# Patient Record
Sex: FEMALE | Race: WHITE | NOT HISPANIC OR LATINO | Employment: OTHER | URBAN - METROPOLITAN AREA
[De-identification: names, ages, dates, MRNs, and addresses within clinical notes are randomized per-mention and may not be internally consistent; named-entity substitution may affect disease eponyms.]

---

## 2017-01-03 ENCOUNTER — HOSPITAL ENCOUNTER (OUTPATIENT)
Dept: PHYSICAL THERAPY | Facility: HOSPITAL | Age: 77
Setting detail: THERAPIES SERIES
Discharge: HOME | End: 2017-01-03

## 2017-01-03 DIAGNOSIS — I89.0 LYMPHEDEMA: Primary | ICD-10-CM

## 2017-01-03 PROCEDURE — 97140 MANUAL THERAPY 1/> REGIONS: CPT

## 2017-01-03 NOTE — PROGRESS NOTES
Outpatient Physical Therapy Lymphedema Treatment Note  [facility]     Patient Name: Shoshana Bae  : 1940  MRN: [id number]  Today's Date: 1/3/2017        Visit Date: 2017    Visit Dx:    ICD-10-CM ICD-9-CM   1. Lymphedema I89.0 457.1       Patient Active Problem List   Diagnosis   • History of total knee arthroplasty   • Pain in left knee   • Failed total knee arthroplasty   • H/O mammogram   • H/O colonoscopy   • Lymph edema   • Sarcoidosis   • Atrial fibrillation   • Hyperlipidemia   • Hypertriglyceridemia   • Type 2 diabetes mellitus with hyperglycemia   • Stasis dermatitis of both legs   • Essential hypertension   • Cellulitis of left lower extremity   • Cellulitis of extremity, lower bilateral   • Chronic anticoagulation, for a fib   • Morbid obesity   • OA (osteoarthritis) of knee   • Infected prosthetic knee joint              Lymphedema       17 1000          Subjective Comments    Subjective Comments States the nurse where she lives did bandage her over the long holiday weekend, but did not apply as many bandages on the left leg as we do here.  -KD      Subjective Pain    Able to rate subjective pain? yes  -KD      Pre-Treatment Pain Level 3  -KD      Post-Treatment Pain Level 3  -KD      Manual Lymphatic Drainage    Manual Lymphatic Drainage --   B inguinal, BLE's  -KD      Compression/Skin Care    Skin Care --   Eucerin lotion  -KD      Wrapping Location --   R LE foot to knee, L LE foot to mid-thigh  -KD      Wrapping Comments Left: Silver stockinette, Rosidal soft, Rosidal K 3-10cm, 2-12cm. Right: Silver stockinette, Rosidal soft, Rosidal K 3-10cm.  -KD        User Key  (r) = Recorded By, (t) = Taken By, (c) = Cosigned By    Initials Name Provider Type    CLAUDIA Cruz, PT Physical Therapist                              PT Assessment/Plan       17 1057 16 1141 16 1116    PT Assessment    Assessment Comments No rash noted today. Will measure pt on  "1/5/16.  -KD Noted light rash left knee, but pt requested using Eucerin lotion instead of HCC.  Will moniter.  -PC Measurements are about the same today. Due to the holidays pt is being bandaged frequently at home.  -KD    PT Plan    PT Plan Comments Cont. per POC.  -KD Cont.  -PC Cont.  -KD      User Key  (r) = Recorded By, (t) = Taken By, (c) = Cosigned By    Initials Name Provider Type    PC Joya Escudero, PT Physical Therapist    CLAUDIA Cruz, PT Physical Therapist                     Exercises       01/03/17 1000          Subjective Comments    Subjective Comments States the nurse where she lives did bandage her over the long holiday weekend, but did not apply as many bandages on the left leg as we do here.  -KD      Subjective Pain    Able to rate subjective pain? yes  -KD      Pre-Treatment Pain Level 3  -KD      Post-Treatment Pain Level 3  -KD        User Key  (r) = Recorded By, (t) = Taken By, (c) = Cosigned By    Initials Name Provider Type    KD Brit Cruz, PT Physical Therapist                              PT OP Goals       12/27/16 1100          PT Short Term Goals    STG Date to Achieve 01/03/17  -KD      STG 1 Patient to demonstrate proper awareness of \"What is Lymphedema\" , \"Do's and Don'ts\", and /or Risk Reduction Practices\" for improved prevention, management, care of symptoms and ease to self-care of condition.  -KD      STG 1 Progress Ongoing  -KD      STG 2 Patient independent and compliant with self-wrapping techniques of compression bandages with assistance of caregiver as needed for improved self-management of condition.  -KD      STG 2 Progress Met  -KD      STG 2 Progress Comments Nurse where pt lives is able to bandage pt's legs.  -KD      STG 3 Patient will demonstrate decreased net edema of B LE's  >/= 5-10cm  for decrease in edema, symptoms, decreased risk of infection and improved skin care/transition to self-care of condition.  -KD      STG 3 Progress Ongoing  -KD      STG " 3 Progress Comments Measurements are about the same.  -KD        User Key  (r) = Recorded By, (t) = Taken By, (c) = Cosigned By    Initials Name Provider Type    CLAUDIA Cruz PT Physical Therapist                Therapy Education       01/03/17 1057    Therapy Education    Given Edema management  -KD    Program Reinforced  -KD    How Provided Verbal  -KD    Provided to Patient  -KD    Level of Understanding Verbalized  -KD      12/28/16 1140    Therapy Education    Given Symptoms/condition management   Skin care  -PC    Program Reinforced  -PC    How Provided Verbal  -PC    Provided to Patient  -PC    Level of Understanding Verbalized  -PC      12/27/16 1114    Therapy Education    Given Edema management  -KD    Program Reinforced  -KD    How Provided Verbal  -KD    Provided to Patient  -KD    Level of Understanding Verbalized  -KD      User Key  (r) = Recorded By, (t) = Taken By, (c) = Cosigned By    Initials Name Provider Type    PC Joya Escudero, PT Physical Therapist    CLAUDIA Cruz PT Physical Therapist                Time Calculation:   Start Time: 0835  Stop Time: 0936  Time Calculation (min): 61 min     Therapy Charges for Today     Code Description Service Date Service Provider Modifiers Qty    02916807999 HC PT MANUAL THERAPY EA 15 MIN 1/3/2017 Brit Cruz, PT GP 4                    Brit Cruz PT  1/3/2017

## 2017-01-04 ENCOUNTER — HOSPITAL ENCOUNTER (OUTPATIENT)
Dept: PHYSICAL THERAPY | Facility: HOSPITAL | Age: 77
Setting detail: THERAPIES SERIES
Discharge: HOME | End: 2017-01-04

## 2017-01-04 DIAGNOSIS — I89.0 LYMPHEDEMA: Primary | ICD-10-CM

## 2017-01-04 PROCEDURE — 97140 MANUAL THERAPY 1/> REGIONS: CPT

## 2017-01-04 NOTE — PROGRESS NOTES
Outpatient Physical Therapy Lymphedema Treatment Note  [facility]     Patient Name: Shoshana Bae  : 1940  MRN: [id number]  Today's Date: 2017        Visit Date: 2017    Visit Dx:    ICD-10-CM ICD-9-CM   1. Lymphedema I89.0 457.1       Patient Active Problem List   Diagnosis   • History of total knee arthroplasty   • Pain in left knee   • Failed total knee arthroplasty   • H/O mammogram   • H/O colonoscopy   • Lymph edema   • Sarcoidosis   • Atrial fibrillation   • Hyperlipidemia   • Hypertriglyceridemia   • Type 2 diabetes mellitus with hyperglycemia   • Stasis dermatitis of both legs   • Essential hypertension   • Cellulitis of left lower extremity   • Cellulitis of extremity, lower bilateral   • Chronic anticoagulation, for a fib   • Morbid obesity   • OA (osteoarthritis) of knee   • Infected prosthetic knee joint              Lymphedema       17 0900 17 1000       Subjective Comments    Subjective Comments No complaints of itching.  -PC States the nurse where she lives did bandage her over the long holiday weekend, but did not apply as many bandages on the left leg as we do here.  -KD     Subjective Pain    Able to rate subjective pain? yes  -PC yes  -KD     Pre-Treatment Pain Level 0  -PC 3  -KD     Post-Treatment Pain Level 0  -PC 3  -KD     Lymphedema Edema Assessment    Edema Assessment Comment Left foot and ankle edematous with pitting from pt's shoe.  -PC      Skin Changes/Observations    Skin Observations Comment No rash noted todayl  -PC      Manual Lymphatic Drainage    Manual Lymphatic Drainage --   B inguinal, BLE's  -PC --   B inguinal, BLE's  -KD     Compression/Skin Care    Skin Care --   Eucerin lotion  -PC --   Eucerin lotion  -KD     Wrapping Location --   R LE foot to knee, L LE foot to mid-thigh  -PC --   R LE foot to knee, L LE foot to mid-thigh  -KD     Wrapping Comments Left: Silver stockinette, Rosidal soft, Rosidal K 3-10cm, 2-12cm. Right: Silver  "stockinette, Rosidal soft, Rosidal K 3-10cm.  -PC Left: Silver stockinette, Rosidal soft, Rosidal K 3-10cm, 2-12cm. Right: Silver stockinette, Rosidal soft, Rosidal K 3-10cm.  -KD       User Key  (r) = Recorded By, (t) = Taken By, (c) = Cosigned By    Initials Name Provider Type    PC Joya Escudero, PT Physical Therapist    CLAUDIA Cruz, PT Physical Therapist                              PT Assessment/Plan       01/04/17 0945 01/03/17 1057 12/28/16 1141    PT Assessment    Assessment Comments Legs appear smaller in size.  -PC No rash noted today. Will measure pt on 1/5/16.  -KD Noted light rash left knee, but pt requested using Eucerin lotion instead of HCC.  Will moniter.  -PC    PT Plan    PT Plan Comments Cont.  -PC Cont. per POC.  -KD Cont.  -PC      User Key  (r) = Recorded By, (t) = Taken By, (c) = Cosigned By    Initials Name Provider Type    PC Joya Escudero, PT Physical Therapist    KD Brit Cruz, PT Physical Therapist                     Exercises       01/04/17 0900 01/03/17 1000       Subjective Comments    Subjective Comments No complaints of itching.  -PC States the nurse where she lives did bandage her over the long holiday weekend, but did not apply as many bandages on the left leg as we do here.  -KD     Subjective Pain    Able to rate subjective pain? yes  -PC yes  -KD     Pre-Treatment Pain Level 0  -PC 3  -KD     Post-Treatment Pain Level 0  -PC 3  -KD       User Key  (r) = Recorded By, (t) = Taken By, (c) = Cosigned By    Initials Name Provider Type    PC Joya Escudero, PT Physical Therapist    CLAUDIA Cruz, PT Physical Therapist                              PT OP Goals       12/27/16 1100          PT Short Term Goals    STG Date to Achieve 01/03/17  -KD      STG 1 Patient to demonstrate proper awareness of \"What is Lymphedema\" , \"Do's and Don'ts\", and /or Risk Reduction Practices\" for improved prevention, management, care of symptoms and ease to self-care of condition.  -KD   "    STG 1 Progress Ongoing  -KD      STG 2 Patient independent and compliant with self-wrapping techniques of compression bandages with assistance of caregiver as needed for improved self-management of condition.  -KD      STG 2 Progress Met  -KD      STG 2 Progress Comments Nurse where pt lives is able to bandage pt's legs.  -KD      STG 3 Patient will demonstrate decreased net edema of B LE's  >/= 5-10cm  for decrease in edema, symptoms, decreased risk of infection and improved skin care/transition to self-care of condition.  -KD      STG 3 Progress Ongoing  -KD      STG 3 Progress Comments Measurements are about the same.  -KD        User Key  (r) = Recorded By, (t) = Taken By, (c) = Cosigned By    Initials Name Provider Type    CLAUDIA Cruz PT Physical Therapist                Therapy Education       01/04/17 0945    Therapy Education    Given Symptoms/condition management  -PC    Program Reinforced  -PC    How Provided Verbal  -PC    Provided to Patient  -PC    Level of Understanding Verbalized  -PC      01/03/17 1057    Therapy Education    Given Edema management  -KD    Program Reinforced  -KD    How Provided Verbal  -KD    Provided to Patient  -KD    Level of Understanding Verbalized  -KD      12/28/16 1140    Therapy Education    Given Symptoms/condition management   Skin care  -PC    Program Reinforced  -PC    How Provided Verbal  -PC    Provided to Patient  -PC    Level of Understanding Verbalized  -PC      User Key  (r) = Recorded By, (t) = Taken By, (c) = Cosigned By    Initials Name Provider Type    PC Joya Escudero, PT Physical Therapist    KD Brit Cruz PT Physical Therapist                Time Calculation:   Start Time: 0832  Stop Time: 0934  Time Calculation (min): 62 min     Therapy Charges for Today     Code Description Service Date Service Provider Modifiers Qty    38203804757  PT MANUAL THERAPY EA 15 MIN 1/4/2017 Joya Escudero, PT GP 4                    Joya Escudero,  PT  1/4/2017

## 2017-01-05 ENCOUNTER — APPOINTMENT (OUTPATIENT)
Dept: LAB | Facility: HOSPITAL | Age: 77
End: 2017-01-05

## 2017-01-05 ENCOUNTER — HOSPITAL ENCOUNTER (OUTPATIENT)
Dept: PHYSICAL THERAPY | Facility: HOSPITAL | Age: 77
Setting detail: THERAPIES SERIES
Discharge: HOME | End: 2017-01-05

## 2017-01-05 PROCEDURE — 36415 COLL VENOUS BLD VENIPUNCTURE: CPT | Performed by: ORTHOPAEDIC SURGERY

## 2017-01-05 PROCEDURE — 97140 MANUAL THERAPY 1/> REGIONS: CPT

## 2017-01-05 PROCEDURE — 86140 C-REACTIVE PROTEIN: CPT | Performed by: ORTHOPAEDIC SURGERY

## 2017-01-05 PROCEDURE — 85652 RBC SED RATE AUTOMATED: CPT | Performed by: ORTHOPAEDIC SURGERY

## 2017-01-05 NOTE — PROGRESS NOTES
Outpatient Physical Therapy Lymphedema Treatment Note  [facility]     Patient Name: Shoshana Bae  : 1940  MRN: [id number]  Today's Date: 2017        Visit Date: 2017    Visit Dx:  No diagnosis found.    Patient Active Problem List   Diagnosis   • History of total knee arthroplasty   • Pain in left knee   • Failed total knee arthroplasty   • H/O mammogram   • H/O colonoscopy   • Lymph edema   • Sarcoidosis   • Atrial fibrillation   • Hyperlipidemia   • Hypertriglyceridemia   • Type 2 diabetes mellitus with hyperglycemia   • Stasis dermatitis of both legs   • Essential hypertension   • Cellulitis of left lower extremity   • Cellulitis of extremity, lower bilateral   • Chronic anticoagulation, for a fib   • Morbid obesity   • OA (osteoarthritis) of knee   • Infected prosthetic knee joint              Lymphedema       17 0900          Subjective Comments    Subjective Comments States she thinks her left knee is down some.  -KD      Subjective Pain    Able to rate subjective pain? yes  -KD      Pre-Treatment Pain Level 0  -KD      Post-Treatment Pain Level 0  -KD      Lymphedema Edema Assessment    Edema Assessment Comment Left knee seems less edematous.  -KD      LLE Circumferential (cm)    Measurement Location 1 10cm above knee  -KD      Left 1 59.5 cm  -KD      Measurement Location 2 Knee (popliteal crease)  -KD      Left 2 59 cm  -KD      Measurement Location 3 10cm below knee  -KD      Left 3 46 cm  -KD      Measurement Location 4 20cm below knee  -KD      Left 4 40.5 cm  -KD      Measurement Location 5 30cm below knee  -KD      Left 5 27 cm  -KD      Measurement Location 6 Ankle  -KD      Left 6 26 cm  -KD      Measurement Location 7 Midfoot  -KD      Left 7 25 cm  -KD      Measurement Location 8 TOTAL  -KD      Left 8 283 cm  -KD      Measurement Location 9 Difference since 16  -KD      Left 9 -6.2 cm  -KD      RLE Circumferential (cm)    Measurement Location 1 10cm above  knee  -KD      Right 1 55.5 cm  -KD      Measurement Location 2 Knee (popliteal crease)  -KD      Right 2 46.5 cm  -KD      Measurement Location 3 10cm below  knee  -KD      Right 3 41.5 cm  -KD      Measurement Location 4 20cm below knee  -KD      Right 4 32.5 cm  -KD      Measurement Location 5 30cm below knee  -KD      Right 5 23 cm  -KD      Measurement Location 6 Ankle  -KD      Right 6 25.7 cm  -KD      Measurement Location 7 Midfoot  -KD      Right 7 24.5 cm  -KD      Measurement Location 8 TOTAL  -KD      Right 8 249.2 cm  -KD      Measurement Location 9 Difference since 12/20/16  -KD      Right 9 -1.3 cm  -KD      Manual Lymphatic Drainage    Manual Lymphatic Drainage --   B inguinal, BLE's  -KD      Compression/Skin Care    Skin Care --   Eucerin lotion  -KD      Wrapping Location --   R LE foot to knee, L LE foot to mid-thigh  -KD      Wrapping Comments Left: Silver stockinette, Rosidal soft, Rosidal K 3-10cm, 2-12cm. Right: Silver stockinette, Rosidal soft, Rosidal K 3-10cm  -KD        User Key  (r) = Recorded By, (t) = Taken By, (c) = Cosigned By    Initials Name Provider Type    KD Brit Cruz, PT Physical Therapist                              PT Assessment/Plan       01/05/17 0947 01/04/17 0945 01/03/17 1057    PT Assessment    Assessment Comments Msmts down some today, rashi for left knee.  -KD Legs appear smaller in size.  -PC No rash noted today. Will measure pt on 1/5/16.  -KD    PT Plan    PT Plan Comments Cont.  -KD Cont.  -PC Cont. per POC.  -KD      User Key  (r) = Recorded By, (t) = Taken By, (c) = Cosigned By    Initials Name Provider Type    PC Joya Escudero, PT Physical Therapist    KD Brit Cruz, PT Physical Therapist                     Exercises       01/05/17 0900          Subjective Comments    Subjective Comments States she thinks her left knee is down some.  -KD      Subjective Pain    Able to rate subjective pain? yes  -KD      Pre-Treatment Pain Level 0  -KD       "Post-Treatment Pain Level 0  -KD        User Key  (r) = Recorded By, (t) = Taken By, (c) = Cosigned By    Initials Name Provider Type    CLAUDIA Cruz, PT Physical Therapist                              PT OP Goals       01/05/17 0900 12/27/16 1100       PT Short Term Goals    STG Date to Achieve 01/03/17  -KD 01/03/17  -KD     STG 1 Patient to demonstrate proper awareness of \"What is Lymphedema\" , \"Do's and Don'ts\", and /or Risk Reduction Practices\" for improved prevention, management, care of symptoms and ease to self-care of condition.  -KD Patient to demonstrate proper awareness of \"What is Lymphedema\" , \"Do's and Don'ts\", and /or Risk Reduction Practices\" for improved prevention, management, care of symptoms and ease to self-care of condition.  -KD     STG 1 Progress Ongoing  -KD Ongoing  -KD     STG 2 Patient independent and compliant with self-wrapping techniques of compression bandages with assistance of caregiver as needed for improved self-management of condition.  -KD Patient independent and compliant with self-wrapping techniques of compression bandages with assistance of caregiver as needed for improved self-management of condition.  -KD     STG 2 Progress Met  -KD Met  -KD     STG 2 Progress Comments  Nurse where pt lives is able to bandage pt's legs.  -KD     STG 3 Patient will demonstrate decreased net edema of B LE's  >/= 5-10cm  for decrease in edema, symptoms, decreased risk of infection and improved skin care/transition to self-care of condition.  -KD Patient will demonstrate decreased net edema of B LE's  >/= 5-10cm  for decrease in edema, symptoms, decreased risk of infection and improved skin care/transition to self-care of condition.  -KD     STG 3 Progress Partially Met  -KD Ongoing  -KD     STG 3 Progress Comments Met for L LE (decrease of 6.2cm). R LE down 1.3cm  -KD Measurements are about the same.  -KD       User Key  (r) = Recorded By, (t) = Taken By, (c) = Cosigned By    Initials " Name Provider Type    CLAUDIA Cruz PT Physical Therapist                Therapy Education       01/05/17 0944    Therapy Education    Given Bandaging/dressing change   reiterated importance of using the same number of bandages on the weekend that we apply here  -KD    Program Reinforced  -KD    How Provided Verbal  -KD    Provided to Patient  -KD    Level of Understanding Verbalized  -KD      01/04/17 0945    Therapy Education    Given Symptoms/condition management  -PC    Program Reinforced  -PC    How Provided Verbal  -PC    Provided to Patient  -PC    Level of Understanding Verbalized  -PC      01/03/17 1057    Therapy Education    Given Edema management  -KD    Program Reinforced  -KD    How Provided Verbal  -KD    Provided to Patient  -KD    Level of Understanding Verbalized  -KD      User Key  (r) = Recorded By, (t) = Taken By, (c) = Cosigned By    Initials Name Provider Type    LIZZIE Escudero, OLE Physical Therapist    CLAUDIA Cruz PT Physical Therapist                Time Calculation:   Start Time: 0833  Stop Time: 0932  Time Calculation (min): 59 min     Therapy Charges for Today     Code Description Service Date Service Provider Modifiers Qty    25910535855 HC PT MANUAL THERAPY EA 15 MIN 1/5/2017 Brit Cruz, PT GP 4                    Brit Cruz PT  1/5/2017

## 2017-01-06 ENCOUNTER — TELEPHONE (OUTPATIENT)
Dept: ORTHOPEDIC SURGERY | Facility: CLINIC | Age: 77
End: 2017-01-06

## 2017-01-06 ENCOUNTER — HOSPITAL ENCOUNTER (OUTPATIENT)
Dept: PHYSICAL THERAPY | Facility: HOSPITAL | Age: 77
Setting detail: THERAPIES SERIES
Discharge: HOME | End: 2017-01-06

## 2017-01-06 DIAGNOSIS — I89.0 LYMPHEDEMA: Primary | ICD-10-CM

## 2017-01-06 PROCEDURE — 97140 MANUAL THERAPY 1/> REGIONS: CPT

## 2017-01-06 NOTE — PROGRESS NOTES
Outpatient Physical Therapy Lymphedema Treatment Note  [facility]     Patient Name: Shoshana Bae  : 1940  MRN: [id number]  Today's Date: 2017        Visit Date: 2017    Visit Dx:    ICD-10-CM ICD-9-CM   1. Lymphedema I89.0 457.1       Patient Active Problem List   Diagnosis   • History of total knee arthroplasty   • Pain in left knee   • Failed total knee arthroplasty   • H/O mammogram   • H/O colonoscopy   • Lymph edema   • Sarcoidosis   • Atrial fibrillation   • Hyperlipidemia   • Hypertriglyceridemia   • Type 2 diabetes mellitus with hyperglycemia   • Stasis dermatitis of both legs   • Essential hypertension   • Cellulitis of left lower extremity   • Cellulitis of extremity, lower bilateral   • Chronic anticoagulation, for a fib   • Morbid obesity   • OA (osteoarthritis) of knee   • Infected prosthetic knee joint              Lymphedema       17 1000          Subjective Comments    Subjective Comments States the bandages have been staying on fine.  -PC      Subjective Pain    Able to rate subjective pain? yes  -PC      Pre-Treatment Pain Level 0  -PC      Post-Treatment Pain Level 0  -PC      Manual Lymphatic Drainage    Manual Lymphatic Drainage --   B inguinal, BLE's  -PC      Compression/Skin Care    Skin Care --   Eucerin lotion  -PC      Wrapping Location --   R LE foot to knee, L LE foot to mid-thigh  -PC      Wrapping Comments Left: Silver stockinette, Rosidal soft, Rosidal K 3-10cm, 2-12cm. Right: Silver stockinette, Rosidal soft, Rosidal K 3-10cm  -PC        User Key  (r) = Recorded By, (t) = Taken By, (c) = Cosigned By    Initials Name Provider Type    PC Joya Escudero, PT Physical Therapist                              PT Assessment/Plan       17 1018 17 0947 17 0945    PT Assessment    Assessment Comments Pt is doing well so far.  Still needs further decrease in edema to prepare for her TKR surgery.  -PC Msmts down some today, rashi for left knee.   "-KD Legs appear smaller in size.  -PC    PT Plan    PT Plan Comments Cont.  -PC Cont.  -KD Cont.  -PC      01/03/17 1057          PT Assessment    Assessment Comments No rash noted today. Will measure pt on 1/5/16.  -KD      PT Plan    PT Plan Comments Cont. per POC.  -KD        User Key  (r) = Recorded By, (t) = Taken By, (c) = Cosigned By    Initials Name Provider Type    PC Joya Escudero, PT Physical Therapist    KD Brit Cruz, PT Physical Therapist                     Exercises       01/06/17 1000          Subjective Comments    Subjective Comments States the bandages have been staying on fine.  -PC      Subjective Pain    Able to rate subjective pain? yes  -PC      Pre-Treatment Pain Level 0  -PC      Post-Treatment Pain Level 0  -PC        User Key  (r) = Recorded By, (t) = Taken By, (c) = Cosigned By    Initials Name Provider Type    PC Joya Escudero, PT Physical Therapist                              PT OP Goals       01/05/17 0900 12/27/16 1100       PT Short Term Goals    STG Date to Achieve 01/03/17  -KD 01/03/17  -KD     STG 1 Patient to demonstrate proper awareness of \"What is Lymphedema\" , \"Do's and Don'ts\", and /or Risk Reduction Practices\" for improved prevention, management, care of symptoms and ease to self-care of condition.  -KD Patient to demonstrate proper awareness of \"What is Lymphedema\" , \"Do's and Don'ts\", and /or Risk Reduction Practices\" for improved prevention, management, care of symptoms and ease to self-care of condition.  -KD     STG 1 Progress Ongoing  -KD Ongoing  -KD     STG 2 Patient independent and compliant with self-wrapping techniques of compression bandages with assistance of caregiver as needed for improved self-management of condition.  -KD Patient independent and compliant with self-wrapping techniques of compression bandages with assistance of caregiver as needed for improved self-management of condition.  -KD     STG 2 Progress Met  -KD Met  -KD     STG 2 Progress " Comments  Nurse where pt lives is able to bandage pt's legs.  -KD     STG 3 Patient will demonstrate decreased net edema of B LE's  >/= 5-10cm  for decrease in edema, symptoms, decreased risk of infection and improved skin care/transition to self-care of condition.  -KD Patient will demonstrate decreased net edema of B LE's  >/= 5-10cm  for decrease in edema, symptoms, decreased risk of infection and improved skin care/transition to self-care of condition.  -KD     STG 3 Progress Partially Met  -KD Ongoing  -KD     STG 3 Progress Comments Met for L LE (decrease of 6.2cm). R LE down 1.3cm  -KD Measurements are about the same.  -KD       User Key  (r) = Recorded By, (t) = Taken By, (c) = Cosigned By    Initials Name Provider Type    CLAUDIA Cruz, PT Physical Therapist                Therapy Education       01/06/17 1017    Therapy Education    Given HEP  -PC    Program Reinforced  -PC    How Provided Verbal  -PC    Provided to Patient  -PC    Level of Understanding Verbalized  -PC      01/05/17 0944    Therapy Education    Given Bandaging/dressing change   reiterated importance of using the same number of bandages on the weekend that we apply here  -KD    Program Reinforced  -KD    How Provided Verbal  -KD    Provided to Patient  -KD    Level of Understanding Verbalized  -KD      01/04/17 0945    Therapy Education    Given Symptoms/condition management  -PC    Program Reinforced  -PC    How Provided Verbal  -PC    Provided to Patient  -PC    Level of Understanding Verbalized  -PC      01/03/17 1057    Therapy Education    Given Edema management  -KD    Program Reinforced  -KD    How Provided Verbal  -KD    Provided to Patient  -KD    Level of Understanding Verbalized  -KD      User Key  (r) = Recorded By, (t) = Taken By, (c) = Cosigned By    Initials Name Provider Type    PC Joya Escudero, PT Physical Therapist    CLAUDIA Cruz, PT Physical Therapist                Time Calculation:   Start Time: 0850 (Pt  was late due to having blood drawn this am.)  Stop Time: 0952  Time Calculation (min): 62 min     Therapy Charges for Today     Code Description Service Date Service Provider Modifiers Qty    57344810464 HC PT MANUAL THERAPY EA 15 MIN 1/6/2017 Joya Escudero, PT GP 4                    Joya Escudero, PT  1/6/2017

## 2017-01-06 NOTE — TELEPHONE ENCOUNTER
Called patient with the results of the labs and reminded her to keep her appt with RBB next week per MLL

## 2017-01-06 NOTE — TELEPHONE ENCOUNTER
----- Message from CHAIM Andujar sent at 1/5/2017  5:24 PM EST -----  Please let patient know that her labs are more elevated than last time, please make sure to keep appointment with Dr. Bosch next week as scheduled.

## 2017-01-09 ENCOUNTER — HOSPITAL ENCOUNTER (OUTPATIENT)
Dept: PHYSICAL THERAPY | Facility: HOSPITAL | Age: 77
Setting detail: THERAPIES SERIES
Discharge: HOME | End: 2017-01-09

## 2017-01-09 DIAGNOSIS — I89.0 LYMPHEDEMA: Primary | ICD-10-CM

## 2017-01-09 PROCEDURE — G8979 MOBILITY GOAL STATUS: HCPCS

## 2017-01-09 PROCEDURE — 97140 MANUAL THERAPY 1/> REGIONS: CPT

## 2017-01-09 PROCEDURE — G8978 MOBILITY CURRENT STATUS: HCPCS

## 2017-01-09 NOTE — PROGRESS NOTES
Outpatient Physical Therapy Lymphedema Treatment Note  [facility]     Patient Name: Shoshana Bae  : 1940  MRN: [id number]  Today's Date: 2017        Visit Date: 2017    Visit Dx:    ICD-10-CM ICD-9-CM   1. Lymphedema I89.0 457.1       Patient Active Problem List   Diagnosis   • History of total knee arthroplasty   • Pain in left knee   • Failed total knee arthroplasty   • H/O mammogram   • H/O colonoscopy   • Lymph edema   • Sarcoidosis   • Atrial fibrillation   • Hyperlipidemia   • Hypertriglyceridemia   • Type 2 diabetes mellitus with hyperglycemia   • Stasis dermatitis of both legs   • Essential hypertension   • Cellulitis of left lower extremity   • Cellulitis of extremity, lower bilateral   • Chronic anticoagulation, for a fib   • Morbid obesity   • OA (osteoarthritis) of knee   • Infected prosthetic knee joint              Lymphedema       17 1000          Subjective Comments    Subjective Comments States her blood is too thick so she might not be able to have her knee surgery on  as planned.  -PC      Subjective Pain    Able to rate subjective pain? yes  -PC      Pre-Treatment Pain Level 0  -PC      Post-Treatment Pain Level 0  -PC      Skin Changes/Observations    Skin Observations Comment Still with redness B ankles to knees.  -PC      Manual Lymphatic Drainage    Manual Lymphatic Drainage --   B inguinal, BLE's  -PC      Compression/Skin Care    Skin Care --   Eucerin lotion  -PC      Wrapping Location --   R LE foot to knee, L LE foot to mid-thigh  -PC      Wrapping Comments Left: Silver stockinette, Rosidal soft, Rosidal K 3-10cm, 2-12cm. Right: Silver stockinette, Rosidal soft, Rosidal K 3-10cm  -PC        User Key  (r) = Recorded By, (t) = Taken By, (c) = Cosigned By    Initials Name Provider Type    PC Joya Escudero, PT Physical Therapist                              PT Assessment/Plan       17 1012 17 1018 17 0947    PT Assessment    Assessment  "Comments Pt is progressing well.  -PC Pt is doing well so far.  Still needs further decrease in edema to prepare for her TKR surgery.  -PC Msmts down some today, rashi for left knee.  -KD    PT Plan    PT Plan Comments Cont per POC.  -PC Cont.  -PC Cont.  -KD      01/04/17 0945 01/03/17 1057       PT Assessment    Assessment Comments Legs appear smaller in size.  -PC No rash noted today. Will measure pt on 1/5/16.  -KD     PT Plan    PT Plan Comments Cont.  -PC Cont. per POC.  -KD       User Key  (r) = Recorded By, (t) = Taken By, (c) = Cosigned By    Initials Name Provider Type    PC Joya Escudero, PT Physical Therapist    KD Brit Cruz, PT Physical Therapist                     Exercises       01/09/17 1000          Subjective Comments    Subjective Comments States her blood is too thick so she might not be able to have her knee surgery on 2/1 as planned.  -PC      Subjective Pain    Able to rate subjective pain? yes  -PC      Pre-Treatment Pain Level 0  -PC      Post-Treatment Pain Level 0  -PC        User Key  (r) = Recorded By, (t) = Taken By, (c) = Cosigned By    Initials Name Provider Type    PC Joya Escudero, PT Physical Therapist                              PT OP Goals       01/05/17 0900 12/27/16 1100       PT Short Term Goals    STG Date to Achieve 01/03/17  -KD 01/03/17  -KD     STG 1 Patient to demonstrate proper awareness of \"What is Lymphedema\" , \"Do's and Don'ts\", and /or Risk Reduction Practices\" for improved prevention, management, care of symptoms and ease to self-care of condition.  -KD Patient to demonstrate proper awareness of \"What is Lymphedema\" , \"Do's and Don'ts\", and /or Risk Reduction Practices\" for improved prevention, management, care of symptoms and ease to self-care of condition.  -KD     STG 1 Progress Ongoing  -KD Ongoing  -KD     STG 2 Patient independent and compliant with self-wrapping techniques of compression bandages with assistance of caregiver as needed for improved " self-management of condition.  -KD Patient independent and compliant with self-wrapping techniques of compression bandages with assistance of caregiver as needed for improved self-management of condition.  -KD     STG 2 Progress Met  -KD Met  -KD     STG 2 Progress Comments  Nurse where pt lives is able to bandage pt's legs.  -KD     STG 3 Patient will demonstrate decreased net edema of B LE's  >/= 5-10cm  for decrease in edema, symptoms, decreased risk of infection and improved skin care/transition to self-care of condition.  -KD Patient will demonstrate decreased net edema of B LE's  >/= 5-10cm  for decrease in edema, symptoms, decreased risk of infection and improved skin care/transition to self-care of condition.  -KD     STG 3 Progress Partially Met  -KD Ongoing  -KD     STG 3 Progress Comments Met for L LE (decrease of 6.2cm). R LE down 1.3cm  -KD Measurements are about the same.  -KD       User Key  (r) = Recorded By, (t) = Taken By, (c) = Cosigned By    Initials Name Provider Type    CLAUDIA Cruz PT Physical Therapist                Therapy Education       01/09/17 1011    Therapy Education    Given Symptoms/condition management  -PC    Program Reinforced  -PC    How Provided Verbal  -PC    Provided to Patient  -PC    Level of Understanding Verbalized  -PC      01/06/17 1017    Therapy Education    Given HEP  -PC    Program Reinforced  -PC    How Provided Verbal  -PC    Provided to Patient  -PC    Level of Understanding Verbalized  -PC      01/05/17 0944    Therapy Education    Given Bandaging/dressing change   reiterated importance of using the same number of bandages on the weekend that we apply here  -KD    Program Reinforced  -KD    How Provided Verbal  -KD    Provided to Patient  -KD    Level of Understanding Verbalized  -KD      01/04/17 0945    Therapy Education    Given Symptoms/condition management  -PC    Program Reinforced  -PC    How Provided Verbal  -PC    Provided to Patient  -PC    Level  of Understanding Verbalized  -PC      01/03/17 1057    Therapy Education    Given Edema management  -KD    Program Reinforced  -KD    How Provided Verbal  -KD    Provided to Patient  -KD    Level of Understanding Verbalized  -KD      User Key  (r) = Recorded By, (t) = Taken By, (c) = Cosigned By    Initials Name Provider Type    PC Joya Escudero, PT Physical Therapist    KD Brit Cruz, PT Physical Therapist                Time Calculation:   Start Time: 0835  Stop Time: 0935  Time Calculation (min): 60 min     Therapy Charges for Today     Code Description Service Date Service Provider Modifiers Qty    90976117937 HC PT MOBILITY CURRENT 1/9/2017 Joya Escudero, PT GP, CL 1    15772452049 HC PT MOBILITY PROJECTED 1/9/2017 Joya Escudero, PT GP, CK 1    [id number] HC PT MANUAL THERAPY EA 15 MIN 1/9/2017 Joya Escudero, PT GP 4          PT G-Codes  Functional Limitation: Mobility: Walking and moving around  Mobility: Walking and Moving Around Current Status (): At least 60 percent but less than 80 percent impaired, limited or restricted  Mobility: Walking and Moving Around Goal Status (): At least 40 percent but less than 60 percent impaired, limited or restricted         Joya Escudero, PT  1/9/2017

## 2017-01-10 ENCOUNTER — HOSPITAL ENCOUNTER (OUTPATIENT)
Dept: PHYSICAL THERAPY | Facility: HOSPITAL | Age: 77
Setting detail: THERAPIES SERIES
Discharge: HOME | End: 2017-01-10

## 2017-01-10 DIAGNOSIS — I89.0 LYMPHEDEMA: Primary | ICD-10-CM

## 2017-01-10 PROCEDURE — 97140 MANUAL THERAPY 1/> REGIONS: CPT

## 2017-01-10 NOTE — PROGRESS NOTES
Outpatient Physical Therapy Lymphedema Treatment Note  [facility]     Patient Name: Shoshana Bae  : 1940  MRN: [id number]  Today's Date: 1/10/2017        Visit Date: 01/10/2017    Visit Dx:    ICD-10-CM ICD-9-CM   1. Lymphedema I89.0 457.1       Patient Active Problem List   Diagnosis   • History of total knee arthroplasty   • Pain in left knee   • Failed total knee arthroplasty   • H/O mammogram   • H/O colonoscopy   • Lymph edema   • Sarcoidosis   • Atrial fibrillation   • Hyperlipidemia   • Hypertriglyceridemia   • Type 2 diabetes mellitus with hyperglycemia   • Stasis dermatitis of both legs   • Essential hypertension   • Cellulitis of left lower extremity   • Cellulitis of extremity, lower bilateral   • Chronic anticoagulation, for a fib   • Morbid obesity   • OA (osteoarthritis) of knee   • Infected prosthetic knee joint              Lymphedema       01/10/17 1000          Subjective Comments    Subjective Comments Nothing new today.  -KD      Subjective Pain    Able to rate subjective pain? yes  -KD      Pre-Treatment Pain Level 0  -KD      Post-Treatment Pain Level 0  -KD      Manual Lymphatic Drainage    Manual Lymphatic Drainage --   B inguinal, BLE's  -KD      Compression/Skin Care    Compression/Skin Care --   Silver stockinette, Ros Soft,Ros K 1-8cm;2-10cm R, 4-10cm L  -KD      Skin Care --   Eucerin lotion  -KD      Wrapping Location --   R LE foot to knee, L LE foot to mid-thigh  -KD        User Key  (r) = Recorded By, (t) = Taken By, (c) = Cosigned By    Initials Name Provider Type    CLAUDIA Cruz, PT Physical Therapist                              PT Assessment/Plan       01/10/17 1058 17 1012 17 1018    PT Assessment    Assessment Comments Pt doing well with therapy program.  -KD Pt is progressing well.  -PC Pt is doing well so far.  Still needs further decrease in edema to prepare for her TKR surgery.  -PC    PT Plan    PT Plan Comments Cont.  -KD Cont per POC.   "-PC Cont.  -PC      01/05/17 0947 01/04/17 0945       PT Assessment    Assessment Comments Msmts down some today, rashi for left knee.  -KD Legs appear smaller in size.  -PC     PT Plan    PT Plan Comments Cont.  -KD Cont.  -PC       User Key  (r) = Recorded By, (t) = Taken By, (c) = Cosigned By    Initials Name Provider Type     Joya Escudero, PT Physical Therapist    CLAUDIA Cruz, PT Physical Therapist                     Exercises       01/10/17 1000          Subjective Comments    Subjective Comments Nothing new today.  -KD      Subjective Pain    Able to rate subjective pain? yes  -KD      Pre-Treatment Pain Level 0  -KD      Post-Treatment Pain Level 0  -KD        User Key  (r) = Recorded By, (t) = Taken By, (c) = Cosigned By    Initials Name Provider Type    CLAUDIA Cruz, PT Physical Therapist                              PT OP Goals       01/05/17 0900          PT Short Term Goals    STG Date to Achieve 01/03/17  -KD      STG 1 Patient to demonstrate proper awareness of \"What is Lymphedema\" , \"Do's and Don'ts\", and /or Risk Reduction Practices\" for improved prevention, management, care of symptoms and ease to self-care of condition.  -KD      STG 1 Progress Ongoing  -KD      STG 2 Patient independent and compliant with self-wrapping techniques of compression bandages with assistance of caregiver as needed for improved self-management of condition.  -KD      STG 2 Progress Met  -KD      STG 3 Patient will demonstrate decreased net edema of B LE's  >/= 5-10cm  for decrease in edema, symptoms, decreased risk of infection and improved skin care/transition to self-care of condition.  -KD      STG 3 Progress Partially Met  -KD      STG 3 Progress Comments Met for L LE (decrease of 6.2cm). R LE down 1.3cm  -KD        User Key  (r) = Recorded By, (t) = Taken By, (c) = Cosigned By    Initials Name Provider Type    CLAUDIA Cruz PT Physical Therapist                Therapy Education       01/10/17 1057 "    Therapy Education    Given Edema management  -KD    Program Reinforced  -KD    How Provided Verbal  -KD    Provided to Patient  -KD    Level of Understanding Verbalized  -KD      01/09/17 1011    Therapy Education    Given Symptoms/condition management  -PC    Program Reinforced  -PC    How Provided Verbal  -PC    Provided to Patient  -PC    Level of Understanding Verbalized  -PC      01/06/17 1017    Therapy Education    Given HEP  -PC    Program Reinforced  -PC    How Provided Verbal  -PC    Provided to Patient  -PC    Level of Understanding Verbalized  -PC      01/05/17 0944    Therapy Education    Given Bandaging/dressing change   reiterated importance of using the same number of bandages on the weekend that we apply here  -KD    Program Reinforced  -KD    How Provided Verbal  -KD    Provided to Patient  -KD    Level of Understanding Verbalized  -KD      01/04/17 0945    Therapy Education    Given Symptoms/condition management  -PC    Program Reinforced  -PC    How Provided Verbal  -PC    Provided to Patient  -PC    Level of Understanding Verbalized  -PC      User Key  (r) = Recorded By, (t) = Taken By, (c) = Cosigned By    Initials Name Provider Type    PC Joya Escudero PT Physical Therapist    KD Brit Cruz PT Physical Therapist                Time Calculation:   Start Time: 0828  Stop Time: 0922  Time Calculation (min): 54 min     Therapy Charges for Today     Code Description Service Date Service Provider Modifiers Qty    [id number] HC PT MANUAL THERAPY EA 15 MIN 1/10/2017 Brit Cruz, PT GP 4                    Brit Cruz PT  1/10/2017

## 2017-01-11 ENCOUNTER — HOSPITAL ENCOUNTER (OUTPATIENT)
Dept: PHYSICAL THERAPY | Facility: HOSPITAL | Age: 77
Setting detail: THERAPIES SERIES
Discharge: HOME | End: 2017-01-11

## 2017-01-11 DIAGNOSIS — I89.0 LYMPHEDEMA: Primary | ICD-10-CM

## 2017-01-11 PROCEDURE — 97140 MANUAL THERAPY 1/> REGIONS: CPT

## 2017-01-11 NOTE — PROGRESS NOTES
Outpatient Physical Therapy Lymphedema Treatment Note  [facility]     Patient Name: Shoshana Bae  : 1940  MRN: [id number]  Today's Date: 2017        Visit Date: 2017    Visit Dx:    ICD-10-CM ICD-9-CM   1. Lymphedema I89.0 457.1       Patient Active Problem List   Diagnosis   • History of total knee arthroplasty   • Pain in left knee   • Failed total knee arthroplasty   • H/O mammogram   • H/O colonoscopy   • Lymph edema   • Sarcoidosis   • Atrial fibrillation   • Hyperlipidemia   • Hypertriglyceridemia   • Type 2 diabetes mellitus with hyperglycemia   • Stasis dermatitis of both legs   • Essential hypertension   • Cellulitis of left lower extremity   • Cellulitis of extremity, lower bilateral   • Chronic anticoagulation, for a fib   • Morbid obesity   • OA (osteoarthritis) of knee   • Infected prosthetic knee joint              Lymphedema       17 1200          Subjective Comments    Subjective Comments Doing well.  -PC      Subjective Pain    Able to rate subjective pain? yes  -PC      Pre-Treatment Pain Level 0  -PC      Post-Treatment Pain Level 0  -PC      Skin Changes/Observations    Skin Observations Comment Redness appears decreased on right lower leg.  -PC      Manual Lymphatic Drainage    Manual Lymphatic Drainage --   B inguinal, BLE's  -PC      Compression/Skin Care    Compression/Skin Care --   Silver stockinette, Ros Soft,Ros K 1-8cm;2-10cm R, 4-10cm L  -PC      Skin Care --   Eucerin lotion  -PC      Wrapping Location --   R LE foot to knee, L LE foot to mid-thigh  -PC        User Key  (r) = Recorded By, (t) = Taken By, (c) = Cosigned By    Initials Name Provider Type    PC Joya Escudero PT Physical Therapist                              PT Assessment/Plan       17 1238 01/10/17 1058 17 1012    PT Assessment    Assessment Comments No new problems. Pt sees ortho dr on .  -PC Pt doing well with therapy program.  -KD Pt is progressing well.  -PC    PT  "Plan    PT Plan Comments Cont.  -PC Cont.  -KD Cont per POC.  -PC      01/06/17 1018 01/05/17 0947       PT Assessment    Assessment Comments Pt is doing well so far.  Still needs further decrease in edema to prepare for her TKR surgery.  -PC Msmts down some today, rashi for left knee.  -KD     PT Plan    PT Plan Comments Cont.  -PC Cont.  -KD       User Key  (r) = Recorded By, (t) = Taken By, (c) = Cosigned By    Initials Name Provider Type    PC Joya Escudero, PT Physical Therapist    KD Brit Cruz, PT Physical Therapist                     Exercises       01/11/17 1200          Subjective Comments    Subjective Comments Doing well.  -PC      Subjective Pain    Able to rate subjective pain? yes  -PC      Pre-Treatment Pain Level 0  -PC      Post-Treatment Pain Level 0  -PC        User Key  (r) = Recorded By, (t) = Taken By, (c) = Cosigned By    Initials Name Provider Type    PC Joya Escudero, PT Physical Therapist                              PT OP Goals       01/05/17 0900          PT Short Term Goals    STG Date to Achieve 01/03/17  -KD      STG 1 Patient to demonstrate proper awareness of \"What is Lymphedema\" , \"Do's and Don'ts\", and /or Risk Reduction Practices\" for improved prevention, management, care of symptoms and ease to self-care of condition.  -KD      STG 1 Progress Ongoing  -KD      STG 2 Patient independent and compliant with self-wrapping techniques of compression bandages with assistance of caregiver as needed for improved self-management of condition.  -KD      STG 2 Progress Met  -KD      STG 3 Patient will demonstrate decreased net edema of B LE's  >/= 5-10cm  for decrease in edema, symptoms, decreased risk of infection and improved skin care/transition to self-care of condition.  -KD      STG 3 Progress Partially Met  -KD      STG 3 Progress Comments Met for L LE (decrease of 6.2cm). R LE down 1.3cm  -KD        User Key  (r) = Recorded By, (t) = Taken By, (c) = Cosigned By    Initials Name " Provider Type    KD Brit Cruz, PT Physical Therapist                Therapy Education       01/11/17 1237    Therapy Education    Given Bandaging/dressing change  -PC    Program Reinforced  -PC    How Provided Verbal;Demonstration  -PC    Provided to Patient  -PC    Level of Understanding Verbalized  -PC      01/10/17 1057    Therapy Education    Given Edema management  -KD    Program Reinforced  -KD    How Provided Verbal  -KD    Provided to Patient  -KD    Level of Understanding Verbalized  -KD      01/09/17 1011    Therapy Education    Given Symptoms/condition management  -PC    Program Reinforced  -PC    How Provided Verbal  -PC    Provided to Patient  -PC    Level of Understanding Verbalized  -PC      01/06/17 1017    Therapy Education    Given HEP  -PC    Program Reinforced  -PC    How Provided Verbal  -PC    Provided to Patient  -PC    Level of Understanding Verbalized  -PC      01/05/17 0944    Therapy Education    Given Bandaging/dressing change   reiterated importance of using the same number of bandages on the weekend that we apply here  -KD    Program Reinforced  -KD    How Provided Verbal  -KD    Provided to Patient  -KD    Level of Understanding Verbalized  -KD      User Key  (r) = Recorded By, (t) = Taken By, (c) = Cosigned By    Initials Name Provider Type    PC Joya Escudero, PT Physical Therapist    KD Brit Cruz PT Physical Therapist                Time Calculation:   Start Time: 1100  Stop Time: 1201  Time Calculation (min): 61 min     Therapy Charges for Today     Code Description Service Date Service Provider Modifiers Qty    86406830804 HC PT MANUAL THERAPY EA 15 MIN 1/11/2017 Joya Escudero, PT GP 4                    Joya Escudero, PT  1/11/2017

## 2017-01-12 ENCOUNTER — HOSPITAL ENCOUNTER (OUTPATIENT)
Dept: PHYSICAL THERAPY | Facility: HOSPITAL | Age: 77
Setting detail: THERAPIES SERIES
Discharge: HOME | End: 2017-01-12

## 2017-01-12 DIAGNOSIS — I89.0 LYMPHEDEMA: Primary | ICD-10-CM

## 2017-01-12 PROCEDURE — 97140 MANUAL THERAPY 1/> REGIONS: CPT

## 2017-01-12 NOTE — PROGRESS NOTES
Outpatient Physical Therapy Lymphedema Treatment Note  [facility]     Patient Name: Shoshana Bae  : 1940  MRN: [id number]  Today's Date: 2017        Visit Date: 2017    Visit Dx:    ICD-10-CM ICD-9-CM   1. Lymphedema I89.0 457.1       Patient Active Problem List   Diagnosis   • History of total knee arthroplasty   • Pain in left knee   • Failed total knee arthroplasty   • H/O mammogram   • H/O colonoscopy   • Lymph edema   • Sarcoidosis   • Atrial fibrillation   • Hyperlipidemia   • Hypertriglyceridemia   • Type 2 diabetes mellitus with hyperglycemia   • Stasis dermatitis of both legs   • Essential hypertension   • Cellulitis of left lower extremity   • Cellulitis of extremity, lower bilateral   • Chronic anticoagulation, for a fib   • Morbid obesity   • OA (osteoarthritis) of knee   • Infected prosthetic knee joint              Lymphedema       17 1000 17 1200       Subjective Comments    Subjective Comments States she sees Dr. Bosch tomorrow to discuss knee surgery.  -KD Doing well.  -PC     Subjective Pain    Able to rate subjective pain? yes  -KD yes  -PC     Pre-Treatment Pain Level 0  -KD 0  -PC     Post-Treatment Pain Level 0  -KD 0  -PC     Skin Changes/Observations    Skin Observations Comment Still with some redness of lower legs, temperature normal.  -KD Redness appears decreased on right lower leg.  -PC     LLE Circumferential (cm)    Measurement Location 1 10cm above knee  -KD      Left 1 59 cm  -KD      Measurement Location 2 Knee (popliteal crease)  -KD      Left 2 58.5 cm  -KD      Measurement Location 3 10cm below knee  -KD      Left 3 46 cm  -KD      Measurement Location 4 20cm below knee  -KD      Left 4 40.5 cm  -KD      Measurement Location 5 30cm below knee  -KD      Left 5 27 cm  -KD      Measurement Location 6 Ankle  -KD      Left 6 26.5 cm  -KD      Measurement Location 7 Midfoot  -KD      Left 7 24.5 cm  -KD      Measurement Location 8 TOTAL  -KD       Left 8 282 cm  -KD      Measurement Location 9 Difference since 12/20/16  -KD      Left 9 -7.2 cm  -KD      RLE Circumferential (cm)    Measurement Location 1 10cm above knee  -KD      Right 1 55.5 cm  -KD      Measurement Location 2 Knee (popliteal crease)  -KD      Right 2 47 cm  -KD      Measurement Location 3 10cm below  knee  -KD      Right 3 41 cm  -KD      Measurement Location 4 20cm below knee  -KD      Right 4 31.5 cm  -KD      Measurement Location 5 30cm below knee  -KD      Right 5 22.5 cm  -KD      Measurement Location 6 Ankle  -KD      Right 6 26 cm  -KD      Measurement Location 7 Midfoot  -KD      Right 7 24.5 cm  -KD      Measurement Location 8 TOTAL  -KD      Right 8 248 cm  -KD      Measurement Location 9 Difference since 12/20/16  -KD      Right 9 -2.5 cm  -KD      Manual Lymphatic Drainage    Manual Lymphatic Drainage --   B inguinal, BLE's  -KD --   B inguinal, BLE's  -PC     Compression/Skin Care    Compression/Skin Care --   Silver stockinette, Ros Soft,Ros K 1-8cm;2-10cm R, 4-10cm L  -KD --   Silver stockinette, Ros Soft,Ros K 1-8cm;2-10cm R, 4-10cm L  -PC     Skin Care --   Eucerin lotion  -KD --   Eucerin lotion  -PC     Wrapping Location --   R LE foot to knee, L LE foot to mid-thigh  -KD --   R LE foot to knee, L LE foot to mid-thigh  -PC       User Key  (r) = Recorded By, (t) = Taken By, (c) = Cosigned By    Initials Name Provider Type    PC Joya Escudero, PT Physical Therapist    KD Brit Cruz, PT Physical Therapist                              PT Assessment/Plan       01/12/17 1112 01/11/17 1238 01/10/17 1058    PT Assessment    Assessment Comments Pt making slow but steady progress in therapy. Left knee has decreased by 5.5cm total.  -KD No new problems. Pt sees ortho  on 1/13.  -PC Pt doing well with therapy program.  -KD    PT Plan    PT Plan Comments Cont.  -KD Cont.  -PC Cont.  -KD      01/09/17 1012 01/06/17 1018       PT Assessment    Assessment Comments Pt is  "progressing well.  -PC Pt is doing well so far.  Still needs further decrease in edema to prepare for her TKR surgery.  -PC     PT Plan    PT Plan Comments Cont per POC.  -PC Cont.  -PC       User Key  (r) = Recorded By, (t) = Taken By, (c) = Cosigned By    Initials Name Provider Type    PC Joya Escudero, PT Physical Therapist    CLAUDIA Cruz, PT Physical Therapist                     Exercises       01/12/17 1000 01/11/17 1200       Subjective Comments    Subjective Comments States she sees Dr. Bosch tomorrow to discuss knee surgery.  -KD Doing well.  -PC     Subjective Pain    Able to rate subjective pain? yes  -KD yes  -PC     Pre-Treatment Pain Level 0  -KD 0  -PC     Post-Treatment Pain Level 0  -KD 0  -PC       User Key  (r) = Recorded By, (t) = Taken By, (c) = Cosigned By    Initials Name Provider Type    PC Joya Escudero, PT Physical Therapist    KD Brit Cruz, PT Physical Therapist                              PT OP Goals       01/12/17 1100 01/05/17 0900       PT Short Term Goals    STG Date to Achieve 01/03/17  -KD 01/03/17  -KD     STG 1 Patient to demonstrate proper awareness of \"What is Lymphedema\" , \"Do's and Don'ts\", and /or Risk Reduction Practices\" for improved prevention, management, care of symptoms and ease to self-care of condition.  -KD Patient to demonstrate proper awareness of \"What is Lymphedema\" , \"Do's and Don'ts\", and /or Risk Reduction Practices\" for improved prevention, management, care of symptoms and ease to self-care of condition.  -KD     STG 1 Progress Met  -KD Ongoing  -KD     STG 1 Progress Comments Pt seems to have good understanding of condition & precautions  -KD      STG 2 Patient independent and compliant with self-wrapping techniques of compression bandages with assistance of caregiver as needed for improved self-management of condition.  -KD Patient independent and compliant with self-wrapping techniques of compression bandages with assistance of caregiver as " needed for improved self-management of condition.  -KD     STG 2 Progress Met  -KD Met  -KD     STG 2 Progress Comments Pt has someone where she resides that can bandage her legs.  -KD      STG 3 Patient will demonstrate decreased net edema of B LE's  >/= 5-10cm  for decrease in edema, symptoms, decreased risk of infection and improved skin care/transition to self-care of condition.  -KD Patient will demonstrate decreased net edema of B LE's  >/= 5-10cm  for decrease in edema, symptoms, decreased risk of infection and improved skin care/transition to self-care of condition.  -KD     STG 3 Progress Partially Met  -KD Partially Met  -KD     STG 3 Progress Comments Met for L LE (decrease of 7.2cm), ongoing for R LE (decrease of 2.5cm)  -KD Met for L LE (decrease of 6.2cm). R LE down 1.3cm  -KD     Long Term Goals    LTG Date to Achieve 01/20/17  -KD      LTG 1 Patient will demonstrate decreased net edema of L LE >/= 10-20cm for decrease in edema, symptoms, decreased risk of infection and improved skin care/transition to self-care of condition.  -KD      LTG 1 Progress Ongoing  -KD      LTG 2 Patient independent and compliant with compression garments as indicated for self-management of condition.  -KD      LTG 2 Progress New  -KD      LTG 3 Patient/family/caregivers independent and compliant with self-care techniques for self-management of condition.  -KD      LTG 3 Progress Ongoing  -KD        User Key  (r) = Recorded By, (t) = Taken By, (c) = Cosigned By    Initials Name Provider Type    CLAUDIA Cruz PT Physical Therapist                Therapy Education       01/12/17 1108    Therapy Education    Given Edema management  -KD    Program Reinforced  -KD    How Provided Verbal  -KD    Provided to Patient  -KD    Level of Understanding Verbalized  -KD      01/11/17 1237    Therapy Education    Given Bandaging/dressing change  -PC    Program Reinforced  -PC    How Provided Verbal;Demonstration  -PC    Provided to  Patient  -PC    Level of Understanding Verbalized  -PC      01/10/17 1057    Therapy Education    Given Edema management  -KD    Program Reinforced  -KD    How Provided Verbal  -KD    Provided to Patient  -KD    Level of Understanding Verbalized  -KD      01/09/17 1011    Therapy Education    Given Symptoms/condition management  -PC    Program Reinforced  -PC    How Provided Verbal  -PC    Provided to Patient  -PC    Level of Understanding Verbalized  -PC      01/06/17 1017    Therapy Education    Given HEP  -PC    Program Reinforced  -PC    How Provided Verbal  -PC    Provided to Patient  -PC    Level of Understanding Verbalized  -PC      User Key  (r) = Recorded By, (t) = Taken By, (c) = Cosigned By    Initials Name Provider Type    PC Joya Escudero, PT Physical Therapist    KD Brit Cruz PT Physical Therapist                Time Calculation:   Start Time: 0831  Stop Time: 0932  Time Calculation (min): 61 min     Therapy Charges for Today     Code Description Service Date Service Provider Modifiers Qty    95284402201 HC PT MANUAL THERAPY EA 15 MIN 1/12/2017 Brit Cruz, PT GP 4                    Brit Cruz PT  1/12/2017

## 2017-01-13 ENCOUNTER — OFFICE VISIT (OUTPATIENT)
Dept: ORTHOPEDIC SURGERY | Facility: CLINIC | Age: 77
End: 2017-01-13

## 2017-01-13 ENCOUNTER — APPOINTMENT (OUTPATIENT)
Dept: PHYSICAL THERAPY | Facility: HOSPITAL | Age: 77
End: 2017-01-13

## 2017-01-13 VITALS — TEMPERATURE: 98.4 F | WEIGHT: 260 LBS | BODY MASS INDEX: 47.84 KG/M2 | HEIGHT: 62 IN

## 2017-01-13 DIAGNOSIS — T84.019D FAILED TOTAL JOINT REPLACEMENT, SUBSEQUENT ENCOUNTER: Primary | ICD-10-CM

## 2017-01-13 PROCEDURE — 99213 OFFICE O/P EST LOW 20 MIN: CPT | Performed by: ORTHOPAEDIC SURGERY

## 2017-01-13 PROCEDURE — 73562 X-RAY EXAM OF KNEE 3: CPT | Performed by: ORTHOPAEDIC SURGERY

## 2017-01-13 PROCEDURE — 20610 DRAIN/INJ JOINT/BURSA W/O US: CPT | Performed by: ORTHOPAEDIC SURGERY

## 2017-01-13 RX ORDER — METOPROLOL TARTRATE 50 MG/1
50 TABLET, FILM COATED ORAL 2 TIMES DAILY
COMMUNITY
Start: 2016-12-12 | End: 2017-04-13 | Stop reason: SDUPTHER

## 2017-01-13 NOTE — PROGRESS NOTES
Patient: Shoshana Bae  YOB: 1940 76 y.o. female  Medical Record Number: [id number]    Chief Complaints:   Chief Complaint   Patient presents with   • Left Knee - Follow-up, Pain       History of Present Illness:Shoshana Bae is a 76 y.o. female who presents for follow-up of  left knee pain.  She had removal of an infected knee replacement on 10/10/16 she has been off antibiotics for 6 weeks now over the last day or so she's had increased pain in the left knee.  She has been working with the lymphedema clinic and her edema and her lites is coming down nicely.    Allergies:   Allergies   Allergen Reactions   • Codeine Nausea And Vomiting   • Morphine Nausea And Vomiting   • Morphine And Related        Medications:   Current Outpatient Prescriptions   Medication Sig Dispense Refill   • acetaminophen (TYLENOL) 500 MG tablet Take 500 mg by mouth Every 6 (Six) Hours As Needed for mild pain (1-3).     • atorvastatin (LIPITOR) 10 MG tablet Take 1 tablet by mouth daily. 90 tablet 3   • furosemide (LASIX) 40 MG tablet Take 1 tablet by mouth 2 (two) times a day. (Patient taking differently: Take 40 mg by mouth Daily. Pt taking 100mg qd) 180 tablet 3   • glucagon, human recombinant, (GLUCAGEN DIAGNOSTIC) 1 MG injection Inject 1 mg under the skin 1 (One) Time As Needed (hypoglycemia) for up to 1 dose. 1 each 2   • HYDROcodone-acetaminophen (NORCO) 7.5-325 MG per tablet 1-2 po q4hr prn pain 100 tablet 0   • hydrocortisone (ALA-CYNTHIA) 1 % cream Apply  topically 2 (two) times a day. (Patient taking differently: Apply 1 application topically 2 (Two) Times a Day.) 1.5 g 3   • icosapent ethyl (VASCEPA) 1 G capsule capsule Take 2 g by mouth 2 (two) times a day with meals. 360 capsule 3   • KLOR-CON 20 MEQ CR tablet Take 1 tablet by mouth 2 (two) times a day. 180 tablet 3   • lisinopril (PRINIVIL,ZESTRIL) 10 MG tablet Take 10 mg by mouth Daily.     • metFORMIN (GLUCOPHAGE) 500 MG tablet      • metoprolol tartrate  "(LOPRESSOR) 50 MG tablet      • warfarin (COUMADIN) 5 MG tablet Take 1 tablet by mouth Daily. 5MG daily 30 tablet 0     No current facility-administered medications for this visit.          The following portions of the patient's history were reviewed and updated as appropriate: allergies, current medications, past family history, past medical history, past social history, past surgical history and problem list.    Review of Systems:   A 14 point review of systems was performed. All systems negative except pertinent positives/negative listed in HPI above    Physical Exam:   Vitals:    01/13/17 1443   Temp: 98.4 °F (36.9 °C)   Weight: 260 lb (118 kg)   Height: 62\" (157.5 cm)       General: A and O x 3, ASA, NAD    SCLERA:    Normal    DENTITION:   Normal  Range of motion is 5-90° moderate lymphedema from mid tibia distal    Radiology:  Xrays 3views (ap,lateral, sunrise) were ordered and reviewed for evaluation of knee pain demonstratinga well positioned knee spacer without evidence of wear, loosening or osteolysis  todays xrays were compared to previous xrays and demonstrate no change    Assessment/Plan:  Infected TKA s/p spacer placement. Knee aspirated, will order ESR, CRP.  Large Joint Arthrocentesis  Date/Time: 1/13/2017 3:24 PM  Procedure Details  Aspirate amount: 25 mL  Aspirate: bloody          "

## 2017-01-13 NOTE — MR AVS SNAPSHOT
Shoshana Bae   1/13/2017 2:30 PM   Office Visit    Dept Phone:  [phone]   Encounter #:  [id number]    Provider:  Dominick Bosch MD   Department:  [facility]                Your Full Care Plan              Today's Medication Changes          These changes are accurate as of: 1/13/17  3:35 PM.  If you have any questions, ask your nurse or doctor.               Medication(s)that have changed:     metFORMIN 500 MG tablet   Commonly known as:  GLUCOPHAGE   What changed:  Another medication with the same name was removed. Continue taking this medication, and follow the directions you see here.   Changed by:  Dominick Bosch MD       metoprolol tartrate 50 MG tablet   Commonly known as:  LOPRESSOR   What changed:  Another medication with the same name was removed. Continue taking this medication, and follow the directions you see here.   Changed by:  Dominick Bosch MD                  Your Updated Medication List          This list is accurate as of: 1/13/17  3:35 PM.  Always use your most recent med list.                acetaminophen 500 MG tablet   Commonly known as:  TYLENOL       atorvastatin 10 MG tablet   Commonly known as:  LIPITOR   Take 1 tablet by mouth daily.       furosemide 40 MG tablet   Commonly known as:  LASIX   Take 1 tablet by mouth 2 (two) times a day.       glucagon (human recombinant) 1 MG injection   Commonly known as:  GLUCAGEN DIAGNOSTIC   Inject 1 mg under the skin 1 (One) Time As Needed (hypoglycemia) for up to 1 dose.       HYDROcodone-acetaminophen 7.5-325 MG per tablet   Commonly known as:  NORCO   1-2 po q4hr prn pain       hydrocortisone 1 % cream   Commonly known as:  ALA-CYNTHIA   Apply  topically 2 (two) times a day.       icosapent ethyl 1 G capsule capsule   Commonly known as:  VASCEPA   Take 2 g by mouth 2 (two) times a day with meals.       KLOR-CON 20 MEQ CR tablet   Generic drug:  potassium chloride      Take 1 tablet by mouth 2 (two) times a day.       lisinopril 10 MG tablet   Commonly known as:  PRINIVIL,ZESTRIL       metFORMIN 500 MG tablet   Commonly known as:  GLUCOPHAGE       metoprolol tartrate 50 MG tablet   Commonly known as:  LOPRESSOR       warfarin 5 MG tablet   Commonly known as:  COUMADIN   Take 1 tablet by mouth Daily. 5MG daily               We Performed the Following     Body Fluid Cell Count With Differential     Body Fluid Culture     Large Joint Arthrocentesis     XR Knee 1 or 2 View Left       You Were Diagnosed With        Codes Comments    Failed total joint replacement, subsequent encounter    -  Primary ICD-10-CM: T84.019D  ICD-9-CM: V58.89, 996.47, V43.60       Instructions     None    Patient Instructions History      Upcoming Appointments     Visit Type Date Time Department    FOLLOW UP 1/13/2017  2:30 PM [facility]    TREATMENT - LYMPHEDEMA 1/16/2017  8:30 AM  [facility]    TREATMENT - LYMPHEDEMA 1/17/2017  8:30 AM  [facility]    TREATMENT - LYMPHEDEMA 1/18/2017  8:30 AM  [facility]    TREATMENT - LYMPHEDEMA 1/19/2017  8:30 AM  [facility]    TREATMENT - LYMPHEDEMA 1/20/2017  8:30 AM  [facility]    TREATMENT - LYMPHEDEMA 1/23/2017  8:30 AM  [facility]    TREATMENT - LYMPHEDEMA 1/24/2017  8:30 AM  [facility]    FOLLOW UP 1/25/2017  1:00 PM [facility]    TREATMENT - LYMPHEDEMA 1/25/2017  8:30 AM  [facility]    TREATMENT - LYMPHEDEMA 1/26/2017  8:30 AM  [facility]    TREATMENT - LYMPHEDEMA 1/27/2017  8:30 AM  [facility]    TREATMENT - LYMPHEDEMA 1/30/2017  8:30 AM [facility]    TREATMENT - LYMPHEDEMA 1/31/2017  8:30 AM [facility]    TREATMENT - LYMPHEDEMA 2/1/2017  8:30 AM [facility]    TREATMENT - LYMPHEDEMA 2/2/2017  8:30 AM [facility]    TREATMENT - LYMPHEDEMA 2/7/2017  8:30 AM [facility]      MyChart Signup     Our  "records indicate that your [facility] account has been deactivated. If you would like to reactivate your account, please email [email or web address] or call [phone] to talk to our [facility] staff.             Other Info from Your Visit           Your Appointments     Jan 16, 2017  8:30 AM EST   LYMPHEDEMA with Joya Escudero, PT   [facility] ([facility]    [address] [id number] [phone]            Jan 17, 2017  8:30 AM EST   LYMPHEDEMA with Brit Cruz, PT   [facility] ([facility]    [address] [id number] [phone]            Jan 18, 2017  8:30 AM EST   LYMPHEDEMA with Joya Escudero, PT   [facility] ([place])    [address] [id number] [phone]            Jan 19, 2017  8:30 AM EST   LYMPHEDEMA with Brit Cruz, PT   [facility] ([place])    [address] [id number] [phone]            Jan 20, 2017  8:30 AM EST   LYMPHEDEMA with Joya Escudero, PT   [facility] ([place])    [address] [id number] [phone]              Allergies     Codeine  Nausea And Vomiting    Morphine  Nausea And Vomiting    Morphine And Related        Reason for Visit     Left Knee - Follow-up, Pain           Vital Signs     Temperature Height Weight Body Mass Index Smoking Status       98.4 °F (36.9 °C) 62\" (157.5 cm) 260 lb (118 kg) 47.55 kg/m2 Never Smoker       Problems and Diagnoses Noted     Failed total joint replacement, subsequent encounter    -  Primary        "

## 2017-01-16 ENCOUNTER — HOSPITAL ENCOUNTER (OUTPATIENT)
Dept: PHYSICAL THERAPY | Facility: HOSPITAL | Age: 77
Setting detail: THERAPIES SERIES
Discharge: HOME | End: 2017-01-16

## 2017-01-16 DIAGNOSIS — I89.0 LYMPHEDEMA: Primary | ICD-10-CM

## 2017-01-16 LAB
APPEARANCE FLD: ABNORMAL
COLOR FLD: ABNORMAL
LYMPHOCYTES NFR FLD MANUAL: 44 %
METHOD: ABNORMAL
MONOCYTES NFR FLD: 49 %
MONOS+MACROS NFR FLD: 2 %
NEUTROPHILS NFR FLD MANUAL: 5 %
NUC CELL # FLD: 290 /MM3
RBC # FLD AUTO: ABNORMAL /MM3

## 2017-01-16 PROCEDURE — 89051 BODY FLUID CELL COUNT: CPT | Performed by: ORTHOPAEDIC SURGERY

## 2017-01-16 PROCEDURE — 87070 CULTURE OTHR SPECIMN AEROBIC: CPT | Performed by: ORTHOPAEDIC SURGERY

## 2017-01-16 PROCEDURE — 87015 SPECIMEN INFECT AGNT CONCNTJ: CPT | Performed by: ORTHOPAEDIC SURGERY

## 2017-01-16 PROCEDURE — 97140 MANUAL THERAPY 1/> REGIONS: CPT

## 2017-01-16 PROCEDURE — 87205 SMEAR GRAM STAIN: CPT | Performed by: ORTHOPAEDIC SURGERY

## 2017-01-16 NOTE — PROGRESS NOTES
Outpatient Physical Therapy Lymphedema Treatment Note  [facility]     Patient Name: Shoshana Bae  : 1940  MRN: [id number]  Today's Date: 2017        Visit Date: 2017    Visit Dx:    ICD-10-CM ICD-9-CM   1. Lymphedema I89.0 457.1       Patient Active Problem List   Diagnosis   • History of total knee arthroplasty   • Pain in left knee   • Failed total knee arthroplasty   • H/O mammogram   • H/O colonoscopy   • Lymph edema   • Sarcoidosis   • Atrial fibrillation   • Hyperlipidemia   • Hypertriglyceridemia   • Type 2 diabetes mellitus with hyperglycemia   • Stasis dermatitis of both legs   • Essential hypertension   • Cellulitis of left lower extremity   • Cellulitis of extremity, lower bilateral   • Chronic anticoagulation, for a fib   • Morbid obesity   • OA (osteoarthritis) of knee   • Infected prosthetic knee joint              Lymphedema       17 1000          Subjective Comments    Subjective Comments States she saw Dr Bosch yesterday.  Her white count is high so she cannot have the TKR surgery on  as planned.  He drained some fluid out of her knee and it was bloody.  She will have more bloodwork on . He was very pleased with how much better her legs looked with decreased swelling.  -PC      Subjective Pain    Able to rate subjective pain? yes  -PC      Pre-Treatment Pain Level 0  -PC      Post-Treatment Pain Level 0  -PC      Manual Lymphatic Drainage    Manual Lymphatic Drainage --   B inguinal, BLE's  -PC      Compression/Skin Care    Compression/Skin Care --   Silver stockinette, Ros Soft,Ros K 1-8cm;2-10cm R, 4-10cm L  -PC      Skin Care --   Eucerin lotion  -PC      Wrapping Location --   R LE foot to knee, L LE foot to mid-thigh  -PC        User Key  (r) = Recorded By, (t) = Taken By, (c) = Cosigned By    Initials Name Provider Type    PC Joya Escudero PT Physical Therapist                              PT Assessment/Plan       17 1007 17 1112  "01/11/17 1238    PT Assessment    Assessment Comments Pt not having TKR Feb 1 as planned due to elevated white count.  MD was pleased with her Lymphedema progress.  -PC Pt making slow but steady progress in therapy. Left knee has decreased by 5.5cm total.  -KD No new problems. Pt sees ortho  on 1/13.  -PC    PT Plan    Predicted Duration of Therapy Intervention (days/wks) Cont.  -PC      PT Plan Comments  Cont.  -KD Cont.  -PC      01/10/17 1058 01/09/17 1012       PT Assessment    Assessment Comments Pt doing well with therapy program.  -KD Pt is progressing well.  -PC     PT Plan    PT Plan Comments Cont.  -KD Cont per POC.  -PC       User Key  (r) = Recorded By, (t) = Taken By, (c) = Cosigned By    Initials Name Provider Type    LIZZIE Escudero, PT Physical Therapist    KD Brit Cruz, PT Physical Therapist                     Exercises       01/16/17 1000          Subjective Comments    Subjective Comments States she saw Dr Bosch yesterday.  Her white count is high so she cannot have the TKR surgery on Feb 1 as planned.  He drained some fluid out of her knee and it was bloody.  She will have more bloodwork on 1/20. He was very pleased with how much better her legs looked with decreased swelling.  -PC      Subjective Pain    Able to rate subjective pain? yes  -PC      Pre-Treatment Pain Level 0  -PC      Post-Treatment Pain Level 0  -PC        User Key  (r) = Recorded By, (t) = Taken By, (c) = Cosigned By    Initials Name Provider Type    PC Joya Escudero, PT Physical Therapist                              PT OP Goals       01/12/17 1100 01/05/17 0900       PT Short Term Goals    STG Date to Achieve 01/03/17  -KD 01/03/17  -KD     STG 1 Patient to demonstrate proper awareness of \"What is Lymphedema\" , \"Do's and Don'ts\", and /or Risk Reduction Practices\" for improved prevention, management, care of symptoms and ease to self-care of condition.  -KD Patient to demonstrate proper awareness of \"What is " "Lymphedema\" , \"Do's and Don'ts\", and /or Risk Reduction Practices\" for improved prevention, management, care of symptoms and ease to self-care of condition.  -KD     STG 1 Progress Met  -KD Ongoing  -KD     STG 1 Progress Comments Pt seems to have good understanding of condition & precautions  -KD      STG 2 Patient independent and compliant with self-wrapping techniques of compression bandages with assistance of caregiver as needed for improved self-management of condition.  -KD Patient independent and compliant with self-wrapping techniques of compression bandages with assistance of caregiver as needed for improved self-management of condition.  -KD     STG 2 Progress Met  -KD Met  -KD     STG 2 Progress Comments Pt has someone where she resides that can bandage her legs.  -KD      STG 3 Patient will demonstrate decreased net edema of B LE's  >/= 5-10cm  for decrease in edema, symptoms, decreased risk of infection and improved skin care/transition to self-care of condition.  -KD Patient will demonstrate decreased net edema of B LE's  >/= 5-10cm  for decrease in edema, symptoms, decreased risk of infection and improved skin care/transition to self-care of condition.  -KD     STG 3 Progress Partially Met  -KD Partially Met  -KD     STG 3 Progress Comments Met for L LE (decrease of 7.2cm), ongoing for R LE (decrease of 2.5cm)  -KD Met for L LE (decrease of 6.2cm). R LE down 1.3cm  -KD     Long Term Goals    LTG Date to Achieve 01/20/17  -KD      LTG 1 Patient will demonstrate decreased net edema of L LE >/= 10-20cm for decrease in edema, symptoms, decreased risk of infection and improved skin care/transition to self-care of condition.  -KD      LTG 1 Progress Ongoing  -KD      LTG 2 Patient independent and compliant with compression garments as indicated for self-management of condition.  -KD      LTG 2 Progress New  -KD      LTG 3 Patient/family/caregivers independent and compliant with self-care techniques for " self-management of condition.  -KD      LTG 3 Progress Ongoing  -KD        User Key  (r) = Recorded By, (t) = Taken By, (c) = Cosigned By    Initials Name Provider Type    CLAUDIA Cruz PT Physical Therapist                Therapy Education       01/16/17 1006    Therapy Education    Given Symptoms/condition management  -PC    Program Reinforced  -PC    How Provided Verbal  -PC    Provided to Patient  -PC    Level of Understanding Verbalized  -PC      01/12/17 1108    Therapy Education    Given Edema management  -KD    Program Reinforced  -KD    How Provided Verbal  -KD    Provided to Patient  -KD    Level of Understanding Verbalized  -KD      01/11/17 1237    Therapy Education    Given Bandaging/dressing change  -PC    Program Reinforced  -PC    How Provided Verbal;Demonstration  -PC    Provided to Patient  -PC    Level of Understanding Verbalized  -PC      01/10/17 1057    Therapy Education    Given Edema management  -KD    Program Reinforced  -KD    How Provided Verbal  -KD    Provided to Patient  -KD    Level of Understanding Verbalized  -KD      01/09/17 1011    Therapy Education    Given Symptoms/condition management  -PC    Program Reinforced  -PC    How Provided Verbal  -PC    Provided to Patient  -PC    Level of Understanding Verbalized  -PC      User Key  (r) = Recorded By, (t) = Taken By, (c) = Cosigned By    Initials Name Provider Type    PC Joya Escudero, PT Physical Therapist    KD Brit Cruz PT Physical Therapist                Time Calculation:   Start Time: 0828  Stop Time: 0933  Time Calculation (min): 65 min     Therapy Charges for Today     Code Description Service Date Service Provider Modifiers Qty    85446071877 HC PT MANUAL THERAPY EA 15 MIN 1/16/2017 Joya Escudero, PT GP 4                    Joya Escudero, PT  1/16/2017

## 2017-01-17 ENCOUNTER — HOSPITAL ENCOUNTER (OUTPATIENT)
Dept: PHYSICAL THERAPY | Facility: HOSPITAL | Age: 77
Setting detail: THERAPIES SERIES
Discharge: HOME | End: 2017-01-17

## 2017-01-17 DIAGNOSIS — I89.0 LYMPHEDEMA: Primary | ICD-10-CM

## 2017-01-17 PROCEDURE — 97140 MANUAL THERAPY 1/> REGIONS: CPT

## 2017-01-17 NOTE — PROGRESS NOTES
Outpatient Physical Therapy Lymphedema Treatment Note  [facility]     Patient Name: Shoshana Bae  : 1940  MRN: [id number]  Today's Date: 2017        Visit Date: 2017    Visit Dx:    ICD-10-CM ICD-9-CM   1. Lymphedema I89.0 457.1       Patient Active Problem List   Diagnosis   • History of total knee arthroplasty   • Pain in left knee   • Failed total knee arthroplasty   • H/O mammogram   • H/O colonoscopy   • Lymph edema   • Sarcoidosis   • Atrial fibrillation   • Hyperlipidemia   • Hypertriglyceridemia   • Type 2 diabetes mellitus with hyperglycemia   • Stasis dermatitis of both legs   • Essential hypertension   • Cellulitis of left lower extremity   • Cellulitis of extremity, lower bilateral   • Chronic anticoagulation, for a fib   • Morbid obesity   • OA (osteoarthritis) of knee   • Infected prosthetic knee joint              Lymphedema       17 1100          Subjective Comments    Subjective Comments Nothing new today.  -KD      Subjective Pain    Able to rate subjective pain? yes  -KD      Pre-Treatment Pain Level 0  -KD      Post-Treatment Pain Level 0  -KD      Manual Lymphatic Drainage    Manual Lymphatic Drainage --   B inguinal, BLE's  -KD      Compression/Skin Care    Compression/Skin Care --   Silver stockinette, Ros Soft,Ros K 1-8cm;2-10cm R, 4-10cm L  -KD      Skin Care --   Eucerin lotion  -KD      Wrapping Location --   R LE foot to knee, L LE foot to mid-thigh  -KD        User Key  (r) = Recorded By, (t) = Taken By, (c) = Cosigned By    Initials Name Provider Type    CLAUDIA Cruz, PT Physical Therapist                              PT Assessment/Plan       17 1125 17 1007 17 1112    PT Assessment    Assessment Comments Pt seems agreeable to wearing a knee high stocking on the right, thigh high on the left to keep that knee down in preparation for knee surgery(whenever she undergoes it).  -KD Pt not having TKR  as planned due to elevated  "white count.  MD was pleased with her Lymphedema progress.  -PC Pt making slow but steady progress in therapy. Left knee has decreased by 5.5cm total.  -KD    PT Plan    Predicted Duration of Therapy Intervention (days/wks)  Cont.  -PC     PT Plan Comments Work toward discharge.  -KD  Cont.  -KD      01/11/17 1238          PT Assessment    Assessment Comments No new problems. Pt sees ortho  on 1/13.  -PC      PT Plan    PT Plan Comments Cont.  -PC        User Key  (r) = Recorded By, (t) = Taken By, (c) = Cosigned By    Initials Name Provider Type    PC Joya Escudero, PT Physical Therapist    KD Brit Cruz, PT Physical Therapist                     Exercises       01/17/17 1100          Subjective Comments    Subjective Comments Nothing new today.  -KD      Subjective Pain    Able to rate subjective pain? yes  -KD      Pre-Treatment Pain Level 0  -KD      Post-Treatment Pain Level 0  -KD        User Key  (r) = Recorded By, (t) = Taken By, (c) = Cosigned By    Initials Name Provider Type    CLAUDIA Cruz, PT Physical Therapist                              PT OP Goals       01/12/17 1100 01/05/17 0900       PT Short Term Goals    STG Date to Achieve 01/03/17  -KD 01/03/17  -KD     STG 1 Patient to demonstrate proper awareness of \"What is Lymphedema\" , \"Do's and Don'ts\", and /or Risk Reduction Practices\" for improved prevention, management, care of symptoms and ease to self-care of condition.  -KD Patient to demonstrate proper awareness of \"What is Lymphedema\" , \"Do's and Don'ts\", and /or Risk Reduction Practices\" for improved prevention, management, care of symptoms and ease to self-care of condition.  -KD     STG 1 Progress Met  -KD Ongoing  -KD     STG 1 Progress Comments Pt seems to have good understanding of condition & precautions  -KD      STG 2 Patient independent and compliant with self-wrapping techniques of compression bandages with assistance of caregiver as needed for improved self-management of " condition.  -KD Patient independent and compliant with self-wrapping techniques of compression bandages with assistance of caregiver as needed for improved self-management of condition.  -KD     STG 2 Progress Met  -KD Met  -KD     STG 2 Progress Comments Pt has someone where she resides that can bandage her legs.  -KD      STG 3 Patient will demonstrate decreased net edema of B LE's  >/= 5-10cm  for decrease in edema, symptoms, decreased risk of infection and improved skin care/transition to self-care of condition.  -KD Patient will demonstrate decreased net edema of B LE's  >/= 5-10cm  for decrease in edema, symptoms, decreased risk of infection and improved skin care/transition to self-care of condition.  -KD     STG 3 Progress Partially Met  -KD Partially Met  -KD     STG 3 Progress Comments Met for L LE (decrease of 7.2cm), ongoing for R LE (decrease of 2.5cm)  -KD Met for L LE (decrease of 6.2cm). R LE down 1.3cm  -KD     Long Term Goals    LTG Date to Achieve 01/20/17  -KD      LTG 1 Patient will demonstrate decreased net edema of L LE >/= 10-20cm for decrease in edema, symptoms, decreased risk of infection and improved skin care/transition to self-care of condition.  -KD      LTG 1 Progress Ongoing  -KD      LTG 2 Patient independent and compliant with compression garments as indicated for self-management of condition.  -KD      LTG 2 Progress New  -KD      LTG 3 Patient/family/caregivers independent and compliant with self-care techniques for self-management of condition.  -KD      LTG 3 Progress Ongoing  -KD        User Key  (r) = Recorded By, (t) = Taken By, (c) = Cosigned By    Initials Name Provider Type    CLAUDIA Cruz PT Physical Therapist                Therapy Education       01/17/17 1122    Therapy Education    Given Edema management   Discussed compression stockings.  -KD    Program New  -KD    How Provided Verbal  -KD    Provided to Patient  -KD    Level of Understanding Verbalized  -KD       01/16/17 1006    Therapy Education    Given Symptoms/condition management  -PC    Program Reinforced  -PC    How Provided Verbal  -PC    Provided to Patient  -PC    Level of Understanding Verbalized  -PC      01/12/17 1108    Therapy Education    Given Edema management  -KD    Program Reinforced  -KD    How Provided Verbal  -KD    Provided to Patient  -KD    Level of Understanding Verbalized  -KD      01/11/17 1237    Therapy Education    Given Bandaging/dressing change  -PC    Program Reinforced  -PC    How Provided Verbal;Demonstration  -PC    Provided to Patient  -PC    Level of Understanding Verbalized  -PC      User Key  (r) = Recorded By, (t) = Taken By, (c) = Cosigned By    Initials Name Provider Type    PC Joya Escudero, PT Physical Therapist    KD Brit Cruz, PT Physical Therapist                Time Calculation:   Start Time: 0831  Stop Time: 0932  Time Calculation (min): 61 min     Therapy Charges for Today     Code Description Service Date Service Provider Modifiers Qty    51394570903 HC PT MANUAL THERAPY EA 15 MIN 1/17/2017 Brit Cruz, PT GP 4                    Brit Cruz PT  1/17/2017

## 2017-01-18 ENCOUNTER — HOSPITAL ENCOUNTER (OUTPATIENT)
Dept: PHYSICAL THERAPY | Facility: HOSPITAL | Age: 77
Setting detail: THERAPIES SERIES
Discharge: HOME | End: 2017-01-18

## 2017-01-18 DIAGNOSIS — I89.0 LYMPHEDEMA: Primary | ICD-10-CM

## 2017-01-18 PROCEDURE — 97140 MANUAL THERAPY 1/> REGIONS: CPT

## 2017-01-18 NOTE — PROGRESS NOTES
Outpatient Physical Therapy Lymphedema Treatment Note  [facility]     Patient Name: Shoshana Bae  : 1940  MRN: [id number]  Today's Date: 2017        Visit Date: 2017    Visit Dx:    ICD-10-CM ICD-9-CM   1. Lymphedema I89.0 457.1       Patient Active Problem List   Diagnosis   • History of total knee arthroplasty   • Pain in left knee   • Failed total knee arthroplasty   • H/O mammogram   • H/O colonoscopy   • Lymph edema   • Sarcoidosis   • Atrial fibrillation   • Hyperlipidemia   • Hypertriglyceridemia   • Type 2 diabetes mellitus with hyperglycemia   • Stasis dermatitis of both legs   • Essential hypertension   • Cellulitis of left lower extremity   • Cellulitis of extremity, lower bilateral   • Chronic anticoagulation, for a fib   • Morbid obesity   • OA (osteoarthritis) of knee   • Infected prosthetic knee joint              Lymphedema       17 1100          Subjective Comments    Subjective Comments No complaints.  -PC      Subjective Pain    Able to rate subjective pain? yes  -PC      Pre-Treatment Pain Level 0  -PC      Post-Treatment Pain Level 0  -PC      Manual Lymphatic Drainage    Manual Lymphatic Drainage --   B inguinal, BLE's  -PC      Compression/Skin Care    Compression/Skin Care --   Silver stockinette, Ros Soft,Ros K 1-8cm;2-10cm R, 4-10cm L  -PC      Skin Care --   Eucerin lotion  -PC      Wrapping Location --   R LE foot to knee, L LE foot to mid-thigh  -PC        User Key  (r) = Recorded By, (t) = Taken By, (c) = Cosigned By    Initials Name Provider Type    PC Joya Escudero, PT Physical Therapist                              PT Assessment/Plan       17 1134 17 1125 17 1007    PT Assessment    Assessment Comments Pt is doing well, ready to order stockings.  Deciding between a class 1 and 2.  -PC Pt seems agreeable to wearing a knee high stocking on the right, thigh high on the left to keep that knee down in preparation for knee  "surgery(whenever she undergoes it).  -KD Pt not having TKR Feb 1 as planned due to elevated white count.  MD was pleased with her Lymphedema progress.  -PC    PT Plan    Predicted Duration of Therapy Intervention (days/wks)   Cont.  -PC    PT Plan Comments Cont- send pt to certified fitter for stockings.  -PC Work toward discharge.  -KD       01/12/17 1112 01/11/17 1238       PT Assessment    Assessment Comments Pt making slow but steady progress in therapy. Left knee has decreased by 5.5cm total.  -KD No new problems. Pt sees ortho  on 1/13.  -PC     PT Plan    PT Plan Comments Cont.  -KD Cont.  -PC       User Key  (r) = Recorded By, (t) = Taken By, (c) = Cosigned By    Initials Name Provider Type    PC Joya Escudero, PT Physical Therapist    KD Brit Cruz, PT Physical Therapist                     Exercises       01/18/17 1100          Subjective Comments    Subjective Comments No complaints.  -PC      Subjective Pain    Able to rate subjective pain? yes  -PC      Pre-Treatment Pain Level 0  -PC      Post-Treatment Pain Level 0  -PC        User Key  (r) = Recorded By, (t) = Taken By, (c) = Cosigned By    Initials Name Provider Type    PC Joya Escudero, PT Physical Therapist                              PT OP Goals       01/12/17 1100 01/05/17 0900       PT Short Term Goals    STG Date to Achieve 01/03/17  -KD 01/03/17  -KD     STG 1 Patient to demonstrate proper awareness of \"What is Lymphedema\" , \"Do's and Don'ts\", and /or Risk Reduction Practices\" for improved prevention, management, care of symptoms and ease to self-care of condition.  -KD Patient to demonstrate proper awareness of \"What is Lymphedema\" , \"Do's and Don'ts\", and /or Risk Reduction Practices\" for improved prevention, management, care of symptoms and ease to self-care of condition.  -KD     STG 1 Progress Met  -KD Ongoing  -KD     STG 1 Progress Comments Pt seems to have good understanding of condition & precautions  -KD      STG 2 Patient " independent and compliant with self-wrapping techniques of compression bandages with assistance of caregiver as needed for improved self-management of condition.  -KD Patient independent and compliant with self-wrapping techniques of compression bandages with assistance of caregiver as needed for improved self-management of condition.  -KD     STG 2 Progress Met  -KD Met  -KD     STG 2 Progress Comments Pt has someone where she resides that can bandage her legs.  -KD      STG 3 Patient will demonstrate decreased net edema of B LE's  >/= 5-10cm  for decrease in edema, symptoms, decreased risk of infection and improved skin care/transition to self-care of condition.  -KD Patient will demonstrate decreased net edema of B LE's  >/= 5-10cm  for decrease in edema, symptoms, decreased risk of infection and improved skin care/transition to self-care of condition.  -KD     STG 3 Progress Partially Met  -KD Partially Met  -KD     STG 3 Progress Comments Met for L LE (decrease of 7.2cm), ongoing for R LE (decrease of 2.5cm)  -KD Met for L LE (decrease of 6.2cm). R LE down 1.3cm  -KD     Long Term Goals    LTG Date to Achieve 01/20/17  -KD      LTG 1 Patient will demonstrate decreased net edema of L LE >/= 10-20cm for decrease in edema, symptoms, decreased risk of infection and improved skin care/transition to self-care of condition.  -KD      LTG 1 Progress Ongoing  -KD      LTG 2 Patient independent and compliant with compression garments as indicated for self-management of condition.  -KD      LTG 2 Progress New  -KD      LTG 3 Patient/family/caregivers independent and compliant with self-care techniques for self-management of condition.  -KD      LTG 3 Progress Ongoing  -KD        User Key  (r) = Recorded By, (t) = Taken By, (c) = Cosigned By    Initials Name Provider Type    CLAUDIA Cruz PT Physical Therapist                Therapy Education       01/18/17 1133    Therapy Education    Given Symptoms/condition  management   Discussed stocking options  -PC    Program New  -PC    How Provided Verbal  -PC    Provided to Patient  -PC    Level of Understanding Verbalized  -PC      01/17/17 1122    Therapy Education    Given Edema management   Discussed compression stockings.  -KD    Program New  -KD    How Provided Verbal  -KD    Provided to Patient  -KD    Level of Understanding Verbalized  -KD      01/16/17 1006    Therapy Education    Given Symptoms/condition management  -PC    Program Reinforced  -PC    How Provided Verbal  -PC    Provided to Patient  -PC    Level of Understanding Verbalized  -PC      01/12/17 1108    Therapy Education    Given Edema management  -KD    Program Reinforced  -KD    How Provided Verbal  -KD    Provided to Patient  -KD    Level of Understanding Verbalized  -KD      01/11/17 1237    Therapy Education    Given Bandaging/dressing change  -PC    Program Reinforced  -PC    How Provided Verbal;Demonstration  -PC    Provided to Patient  -PC    Level of Understanding Verbalized  -PC      User Key  (r) = Recorded By, (t) = Taken By, (c) = Cosigned By    Initials Name Provider Type    PC Joya Escudero, PT Physical Therapist    KD Brit Cruz, PT Physical Therapist                Time Calculation:   Start Time: 0840  Stop Time: 0935  Time Calculation (min): 55 min     Therapy Charges for Today     Code Description Service Date Service Provider Modifiers Qty    26900891796 HC PT MANUAL THERAPY EA 15 MIN 1/18/2017 Joya Escudero, PT GP 4                    Joya Escudero, PT  1/18/2017

## 2017-01-19 ENCOUNTER — HOSPITAL ENCOUNTER (OUTPATIENT)
Dept: PHYSICAL THERAPY | Facility: HOSPITAL | Age: 77
Setting detail: THERAPIES SERIES
Discharge: HOME | End: 2017-01-19

## 2017-01-19 DIAGNOSIS — I89.0 LYMPHEDEMA: Primary | ICD-10-CM

## 2017-01-19 PROCEDURE — 97140 MANUAL THERAPY 1/> REGIONS: CPT

## 2017-01-19 NOTE — PROGRESS NOTES
Outpatient Physical Therapy Lymphedema 30 Day Re-certification/Re-evaluation Progress Note  [facility]     Patient Name: Shoshana Bae  : 1940  MRN: [id number]  Today's Date: 2017        Visit Date: 2017    Visit Dx:    ICD-10-CM ICD-9-CM   1. Lymphedema I89.0 457.1       Patient Active Problem List   Diagnosis   • History of total knee arthroplasty   • Pain in left knee   • Failed total knee arthroplasty   • H/O mammogram   • H/O colonoscopy   • Lymph edema   • Sarcoidosis   • Atrial fibrillation   • Hyperlipidemia   • Hypertriglyceridemia   • Type 2 diabetes mellitus with hyperglycemia   • Stasis dermatitis of both legs   • Essential hypertension   • Cellulitis of left lower extremity   • Cellulitis of extremity, lower bilateral   • Chronic anticoagulation, for a fib   • Morbid obesity   • OA (osteoarthritis) of knee   • Infected prosthetic knee joint              Lymphedema       17 1400          Subjective Comments    Subjective Comments States she had some itching along top part of scar on left knee.  -KD      Subjective Pain    Able to rate subjective pain? yes  -KD      Pre-Treatment Pain Level 1  -KD      Post-Treatment Pain Level 1  -KD      Skin Changes/Observations    Skin Observations Comment Left knee appears down.  -KD      LLE Circumferential (cm)    Measurement Location 1 10cm above knee  -KD      Left 1 59 cm  -KD      Measurement Location 2 Knee (popliteal crease)  -KD      Left 2 55 cm  -KD      Measurement Location 3 10cm below knee  -KD      Left 3 47 cm  -KD      Measurement Location 4 20cm below knee  -KD      Left 4 40.5 cm  -KD      Measurement Location 5 30cm below knee  -KD      Left 5 28 cm  -KD      Measurement Location 6 Ankle  -KD      Left 6 26 cm  -KD      Measurement Location 7 Midfoot  -KD      Left 7 25 cm  -KD      Measurement Location 8 TOTAL  -KD      Left 8 280.5 cm  -KD      Measurement Location 9 Difference since 16  -KD      Left 9  -8.7 cm  -KD      RLE Circumferential (cm)    Measurement Location 1 10cm above knee  -KD      Right 1 54 cm  -KD      Measurement Location 2 Knee (popliteal crease)  -KD      Right 2 47.5 cm  -KD      Measurement Location 3 10cm below  knee  -KD      Right 3 42 cm  -KD      Measurement Location 4 20cm below knee  -KD      Right 4 33 cm  -KD      Measurement Location 5 30cm below knee  -KD      Right 5 23.5 cm  -KD      Measurement Location 6 Ankle  -KD      Right 6 24.5 cm  -KD      Measurement Location 7 Midfoot  -KD      Right 7 25 cm  -KD      Measurement Location 8 TOTAL  -KD      Right 8 249.5 cm  -KD      Measurement Location 9 Difference since 12/20/16  -KD      Right 9 -1 cm  -KD      Manual Lymphatic Drainage    Manual Lymphatic Drainage --   B inguinal, BLE's  -KD      Compression/Skin Care    Compression/Skin Care --   Silver stockinette, Ros Soft,Ros K 1-8cm;2-10cm R, 4-10cm L  -KD      Skin Care --   Eucerin lotion, HCC to scar area on left knee  -KD      Wrapping Location --   R LE foot to knee, L LE foot to mid-thigh  -KD        User Key  (r) = Recorded By, (t) = Taken By, (c) = Cosigned By    Initials Name Provider Type    KD Brit Cruz, PT Physical Therapist                              PT Assessment/Plan       01/19/17 1457 01/18/17 1134 01/17/17 1125    PT Assessment    Functional Limitations Impaired locomotion  -KD      Impairments Impaired lymphatic circulation;Edema;Impaired venous circulation;Locomotion  -KD      Assessment Comments Request has been sent to [place] to schedule pt for stocking measurement and fitting as she is ready for long term compression.  -KD Pt is doing well, ready to order stockings.  Deciding betweeen a class 1 and 2.  -PC Pt seems agreeable to wearing a knee high stocking on the right, thigh high on the left to keep that knee down in preparation for knee surgery(whenever she undergoes it).  -KD    Please refer to paper survey for additional  "self-reported information Yes  -KD      Rehab Potential Good  -KD      Patient/caregiver participated in establishment of treatment plan and goals Yes  -KD      Patient would benefit from skilled therapy intervention Yes  -KD      PT Plan    PT Frequency 5x/week  -KD      Predicted Duration of Therapy Intervention (days/wks) 4 weeks  -KD      Planned CPT's? PT RE-EVAL: 24364;PT MANUAL THERAPY EA 15 MIN: 76782;PT SELF CARE/HOME MGMT/TRAIN EA 15: 55017  -KD      Physical Therapy Interventions (Optional Details) bandaging;home exercise program;patient/family education;manual lymphatic drainage   skin care  -KD      PT Plan Comments Continue per PT Plan, work toward discharge.  -KD Cont- send pt to certified fitter for stockings.  -PC Work toward discharge.  -KD      01/16/17 1007          PT Assessment    Assessment Comments Pt not having TKR Feb 1 as planned due to elevated white count.  MD was pleased with her Lymphedema progress.  -PC      PT Plan    Predicted Duration of Therapy Intervention (days/wks) Cont.  -PC        User Key  (r) = Recorded By, (t) = Taken By, (c) = Cosigned By    Initials Name Provider Type    PC Joya Escudero, PT Physical Therapist    KD Brit Cruz, PT Physical Therapist                     Exercises       01/19/17 1400          Subjective Comments    Subjective Comments States she had some itching along top part of scar on left knee.  -KD      Subjective Pain    Able to rate subjective pain? yes  -KD      Pre-Treatment Pain Level 1  -KD      Post-Treatment Pain Level 1  -KD        User Key  (r) = Recorded By, (t) = Taken By, (c) = Cosigned By    Initials Name Provider Type    CLAUDIA Cruz, PT Physical Therapist                              PT OP Goals       01/19/17 1400 01/12/17 1100       PT Short Term Goals    STG Date to Achieve 01/03/17  -KD 01/03/17  -KD     STG 1 Patient to demonstrate proper awareness of \"What is Lymphedema\" , \"Do's and Don'ts\", and /or Risk Reduction " "Practices\" for improved prevention, management, care of symptoms and ease to self-care of condition.  -KD Patient to demonstrate proper awareness of \"What is Lymphedema\" , \"Do's and Don'ts\", and /or Risk Reduction Practices\" for improved prevention, management, care of symptoms and ease to self-care of condition.  -KD     STG 1 Progress Met  -KD Met  -KD     STG 1 Progress Comments  Pt seems to have good understanding of condition & precautions  -KD     STG 2 Patient independent and compliant with self-wrapping techniques of compression bandages with assistance of caregiver as needed for improved self-management of condition.  -KD Patient independent and compliant with self-wrapping techniques of compression bandages with assistance of caregiver as needed for improved self-management of condition.  -KD     STG 2 Progress Met  -KD Met  -KD     STG 2 Progress Comments  Pt has someone where she resides that can bandage her legs.  -KD     STG 3 Patient will demonstrate decreased net edema of B LE's  >/= 5-10cm  for decrease in edema, symptoms, decreased risk of infection and improved skin care/transition to self-care of condition.  -KD Patient will demonstrate decreased net edema of B LE's  >/= 5-10cm  for decrease in edema, symptoms, decreased risk of infection and improved skin care/transition to self-care of condition.  -KD     STG 3 Progress Partially Met  -KD Partially Met  -KD     STG 3 Progress Comments Met for L LE (decrease of 8.7cm), ongoing for R LE (decrease of 1.0cm)  -KD Met for L LE (decrease of 7.2cm), ongoing for R LE (decrease of 2.5cm)  -KD     Long Term Goals    LTG Date to Achieve 02/18/17  -KD 01/20/17  -KD     LTG 1 Patient will demonstrate decreased net edema of L LE >/= 10-20cm for decrease in edema, symptoms, decreased risk of infection and improved skin care/transition to self-care of condition.  -KD Patient will demonstrate decreased net edema of L LE >/= 10-20cm for decrease in edema, " symptoms, decreased risk of infection and improved skin care/transition to self-care of condition.  -KD     LTG 1 Progress Progressing  -KD Ongoing  -KD     LTG 1 Progress Comments L LE has decreased by a total of 8.7cm.  -KD      LTG 2 Patient independent and compliant with compression garments as indicated for self-management of condition.  -KD Patient independent and compliant with compression garments as indicated for self-management of condition.  -KD     LTG 2 Progress New  -KD New  -KD     LTG 2 Progress Comments Pt to be fit for stockings soon.  -KD      LTG 3 Patient/family/caregivers independent and compliant with self-care techniques for self-management of condition.  -KD Patient/family/caregivers independent and compliant with self-care techniques for self-management of condition.  -KD     LTG 3 Progress Ongoing  -KD Ongoing  -KD     Time Calculation    PT Goal Re-Cert Due Date 02/18/17  -KD        User Key  (r) = Recorded By, (t) = Taken By, (c) = Cosigned By    Initials Name Provider Type    KD Brit Cruz, PT Physical Therapist                Therapy Education       01/19/17 1452    Therapy Education    Given Edema management   Discussed long term compression--[facility] to schedule her for fitting.  -KD    Program Reinforced  -KD    How Provided Verbal  -KD    Provided to Patient  -KD    Level of Understanding Verbalized  -KD      01/18/17 1132    Therapy Education    Given Symptoms/condition management   Discussed stocking options  -PC    Program New  -PC    How Provided Verbal  -PC    Provided to Patient  -PC    Level of Understanding Verbalized  -PC      01/17/17 1122    Therapy Education    Given Edema management   Discussed compression stockings.  -KD    Program New  -KD    How Provided Verbal  -KD    Provided to Patient  -KD    Level of Understanding Verbalized  -KD      01/16/17 1006    Therapy Education    Given Symptoms/condition management  -PC    Program Reinforced  -PC     How Provided Verbal  -PC    Provided to Patient  -PC    Level of Understanding Verbalized  -PC      User Key  (r) = Recorded By, (t) = Taken By, (c) = Cosigned By    Initials Name Provider Type    PC Joya Escudero, PT Physical Therapist    CLAUDIA Cruz PT Physical Therapist                Time Calculation:   Start Time: 0832  Stop Time: 0933  Time Calculation (min): 61 min     Therapy Charges for Today     Code Description Service Date Service Provider Modifiers Qty    75911565294 HC PT MANUAL THERAPY EA 15 MIN 1/19/2017 Brit Cruz, PT GP 4                    Brit Cruz PT  1/19/2017

## 2017-01-20 ENCOUNTER — TELEPHONE (OUTPATIENT)
Dept: ORTHOPEDIC SURGERY | Facility: CLINIC | Age: 77
End: 2017-01-20

## 2017-01-20 ENCOUNTER — HOSPITAL ENCOUNTER (OUTPATIENT)
Dept: PHYSICAL THERAPY | Facility: HOSPITAL | Age: 77
Setting detail: THERAPIES SERIES
Discharge: HOME | End: 2017-01-20

## 2017-01-20 ENCOUNTER — LAB (OUTPATIENT)
Dept: LAB | Facility: HOSPITAL | Age: 77
End: 2017-01-20
Attending: ORTHOPAEDIC SURGERY

## 2017-01-20 ENCOUNTER — TRANSCRIBE ORDERS (OUTPATIENT)
Dept: ADMINISTRATIVE | Facility: HOSPITAL | Age: 77
End: 2017-01-20

## 2017-01-20 DIAGNOSIS — I89.0 LYMPHEDEMA: Primary | ICD-10-CM

## 2017-01-20 DIAGNOSIS — T84.019A FAILED TOTAL JOINT REPLACEMENT, INITIAL ENCOUNTER (HCC): Primary | ICD-10-CM

## 2017-01-20 DIAGNOSIS — T84.019A FAILED TOTAL JOINT REPLACEMENT, INITIAL ENCOUNTER (HCC): ICD-10-CM

## 2017-01-20 LAB
CRP SERPL-MCNC: 0.84 MG/DL (ref 0–0.5)
ERYTHROCYTE [SEDIMENTATION RATE] IN BLOOD: 42 MM/HR (ref 0–30)

## 2017-01-20 PROCEDURE — 85652 RBC SED RATE AUTOMATED: CPT

## 2017-01-20 PROCEDURE — 36415 COLL VENOUS BLD VENIPUNCTURE: CPT

## 2017-01-20 PROCEDURE — 97140 MANUAL THERAPY 1/> REGIONS: CPT

## 2017-01-20 PROCEDURE — 86140 C-REACTIVE PROTEIN: CPT

## 2017-01-20 NOTE — PROGRESS NOTES
Outpatient Physical Therapy Lymphedema Treatment Note  [facility]     Patient Name: Shoshana Bae  : 1940  MRN: [id number]  Today's Date: 2017        Visit Date: 2017    Visit Dx:    ICD-10-CM ICD-9-CM   1. Lymphedema I89.0 457.1       Patient Active Problem List   Diagnosis   • History of total knee arthroplasty   • Pain in left knee   • Failed total knee arthroplasty   • H/O mammogram   • H/O colonoscopy   • Lymph edema   • Sarcoidosis   • Atrial fibrillation   • Hyperlipidemia   • Hypertriglyceridemia   • Type 2 diabetes mellitus with hyperglycemia   • Stasis dermatitis of both legs   • Essential hypertension   • Cellulitis of left lower extremity   • Cellulitis of extremity, lower bilateral   • Chronic anticoagulation, for a fib   • Morbid obesity   • OA (osteoarthritis) of knee   • Infected prosthetic knee joint              Lymphedema       17 1000 17 1400       Subjective Comments    Subjective Comments No complaints of itching.  -PC States she had some itching along top part of scar on left knee.  -KD     Subjective Pain    Able to rate subjective pain? yes  -PC yes  -KD     Pre-Treatment Pain Level 0  -PC 1  -KD     Post-Treatment Pain Level 0  -PC 1  -KD     Skin Changes/Observations    Skin Observations Comment  Left knee appears down.  -KD     LLE Circumferential (cm)    Measurement Location 1  10cm above knee  -KD     Left 1  59 cm  -KD     Measurement Location 2  Knee (popliteal crease)  -KD     Left 2  55 cm  -KD     Measurement Location 3  10cm below knee  -KD     Left 3  47 cm  -KD     Measurement Location 4  20cm below knee  -KD     Left 4  40.5 cm  -KD     Measurement Location 5  30cm below knee  -KD     Left 5  28 cm  -KD     Measurement Location 6  Ankle  -KD     Left 6  26 cm  -KD     Measurement Location 7  Midfoot  -KD     Left 7  25 cm  -KD     Measurement Location 8  TOTAL  -KD     Left 8  280.5 cm  -KD     Measurement Location 9  Difference since  12/20/16  -KD     Left 9  -8.7 cm  -KD     RLE Circumferential (cm)    Measurement Location 1  10cm above knee  -KD     Right 1  54 cm  -KD     Measurement Location 2  Knee (popliteal crease)  -KD     Right 2  47.5 cm  -KD     Measurement Location 3  10cm below  knee  -KD     Right 3  42 cm  -KD     Measurement Location 4  20cm below knee  -KD     Right 4  33 cm  -KD     Measurement Location 5  30cm below knee  -KD     Right 5  23.5 cm  -KD     Measurement Location 6  Ankle  -KD     Right 6  24.5 cm  -KD     Measurement Location 7  Midfoot  -KD     Right 7  25 cm  -KD     Measurement Location 8  TOTAL  -KD     Right 8  249.5 cm  -KD     Measurement Location 9  Difference since 12/20/16  -KD     Right 9  -1 cm  -KD     Manual Lymphatic Drainage    Manual Lymphatic Drainage --   B inguinal, BLE's  -PC --   B inguinal, BLE's  -KD     Compression/Skin Care    Compression/Skin Care --   Silver stockinette, Ros Soft,Ros K 1-8cm;2-10cm R, 4-10cm L  -PC --   Silver stockinette, Ros Soft,Ros K 1-8cm;2-10cm R, 4-10cm L  -KD     Skin Care --   Eucerin lotion  -PC --   Eucerin lotion, HCC to scar area on left knee  -KD     Wrapping Location --   R LE foot to knee, L LE foot to mid-thigh  -PC --   R LE foot to knee, L LE foot to mid-thigh  -KD       User Key  (r) = Recorded By, (t) = Taken By, (c) = Cosigned By    Initials Name Provider Type    PC Joya Escudero, PT Physical Therapist    KD Brit Cruz, PT Physical Therapist                              PT Assessment/Plan       01/20/17 1108 01/19/17 1457 01/18/17 1134    PT Assessment    Functional Limitations  Impaired locomotion  -KD     Impairments  Impaired lymphatic circulation;Edema;Impaired venous circulation;Locomotion  -KD     Assessment Comments No new problems.  -PC Request has been sent to [facility] to schedule pt for stocking measurement and fitting as she is ready for long term compression.  -KD Pt is doing well, ready to order stockings.   Deciding betweeen a class 1 and 2.  -PC    Please refer to paper survey for additional self-reported information  Yes  -KD     Rehab Potential  Good  -KD     Patient/caregiver participated in establishment of treatment plan and goals  Yes  -KD     Patient would benefit from skilled therapy intervention  Yes  -KD     PT Plan    PT Frequency  5x/week  -KD     Predicted Duration of Therapy Intervention (days/wks)  4 weeks  -KD     Planned CPT's?  PT RE-EVAL: 89620;PT MANUAL THERAPY EA 15 MIN: 18190;PT SELF CARE/HOME MGMT/TRAIN EA 15: 29666  -KD     Physical Therapy Interventions (Optional Details)  bandaging;home exercise program;patient/family education;manual lymphatic drainage   skin care  -KD     PT Plan Comments Cont.  -PC Continue per PT Plan, work toward discharge.  -KD Cont- send pt to certified fitter for stockings.  -PC      01/17/17 1125 01/16/17 1007       PT Assessment    Assessment Comments Pt seems agreeable to wearing a knee high stocking on the right, thigh high on the left to keep that knee down in preparation for knee surgery(whenever she undergoes it).  -KD Pt not having TKR Feb 1 as planned due to elevated white count.  MD was pleased with her Lymphedema progress.  -PC     PT Plan    Predicted Duration of Therapy Intervention (days/wks)  Cont.  -PC     PT Plan Comments Work toward discharge.  -KD        User Key  (r) = Recorded By, (t) = Taken By, (c) = Cosigned By    Initials Name Provider Type    PC Joya Escudero, PT Physical Therapist    KD Brit Cruz, PT Physical Therapist                     Exercises       01/20/17 1000 01/19/17 1400       Subjective Comments    Subjective Comments No complaints of itching.  -PC States she had some itching along top part of scar on left knee.  -KD     Subjective Pain    Able to rate subjective pain? yes  -PC yes  -KD     Pre-Treatment Pain Level 0  -PC 1  -KD     Post-Treatment Pain Level 0  -PC 1  -KD       User Key  (r) = Recorded By, (t) = Taken By,  "(c) = Cosigned By    Initials Name Provider Type    PC Joya Escudero, PT Physical Therapist    KD Brit Cruz, PT Physical Therapist                              PT OP Goals       01/19/17 1400 01/12/17 1100       PT Short Term Goals    STG Date to Achieve 01/03/17  -KD 01/03/17  -KD     STG 1 Patient to demonstrate proper awareness of \"What is Lymphedema\" , \"Do's and Don'ts\", and /or Risk Reduction Practices\" for improved prevention, management, care of symptoms and ease to self-care of condition.  -KD Patient to demonstrate proper awareness of \"What is Lymphedema\" , \"Do's and Don'ts\", and /or Risk Reduction Practices\" for improved prevention, management, care of symptoms and ease to self-care of condition.  -KD     STG 1 Progress Met  -KD Met  -KD     STG 1 Progress Comments  Pt seems to have good understanding of condition & precautions  -KD     STG 2 Patient independent and compliant with self-wrapping techniques of compression bandages with assistance of caregiver as needed for improved self-management of condition.  -KD Patient independent and compliant with self-wrapping techniques of compression bandages with assistance of caregiver as needed for improved self-management of condition.  -KD     STG 2 Progress Met  -KD Met  -KD     STG 2 Progress Comments  Pt has someone where she resides that can bandage her legs.  -KD     STG 3 Patient will demonstrate decreased net edema of B LE's  >/= 5-10cm  for decrease in edema, symptoms, decreased risk of infection and improved skin care/transition to self-care of condition.  -KD Patient will demonstrate decreased net edema of B LE's  >/= 5-10cm  for decrease in edema, symptoms, decreased risk of infection and improved skin care/transition to self-care of condition.  -KD     STG 3 Progress Partially Met  -KD Partially Met  -KD     STG 3 Progress Comments Met for L LE (decrease of 8.7cm), ongoing for R LE (decrease of 1.0cm)  -KD Met for L LE (decrease of 7.2cm), " ongoing for R LE (decrease of 2.5cm)  -KD     Long Term Goals    LTG Date to Achieve 02/18/17  -KD 01/20/17  -KD     LTG 1 Patient will demonstrate decreased net edema of L LE >/= 10-20cm for decrease in edema, symptoms, decreased risk of infection and improved skin care/transition to self-care of condition.  -KD Patient will demonstrate decreased net edema of L LE >/= 10-20cm for decrease in edema, symptoms, decreased risk of infection and improved skin care/transition to self-care of condition.  -KD     LTG 1 Progress Progressing  -KD Ongoing  -KD     LTG 1 Progress Comments L LE has decreased by a total of 8.7cm.  -KD      LTG 2 Patient independent and compliant with compression garments as indicated for self-management of condition.  -KD Patient independent and compliant with compression garments as indicated for self-management of condition.  -KD     LTG 2 Progress New  -KD New  -KD     LTG 2 Progress Comments Pt to be fit for stockings soon.  -KD      LTG 3 Patient/family/caregivers independent and compliant with self-care techniques for self-management of condition.  -KD Patient/family/caregivers independent and compliant with self-care techniques for self-management of condition.  -KD     LTG 3 Progress Ongoing  -KD Ongoing  -KD     Time Calculation    PT Goal Re-Cert Due Date 02/18/17  -KD        User Key  (r) = Recorded By, (t) = Taken By, (c) = Cosigned By    Initials Name Provider Type    CLAUDIA Cruz, PT Physical Therapist                Therapy Education       01/20/17 1105    Therapy Education    Given Symptoms/condition management  -PC    Program Reinforced  -PC    How Provided Verbal  -PC    Provided to Patient  -PC    Level of Understanding Verbalized  -PC      01/19/17 1452    Therapy Education    Given Edema management   Discussed long term compression--[facility] to schedule her for fitting.  -KD    Program Reinforced  -KD    How Provided Verbal  -KD    Provided to Patient   -KD    Level of Understanding Verbalized  -KD      01/18/17 1132    Therapy Education    Given Symptoms/condition management   Discussed stocking options  -PC    Program New  -PC    How Provided Verbal  -PC    Provided to Patient  -PC    Level of Understanding Verbalized  -PC      01/17/17 1122    Therapy Education    Given Edema management   Discussed compression stockings.  -KD    Program New  -KD    How Provided Verbal  -KD    Provided to Patient  -KD    Level of Understanding Verbalized  -KD      01/16/17 1006    Therapy Education    Given Symptoms/condition management  -PC    Program Reinforced  -PC    How Provided Verbal  -PC    Provided to Patient  -PC    Level of Understanding Verbalized  -PC      User Key  (r) = Recorded By, (t) = Taken By, (c) = Cosigned By    Initials Name Provider Type    PC Joya Escudero, PT Physical Therapist    KD Brit Cruz, PT Physical Therapist                Time Calculation:   Start Time: 0835  Stop Time: 0930  Time Calculation (min): 55 min     Therapy Charges for Today     Code Description Service Date Service Provider Modifiers Qty    54875687903 HC PT MANUAL THERAPY EA 15 MIN 1/20/2017 Joya Escudero, PT GP 4                    Joya Escudero, PT  1/20/2017

## 2017-01-21 LAB
BACTERIA FLD CULT: NORMAL
GRAM STN SPEC: NORMAL
GRAM STN SPEC: NORMAL

## 2017-01-23 ENCOUNTER — HOSPITAL ENCOUNTER (OUTPATIENT)
Dept: PHYSICAL THERAPY | Facility: HOSPITAL | Age: 77
Setting detail: THERAPIES SERIES
Discharge: HOME | End: 2017-01-23

## 2017-01-23 DIAGNOSIS — I89.0 LYMPHEDEMA: Primary | ICD-10-CM

## 2017-01-23 PROCEDURE — 97140 MANUAL THERAPY 1/> REGIONS: CPT

## 2017-01-23 NOTE — PROGRESS NOTES
Outpatient Physical Therapy Lymphedema Treatment Note  [facility]     Patient Name: Shoshana Bae  : 1940  MRN: [id number]  Today's Date: 2017        Visit Date: 2017    Visit Dx:    ICD-10-CM ICD-9-CM   1. Lymphedema I89.0 457.1       Patient Active Problem List   Diagnosis   • History of total knee arthroplasty   • Pain in left knee   • Failed total knee arthroplasty   • H/O mammogram   • H/O colonoscopy   • Lymph edema   • Sarcoidosis   • Atrial fibrillation   • Hyperlipidemia   • Hypertriglyceridemia   • Type 2 diabetes mellitus with hyperglycemia   • Stasis dermatitis of both legs   • Essential hypertension   • Cellulitis of left lower extremity   • Cellulitis of extremity, lower bilateral   • Chronic anticoagulation, for a fib   • Morbid obesity   • OA (osteoarthritis) of knee   • Infected prosthetic knee joint              Lymphedema       17 1100          Subjective Comments    Subjective Comments States she spoke with [facility] and she will be measured/fitted on Thurs.  -PC      Subjective Pain    Able to rate subjective pain? yes  -PC      Pre-Treatment Pain Level 0  -PC      Post-Treatment Pain Level 0  -PC      Skin Changes/Observations    Skin Observations Comment Pt had a small area of rash ant lower legs.  -PC      Manual Lymphatic Drainage    Manual Lymphatic Drainage --   B inguinal, BLE's  -PC      Compression/Skin Care    Compression/Skin Care --   Silver stockinette, Ros Soft,Ros K 1-8cm;2-10cm R, 4-10cm L  -PC      Skin Care --   HCC to rash areas, Eucerin lotion to rest of legs.  -PC      Wrapping Location --   R LE foot to knee, L LE foot to mid-thigh  -PC        User Key  (r) = Recorded By, (t) = Taken By, (c) = Cosigned By    Initials Name Provider Type    PC Joya Escudero, PT Physical Therapist                              PT Assessment/Plan       17 1126 17 1108 17 1178    PT Assessment    Functional Limitations   Impaired locomotion  -KD     Impairments   Impaired lymphatic circulation;Edema;Impaired venous circulation;Locomotion  -KD    Assessment Comments Pt had new small areas of rash today, applied HCC. Pt to be fitted with stockings on 1/26.  -PC No new problems.  -PC Request has been sent to [facility] to schedule pt for stocking measurement and fitting as she is ready for long term compression.  -KD    Please refer to paper survey for additional self-reported information   Yes  -KD    Rehab Potential   Good  -KD    Patient/caregiver participated in establishment of treatment plan and goals   Yes  -KD    Patient would benefit from skilled therapy intervention   Yes  -KD    PT Plan    PT Frequency   5x/week  -KD    Predicted Duration of Therapy Intervention (days/wks)   4 weeks  -KD    Planned CPT's?   PT RE-EVAL: 31503;PT MANUAL THERAPY EA 15 MIN: 94455;PT SELF CARE/HOME MGMT/TRAIN EA 15: 29010  -KD    Physical Therapy Interventions (Optional Details)   bandaging;home exercise program;patient/family education;manual lymphatic drainage   skin care  -KD    PT Plan Comments Cont, assess rash.  -PC Cont.  -PC Continue per PT Plan, work toward discharge.  -KD      01/18/17 1134 01/17/17 1125       PT Assessment    Assessment Comments Pt is doing well, ready to order stockings.  Deciding betweeen a class 1 and 2.  -PC Pt seems agreeable to wearing a knee high stocking on the right, thigh high on the left to keep that knee down in preparation for knee surgery(whenever she undergoes it).  -KD     PT Plan    PT Plan Comments Cont- send pt to certified fitter for stockings.  -PC Work toward discharge.  -KD       User Key  (r) = Recorded By, (t) = Taken By, (c) = Cosigned By    Initials Name Provider Type    PC Joya Escudero, PT Physical Therapist    KD Brit Cruz, PT Physical Therapist                     Exercises       01/23/17 1100          Subjective Comments    Subjective Comments States she spoke with [facility] and she will be  "measured/fitted on Thurs.  -PC      Subjective Pain    Able to rate subjective pain? yes  -PC      Pre-Treatment Pain Level 0  -PC      Post-Treatment Pain Level 0  -PC        User Key  (r) = Recorded By, (t) = Taken By, (c) = Cosigned By    Initials Name Provider Type    PC Joya Escudero, PT Physical Therapist                              PT OP Goals       01/19/17 1400 01/12/17 1100       PT Short Term Goals    STG Date to Achieve 01/03/17  -KD 01/03/17  -KD     STG 1 Patient to demonstrate proper awareness of \"What is Lymphedema\" , \"Do's and Don'ts\", and /or Risk Reduction Practices\" for improved prevention, management, care of symptoms and ease to self-care of condition.  -KD Patient to demonstrate proper awareness of \"What is Lymphedema\" , \"Do's and Don'ts\", and /or Risk Reduction Practices\" for improved prevention, management, care of symptoms and ease to self-care of condition.  -KD     STG 1 Progress Met  -KD Met  -KD     STG 1 Progress Comments  Pt seems to have good understanding of condition & precautions  -KD     STG 2 Patient independent and compliant with self-wrapping techniques of compression bandages with assistance of caregiver as needed for improved self-management of condition.  -KD Patient independent and compliant with self-wrapping techniques of compression bandages with assistance of caregiver as needed for improved self-management of condition.  -KD     STG 2 Progress Met  -KD Met  -KD     STG 2 Progress Comments  Pt has someone where she resides that can bandage her legs.  -KD     STG 3 Patient will demonstrate decreased net edema of B LE's  >/= 5-10cm  for decrease in edema, symptoms, decreased risk of infection and improved skin care/transition to self-care of condition.  -KD Patient will demonstrate decreased net edema of B LE's  >/= 5-10cm  for decrease in edema, symptoms, decreased risk of infection and improved skin care/transition to self-care of condition.  -KD     STG 3 Progress " Partially Met  -KD Partially Met  -KD     STG 3 Progress Comments Met for L LE (decrease of 8.7cm), ongoing for R LE (decrease of 1.0cm)  -KD Met for L LE (decrease of 7.2cm), ongoing for R LE (decrease of 2.5cm)  -KD     Long Term Goals    LTG Date to Achieve 02/18/17  -KD 01/20/17  -KD     LTG 1 Patient will demonstrate decreased net edema of L LE >/= 10-20cm for decrease in edema, symptoms, decreased risk of infection and improved skin care/transition to self-care of condition.  -KD Patient will demonstrate decreased net edema of L LE >/= 10-20cm for decrease in edema, symptoms, decreased risk of infection and improved skin care/transition to self-care of condition.  -KD     LTG 1 Progress Progressing  -KD Ongoing  -KD     LTG 1 Progress Comments L LE has decreased by a total of 8.7cm.  -KD      LTG 2 Patient independent and compliant with compression garments as indicated for self-management of condition.  -KD Patient independent and compliant with compression garments as indicated for self-management of condition.  -KD     LTG 2 Progress New  -KD New  -KD     LTG 2 Progress Comments Pt to be fit for stockings soon.  -KD      LTG 3 Patient/family/caregivers independent and compliant with self-care techniques for self-management of condition.  -KD Patient/family/caregivers independent and compliant with self-care techniques for self-management of condition.  -KD     LTG 3 Progress Ongoing  -KD Ongoing  -KD     Time Calculation    PT Goal Re-Cert Due Date 02/18/17  -KD        User Key  (r) = Recorded By, (t) = Taken By, (c) = Cosigned By    Initials Name Provider Type    CLAUDIA Cruz, OLE Physical Therapist                Therapy Education       01/23/17 1126    Therapy Education    Given Symptoms/condition management   Discussed stocking options  -PC    Program Reinforced  -PC    How Provided Verbal  -PC    Provided to Patient  -PC    Level of Understanding Verbalized  -PC      01/20/17 1105    Therapy  Education    Given Symptoms/condition management  -PC    Program Reinforced  -PC    How Provided Verbal  -PC    Provided to Patient  -PC    Level of Understanding Verbalized  -PC      01/19/17 1452    Therapy Education    Given Edema management   Discussed long term compression--[facility] to schedule her for fitting.  -KD    Program Reinforced  -KD    How Provided Verbal  -KD    Provided to Patient  -KD    Level of Understanding Verbalized  -KD      01/18/17 1132    Therapy Education    Given Symptoms/condition management   Discussed stocking options  -PC    Program New  -PC    How Provided Verbal  -PC    Provided to Patient  -PC    Level of Understanding Verbalized  -PC      01/17/17 1122    Therapy Education    Given Edema management   Discussed compression stockings.  -KD    Program New  -KD    How Provided Verbal  -KD    Provided to Patient  -KD    Level of Understanding Verbalized  -KD      User Key  (r) = Recorded By, (t) = Taken By, (c) = Cosigned By    Initials Name Provider Type    PC Joya Escudero, PT Physical Therapist    KD Brit Cruz, PT Physical Therapist                Time Calculation:   Start Time: 0832  Stop Time: 0935  Time Calculation (min): 63 min     Therapy Charges for Today     Code Description Service Date Service Provider Modifiers Qty    38885970511 HC PT MANUAL THERAPY EA 15 MIN 1/23/2017 Joya Escudero, PT GP 4                    Joya Escudero, PT  1/23/2017

## 2017-01-24 ENCOUNTER — HOSPITAL ENCOUNTER (OUTPATIENT)
Dept: PHYSICAL THERAPY | Facility: HOSPITAL | Age: 77
Setting detail: THERAPIES SERIES
Discharge: HOME | End: 2017-01-24

## 2017-01-24 ENCOUNTER — TELEPHONE (OUTPATIENT)
Dept: ORTHOPEDIC SURGERY | Facility: CLINIC | Age: 77
End: 2017-01-24

## 2017-01-24 DIAGNOSIS — T84.018D FAILED TOTAL KNEE REPLACEMENT, SUBSEQUENT ENCOUNTER: Primary | ICD-10-CM

## 2017-01-24 DIAGNOSIS — I89.0 LYMPHEDEMA: Primary | ICD-10-CM

## 2017-01-24 DIAGNOSIS — Z96.659 FAILED TOTAL KNEE REPLACEMENT, SUBSEQUENT ENCOUNTER: Primary | ICD-10-CM

## 2017-01-24 PROCEDURE — 97140 MANUAL THERAPY 1/> REGIONS: CPT

## 2017-01-24 PROCEDURE — G8978 MOBILITY CURRENT STATUS: HCPCS

## 2017-01-24 PROCEDURE — G8979 MOBILITY GOAL STATUS: HCPCS

## 2017-01-24 RX ORDER — MELOXICAM 7.5 MG/1
15 TABLET ORAL ONCE
Status: CANCELLED | OUTPATIENT
Start: 2017-01-24 | End: 2017-01-24

## 2017-01-24 RX ORDER — CEFAZOLIN SODIUM 2 G/100ML
2 INJECTION, SOLUTION INTRAVENOUS ONCE
Status: CANCELLED | OUTPATIENT
Start: 2017-01-24 | End: 2017-01-24

## 2017-01-24 RX ORDER — PREGABALIN 75 MG/1
150 CAPSULE ORAL ONCE
Status: CANCELLED | OUTPATIENT
Start: 2017-01-24 | End: 2017-01-24

## 2017-01-24 NOTE — PROGRESS NOTES
Outpatient Physical Therapy Lymphedema 10th Visit  Progress Note  [facility]     Patient Name: Shoshana Bae  : 1940  MRN: [id number]  Today's Date: 2017        Visit Date: 2017    Visit Dx:    ICD-10-CM ICD-9-CM   1. Lymphedema I89.0 457.1       Patient Active Problem List   Diagnosis   • History of total knee arthroplasty   • Pain in left knee   • Failed total knee arthroplasty   • H/O mammogram   • H/O colonoscopy   • Lymph edema   • Sarcoidosis   • Atrial fibrillation   • Hyperlipidemia   • Hypertriglyceridemia   • Type 2 diabetes mellitus with hyperglycemia   • Stasis dermatitis of both legs   • Essential hypertension   • Cellulitis of left lower extremity   • Cellulitis of extremity, lower bilateral   • Chronic anticoagulation, for a fib   • Morbid obesity   • OA (osteoarthritis) of knee   • Infected prosthetic knee joint              Lymphedema       17 1500          Subjective Comments    Subjective Comments States she sees MD tomorrow and is planning to go to [facility] for fitting on  morning.  -KD      Subjective Pain    Able to rate subjective pain? yes  -KD      Pre-Treatment Pain Level 0  -KD      Post-Treatment Pain Level 0  -KD      Skin Changes/Observations    Skin Observations Comment Pt had a small area of rash ant lower legs  -KD      Manual Lymphatic Drainage    Manual Lymphatic Drainage --   B inguinal, BLE's  -KD      Compression/Skin Care    Compression/Skin Care --   Silver stockinette, Ros Soft,Ros K 1-8cm;2-10cm R, 4-10cm L  -KD      Skin Care --   HCC to rash areas, Eucerin lotion to rest of legs.  -KD      Wrapping Location --   R LE foot to knee, L LE foot to mid-thigh  -KD        User Key  (r) = Recorded By, (t) = Taken By, (c) = Cosigned By    Initials Name Provider Type    CLAUDIA Cruz, PT Physical Therapist                              PT Assessment/Plan       17 1524 17 1126 17 1108    PT Assessment    Assessment Comments  Pt to see MD tomorrow, to be fitted with compression stockings 1/26.  -KD Pt had new small areas of rash today, applied HCC. Pt to be fitted with stockings on 1/26.  -PC No new problems.  -PC    PT Plan    PT Plan Comments Cont. on 1/27, hopefully mainly for a stocking check.  -KD Cont, assess rash.  -PC Cont.  -PC      01/19/17 1457 01/18/17 1134       PT Assessment    Functional Limitations Impaired locomotion  -KD      Impairments Impaired lymphatic circulation;Edema;Impaired venous circulation;Locomotion  -KD      Assessment Comments Request has been sent to [facility] to schedule pt for stocking measurement and fitting as she is ready for long term compression.  -KD Pt is doing well, ready to order stockings.  Deciding betweeen a class 1 and 2.  -PC     Please refer to paper survey for additional self-reported information Yes  -KD      Rehab Potential Good  -KD      Patient/caregiver participated in establishment of treatment plan and goals Yes  -KD      Patient would benefit from skilled therapy intervention Yes  -KD      PT Plan    PT Frequency 5x/week  -KD      Predicted Duration of Therapy Intervention (days/wks) 4 weeks  -KD      Planned CPT's? PT RE-EVAL: 39008;PT MANUAL THERAPY EA 15 MIN: 22605;PT SELF CARE/HOME MGMT/TRAIN EA 15: 76048  -KD      Physical Therapy Interventions (Optional Details) bandaging;home exercise program;patient/family education;manual lymphatic drainage   skin care  -KD      PT Plan Comments Continue per PT Plan, work toward discharge.  -KD Cont- send pt to certified fitter for stockings.  -PC       User Key  (r) = Recorded By, (t) = Taken By, (c) = Cosigned By    Initials Name Provider Type    PC Joya Escudero, PT Physical Therapist    KD Brit Cruz, PT Physical Therapist                     Exercises       01/24/17 1500          Subjective Comments    Subjective Comments States she sees MD tomorrow and is planning to go to [facility] for fitting on 1/26 morning.   "-KD      Subjective Pain    Able to rate subjective pain? yes  -KD      Pre-Treatment Pain Level 0  -KD      Post-Treatment Pain Level 0  -KD        User Key  (r) = Recorded By, (t) = Taken By, (c) = Cosigned By    Initials Name Provider Type    CLAUDIA Cruz, PT Physical Therapist                              PT OP Goals       01/19/17 1400 01/12/17 1100       PT Short Term Goals    STG Date to Achieve 01/03/17  -KD 01/03/17  -KD     STG 1 Patient to demonstrate proper awareness of \"What is Lymphedema\" , \"Do's and Don'ts\", and /or Risk Reduction Practices\" for improved prevention, management, care of symptoms and ease to self-care of condition.  -KD Patient to demonstrate proper awareness of \"What is Lymphedema\" , \"Do's and Don'ts\", and /or Risk Reduction Practices\" for improved prevention, management, care of symptoms and ease to self-care of condition.  -KD     STG 1 Progress Met  -KD Met  -KD     STG 1 Progress Comments  Pt seems to have good understanding of condition & precautions  -KD     STG 2 Patient independent and compliant with self-wrapping techniques of compression bandages with assistance of caregiver as needed for improved self-management of condition.  -KD Patient independent and compliant with self-wrapping techniques of compression bandages with assistance of caregiver as needed for improved self-management of condition.  -KD     STG 2 Progress Met  -KD Met  -KD     STG 2 Progress Comments  Pt has someone where she resides that can bandage her legs.  -KD     STG 3 Patient will demonstrate decreased net edema of B LE's  >/= 5-10cm  for decrease in edema, symptoms, decreased risk of infection and improved skin care/transition to self-care of condition.  -KD Patient will demonstrate decreased net edema of B LE's  >/= 5-10cm  for decrease in edema, symptoms, decreased risk of infection and improved skin care/transition to self-care of condition.  -KD     STG 3 Progress Partially Met  -KD Partially " Met  -KD     STG 3 Progress Comments Met for L LE (decrease of 8.7cm), ongoing for R LE (decrease of 1.0cm)  -KD Met for L LE (decrease of 7.2cm), ongoing for R LE (decrease of 2.5cm)  -KD     Long Term Goals    LTG Date to Achieve 02/18/17  -KD 01/20/17  -KD     LTG 1 Patient will demonstrate decreased net edema of L LE >/= 10-20cm for decrease in edema, symptoms, decreased risk of infection and improved skin care/transition to self-care of condition.  -KD Patient will demonstrate decreased net edema of L LE >/= 10-20cm for decrease in edema, symptoms, decreased risk of infection and improved skin care/transition to self-care of condition.  -KD     LTG 1 Progress Progressing  -KD Ongoing  -KD     LTG 1 Progress Comments L LE has decreased by a total of 8.7cm.  -KD      LTG 2 Patient independent and compliant with compression garments as indicated for self-management of condition.  -KD Patient independent and compliant with compression garments as indicated for self-management of condition.  -KD     LTG 2 Progress New  -KD New  -KD     LTG 2 Progress Comments Pt to be fit for stockings soon.  -KD      LTG 3 Patient/family/caregivers independent and compliant with self-care techniques for self-management of condition.  -KD Patient/family/caregivers independent and compliant with self-care techniques for self-management of condition.  -KD     LTG 3 Progress Ongoing  -KD Ongoing  -KD     Time Calculation    PT Goal Re-Cert Due Date 02/18/17  -KD        User Key  (r) = Recorded By, (t) = Taken By, (c) = Cosigned By    Initials Name Provider Type    CLAUDIA Cruz, PT Physical Therapist                Therapy Education       01/24/17 1524    Therapy Education    Given Edema management  -KD    Program Reinforced  -KD    How Provided Verbal  -KD    Provided to Patient  -KD    Level of Understanding Verbalized  -KD      01/23/17 1126    Therapy Education    Given Symptoms/condition management   Discussed stocking options   -PC    Program Reinforced  -PC    How Provided Verbal  -PC    Provided to Patient  -PC    Level of Understanding Verbalized  -PC      01/20/17 1105    Therapy Education    Given Symptoms/condition management  -PC    Program Reinforced  -PC    How Provided Verbal  -PC    Provided to Patient  -PC    Level of Understanding Verbalized  -PC      01/19/17 1452    Therapy Education    Given Edema management   Discussed long term compression--[facility] to schedule her for fitting.  -KD    Program Reinforced  -KD    How Provided Verbal  -KD    Provided to Patient  -KD    Level of Understanding Verbalized  -KD      01/18/17 1132    Therapy Education    Given Symptoms/condition management   Discussed stocking options  -PC    Program New  -PC    How Provided Verbal  -PC    Provided to Patient  -PC    Level of Understanding Verbalized  -PC      User Key  (r) = Recorded By, (t) = Taken By, (c) = Cosigned By    Initials Name Provider Type    PC Joya Escudero, PT Physical Therapist    KD Brit Cruz PT Physical Therapist                Time Calculation:   Start Time: 0831  Stop Time: 0932  Time Calculation (min): 61 min     Therapy Charges for Today     Code Description Service Date Service Provider Modifiers Qty    14308931035 HC PT MOBILITY CURRENT 1/24/2017 Brit Cruz, PT GP, CL 1    42879573865 HC PT MOBILITY PROJECTED 1/24/2017 Brit Cruz, PT GP, CK 1    31671083394 HC PT MANUAL THERAPY EA 15 MIN 1/24/2017 Brit Cruz PT KX, GP 4          PT G-Codes  Mobility: Walking and Moving Around Current Status (): At least 60 percent but less than 80 percent impaired, limited or restricted  Mobility: Walking and Moving Around Goal Status (): At least 40 percent but less than 60 percent impaired, limited or restricted         Brit Cruz PT  1/24/2017

## 2017-01-25 ENCOUNTER — APPOINTMENT (OUTPATIENT)
Dept: PHYSICAL THERAPY | Facility: HOSPITAL | Age: 77
End: 2017-01-25

## 2017-01-25 ENCOUNTER — OFFICE VISIT (OUTPATIENT)
Dept: INFECTIOUS DISEASES | Facility: CLINIC | Age: 77
End: 2017-01-25

## 2017-01-25 VITALS
DIASTOLIC BLOOD PRESSURE: 67 MMHG | SYSTOLIC BLOOD PRESSURE: 113 MMHG | HEIGHT: 62 IN | TEMPERATURE: 97.5 F | HEART RATE: 75 BPM

## 2017-01-25 DIAGNOSIS — I89.0 LYMPH EDEMA: ICD-10-CM

## 2017-01-25 DIAGNOSIS — Z96.659 INFECTED PROSTHETIC KNEE JOINT, SUBSEQUENT ENCOUNTER: Primary | ICD-10-CM

## 2017-01-25 DIAGNOSIS — T84.59XD INFECTED PROSTHETIC KNEE JOINT, SUBSEQUENT ENCOUNTER: Primary | ICD-10-CM

## 2017-01-25 PROCEDURE — 99213 OFFICE O/P EST LOW 20 MIN: CPT | Performed by: INTERNAL MEDICINE

## 2017-01-25 NOTE — PROGRESS NOTES
cc: 76 y.o. female here for  Prosthetic Joint Infection     Per prior notes: Yee, she underwent bilateral knee replacement in 2006. She did well up until about 2015 when she developed pain in the left knee which was progressive. She followed up with Dr. Mayer on 06/17/2016 and was referred to the lymphedema clinic, as well as his partner, Dr. Dominick Bosch after plain films revealed loosening. Given increasing ESR and CRP and abnormal imaging, it was felt most likely that this was representative of chronic prosthetic joint infection causing loosening versus aseptic loosening. She was admitted electively on 10/10/2016 and taken to the operating room by Dr. Dominick Bosch. He performed incision and drainage of her left TKA, as well as complete explantation of the prosthetic material and implantation of an antibiotic spacer. Per the operative note it appears that there was “a moderate amount of somewhat bloody cloudy appearing fluid with joint, there was evidence of chronic inflammatory/infectious synovitis.” Operative cultures grew coagulase-negative staphylococci susceptible to clindamycin, rifampin, trimethoprim/sulfamethoxazole and vancomycin with an MALIK of 1. We'll plan on 6 weeks of antibiotic therapy with vancomycin through 11/21/2016.    I last evaluated her in clinic on 11/21/16.  At that time we discontinued her antibiotics and her PICC line.  She's been off antibiotics since that time.  I did go ahead and refer to lymphedema clinic and they've done remarkable work with her.  Her legs are much better and she is much less swollen there.    She's been feeling pretty well since being off antibiotics.  She did see Dr. Bosch on January 13 and she was having some increasing knee pain at that time.  He went ahead and performed aspiration with results as below.  Cultures were negative.  She's not had any real residual knee pain.  No constitutional signs or symptoms of infection.  Compared to the pain she was having prior  "to having her knee out, she is tremendously improved.      Review of Systems: Negative for fever, chills, night sweats, vision changes, GI symptoms,  symptoms    Blood pressure 113/67, pulse 75, temperature 97.5 °F (36.4 °C), height 62\" (157.5 cm).  GENERAL: Awake and alert, in no acute distress.   Left knee minimally swollen without cellulitic features.  Not impressively tender to palpation  SKIN: Chronic venous stasis changes to bilateral lower extremities.  Warm and dry without cutaneous eruptions   PSYCHIATRIC: Appropriate mood, affect, insight, and judgment.       DIAGNOSTICS:     8/15/2016 14:19 9/22/2016 16:57 9/26/2016 15:55 10/12/2016 03:53 12/14/2016 10:23 1/5/2017 09:53 1/20/2017 10:49   C-Reactive Protein 1.57 (H) 4.48 (H) 7.47 (H) 12.38 (H) 0.94 (H) 1.25 (H) 0.84 (H)          8/15/2016 14:19 9/22/2016 16:57 9/26/2016 15:55 10/12/2016 03:53 12/14/2016 10:23 1/5/2017 09:53 1/20/2017 10:49   Sed Rate 45 (H) 42 (H) 58 (H) 40 (H) 39 (H) 55 (H) 42 (H)     Knee aspiration culture from 1/16/17 negative arthrocentesis cell count 158,000 red blood cells, 290 nucleated cells, 5% were neutrophils, 44% lymphocytes, 49% monocytes      Assessment and Plan  1. Infected prosthetic knee joint, subsequent encounter, secondary to coagulase-negative staphylococci status post explantation with placement of antibody spacer on 10/10/16 and status post 6 weeks of IV vancomycin completing on 11/21/16   2. Lymphedema, much improved.  Continue lymphedema clinic.    Doing well.  Her sedimentation rate is stable and her CRP is now the low as it is ever been.  Recent arthrocentesis culture is negative.  She has been very stable off antibiotics.  Lymphedema is well controlled.  I think she is about as optimized as she can be for redo TKA and apparently she is going to be scheduled in March for TKA. that sounds reasonable to me.  I will see her back on an as-needed basis, but she knows she can return to see me anytime she needs.    "

## 2017-01-25 NOTE — MR AVS SNAPSHOT
Shoshana Bae   1/25/2017 1:00 PM   Office Visit    Dept Phone:  [phone]   Encounter #:  [id number]    Provider:  Harvinder Arita MD   Department:  [facility]                Your Full Care Plan              Your Updated Medication List          This list is accurate as of: 1/25/17  1:13 PM.  Always use your most recent med list.                acetaminophen 500 MG tablet   Commonly known as:  TYLENOL       atorvastatin 10 MG tablet   Commonly known as:  LIPITOR   Take 1 tablet by mouth daily.       furosemide 40 MG tablet   Commonly known as:  LASIX   Take 1 tablet by mouth 2 (two) times a day.       glucagon (human recombinant) 1 MG injection   Commonly known as:  GLUCAGEN DIAGNOSTIC   Inject 1 mg under the skin 1 (One) Time As Needed (hypoglycemia) for up to 1 dose.       HYDROcodone-acetaminophen 7.5-325 MG per tablet   Commonly known as:  NORCO   1-2 po q4hr prn pain       hydrocortisone 1 % cream   Commonly known as:  ALA-CYNTHIA   Apply  topically 2 (two) times a day.       icosapent ethyl 1 G capsule capsule   Commonly known as:  VASCEPA   Take 2 g by mouth 2 (two) times a day with meals.       KLOR-CON 20 MEQ CR tablet   Generic drug:  potassium chloride   Take 1 tablet by mouth 2 (two) times a day.       lisinopril 10 MG tablet   Commonly known as:  PRINIVIL,ZESTRIL       metFORMIN 500 MG tablet   Commonly known as:  GLUCOPHAGE       metoprolol tartrate 50 MG tablet   Commonly known as:  LOPRESSOR       warfarin 5 MG tablet   Commonly known as:  COUMADIN   Take 1 tablet by mouth Daily. 5MG daily               Instructions     None    Patient Instructions History      Upcoming Appointments     Visit Type Date Time Department    FOLLOW UP 1/25/2017  1:00 PM [facility]    TREATMENT - LYMPHEDEMA 1/26/2017  8:30 AM  [facility]    TREATMENT - LYMPHEDEMA 1/27/2017  8:30 AM  [facility]    OFFICE VISIT 2/8/2017 10:00 AM [facility]  "[facility] FIONA      [facility] Signup     [facility] "[employer]" allows you to send messages to your doctor, view your test results, renew your prescriptions, schedule appointments, and more. To sign up, go to [employer] and click on the Sign Up Now link in the New User? box. Enter your "[employer]" Activation Code exactly as it appears below along with the last four digits of your Social Security Number and your Date of Birth () to complete the sign-up process. If you do not sign up before the expiration date, you must request a new code.    "[employer]" Activation Code: [email or web address]  Expires: 2017  1:13 PM    If you have questions, you can email [email or web address] or call [phone] to talk to our "[employer]" staff. Remember, "[employer]" is NOT to be used for urgent needs. For medical emergencies, dial 911.               Other Info from Your Visit           Your Appointments     2017  8:30 AM EST   LYMPHEDEMA with Brit Cruz, PT   [facility] OP PT [facility]    [address] [id number]   [phone]            2017  8:30 AM EST   LYMPHEDEMA with Joya Escudero, PT   [facility] OP PT MEDICAL [facility]    [address] [facility] [address]   [phone]            2017 10:00 AM EST   Office Visit with Yuval Real MD   [facility] (--)    [address] [facility] [id number] [phone]           Arrive 15 minutes prior to appointment.              Allergies     Codeine  Nausea And Vomiting    Morphine  Nausea And Vomiting    Morphine And Related        Reason for Visit     Follow-up           Vital Signs     Blood Pressure Pulse Temperature Height Smoking Status       113/67 75 97.5 °F (36.4 °C) 62\" (157.5 cm) Never Smoker         "

## 2017-01-26 ENCOUNTER — APPOINTMENT (OUTPATIENT)
Dept: PHYSICAL THERAPY | Facility: HOSPITAL | Age: 77
End: 2017-01-26

## 2017-01-27 ENCOUNTER — HOSPITAL ENCOUNTER (OUTPATIENT)
Dept: PHYSICAL THERAPY | Facility: HOSPITAL | Age: 77
Setting detail: THERAPIES SERIES
Discharge: HOME | End: 2017-01-27

## 2017-01-27 DIAGNOSIS — I89.0 LYMPHEDEMA: Primary | ICD-10-CM

## 2017-01-27 PROCEDURE — 97140 MANUAL THERAPY 1/> REGIONS: CPT

## 2017-01-27 PROCEDURE — G8979 MOBILITY GOAL STATUS: HCPCS

## 2017-01-27 PROCEDURE — G8980 MOBILITY D/C STATUS: HCPCS

## 2017-01-27 NOTE — THERAPY DISCHARGE NOTE
Outpatient Physical Therapy Lymphedema Treatment Note/Discharge Summary  [facility]     Patient Name: Shoshana Bae  : 1940  MRN: [id number]  Today's Date: 2017      Visit Date: 2017    Visit Dx:    ICD-10-CM ICD-9-CM   1. Lymphedema I89.0 457.1       Patient Active Problem List   Diagnosis   • History of total knee arthroplasty   • Pain in left knee   • Failed total knee arthroplasty   • H/O mammogram   • H/O colonoscopy   • Lymph edema   • Sarcoidosis   • Atrial fibrillation   • Hyperlipidemia   • Hypertriglyceridemia   • Type 2 diabetes mellitus with hyperglycemia   • Stasis dermatitis of both legs   • Essential hypertension   • Cellulitis of left lower extremity   • Cellulitis of extremity, lower bilateral   • Chronic anticoagulation, for a fib   • Morbid obesity   • OA (osteoarthritis) of knee   • Infected prosthetic knee joint              Lymphedema       17 1400          Subjective Comments    Subjective Comments States she got her stockings yesterday.  She was able to get them on and off by herself.  -PC      Subjective Pain    Able to rate subjective pain? yes  -PC      Pre-Treatment Pain Level 0  -PC      Post-Treatment Pain Level 0  -PC      Skin Changes/Observations    Skin Observations Comment No redness or skin problems from the stockings.  -PC      Manual Lymphatic Drainage    Manual Lymphatic Drainage --   B inguinal, BLE's  -PC      Compression/Skin Care    Compression/Skin Care --   No bandaging today- pt wore her stockings.  -PC      Compression Garment Comments Pt came in wearing class 1 knee high on right leg and class 1 thigh high on left leg.   -PC        User Key  (r) = Recorded By, (t) = Taken By, (c) = Cosigned By    Initials Name Provider Type    PC Joya Escudero, PT Physical Therapist                              PT Assessment/Plan       17 1454 17 1524 17 1126    PT Assessment    Assessment Comments Pt was able to don/doff stockings  "independently.  She has help at her home if needed. Her knee surgery has been rescheduled for March.  -PC Pt to see MD tomorrow, to be fitted with compression stockings 1/26.  -KD Pt had new small areas of rash today, applied HCC. Pt to be fitted with stockings on 1/26.  -PC    PT Plan    PT Plan Comments Pt to continue wearing stockings and bandaging as needed. D/C therapy.  -PC Cont. on 1/27, hopefully mainly for a stocking check.  -KD Cont, assess rash.  -PC      User Key  (r) = Recorded By, (t) = Taken By, (c) = Cosigned By    Initials Name Provider Type    PC Joya Escudero, PT Physical Therapist    KD Brit Cruz, PT Physical Therapist                     Exercises       01/27/17 1400          Subjective Comments    Subjective Comments States she got her stockings yesterday.  She was able to get them on and off by herself.  -PC      Subjective Pain    Able to rate subjective pain? yes  -PC      Pre-Treatment Pain Level 0  -PC      Post-Treatment Pain Level 0  -PC        User Key  (r) = Recorded By, (t) = Taken By, (c) = Cosigned By    Initials Name Provider Type    PC Joya Escudero, PT Physical Therapist                              PT OP Goals       01/27/17 1400 01/19/17 1400       PT Short Term Goals    STG Date to Achieve  01/03/17  -KD     STG 1 Patient to demonstrate proper awareness of \"What is Lymphedema\" , \"Do's and Don'ts\", and /or Risk Reduction Practices\" for improved prevention, management, care of symptoms and ease to self-care of condition.  -PC Patient to demonstrate proper awareness of \"What is Lymphedema\" , \"Do's and Don'ts\", and /or Risk Reduction Practices\" for improved prevention, management, care of symptoms and ease to self-care of condition.  -KD     STG 1 Progress Met  -PC Met  -KD     STG 2 Patient independent and compliant with self-wrapping techniques of compression bandages with assistance of caregiver as needed for improved self-management of condition.  -PC Patient " independent and compliant with self-wrapping techniques of compression bandages with assistance of caregiver as needed for improved self-management of condition.  -KD     STG 2 Progress Met  -PC Met  -KD     STG 3 Patient will demonstrate decreased net edema of B LE's  >/= 5-10cm  for decrease in edema, symptoms, decreased risk of infection and improved skin care/transition to self-care of condition.  -PC Patient will demonstrate decreased net edema of B LE's  >/= 5-10cm  for decrease in edema, symptoms, decreased risk of infection and improved skin care/transition to self-care of condition.  -KD     STG 3 Progress Partially Met  -PC Partially Met  -KD     STG 3 Progress Comments Met on left leg, not on right leg.  -PC Met for L LE (decrease of 8.7cm), ongoing for R LE (decrease of 1.0cm)  -KD     Long Term Goals    LTG Date to Achieve  02/18/17  -KD     LTG 1 Patient will demonstrate decreased net edema of L LE >/= 10-20cm for decrease in edema, symptoms, decreased risk of infection and improved skin care/transition to self-care of condition.  -PC Patient will demonstrate decreased net edema of L LE >/= 10-20cm for decrease in edema, symptoms, decreased risk of infection and improved skin care/transition to self-care of condition.  -KD     LTG 1 Progress Not Met  -PC Progressing  -KD     LTG 1 Progress Comments  L LE has decreased by a total of 8.7cm.  -KD     LTG 2 Patient independent and compliant with compression garments as indicated for self-management of condition.  -PC Patient independent and compliant with compression garments as indicated for self-management of condition.  -KD     LTG 2 Progress Met  -PC New  -KD     LTG 2 Progress Comments  Pt to be fit for stockings soon.  -KD     LTG 3 Patient/family/caregivers independent and compliant with self-care techniques for self-management of condition.  -PC Patient/family/caregivers independent and compliant with self-care techniques for self-management of  condition.  -KD     LTG 3 Progress Met  -PC Ongoing  -KD     Time Calculation    PT Goal Re-Cert Due Date  02/18/17  -KD       User Key  (r) = Recorded By, (t) = Taken By, (c) = Cosigned By    Initials Name Provider Type    PC Joya Escudero, PT Physical Therapist    CLAUDIA Cruz, PT Physical Therapist                Therapy Education       01/27/17 1445    Therapy Education    Given Symptoms/condition management   Reviewed stocking don/doff, care of stocking, when to replace.  Reminded pt to use lotion every night after removing stockings.  -PC    Program Reinforced  -PC    How Provided Verbal  -PC    Provided to Patient  -PC    Level of Understanding Verbalized  -PC      01/24/17 1524    Therapy Education    Given Edema management  -KD    Program Reinforced  -KD    How Provided Verbal  -KD    Provided to Patient  -KD    Level of Understanding Verbalized  -KD      01/23/17 1126    Therapy Education    Given Symptoms/condition management   Discussed stocking options  -PC    Program Reinforced  -PC    How Provided Verbal  -PC    Provided to Patient  -PC    Level of Understanding Verbalized  -PC      User Key  (r) = Recorded By, (t) = Taken By, (c) = Cosigned By    Initials Name Provider Type    PC Joya Escudero, PT Physical Therapist    CLAUDIA Cruz, PT Physical Therapist                Time Calculation:   Start Time: 0835  Stop Time: 0930  Time Calculation (min): 55 min     Therapy Charges for Today     Code Description Service Date Service Provider Modifiers Qty    47974139709 HC PT MOBILITY PROJECTED 1/27/2017 Joya Escudero, PT  1    16956130830 HC PT MOBILITY DISCHARGE 1/27/2017 Joya Escudero PT  1    [id number] HC PT MANUAL THERAPY EA 15 MIN 1/27/2017 Joya Escudero, PT  4          PT G-Codes  Functional Limitation: Mobility: Walking and moving around  Mobility: Walking and Moving Around Goal Status (): At least 40 percent but less than 60 percent impaired, limited or restricted  Mobility:  Walking and Moving Around Discharge Status (): At least 60 percent but less than 80 percent impaired, limited or restricted      OP PT Discharge Summary  Date of Discharge: 01/27/17  Reason for Discharge: Maximum functional potential achieved  Outcomes Achieved: Patient able to partially acheive established goals  Discharge Destination: Home with home program  Discharge Instructions: Wear stockings during day, remove at night.  Bandage as needed.  Cont with gentle ex and skin care.      Joya Escudero, PT  1/27/2017

## 2017-01-30 ENCOUNTER — APPOINTMENT (OUTPATIENT)
Dept: PHYSICAL THERAPY | Facility: HOSPITAL | Age: 77
End: 2017-01-30

## 2017-01-31 ENCOUNTER — APPOINTMENT (OUTPATIENT)
Dept: PHYSICAL THERAPY | Facility: HOSPITAL | Age: 77
End: 2017-01-31

## 2017-01-31 RX ORDER — DIAPER,BRIEF,INFANT-TODD,DISP
EACH MISCELLANEOUS
Qty: 85.05 G | Refills: 2 | Status: SHIPPED | OUTPATIENT
Start: 2017-01-31 | End: 2017-04-13 | Stop reason: SDUPTHER

## 2017-02-01 ENCOUNTER — APPOINTMENT (OUTPATIENT)
Dept: PHYSICAL THERAPY | Facility: HOSPITAL | Age: 77
End: 2017-02-01

## 2017-02-02 ENCOUNTER — APPOINTMENT (OUTPATIENT)
Dept: PHYSICAL THERAPY | Facility: HOSPITAL | Age: 77
End: 2017-02-02

## 2017-02-07 ENCOUNTER — APPOINTMENT (OUTPATIENT)
Dept: PHYSICAL THERAPY | Facility: HOSPITAL | Age: 77
End: 2017-02-07

## 2017-02-08 ENCOUNTER — OFFICE VISIT (OUTPATIENT)
Dept: FAMILY MEDICINE CLINIC | Facility: CLINIC | Age: 77
End: 2017-02-08

## 2017-02-08 VITALS
SYSTOLIC BLOOD PRESSURE: 118 MMHG | RESPIRATION RATE: 16 BRPM | DIASTOLIC BLOOD PRESSURE: 84 MMHG | BODY MASS INDEX: 50.97 KG/M2 | HEIGHT: 62 IN | HEART RATE: 77 BPM | TEMPERATURE: 98.1 F | WEIGHT: 277 LBS

## 2017-02-08 DIAGNOSIS — R91.1 PULMONARY NODULE: Primary | ICD-10-CM

## 2017-02-08 PROBLEM — G47.33 OBSTRUCTIVE SLEEP APNEA SYNDROME: Status: ACTIVE | Noted: 2017-02-08

## 2017-02-08 PROBLEM — I87.2 CHRONIC VENOUS INSUFFICIENCY: Status: ACTIVE | Noted: 2017-02-08

## 2017-02-08 PROCEDURE — 99213 OFFICE O/P EST LOW 20 MIN: CPT | Performed by: INTERNAL MEDICINE

## 2017-02-08 NOTE — PROGRESS NOTES
Subjective   Shoshana Bae is a 76 y.o. female.     History of Present Illness     {Common H&P Review Areas:68780}    Review of Systems    Objective   Physical Exam    Assessment/Plan   {Assess/PlanSmartLinks:73955}

## 2017-02-08 NOTE — PROGRESS NOTES
Subjective  complaint is follow-up for pulmonary nodule  Shoshana Bae is a 76 y.o. female.     History of Present Illness   Sr. is here today for follow-up.  In August 2016 on a cardiac PET scan there was a pulmonary nodule noticed.  A high-resolution CT scan performed at [facility] in September showed a 12 mm noncalcified nodule.  A 3 month follow-up was recommended.  The patient has never been a cigarette smoker.  Her father was a cigarette smoker but never really smoked much.  Not exposed much secondhand smoke since joining the [employer].  She does have an upcoming surgery on March 7.  Hopefully they will be able to do her knee replacement.  Her most recent cultures of her left knee showed white cells but no bacteria.  She does have atrial fibrillation for which she is on warfarin.  She did have an INR at her cardiologist it was normal on 30 January.  She has chronic venous stasis and stasis skin changes.  The following portions of the patient's history were reviewed and updated as appropriate: allergies, current medications, past medical history and problem list.    Review of Systems   Respiratory: Negative for chest tightness and shortness of breath.        Objective   Physical Exam   Cardiovascular:   Rhythm is irregular.  The rate is normal   Pulmonary/Chest: Effort normal and breath sounds normal. No respiratory distress. She has no wheezes. She has no rales.   Musculoskeletal: She exhibits edema.   Skin:   There are changes consistent with venous stasis dermatitis but no obvious cellulitis.   Nursing note and vitals reviewed.      Assessment/Plan   Shoshana was seen today for follow-up.    Diagnoses and all orders for this visit:    Pulmonary nodule  -     CT Chest Hi Resolution; Future    Other orders  -     glucose blood (FREESTYLE TEST STRIPS) test strip; 1 each by Other route 2 (Two) Times a Week.      Chief complaint is follow-up for pulmonary nodule.  We are going to get another CAT scan of the  chest to follow-up.  With her never having been a smoker and not being in a smoking environment I would think that this is unchanged we can probably not do any further scans.

## 2017-03-08 ENCOUNTER — APPOINTMENT (OUTPATIENT)
Dept: PREADMISSION TESTING | Facility: HOSPITAL | Age: 77
End: 2017-03-08

## 2017-03-08 VITALS
BODY MASS INDEX: 51.16 KG/M2 | RESPIRATION RATE: 20 BRPM | DIASTOLIC BLOOD PRESSURE: 86 MMHG | HEART RATE: 66 BPM | TEMPERATURE: 98.5 F | HEIGHT: 62 IN | SYSTOLIC BLOOD PRESSURE: 131 MMHG | WEIGHT: 278 LBS | OXYGEN SATURATION: 97 %

## 2017-03-08 LAB
ABO GROUP BLD: NORMAL
ANION GAP SERPL CALCULATED.3IONS-SCNC: 14.5 MMOL/L
BACTERIA UR QL AUTO: ABNORMAL /HPF
BASOPHILS # BLD AUTO: 0.05 10*3/MM3 (ref 0–0.2)
BASOPHILS NFR BLD AUTO: 0.7 % (ref 0–1.5)
BILIRUB UR QL STRIP: NEGATIVE
BLD GP AB SCN SERPL QL: NEGATIVE
BUN BLD-MCNC: 31 MG/DL (ref 8–23)
BUN/CREAT SERPL: 26.7 (ref 7–25)
CALCIUM SPEC-SCNC: 9.1 MG/DL (ref 8.6–10.5)
CHLORIDE SERPL-SCNC: 102 MMOL/L (ref 98–107)
CLARITY UR: ABNORMAL
CO2 SERPL-SCNC: 23.5 MMOL/L (ref 22–29)
COLOR UR: YELLOW
CREAT BLD-MCNC: 1.16 MG/DL (ref 0.57–1)
CRP SERPL-MCNC: 0.56 MG/DL (ref 0–0.5)
DEPRECATED RDW RBC AUTO: 49.8 FL (ref 37–54)
EOSINOPHIL # BLD AUTO: 0.28 10*3/MM3 (ref 0–0.7)
EOSINOPHIL NFR BLD AUTO: 3.8 % (ref 0.3–6.2)
ERYTHROCYTE [DISTWIDTH] IN BLOOD BY AUTOMATED COUNT: 14 % (ref 11.7–13)
ERYTHROCYTE [SEDIMENTATION RATE] IN BLOOD: 38 MM/HR (ref 0–30)
GFR SERPL CREATININE-BSD FRML MDRD: 45 ML/MIN/1.73
GLUCOSE BLD-MCNC: 126 MG/DL (ref 65–99)
GLUCOSE UR STRIP-MCNC: NEGATIVE MG/DL
HCT VFR BLD AUTO: 39.5 % (ref 35.6–45.5)
HGB BLD-MCNC: 12.7 G/DL (ref 11.9–15.5)
HGB UR QL STRIP.AUTO: NEGATIVE
HYALINE CASTS UR QL AUTO: ABNORMAL /LPF
IMM GRANULOCYTES # BLD: 0.02 10*3/MM3 (ref 0–0.03)
IMM GRANULOCYTES NFR BLD: 0.3 % (ref 0–0.5)
KETONES UR QL STRIP: NEGATIVE
LEUKOCYTE ESTERASE UR QL STRIP.AUTO: ABNORMAL
LYMPHOCYTES # BLD AUTO: 2.11 10*3/MM3 (ref 0.9–4.8)
LYMPHOCYTES NFR BLD AUTO: 28.4 % (ref 19.6–45.3)
MCH RBC QN AUTO: 31.7 PG (ref 26.9–32)
MCHC RBC AUTO-ENTMCNC: 32.2 G/DL (ref 32.4–36.3)
MCV RBC AUTO: 98.5 FL (ref 80.5–98.2)
MONOCYTES # BLD AUTO: 0.48 10*3/MM3 (ref 0.2–1.2)
MONOCYTES NFR BLD AUTO: 6.5 % (ref 5–12)
NEUTROPHILS # BLD AUTO: 4.48 10*3/MM3 (ref 1.9–8.1)
NEUTROPHILS NFR BLD AUTO: 60.3 % (ref 42.7–76)
NITRITE UR QL STRIP: NEGATIVE
PH UR STRIP.AUTO: 5.5 [PH] (ref 5–8)
PLATELET # BLD AUTO: 191 10*3/MM3 (ref 140–500)
PMV BLD AUTO: 11.2 FL (ref 6–12)
POTASSIUM BLD-SCNC: 4.7 MMOL/L (ref 3.5–5.2)
PROT UR QL STRIP: NEGATIVE
RBC # BLD AUTO: 4.01 10*6/MM3 (ref 3.9–5.2)
RBC # UR: ABNORMAL /HPF
REF LAB TEST METHOD: ABNORMAL
RH BLD: NEGATIVE
SODIUM BLD-SCNC: 140 MMOL/L (ref 136–145)
SP GR UR STRIP: 1.01 (ref 1–1.03)
SQUAMOUS #/AREA URNS HPF: ABNORMAL /HPF
UROBILINOGEN UR QL STRIP: ABNORMAL
WBC NRBC COR # BLD: 7.42 10*3/MM3 (ref 4.5–10.7)
WBC UR QL AUTO: ABNORMAL /HPF

## 2017-03-08 PROCEDURE — 86901 BLOOD TYPING SEROLOGIC RH(D): CPT | Performed by: ORTHOPAEDIC SURGERY

## 2017-03-08 PROCEDURE — 85652 RBC SED RATE AUTOMATED: CPT | Performed by: ORTHOPAEDIC SURGERY

## 2017-03-08 PROCEDURE — 81001 URINALYSIS AUTO W/SCOPE: CPT | Performed by: ORTHOPAEDIC SURGERY

## 2017-03-08 PROCEDURE — 36415 COLL VENOUS BLD VENIPUNCTURE: CPT

## 2017-03-08 PROCEDURE — 80048 BASIC METABOLIC PNL TOTAL CA: CPT | Performed by: ORTHOPAEDIC SURGERY

## 2017-03-08 PROCEDURE — 86850 RBC ANTIBODY SCREEN: CPT | Performed by: ORTHOPAEDIC SURGERY

## 2017-03-08 PROCEDURE — 86140 C-REACTIVE PROTEIN: CPT | Performed by: ORTHOPAEDIC SURGERY

## 2017-03-08 PROCEDURE — 86900 BLOOD TYPING SEROLOGIC ABO: CPT | Performed by: ORTHOPAEDIC SURGERY

## 2017-03-08 PROCEDURE — 87086 URINE CULTURE/COLONY COUNT: CPT | Performed by: ORTHOPAEDIC SURGERY

## 2017-03-08 PROCEDURE — 85025 COMPLETE CBC W/AUTO DIFF WBC: CPT | Performed by: ORTHOPAEDIC SURGERY

## 2017-03-08 PROCEDURE — 93005 ELECTROCARDIOGRAM TRACING: CPT

## 2017-03-08 RX ORDER — WARFARIN SODIUM 7.5 MG/1
7.5 TABLET ORAL
COMMUNITY
End: 2017-04-13

## 2017-03-08 NOTE — DISCHARGE INSTRUCTIONS
Take the following medications the morning of surgery with a small sip of water.METOPROLOL        General Instructions:  • Do not eat or drink after midnight: includes water, mints, or gum. You may brush your teeth.  • Do not smoke, chew tobacco, or drink alcohol.  • The Pre-Admission Testing nurse will instruct you to bring medications if unable to obtain an accurate list in Pre-Admission Testing.    • If applicable bring your C-PAP/ BI-PAP machine.  • Bring any papers given to you in the doctor’s office.  • Wear clean comfortable clothes and socks.  • Do not wear contact lenses or make-up.  Bring a case for your glasses if applicable.   • Bring crutches or walker if applicable.  • Leave all other valuables and jewelry at home.    If you were given a blood bank ID arm band remember to bring it with you the day of surgery.    Preventing a Surgical Site Infection:  Shower on the morning of surgery using a fresh bar of anti-bacterial soap (such as Dial) and clean washcloth.  Dry with a clean towel and dress in clean clothing.  For 2 to 3 days before surgery, avoid shaving with a razor near where you will have surgery because the razor can irritate skin and make it easier to develop an infection  Ask your surgeon if you will be receiving antibiotics prior to surgery  Make sure you, your family, and all healthcare providers clean their hands with soap and water or an alcohol based hand  before caring for you or your wound  If at all possible, quit smoking as many days before surgery as you can.    Day of surgery:  Upon arrival, a Pre-op nurse and Anesthesiologist will review your health history, obtain vital signs, and answer questions you may have.  The only belongings needed at this time will be your home medications and if applicable your C-PAP/BI-PAP machine.  If you are staying overnight your family can leave the rest of your belongings in the car and bring them to your room later.  A Pre-op nurse will  start an IV and you may receive medication in preparation for surgery, including something to help you relax.  Your family will be able to see you in the Pre-op area.  While you are in surgery your family should notify the waiting room  if they leave the waiting room area and provide a contact phone number.    Please be aware that surgery does come with discomfort.  We want to make every effort to control your discomfort so please discuss any uncontrolled symptoms with your nurse.   Your doctor will most likely have prescribed pain medications.      If you are going home after surgery you will receive individualized written care instructions before being discharged.  A responsible adult must drive you to and from the hospital on the day of your surgery and stay with you for 24 hours.    If you are staying overnight following surgery, you will be transported to your hospital room following the recovery period.  [facility] has all private rooms.    If you have any questions please call Pre-Admission Testing at [phone].  Deductibles and co-payments are collected on the day of service. Please be prepared to pay the required co-pay, deductible or deposit on the day of service as defined by your plan.    2% CHLORAHEXIDINE GLUCONATE* CLOTH  Preparing or “prepping” skin before surgery can reduce the risk of infection at the surgical site. To make the process easier, [facility] has chosen disposable cloths moistened with a rinse-free, 2% Chlorhexidine Gluconate (CHG) antiseptic solution. The steps below outline the prepping process and should be carefully followed.        Use the prep cloth on the area that is circled in the diagram             Directions Night before Surgery  1) Shower using a fresh bar of anti-bacterial soap (such as Dial) and clean washcloth.  Use a clean towel to completely dry your skin.  2) Do not use any lotions, oils or creams on your skin.  3) Open the  package and remove 1 cloth, wipe your skin for 30 seconds in a circular motion.  Allow to dry for 3 minutes.  4) Repeat #3 with second cloth.  5) Do not touch your eyes, ears, or mouth with the prep cloth.  6) Allow the wet prep solution to air dry.  7) Discard the prep cloth and wash your hands with soap and water.   8) Dress in clean bed clothes and sleep on fresh clean bed sheets.   9) You may experience some temporary itching after the prep.    Directions Day of Surgery  1) Repeat steps 1,2,3,4,5,6,7, and 9.   2) Dress in clean clothes before coming to the hospital.    BACTROBAN NASAL OINTMENT  There are many germs normally in your nose. Bactroban is an ointment that will help reduce these germs. Please follow these instructions for Bactroban use:    ____Two days before surgery in the evening Date________    ____The day before surgery in the morning  Date________    ____The day before surgery in the evening              Date________    ____The day of surgery in the morning    Date________    **Squirt ½ package of Bactroban Ointment onto a cotton applicator and apply to inside of 1st nostril.  Squirt the remaining Bactroban and apply to the inside of the other nostril.    PERIDEX- ORAL:  Use only if your surgeon has ordered  Use the night before and morning of surgery - Swish, gargle, and spit - do not swallow.

## 2017-03-09 LAB — BACTERIA SPEC AEROBE CULT: NORMAL

## 2017-03-10 DIAGNOSIS — R93.2 ABNORMAL CT OF LIVER: Primary | ICD-10-CM

## 2017-03-15 DIAGNOSIS — R93.5 ABNORMAL CT OF THE ABDOMEN: Primary | ICD-10-CM

## 2017-03-16 ENCOUNTER — OFFICE VISIT (OUTPATIENT)
Dept: ORTHOPEDIC SURGERY | Facility: CLINIC | Age: 77
End: 2017-03-16

## 2017-03-16 VITALS
WEIGHT: 277 LBS | BODY MASS INDEX: 50.97 KG/M2 | DIASTOLIC BLOOD PRESSURE: 80 MMHG | HEIGHT: 62 IN | SYSTOLIC BLOOD PRESSURE: 115 MMHG | TEMPERATURE: 98.4 F

## 2017-03-16 DIAGNOSIS — G89.29 CHRONIC PAIN OF LEFT KNEE: ICD-10-CM

## 2017-03-16 DIAGNOSIS — T84.018D FAILED TOTAL KNEE REPLACEMENT, SUBSEQUENT ENCOUNTER: Primary | ICD-10-CM

## 2017-03-16 DIAGNOSIS — Z96.659 FAILED TOTAL KNEE REPLACEMENT, SUBSEQUENT ENCOUNTER: Primary | ICD-10-CM

## 2017-03-16 DIAGNOSIS — M25.562 CHRONIC PAIN OF LEFT KNEE: ICD-10-CM

## 2017-03-16 PROCEDURE — 77077 JOINT SURVEY SINGLE VIEW: CPT | Performed by: NURSE PRACTITIONER

## 2017-03-16 PROCEDURE — S0260 H&P FOR SURGERY: HCPCS | Performed by: NURSE PRACTITIONER

## 2017-03-16 PROCEDURE — 73560 X-RAY EXAM OF KNEE 1 OR 2: CPT | Performed by: NURSE PRACTITIONER

## 2017-03-16 NOTE — H&P
Patient: Shoshana Bae    Date of Admission: 3/22/17    YOB: 1940    Medical Record Number: [id number]    Admitting Physician: Dr. Dominick Bosch    Reason for Admission: Left knee revision     History of Present Illness: 76 y.o. female presents with a history of left knee replacement with subsequent infection.  She is scheduled for left knee revision Mar 22, 2017.      Allergies:   Allergies   Allergen Reactions   • Codeine Nausea And Vomiting   • Morphine Nausea And Vomiting   • Morphine And Related          Current Medications:  Scheduled Meds:  PRN Meds:.    PMH:     Past Medical History   Diagnosis Date   • Arm fracture    • Arthritis    • Atrial fibrillation    • Back pain      WITH STANDING   • Cancer    • Cellulitis      LOWER LEGS   • Colon polyp    • DDD (degenerative disc disease), lumbar    • Diabetes mellitus      TYPE 2   • Edema    • H/O colonoscopy      2014   • H/O mammogram      [facility] 2015   • Hyperlipidemia    • Hypertension    • On anticoagulant therapy    • Oral-mouth cancer    • Sarcoidosis of lung with sarcoidosis of lymph nodes    • Sleep apnea      MOUTHPIECE USED/CPAP   • Uterine cancer        PF/Surg/Soc Hx:     Past Surgical History   Procedure Laterality Date   • Hysterectomy     • Tonsillectomy     • Knee surgery Bilateral    • Dilatation and curettage     • Ovarian cyst surgery     • Thyroid surgery  12/18/2007   • Hernia repair     • Colonoscopy     • Eye surgery     • Umbilical hernia repair     • Lumbar discectomy fusion instrumentation     • Mouth lesion excisional biopsy     • Cardiac catheterization  2010   • Pr revise knee joint replace,1 part Left 10/10/2016     Procedure: TOTAL KNEE ARTHROPLASTY REVISION - SPACER PLACEMENT;  Surgeon: Dominick Bosch MD;  Location: [facility] OR;  Service: Orthopedics   • Shoulder surgery Right      FRACTURE REPAIR         Social History     Occupational History   • nun      Social History Main Topics   • Smoking status: Never Smoker  "  • Smokeless tobacco: Never Used   • Alcohol use No   • Drug use: No   • Sexual activity: Not on file      Social History     Social History Narrative        Family History   Problem Relation Age of Onset   • Heart disease Mother    • Hypertension Mother    • Cancer Mother    • Heart disease Father    • Hypertension Father    • Cancer Father    • Heart disease Sister    • Hypertension Sister    • Diabetes Sister    • Cancer Sister    • Cancer Brother    • No Known Problems Daughter    • No Known Problems Son    • No Known Problems Maternal Grandmother    • No Known Problems Paternal Grandmother    • No Known Problems Maternal Aunt    • No Known Problems Paternal Aunt    • Hypertension Other    • Heart disease Other      AFIB   • Cancer Other    • BRCA 1/2 Neg Hx    • Breast cancer Neg Hx    • Colon cancer Neg Hx    • Endometrial cancer Neg Hx    • Ovarian cancer Neg Hx          Review of Systems:   A 14 point review of systems was performed, pertinent positives discussed above, all other systems are negative    Physical Exam: 76 y.o. female  Vital Signs :   Vitals:    03/16/17 1407   BP: 115/80   BP Location: Left arm   Patient Position: Sitting   Temp: 98.4 °F (36.9 °C)   TempSrc: Temporal Artery    Weight: 277 lb (126 kg)   Height: 62\" (157.5 cm)     General: Alert and Oriented x 3, No acute distress.  Psych: mood and affect appropriate; recent and remote memory intact  Eyes: conjunctiva clear; pupils equally round and reactive, sclera nonicteric  CV: no peripheral edema  Resp: normal respiratory effort  Skin: no rashes or wounds; normal turgor  Musculosketetal; pain and crepitance with knee range of motion  Vascular: palpable distal pulses    Xrays:  -A full length AP xray and single view of the left knee was ordered today for purposes of operative alignment demonstrating left knee antibiotic spacer     Assessment:  Infected left total knee replacement Conservative measures have failed.      Plan:  The plan is " to proceed with left knee revision The patient voiced understanding of the risks, benefits, and alternative forms of treatment that were discussed with Dr Bosch at the time of scheduling.  Patient is planning on going to rehabilitation after 3 day stay.  Dr. De La Cruz her cardiologist has instructed her to stop her Coumadin 5 days prior to surgery I did make the patient aware we will need to recheck PT and INR the morning of surgery to make sure she is within safe range for surgery, we will resume her Coumadin postoperatively.  Transient make acid to be given topically.  In addition no anti-inflammatories because of history of elevated BUN/creatinine    Charlee Celestin, APRN  3/16/2017

## 2017-03-22 ENCOUNTER — ANESTHESIA (OUTPATIENT)
Dept: PERIOP | Facility: HOSPITAL | Age: 77
End: 2017-03-22

## 2017-03-22 ENCOUNTER — ANESTHESIA EVENT (OUTPATIENT)
Dept: PERIOP | Facility: HOSPITAL | Age: 77
End: 2017-03-22

## 2017-03-22 ENCOUNTER — APPOINTMENT (OUTPATIENT)
Dept: GENERAL RADIOLOGY | Facility: HOSPITAL | Age: 77
End: 2017-03-22

## 2017-03-22 ENCOUNTER — HOSPITAL ENCOUNTER (INPATIENT)
Facility: HOSPITAL | Age: 77
LOS: 3 days | Discharge: NURSING, REHAB OR LONG-TERM CARE | End: 2017-03-25
Attending: ORTHOPAEDIC SURGERY | Admitting: ORTHOPAEDIC SURGERY

## 2017-03-22 DIAGNOSIS — R26.2 DIFFICULTY WALKING: Primary | ICD-10-CM

## 2017-03-22 DIAGNOSIS — T84.018D FAILED TOTAL KNEE REPLACEMENT, SUBSEQUENT ENCOUNTER: ICD-10-CM

## 2017-03-22 DIAGNOSIS — Z96.659 FAILED TOTAL KNEE REPLACEMENT, SUBSEQUENT ENCOUNTER: ICD-10-CM

## 2017-03-22 PROBLEM — T84.018A FAILED TOTAL KNEE REPLACEMENT (HCC): Status: ACTIVE | Noted: 2017-03-22

## 2017-03-22 LAB
APPEARANCE FLD: ABNORMAL
COLOR FLD: ABNORMAL
GLUCOSE BLDC GLUCOMTR-MCNC: 141 MG/DL (ref 70–130)
GLUCOSE BLDC GLUCOMTR-MCNC: 155 MG/DL (ref 70–130)
GLUCOSE BLDC GLUCOMTR-MCNC: 169 MG/DL (ref 70–130)
INR PPP: 1.29 (ref 0.9–1.1)
INR PPP: 1.31 (ref 0.9–1.1)
LYMPHOCYTES NFR FLD MANUAL: 81 %
METHOD: ABNORMAL
MONOCYTES NFR FLD: 2 %
MONOS+MACROS NFR FLD: 2 %
NEUTROPHILS NFR FLD MANUAL: 15 %
NUC CELL # FLD: 174 /MM3
PROTHROMBIN TIME: 15.6 SECONDS (ref 11.7–14.2)
PROTHROMBIN TIME: 15.8 SECONDS (ref 11.7–14.2)
RBC # FLD AUTO: 2115 /MM3

## 2017-03-22 PROCEDURE — 25010000002 FENTANYL CITRATE (PF) 100 MCG/2ML SOLUTION: Performed by: ANESTHESIOLOGY

## 2017-03-22 PROCEDURE — 25010000002 MIDAZOLAM PER 1 MG: Performed by: ANESTHESIOLOGY

## 2017-03-22 PROCEDURE — 25010000002 FENTANYL CITRATE (PF) 100 MCG/2ML SOLUTION: Performed by: NURSE ANESTHETIST, CERTIFIED REGISTERED

## 2017-03-22 PROCEDURE — 73560 X-RAY EXAM OF KNEE 1 OR 2: CPT

## 2017-03-22 PROCEDURE — 25010000002 ONDANSETRON PER 1 MG: Performed by: ORTHOPAEDIC SURGERY

## 2017-03-22 PROCEDURE — 85610 PROTHROMBIN TIME: CPT | Performed by: ORTHOPAEDIC SURGERY

## 2017-03-22 PROCEDURE — 25010000002 NEOSTIGMINE 10 MG/10ML SOLUTION: Performed by: NURSE ANESTHETIST, CERTIFIED REGISTERED

## 2017-03-22 PROCEDURE — 0SPD08Z REMOVAL OF SPACER FROM LEFT KNEE JOINT, OPEN APPROACH: ICD-10-PCS | Performed by: ORTHOPAEDIC SURGERY

## 2017-03-22 PROCEDURE — C1713 ANCHOR/SCREW BN/BN,TIS/BN: HCPCS | Performed by: ORTHOPAEDIC SURGERY

## 2017-03-22 PROCEDURE — 25010000002 PHENYLEPHRINE PER 1 ML: Performed by: NURSE ANESTHETIST, CERTIFIED REGISTERED

## 2017-03-22 PROCEDURE — 0SHD08Z INSERTION OF SPACER INTO LEFT KNEE JOINT, OPEN APPROACH: ICD-10-PCS | Performed by: ORTHOPAEDIC SURGERY

## 2017-03-22 PROCEDURE — C1776 JOINT DEVICE (IMPLANTABLE): HCPCS | Performed by: ORTHOPAEDIC SURGERY

## 2017-03-22 PROCEDURE — 27310 EXPLORATION OF KNEE JOINT: CPT | Performed by: ORTHOPAEDIC SURGERY

## 2017-03-22 PROCEDURE — 25010000002 PROPOFOL 10 MG/ML EMULSION: Performed by: NURSE ANESTHETIST, CERTIFIED REGISTERED

## 2017-03-22 PROCEDURE — 11983 REMOVE/INSERT DRUG IMPLANT: CPT | Performed by: ORTHOPAEDIC SURGERY

## 2017-03-22 PROCEDURE — 25010000002 ONDANSETRON PER 1 MG: Performed by: NURSE ANESTHETIST, CERTIFIED REGISTERED

## 2017-03-22 PROCEDURE — 88331 PATH CONSLTJ SURG 1 BLK 1SPC: CPT | Performed by: ORTHOPAEDIC SURGERY

## 2017-03-22 PROCEDURE — 88305 TISSUE EXAM BY PATHOLOGIST: CPT | Performed by: ORTHOPAEDIC SURGERY

## 2017-03-22 PROCEDURE — 25010000002 VANCOMYCIN: Performed by: ORTHOPAEDIC SURGERY

## 2017-03-22 PROCEDURE — 25010000003 CEFAZOLIN IN DEXTROSE 2-4 GM/100ML-% SOLUTION: Performed by: ORTHOPAEDIC SURGERY

## 2017-03-22 PROCEDURE — 0SBD0ZZ EXCISION OF LEFT KNEE JOINT, OPEN APPROACH: ICD-10-PCS | Performed by: ORTHOPAEDIC SURGERY

## 2017-03-22 PROCEDURE — 89051 BODY FLUID CELL COUNT: CPT | Performed by: ORTHOPAEDIC SURGERY

## 2017-03-22 PROCEDURE — 94799 UNLISTED PULMONARY SVC/PX: CPT

## 2017-03-22 PROCEDURE — 82962 GLUCOSE BLOOD TEST: CPT

## 2017-03-22 PROCEDURE — 25010000002 VANCOMYCIN PER 500 MG: Performed by: ORTHOPAEDIC SURGERY

## 2017-03-22 DEVICE — CMT BONE PALACOS 120001: Type: IMPLANTABLE DEVICE | Site: KNEE | Status: FUNCTIONAL

## 2017-03-22 DEVICE — TEMPORARY KNEE SPACER WITH GENTAMICIN. INTERSPACE KNEE IS COMPOSED OF FULLY FORMED GENTAMICIN/POLYMETHYLMETHACRYLATE (PMMA) RADIOPAQUE BONE CEMENT.THE DEVICE IS STERILE AND SINGLE-USE.INTERSPACE KNEE IS INDICATED FOR TEMPORARY USE (MAXIMUM OF 180 DAYS) AS A TOTAL KNEE REPLACEMENT (TKR) IN SKELETALLY MATURE PATIENTS UNDERGOING A TWO-STAGE PROCEDURE DUE TO A SEPTIC PROCESS. INTERSPACE KNEE IS APPLIED ON THE FEMORAL CONDYLES AND ON THE TIBIAL PLATE FOLLOWING REMOVAL OF THE EXISTING IMPLANTAND RADICAL DEBRIDEMENT. THE DEVICE IS INTENDED FOR USE IN CONJUNCTION WITH SYSTEMIC ANTIMICROBIAL ANTIBIOTIC THERAPY (STANDARD TREATMENT APPROACH TO AN INFECTION). INTERSPACE KNEE IS NOT INTENDED FOR USE FOR MORE THAN 180 DAYS, AT WHICH TIME IT MUST BE EXPLANTED AND A PERMANENT DEVICE IMPLANTED OR ANOTHER APPROPRIATE TREATMENT PERFORMED (E.G., RESECTION ARTHROPLASTY, FUSION, ETC.).
Type: IMPLANTABLE DEVICE | Site: KNEE | Status: FUNCTIONAL
Brand: INTERSPACE KNEE

## 2017-03-22 RX ORDER — DOCUSATE SODIUM 100 MG/1
100 CAPSULE, LIQUID FILLED ORAL 2 TIMES DAILY
Status: DISCONTINUED | OUTPATIENT
Start: 2017-03-22 | End: 2017-03-25 | Stop reason: HOSPADM

## 2017-03-22 RX ORDER — FENTANYL CITRATE 50 UG/ML
50 INJECTION, SOLUTION INTRAMUSCULAR; INTRAVENOUS
Status: DISCONTINUED | OUTPATIENT
Start: 2017-03-22 | End: 2017-03-22 | Stop reason: HOSPADM

## 2017-03-22 RX ORDER — OXYCODONE AND ACETAMINOPHEN 7.5; 325 MG/1; MG/1
1 TABLET ORAL ONCE AS NEEDED
Status: DISCONTINUED | OUTPATIENT
Start: 2017-03-22 | End: 2017-03-22 | Stop reason: HOSPADM

## 2017-03-22 RX ORDER — POLYETHYLENE GLYCOL 3350 17 G/17G
17 POWDER, FOR SOLUTION ORAL 2 TIMES DAILY
Status: DISCONTINUED | OUTPATIENT
Start: 2017-03-22 | End: 2017-03-25 | Stop reason: HOSPADM

## 2017-03-22 RX ORDER — LIDOCAINE HYDROCHLORIDE 20 MG/ML
INJECTION, SOLUTION INFILTRATION; PERINEURAL AS NEEDED
Status: DISCONTINUED | OUTPATIENT
Start: 2017-03-22 | End: 2017-03-22 | Stop reason: SURG

## 2017-03-22 RX ORDER — SODIUM CHLORIDE, SODIUM LACTATE, POTASSIUM CHLORIDE, CALCIUM CHLORIDE 600; 310; 30; 20 MG/100ML; MG/100ML; MG/100ML; MG/100ML
9 INJECTION, SOLUTION INTRAVENOUS CONTINUOUS
Status: DISCONTINUED | OUTPATIENT
Start: 2017-03-22 | End: 2017-03-22 | Stop reason: HOSPADM

## 2017-03-22 RX ORDER — LISINOPRIL 10 MG/1
10 TABLET ORAL EVERY MORNING
Status: DISCONTINUED | OUTPATIENT
Start: 2017-03-23 | End: 2017-03-23

## 2017-03-22 RX ORDER — PROMETHAZINE HYDROCHLORIDE 25 MG/1
12.5 TABLET ORAL ONCE AS NEEDED
Status: DISCONTINUED | OUTPATIENT
Start: 2017-03-22 | End: 2017-03-22 | Stop reason: HOSPADM

## 2017-03-22 RX ORDER — CEFAZOLIN SODIUM 2 G/100ML
2 INJECTION, SOLUTION INTRAVENOUS ONCE
Status: COMPLETED | OUTPATIENT
Start: 2017-03-22 | End: 2017-03-22

## 2017-03-22 RX ORDER — UREA 10 %
1 LOTION (ML) TOPICAL NIGHTLY PRN
Status: DISCONTINUED | OUTPATIENT
Start: 2017-03-22 | End: 2017-03-25 | Stop reason: HOSPADM

## 2017-03-22 RX ORDER — WARFARIN SODIUM 5 MG/1
5 TABLET ORAL
Status: DISCONTINUED | OUTPATIENT
Start: 2017-03-22 | End: 2017-03-25 | Stop reason: HOSPADM

## 2017-03-22 RX ORDER — ACETAMINOPHEN 325 MG/1
325 TABLET ORAL EVERY 4 HOURS PRN
Status: DISCONTINUED | OUTPATIENT
Start: 2017-03-22 | End: 2017-03-25 | Stop reason: HOSPADM

## 2017-03-22 RX ORDER — PROMETHAZINE HYDROCHLORIDE 25 MG/1
25 TABLET ORAL ONCE AS NEEDED
Status: DISCONTINUED | OUTPATIENT
Start: 2017-03-22 | End: 2017-03-22 | Stop reason: HOSPADM

## 2017-03-22 RX ORDER — PROMETHAZINE HYDROCHLORIDE 25 MG/1
25 SUPPOSITORY RECTAL ONCE AS NEEDED
Status: DISCONTINUED | OUTPATIENT
Start: 2017-03-22 | End: 2017-03-22 | Stop reason: HOSPADM

## 2017-03-22 RX ORDER — GLYCOPYRROLATE 0.2 MG/ML
INJECTION INTRAMUSCULAR; INTRAVENOUS AS NEEDED
Status: DISCONTINUED | OUTPATIENT
Start: 2017-03-22 | End: 2017-03-22 | Stop reason: SURG

## 2017-03-22 RX ORDER — FAMOTIDINE 10 MG/ML
20 INJECTION, SOLUTION INTRAVENOUS ONCE
Status: COMPLETED | OUTPATIENT
Start: 2017-03-22 | End: 2017-03-22

## 2017-03-22 RX ORDER — ROCURONIUM BROMIDE 10 MG/ML
INJECTION, SOLUTION INTRAVENOUS AS NEEDED
Status: DISCONTINUED | OUTPATIENT
Start: 2017-03-22 | End: 2017-03-22 | Stop reason: SURG

## 2017-03-22 RX ORDER — ONDANSETRON 2 MG/ML
INJECTION INTRAMUSCULAR; INTRAVENOUS AS NEEDED
Status: DISCONTINUED | OUTPATIENT
Start: 2017-03-22 | End: 2017-03-22 | Stop reason: SURG

## 2017-03-22 RX ORDER — NALOXONE HCL 0.4 MG/ML
0.2 VIAL (ML) INJECTION AS NEEDED
Status: DISCONTINUED | OUTPATIENT
Start: 2017-03-22 | End: 2017-03-22 | Stop reason: HOSPADM

## 2017-03-22 RX ORDER — ONDANSETRON 2 MG/ML
4 INJECTION INTRAMUSCULAR; INTRAVENOUS EVERY 6 HOURS PRN
Status: DISCONTINUED | OUTPATIENT
Start: 2017-03-22 | End: 2017-03-25 | Stop reason: HOSPADM

## 2017-03-22 RX ORDER — ONDANSETRON 4 MG/1
4 TABLET, FILM COATED ORAL EVERY 6 HOURS PRN
Status: DISCONTINUED | OUTPATIENT
Start: 2017-03-22 | End: 2017-03-25 | Stop reason: HOSPADM

## 2017-03-22 RX ORDER — METOPROLOL TARTRATE 50 MG/1
50 TABLET, FILM COATED ORAL 2 TIMES DAILY
Status: DISCONTINUED | OUTPATIENT
Start: 2017-03-22 | End: 2017-03-23

## 2017-03-22 RX ORDER — MELOXICAM 15 MG/1
15 TABLET ORAL DAILY
Status: DISCONTINUED | OUTPATIENT
Start: 2017-03-22 | End: 2017-03-23

## 2017-03-22 RX ORDER — MAGNESIUM HYDROXIDE 1200 MG/15ML
LIQUID ORAL AS NEEDED
Status: DISCONTINUED | OUTPATIENT
Start: 2017-03-22 | End: 2017-03-22 | Stop reason: HOSPADM

## 2017-03-22 RX ORDER — FLUMAZENIL 0.1 MG/ML
0.2 INJECTION INTRAVENOUS AS NEEDED
Status: DISCONTINUED | OUTPATIENT
Start: 2017-03-22 | End: 2017-03-22 | Stop reason: HOSPADM

## 2017-03-22 RX ORDER — ACETAMINOPHEN 325 MG/1
650 TABLET ORAL EVERY 4 HOURS PRN
Status: DISCONTINUED | OUTPATIENT
Start: 2017-03-22 | End: 2017-03-25 | Stop reason: HOSPADM

## 2017-03-22 RX ORDER — FENTANYL CITRATE 50 UG/ML
INJECTION, SOLUTION INTRAMUSCULAR; INTRAVENOUS AS NEEDED
Status: DISCONTINUED | OUTPATIENT
Start: 2017-03-22 | End: 2017-03-22 | Stop reason: SURG

## 2017-03-22 RX ORDER — PROMETHAZINE HYDROCHLORIDE 25 MG/ML
12.5 INJECTION, SOLUTION INTRAMUSCULAR; INTRAVENOUS ONCE AS NEEDED
Status: DISCONTINUED | OUTPATIENT
Start: 2017-03-22 | End: 2017-03-22 | Stop reason: HOSPADM

## 2017-03-22 RX ORDER — PROMETHAZINE HYDROCHLORIDE 25 MG/ML
12.5 INJECTION, SOLUTION INTRAMUSCULAR; INTRAVENOUS EVERY 6 HOURS PRN
Status: DISCONTINUED | OUTPATIENT
Start: 2017-03-22 | End: 2017-03-25 | Stop reason: HOSPADM

## 2017-03-22 RX ORDER — ONDANSETRON 4 MG/1
4 TABLET, ORALLY DISINTEGRATING ORAL EVERY 6 HOURS PRN
Status: DISCONTINUED | OUTPATIENT
Start: 2017-03-22 | End: 2017-03-25 | Stop reason: HOSPADM

## 2017-03-22 RX ORDER — PREGABALIN 75 MG/1
150 CAPSULE ORAL ONCE
Status: COMPLETED | OUTPATIENT
Start: 2017-03-22 | End: 2017-03-22

## 2017-03-22 RX ORDER — SODIUM CHLORIDE 0.9 % (FLUSH) 0.9 %
1-10 SYRINGE (ML) INJECTION AS NEEDED
Status: DISCONTINUED | OUTPATIENT
Start: 2017-03-22 | End: 2017-03-22 | Stop reason: HOSPADM

## 2017-03-22 RX ORDER — HYDROCODONE BITARTRATE AND ACETAMINOPHEN 7.5; 325 MG/1; MG/1
1 TABLET ORAL ONCE AS NEEDED
Status: DISCONTINUED | OUTPATIENT
Start: 2017-03-22 | End: 2017-03-22 | Stop reason: HOSPADM

## 2017-03-22 RX ORDER — MIDAZOLAM HYDROCHLORIDE 1 MG/ML
1 INJECTION INTRAMUSCULAR; INTRAVENOUS
Status: DISCONTINUED | OUTPATIENT
Start: 2017-03-22 | End: 2017-03-22 | Stop reason: HOSPADM

## 2017-03-22 RX ORDER — MELOXICAM 7.5 MG/1
15 TABLET ORAL ONCE
Status: DISCONTINUED | OUTPATIENT
Start: 2017-03-22 | End: 2017-03-22 | Stop reason: HOSPADM

## 2017-03-22 RX ORDER — ACETAMINOPHEN 10 MG/ML
INJECTION, SOLUTION INTRAVENOUS AS NEEDED
Status: DISCONTINUED | OUTPATIENT
Start: 2017-03-22 | End: 2017-03-22 | Stop reason: SURG

## 2017-03-22 RX ORDER — NEOSTIGMINE METHYLSULFATE 1 MG/ML
INJECTION, SOLUTION INTRAVENOUS AS NEEDED
Status: DISCONTINUED | OUTPATIENT
Start: 2017-03-22 | End: 2017-03-22 | Stop reason: SURG

## 2017-03-22 RX ORDER — HYDROCODONE BITARTRATE AND ACETAMINOPHEN 7.5; 325 MG/1; MG/1
1 TABLET ORAL EVERY 4 HOURS PRN
Status: DISCONTINUED | OUTPATIENT
Start: 2017-03-22 | End: 2017-03-25 | Stop reason: HOSPADM

## 2017-03-22 RX ORDER — HYDRALAZINE HYDROCHLORIDE 20 MG/ML
5 INJECTION INTRAMUSCULAR; INTRAVENOUS
Status: DISCONTINUED | OUTPATIENT
Start: 2017-03-22 | End: 2017-03-22 | Stop reason: HOSPADM

## 2017-03-22 RX ORDER — HYDROMORPHONE HYDROCHLORIDE 1 MG/ML
0.5 INJECTION, SOLUTION INTRAMUSCULAR; INTRAVENOUS; SUBCUTANEOUS
Status: DISCONTINUED | OUTPATIENT
Start: 2017-03-22 | End: 2017-03-22 | Stop reason: HOSPADM

## 2017-03-22 RX ORDER — LABETALOL HYDROCHLORIDE 5 MG/ML
5 INJECTION, SOLUTION INTRAVENOUS
Status: DISCONTINUED | OUTPATIENT
Start: 2017-03-22 | End: 2017-03-22 | Stop reason: HOSPADM

## 2017-03-22 RX ORDER — DIPHENHYDRAMINE HYDROCHLORIDE 50 MG/ML
12.5 INJECTION INTRAMUSCULAR; INTRAVENOUS
Status: DISCONTINUED | OUTPATIENT
Start: 2017-03-22 | End: 2017-03-22 | Stop reason: HOSPADM

## 2017-03-22 RX ORDER — ONDANSETRON 2 MG/ML
4 INJECTION INTRAMUSCULAR; INTRAVENOUS ONCE AS NEEDED
Status: DISCONTINUED | OUTPATIENT
Start: 2017-03-22 | End: 2017-03-22 | Stop reason: HOSPADM

## 2017-03-22 RX ORDER — FUROSEMIDE 40 MG/1
40 TABLET ORAL 2 TIMES DAILY
Status: DISCONTINUED | OUTPATIENT
Start: 2017-03-22 | End: 2017-03-23

## 2017-03-22 RX ORDER — HYDROCODONE BITARTRATE AND ACETAMINOPHEN 7.5; 325 MG/1; MG/1
2 TABLET ORAL EVERY 4 HOURS PRN
Status: DISCONTINUED | OUTPATIENT
Start: 2017-03-22 | End: 2017-03-25 | Stop reason: HOSPADM

## 2017-03-22 RX ORDER — KETOROLAC TROMETHAMINE 15 MG/ML
15 INJECTION, SOLUTION INTRAMUSCULAR; INTRAVENOUS EVERY 8 HOURS
Status: DISCONTINUED | OUTPATIENT
Start: 2017-03-22 | End: 2017-03-23

## 2017-03-22 RX ORDER — PROPOFOL 10 MG/ML
VIAL (ML) INTRAVENOUS AS NEEDED
Status: DISCONTINUED | OUTPATIENT
Start: 2017-03-22 | End: 2017-03-22 | Stop reason: SURG

## 2017-03-22 RX ORDER — MIDAZOLAM HYDROCHLORIDE 1 MG/ML
2 INJECTION INTRAMUSCULAR; INTRAVENOUS
Status: DISCONTINUED | OUTPATIENT
Start: 2017-03-22 | End: 2017-03-22 | Stop reason: HOSPADM

## 2017-03-22 RX ADMIN — FENTANYL CITRATE 50 MCG: 50 INJECTION, SOLUTION INTRAMUSCULAR; INTRAVENOUS at 14:22

## 2017-03-22 RX ADMIN — SODIUM CHLORIDE, POTASSIUM CHLORIDE, SODIUM LACTATE AND CALCIUM CHLORIDE 9 ML/HR: 600; 310; 30; 20 INJECTION, SOLUTION INTRAVENOUS at 10:04

## 2017-03-22 RX ADMIN — LIDOCAINE HYDROCHLORIDE 100 MG: 20 INJECTION, SOLUTION INFILTRATION; PERINEURAL at 11:50

## 2017-03-22 RX ADMIN — PREGABALIN 150 MG: 75 CAPSULE ORAL at 09:02

## 2017-03-22 RX ADMIN — EPHEDRINE SULFATE 10 MG: 50 INJECTION INTRAMUSCULAR; INTRAVENOUS; SUBCUTANEOUS at 12:01

## 2017-03-22 RX ADMIN — NEOSTIGMINE METHYLSULFATE 3 MG: 1 INJECTION INTRAVENOUS at 13:56

## 2017-03-22 RX ADMIN — HYDROCODONE BITARTRATE AND ACETAMINOPHEN 1 TABLET: 7.5; 325 TABLET ORAL at 17:00

## 2017-03-22 RX ADMIN — WARFARIN SODIUM 5 MG: 5 TABLET ORAL at 20:28

## 2017-03-22 RX ADMIN — MIDAZOLAM 1 MG: 1 INJECTION INTRAMUSCULAR; INTRAVENOUS at 09:59

## 2017-03-22 RX ADMIN — VANCOMYCIN HYDROCHLORIDE 1750 MG: 1 INJECTION, POWDER, LYOPHILIZED, FOR SOLUTION INTRAVENOUS at 09:03

## 2017-03-22 RX ADMIN — MIDAZOLAM 1 MG: 1 INJECTION INTRAMUSCULAR; INTRAVENOUS at 09:26

## 2017-03-22 RX ADMIN — ROCURONIUM BROMIDE 10 MG: 10 INJECTION INTRAVENOUS at 12:31

## 2017-03-22 RX ADMIN — FENTANYL CITRATE 25 MCG: 50 INJECTION INTRAMUSCULAR; INTRAVENOUS at 10:00

## 2017-03-22 RX ADMIN — FENTANYL CITRATE 50 MCG: 50 INJECTION, SOLUTION INTRAMUSCULAR; INTRAVENOUS at 12:40

## 2017-03-22 RX ADMIN — FENTANYL CITRATE 50 MCG: 50 INJECTION, SOLUTION INTRAMUSCULAR; INTRAVENOUS at 11:50

## 2017-03-22 RX ADMIN — HYDROCODONE BITARTRATE AND ACETAMINOPHEN 1 TABLET: 7.5; 325 TABLET ORAL at 22:03

## 2017-03-22 RX ADMIN — CEFAZOLIN SODIUM 2 G: 2 INJECTION, SOLUTION INTRAVENOUS at 11:48

## 2017-03-22 RX ADMIN — GLYCOPYRROLATE 0.5 MG: 0.2 INJECTION INTRAMUSCULAR; INTRAVENOUS at 13:56

## 2017-03-22 RX ADMIN — VANCOMYCIN HYDROCHLORIDE 2000 MG: 1 INJECTION, POWDER, LYOPHILIZED, FOR SOLUTION INTRAVENOUS at 20:30

## 2017-03-22 RX ADMIN — METOPROLOL TARTRATE 50 MG: 50 TABLET ORAL at 20:28

## 2017-03-22 RX ADMIN — ROCURONIUM BROMIDE 40 MG: 10 INJECTION INTRAVENOUS at 11:50

## 2017-03-22 RX ADMIN — HYDROCODONE BITARTRATE AND ACETAMINOPHEN 1 TABLET: 7.5; 325 TABLET ORAL at 18:01

## 2017-03-22 RX ADMIN — EPHEDRINE SULFATE 10 MG: 50 INJECTION INTRAMUSCULAR; INTRAVENOUS; SUBCUTANEOUS at 11:56

## 2017-03-22 RX ADMIN — ACETAMINOPHEN 1000 MG: 10 INJECTION, SOLUTION INTRAVENOUS at 12:12

## 2017-03-22 RX ADMIN — FENTANYL CITRATE 50 MCG: 50 INJECTION, SOLUTION INTRAMUSCULAR; INTRAVENOUS at 12:20

## 2017-03-22 RX ADMIN — PROPOFOL 200 MG: 10 INJECTION, EMULSION INTRAVENOUS at 11:50

## 2017-03-22 RX ADMIN — EPHEDRINE SULFATE 5 MG: 50 INJECTION INTRAMUSCULAR; INTRAVENOUS; SUBCUTANEOUS at 12:46

## 2017-03-22 RX ADMIN — FAMOTIDINE 20 MG: 10 INJECTION, SOLUTION INTRAVENOUS at 09:26

## 2017-03-22 RX ADMIN — SODIUM CHLORIDE, POTASSIUM CHLORIDE, SODIUM LACTATE AND CALCIUM CHLORIDE: 600; 310; 30; 20 INJECTION, SOLUTION INTRAVENOUS at 13:32

## 2017-03-22 RX ADMIN — PHENYLEPHRINE HYDROCHLORIDE 100 MCG: 10 INJECTION INTRAVENOUS at 12:04

## 2017-03-22 RX ADMIN — SODIUM CHLORIDE, POTASSIUM CHLORIDE, SODIUM LACTATE AND CALCIUM CHLORIDE 500 ML: 600; 310; 30; 20 INJECTION, SOLUTION INTRAVENOUS at 09:02

## 2017-03-22 RX ADMIN — FENTANYL CITRATE 50 MCG: 50 INJECTION, SOLUTION INTRAMUSCULAR; INTRAVENOUS at 14:30

## 2017-03-22 RX ADMIN — ONDANSETRON 4 MG: 2 INJECTION INTRAMUSCULAR; INTRAVENOUS at 17:01

## 2017-03-22 RX ADMIN — ONDANSETRON 4 MG: 2 INJECTION INTRAMUSCULAR; INTRAVENOUS at 13:51

## 2017-03-22 RX ADMIN — EPHEDRINE SULFATE 10 MG: 50 INJECTION INTRAMUSCULAR; INTRAVENOUS; SUBCUTANEOUS at 12:04

## 2017-03-22 RX ADMIN — METFORMIN HYDROCHLORIDE 500 MG: 500 TABLET ORAL at 20:27

## 2017-03-22 NOTE — OP NOTE
Name: Shoshana Bae#    YOB: 1940  DATE OF SURGERY: 3/22/2017    PREOPERATIVE DIAGNOSIS: Left knee infection    POSTOPERATIVE DIAGNOSIS: Left  knee infection    PROCEDURE PERFORMED: Left knee antibiotic spacer exchange, I&D    SURGEON: Dominick Bosch M.D.    ASSISTANT:  Susannah Durand    IMPLANTS:   Implant Name Type Inv. Item Serial No.  Lot No. LRB No. Used   CMT BONE PALACOS [id number] - [id number] Implant CMT BONE PALACOS [id number]  [facility] [id number] Left 1   SPACR KN INTERSPACE GENT 1.8GM 74 LG - [id number] Implant SPACR KN INTERSPACE GENT 1.8GM 74 LG   TECRES SPA [id number] Left 1       Estimated Blood Loss: 200cc    Specimens : Cx, frozen section    Complications: none    DESCRIPTION OF PROCEDURE: The patient was taken to the operating room and placed in the supine position. A sequential compression device was carefully placed on the non-operative leg. Preoperative antibiotics were administered. Surgical time out was performed. After adequate induction of anesthesia, the leg was prepped and draped in the usual sterile fashion, and the tourniquet inflated to 250 mmHg. A midline incision was performed followed by a medial parapatellar arthrotomy. The patella was subluxed laterally. There was small amount of clear bloody fluid  fluid. There was a large amount of synovial tissue tissue within the knee. All nonviable/ infected appearing was excised back to healthy tissue. A synovectomy was performed. The spacer was then removed. In general the bone and surrounding tissue were in very poor condition  in appearance and the bone ends were also very deteriorated. I used an oscillating saw and removed roughly 4-5 mm of the bone ends. After extensive debridement the spacer was constructed as above. The knee was then copiously irrigated with betadine and saline pulsed lavage. The spacer components were then cemented onto the bone ends with high dose abx cement and the knee was placed in  full extension until the cement had completely hardened. At this point the tourniquet was released and there was excellent hemostasis. We placed a one-eighth inch Hemovac drain. We closed the knee in multiple layers in standard fashion. Sterile dressing were applied. At the end of the case, the sponge and needle counts were reported as being correct. There were no known complications. The patient was then transported to the recovery room.    Post op the pt will remain in a knee immobilizer and will be toe touch weight bearing.    Dominick Bosch M.D.  3/22/2017

## 2017-03-22 NOTE — ANESTHESIA PROCEDURE NOTES
Airway  Date/Time: 3/22/2017 11:53 AM  Airway not difficult    General Information and Staff    Patient location during procedure: OR  Anesthesiologist: CRISTINE STALLWORTH  CRNA: TED RYAN    Indications and Patient Condition  Indications for airway management: airway protection    Preoxygenated: yes  MILS maintained throughout  Mask difficulty assessment: 2 - vent by mask + OA or adjuvant +/- NMBA    Final Airway Details  Final airway type: endotracheal airway      Successful airway: ETT  Cuffed: yes   Successful intubation technique: direct laryngoscopy  Facilitating devices/methods: intubating stylet  Endotracheal tube insertion site: oral  Blade: Vinay  Blade size: #3  ETT size: 7.0 mm  Cormack-Lehane Classification: grade IIa - partial view of glottis  Placement verified by: chest auscultation and capnometry   Cuff volume (mL): 7  Measured from: lips  ETT to lips (cm): 21  Number of attempts at approach: 1    Additional Comments  Patient in OR. Monitors on. BLVS. Pre 02 100%. SIVI. DL x1. DVVC. Atraumatic placement of ETT. Placement verified with ETC02 and BBS. ETT secured. Teeth/lips in pre-op condition.

## 2017-03-22 NOTE — PLAN OF CARE
Problem: Patient Care Overview (Adult)  Goal: Plan of Care Review  Outcome: Ongoing (interventions implemented as appropriate)    03/22/17 0852   Coping/Psychosocial Response Interventions   Plan Of Care Reviewed With patient;other (see comments)  (friend)   Patient Care Overview   Progress no change       Goal: Adult Individualization and Mutuality  Outcome: Ongoing (interventions implemented as appropriate)    03/22/17 0852   Individualization   Patient Specific Preferences call Shsohana   Patient Specific Goals return back to assist living with more mobility and less pain after rehab   Patient Specific Interventions teach S&S of infection   Mutuality/Individual Preferences   What Anxieties, Fears or Concerns Do You Have About Your Health or Care? none   What Questions Do You Have About Your Health or Care? none   What Information Would Help Us Give You More Personalized Care? none       Goal: Discharge Needs Assessment  Outcome: Ongoing (interventions implemented as appropriate)    03/22/17 0852   Discharge Needs Assessment   Concerns To Be Addressed denies needs/concerns at this time   Readmission Within The Last 30 Days no previous admission in last 30 days   Current Health   Outpatient/Agency/Support Group Needs assisted living facility ([facility])  (at [facility] now)   Self-Care   Equipment Currently Used at Home wheelchair;walker, rolling;cane, straight   Living Environment   Transportation Available car         Problem: Perioperative Period (Adult)  Goal: Signs and Symptoms of Listed Potential Problems Will be Absent or Manageable (Perioperative Period)  Outcome: Ongoing (interventions implemented as appropriate)    03/22/17 0852   Perioperative Period   Problems Assessed (Perioperative Period) pain;hypoxia/hypoxemia;situational response   Problems Present (Perioperative Period) none

## 2017-03-22 NOTE — PERIOPERATIVE NURSING NOTE
"Notified Dr Bosch of 2+ leuk estrace and increased WBC. Culture contaminated.  OK to proceed.  Also shown redness in legs and blisters bilateral.  States, \"This as good as she can get her legs\", will proceed with surgery.  "

## 2017-03-22 NOTE — PLAN OF CARE
Problem: Patient Care Overview (Adult)  Goal: Plan of Care Review  Outcome: Ongoing (interventions implemented as appropriate)    03/22/17 1820   Coping/Psychosocial Response Interventions   Plan Of Care Reviewed With patient   Patient Care Overview   Progress no change   Outcome Evaluation   Outcome Summary/Follow up Plan Arrived from PACU vss. Medicated for n/v with some relief. po pain meds given. up to bsc voided. up in chair.          Problem: Perioperative Period (Adult)  Goal: Signs and Symptoms of Listed Potential Problems Will be Absent or Manageable (Perioperative Period)  Outcome: Ongoing (interventions implemented as appropriate)    Problem: Fall Risk (Adult)  Goal: Identify Related Risk Factors and Signs and Symptoms  Outcome: Outcome(s) achieved Date Met:  03/22/17  Goal: Absence of Falls  Outcome: Ongoing (interventions implemented as appropriate)    Problem: Knee Replacement, Total (Adult)  Goal: Signs and Symptoms of Listed Potential Problems Will be Absent or Manageable (Knee Replacement, Total)  Outcome: Ongoing (interventions implemented as appropriate)

## 2017-03-22 NOTE — PERIOPERATIVE NURSING NOTE
Dr. Bosch was called in the or, to inform him that pt. Had left excoriated groin, and small open area in left groin. Dr. Bosch said he was aware of pt.'s skin, and that she was okay for or.

## 2017-03-22 NOTE — ANESTHESIA POSTPROCEDURE EVALUATION
Patient: Shoshana Bae    Procedure Summary     Date Anesthesia Start Anesthesia Stop Room / Location    03/22/17 1144 1435  [facility] /  [facility]       Procedure Diagnosis Surgeon Provider    TOTAL KNEE ARTHROPLASTY REVISION, ANTIBIOTIC SPACER REPLACEMENT (Left Knee) Failed total knee replacement, subsequent encounter  (Failed total knee replacement, subsequent encounter [T84.018D, Z96.649]) MD Telly Bhatia MD          Anesthesia Type: general  Last vitals  /81 (03/22/17 1515)    Temp 36.6 °C (97.8 °F) (03/22/17 1429)    Pulse 86 (03/22/17 1515)   Resp 24 (03/22/17 1515)    SpO2 99 % (03/22/17 1515)      Post Anesthesia Care and Evaluation    Patient location during evaluation: bedside  Patient participation: complete - patient participated  Level of consciousness: awake and alert  Pain management: adequate  Airway patency: patent  Anesthetic complications: No anesthetic complications    Cardiovascular status: acceptable  Respiratory status: acceptable  Hydration status: acceptable

## 2017-03-22 NOTE — ANESTHESIA PREPROCEDURE EVALUATION
Anesthesia Evaluation        Airway   Dental      Pulmonary    (+) sleep apnea,   Cardiovascular     (+) hypertension, dysrhythmias Atrial Fib, PVD,       Neuro/Psych  GI/Hepatic/Renal/Endo    (+) morbid obesity, diabetes mellitus type 2,     Musculoskeletal     (+) back pain,   Abdominal    Substance History      OB/GYN          Other                                    Anesthesia Plan    ASA 3     general     Anesthetic plan and risks discussed with patient.

## 2017-03-22 NOTE — H&P (VIEW-ONLY)
Patient: Shoshana Bae    Date of Admission: 3/22/17    YOB: 1940    Medical Record Number: [id number]    Admitting Physician: Dr. Dominick Bosch    Reason for Admission: Left knee revision     History of Present Illness: 76 y.o. female presents with a history of left knee replacement with subsequent infection.  She is scheduled for left knee revision Mar 22, 2017.      Allergies:   Allergies   Allergen Reactions   • Codeine Nausea And Vomiting   • Morphine Nausea And Vomiting   • Morphine And Related          Current Medications:  Scheduled Meds:  PRN Meds:.    PMH:     Past Medical History   Diagnosis Date   • Arm fracture    • Arthritis    • Atrial fibrillation    • Back pain      WITH STANDING   • Cancer    • Cellulitis      LOWER LEGS   • Colon polyp    • DDD (degenerative disc disease), lumbar    • Diabetes mellitus      TYPE 2   • Edema    • H/O colonoscopy      2014   • H/O mammogram      [facility] 2015   • Hyperlipidemia    • Hypertension    • On anticoagulant therapy    • Oral-mouth cancer    • Sarcoidosis of lung with sarcoidosis of lymph nodes    • Sleep apnea      MOUTHPIECE USED/CPAP   • Uterine cancer        PF/Surg/Soc Hx:     Past Surgical History   Procedure Laterality Date   • Hysterectomy     • Tonsillectomy     • Knee surgery Bilateral    • Dilatation and curettage     • Ovarian cyst surgery     • Thyroid surgery  12/18/2007   • Hernia repair     • Colonoscopy     • Eye surgery     • Umbilical hernia repair     • Lumbar discectomy fusion instrumentation     • Mouth lesion excisional biopsy     • Cardiac catheterization  2010   • Pr revise knee joint replace,1 part Left 10/10/2016     Procedure: TOTAL KNEE ARTHROPLASTY REVISION - SPACER PLACEMENT;  Surgeon: Dominick Bosch MD;  Location: [facility] OR;  Service: Orthopedics   • Shoulder surgery Right      FRACTURE REPAIR         Social History     Occupational History   • nun      Social History Main Topics   • Smoking status: Never Smoker  "  • Smokeless tobacco: Never Used   • Alcohol use No   • Drug use: No   • Sexual activity: Not on file      Social History     Social History Narrative        Family History   Problem Relation Age of Onset   • Heart disease Mother    • Hypertension Mother    • Cancer Mother    • Heart disease Father    • Hypertension Father    • Cancer Father    • Heart disease Sister    • Hypertension Sister    • Diabetes Sister    • Cancer Sister    • Cancer Brother    • No Known Problems Daughter    • No Known Problems Son    • No Known Problems Maternal Grandmother    • No Known Problems Paternal Grandmother    • No Known Problems Maternal Aunt    • No Known Problems Paternal Aunt    • Hypertension Other    • Heart disease Other      AFIB   • Cancer Other    • BRCA 1/2 Neg Hx    • Breast cancer Neg Hx    • Colon cancer Neg Hx    • Endometrial cancer Neg Hx    • Ovarian cancer Neg Hx          Review of Systems:   A 14 point review of systems was performed, pertinent positives discussed above, all other systems are negative    Physical Exam: 76 y.o. female  Vital Signs :   Vitals:    03/16/17 1407   BP: 115/80   BP Location: Left arm   Patient Position: Sitting   Temp: 98.4 °F (36.9 °C)   TempSrc: Temporal Artery    Weight: 277 lb (126 kg)   Height: 62\" (157.5 cm)     General: Alert and Oriented x 3, No acute distress.  Psych: mood and affect appropriate; recent and remote memory intact  Eyes: conjunctiva clear; pupils equally round and reactive, sclera nonicteric  CV: no peripheral edema  Resp: normal respiratory effort  Skin: no rashes or wounds; normal turgor  Musculosketetal; pain and crepitance with knee range of motion  Vascular: palpable distal pulses    Xrays:  -A full length AP xray and single view of the left knee was ordered today for purposes of operative alignment demonstrating left knee antibiotic spacer     Assessment:  Infected left total knee replacement Conservative measures have failed.      Plan:  The plan is " to proceed with left knee revision The patient voiced understanding of the risks, benefits, and alternative forms of treatment that were discussed with Dr Bosch at the time of scheduling.  Patient is planning on going to rehabilitation after 3 day stay.  Dr. De La Cruz her cardiologist has instructed her to stop her Coumadin 5 days prior to surgery I did make the patient aware we will need to recheck PT and INR the morning of surgery to make sure she is within safe range for surgery, we will resume her Coumadin postoperatively.  Transient make acid to be given topically.  In addition no anti-inflammatories because of history of elevated BUN/creatinine    Charlee Celestin, APRN  3/16/2017

## 2017-03-23 LAB
CYTO UR: NORMAL
GLUCOSE BLDC GLUCOMTR-MCNC: 156 MG/DL (ref 70–130)
GLUCOSE BLDC GLUCOMTR-MCNC: 180 MG/DL (ref 70–130)
GLUCOSE BLDC GLUCOMTR-MCNC: 185 MG/DL (ref 70–130)
GLUCOSE BLDC GLUCOMTR-MCNC: 191 MG/DL (ref 70–130)
HCT VFR BLD AUTO: 32.2 % (ref 35.6–45.5)
HGB BLD-MCNC: 10.1 G/DL (ref 11.9–15.5)
INR PPP: 1.41 (ref 0.9–1.1)
LAB AP CASE REPORT: NORMAL
Lab: NORMAL
Lab: NORMAL
PATH REPORT.FINAL DX SPEC: NORMAL
PATH REPORT.GROSS SPEC: NORMAL
PROTHROMBIN TIME: 16.8 SECONDS (ref 11.7–14.2)

## 2017-03-23 PROCEDURE — 94799 UNLISTED PULMONARY SVC/PX: CPT

## 2017-03-23 PROCEDURE — 25010000002 VANCOMYCIN: Performed by: INTERNAL MEDICINE

## 2017-03-23 PROCEDURE — 85018 HEMOGLOBIN: CPT | Performed by: ORTHOPAEDIC SURGERY

## 2017-03-23 PROCEDURE — 25010000002 KETOROLAC TROMETHAMINE PER 15 MG: Performed by: ORTHOPAEDIC SURGERY

## 2017-03-23 PROCEDURE — 85610 PROTHROMBIN TIME: CPT | Performed by: ORTHOPAEDIC SURGERY

## 2017-03-23 PROCEDURE — 97110 THERAPEUTIC EXERCISES: CPT | Performed by: PHYSICAL THERAPIST

## 2017-03-23 PROCEDURE — 99222 1ST HOSP IP/OBS MODERATE 55: CPT | Performed by: INTERNAL MEDICINE

## 2017-03-23 PROCEDURE — 82962 GLUCOSE BLOOD TEST: CPT

## 2017-03-23 PROCEDURE — 97162 PT EVAL MOD COMPLEX 30 MIN: CPT | Performed by: PHYSICAL THERAPIST

## 2017-03-23 PROCEDURE — 63710000001 INSULIN ASPART PER 5 UNITS: Performed by: HOSPITALIST

## 2017-03-23 PROCEDURE — 85014 HEMATOCRIT: CPT | Performed by: ORTHOPAEDIC SURGERY

## 2017-03-23 PROCEDURE — 99024 POSTOP FOLLOW-UP VISIT: CPT | Performed by: NURSE PRACTITIONER

## 2017-03-23 RX ORDER — NICOTINE POLACRILEX 4 MG
15 LOZENGE BUCCAL
Status: DISCONTINUED | OUTPATIENT
Start: 2017-03-23 | End: 2017-03-25 | Stop reason: HOSPADM

## 2017-03-23 RX ORDER — DEXTROSE MONOHYDRATE 25 G/50ML
25 INJECTION, SOLUTION INTRAVENOUS
Status: DISCONTINUED | OUTPATIENT
Start: 2017-03-23 | End: 2017-03-25 | Stop reason: HOSPADM

## 2017-03-23 RX ADMIN — DOCUSATE SODIUM 100 MG: 100 CAPSULE, LIQUID FILLED ORAL at 08:21

## 2017-03-23 RX ADMIN — DOCUSATE SODIUM 100 MG: 100 CAPSULE, LIQUID FILLED ORAL at 17:54

## 2017-03-23 RX ADMIN — WARFARIN SODIUM 5 MG: 5 TABLET ORAL at 17:54

## 2017-03-23 RX ADMIN — HYDROCODONE BITARTRATE AND ACETAMINOPHEN 1 TABLET: 7.5; 325 TABLET ORAL at 12:35

## 2017-03-23 RX ADMIN — INSULIN ASPART 2 UNITS: 100 INJECTION, SOLUTION INTRAVENOUS; SUBCUTANEOUS at 12:35

## 2017-03-23 RX ADMIN — POLYETHYLENE GLYCOL 3350 17 G: 17 POWDER, FOR SOLUTION ORAL at 17:54

## 2017-03-23 RX ADMIN — HYDROCODONE BITARTRATE AND ACETAMINOPHEN 1 TABLET: 7.5; 325 TABLET ORAL at 22:18

## 2017-03-23 RX ADMIN — HYDROCODONE BITARTRATE AND ACETAMINOPHEN 1 TABLET: 7.5; 325 TABLET ORAL at 02:02

## 2017-03-23 RX ADMIN — METFORMIN HYDROCHLORIDE 500 MG: 500 TABLET ORAL at 08:21

## 2017-03-23 RX ADMIN — HYDROCODONE BITARTRATE AND ACETAMINOPHEN 1 TABLET: 7.5; 325 TABLET ORAL at 17:54

## 2017-03-23 RX ADMIN — HYDROCODONE BITARTRATE AND ACETAMINOPHEN 1 TABLET: 7.5; 325 TABLET ORAL at 06:31

## 2017-03-23 RX ADMIN — MELOXICAM 15 MG: 15 TABLET ORAL at 08:21

## 2017-03-23 RX ADMIN — POLYETHYLENE GLYCOL 3350 17 G: 17 POWDER, FOR SOLUTION ORAL at 08:22

## 2017-03-23 RX ADMIN — KETOROLAC TROMETHAMINE 15 MG: 15 INJECTION, SOLUTION INTRAMUSCULAR; INTRAVENOUS at 01:27

## 2017-03-23 RX ADMIN — INSULIN ASPART 2 UNITS: 100 INJECTION, SOLUTION INTRAVENOUS; SUBCUTANEOUS at 21:28

## 2017-03-23 RX ADMIN — VANCOMYCIN HYDROCHLORIDE 2250 MG: 1 INJECTION, POWDER, LYOPHILIZED, FOR SOLUTION INTRAVENOUS at 17:54

## 2017-03-23 RX ADMIN — SODIUM CHLORIDE 500 ML: 9 INJECTION, SOLUTION INTRAVENOUS at 11:10

## 2017-03-23 RX ADMIN — INSULIN ASPART 2 UNITS: 100 INJECTION, SOLUTION INTRAVENOUS; SUBCUTANEOUS at 17:54

## 2017-03-23 NOTE — PROGRESS NOTES
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   Goal trough: 15-20 mg/L   Other antimicrobials:none    Relevant clinical data and objective history reviewed:  76 y.o. female 62.5\" (158.8 cm) 279 lb 3.2 oz (127 kg)    Past Medical History:   Diagnosis Date   • Arm fracture    • Arthritis    • Atrial fibrillation    • Back pain     WITH STANDING   • Cancer    • Cellulitis     LOWER LEGS   • Colon polyp    • DDD (degenerative disc disease), lumbar    • Diabetes mellitus     TYPE 2   • Edema    • H/O colonoscopy     2014   • H/O mammogram     [facility] 2015   • Hyperlipidemia    • Hypertension    • On anticoagulant therapy    • Oral-mouth cancer    • Sarcoidosis of lung with sarcoidosis of lymph nodes    • Sleep apnea     MOUTHPIECE USED/CPAP   • Uterine cancer      Creatinine   Date Value Ref Range Status   03/08/2017 1.16 (H) 0.57 - 1.00 mg/dL Final   10/13/2016 0.78 0.57 - 1.00 mg/dL Final   10/12/2016 0.94 0.57 - 1.00 mg/dL Final     BUN   Date Value Ref Range Status   03/08/2017 31 (H) 8 - 23 mg/dL Final     Estimated Creatinine Clearance: 51.5 mL/min (by C-G formula based on Cr of 1.16).    Lab Results   Component Value Date    WBC 7.42 03/08/2017     Temp Readings from Last 3 Encounters:   03/23/17 98.1 °F (36.7 °C) (Oral)   03/16/17 98.4 °F (36.9 °C) (Temporal Artery )   03/08/17 98.5 °F (36.9 °C) (Oral)      Baseline culture/source/susceptibility: No results to date    Assessment/Plan  Patient received vancomycin 2gm (~15mg/kg) last night at ~2100. Will start a slightly higher dose with a scheduled regimen of ~18mg/kg (2250mg) iv q24h (1st dose at 1800 today) Trough level prior to the 1800 3/25/17 dose. Pharmacy will continue to follow daily while on the vancomycin and adjust dose if level is not therapeutic.     Dillon" DONTRELL Mc, [facility]

## 2017-03-23 NOTE — PROGRESS NOTES
Acute Care - Physical Therapy Initial Evaluation  [facility]     Patient Name: Shoshana Bae  : 1940  MRN: [id number]  Today's Date: 3/23/2017                Admit Date: 3/22/2017     Visit Dx:    ICD-10-CM ICD-9-CM   1. Difficulty walking R26.2 719.7   2. Failed total knee replacement, subsequent encounter T84.018D V58.89    Z96.649 996.47     V43.65     Patient Active Problem List   Diagnosis   • History of total knee arthroplasty   • Pain in left knee   • Failed total knee arthroplasty   • H/O mammogram   • H/O colonoscopy   • Lymph edema   • Sarcoidosis   • Atrial fibrillation   • Hyperlipidemia   • Hypertriglyceridemia   • Type 2 diabetes mellitus with hyperglycemia   • Stasis dermatitis of both legs   • Essential hypertension   • Cellulitis of left lower extremity   • Cellulitis of extremity, lower bilateral   • Chronic anticoagulation, for a fib   • Morbid obesity   • OA (osteoarthritis) of knee   • Infected prosthetic knee joint   • Obstructive sleep apnea syndrome   • Chronic venous insufficiency   • Pulmonary nodule   • Failed total knee replacement     Past Medical History:   Diagnosis Date   • Arm fracture    • Arthritis    • Atrial fibrillation    • Back pain     WITH STANDING   • Cancer    • Cellulitis     LOWER LEGS   • Colon polyp    • DDD (degenerative disc disease), lumbar    • Diabetes mellitus     TYPE 2   • Edema    • H/O colonoscopy        • H/O mammogram     [facility]    • Hyperlipidemia    • Hypertension    • On anticoagulant therapy    • Oral-mouth cancer    • Sarcoidosis of lung with sarcoidosis of lymph nodes    • Sleep apnea     MOUTHPIECE USED/CPAP   • Uterine cancer      Past Surgical History:   Procedure Laterality Date   • CARDIAC CATHETERIZATION     • COLONOSCOPY     • DILATATION AND CURETTAGE     • EYE SURGERY     • HERNIA REPAIR     • HYSTERECTOMY     • KNEE SURGERY Bilateral    • LUMBAR DISCECTOMY FUSION INSTRUMENTATION     • MOUTH LESION EXCISIONAL BIOPSY      • OVARIAN CYST SURGERY     • MA REVISE KNEE JOINT REPLACE,1 PART Left 10/10/2016    Procedure: TOTAL KNEE ARTHROPLASTY REVISION - SPACER PLACEMENT;  Surgeon: Dominick Bosch MD;  Location: [facility] OR;  Service: Orthopedics   • MA REVISE KNEE JOINT REPLACE,1 PART Left 3/22/2017    Procedure: TOTAL KNEE ARTHROPLASTY REVISION, ANTIBIOTIC SPACER REPLACEMENT;  Surgeon: Dominick Bosch MD;  Location: [facility];  Service: Orthopedics   • SHOULDER SURGERY Right     FRACTURE REPAIR    • THYROID SURGERY  12/18/2007   • TONSILLECTOMY     • UMBILICAL HERNIA REPAIR            PT ASSESSMENT (last 72 hours)      PT Evaluation       03/23/17 0900 03/23/17 0850    Rehab Evaluation    Document Type  evaluation  -    Subjective Information  agree to therapy;complains of;pain  -    Patient Effort, Rehab Treatment  good  -    Symptoms Noted During/After Treatment  increased pain  -    General Information    General Observations  in chair, KI on LLE  -    Pertinent History Of Current Problem  s/p L knee spacer exchange  -    Precautions/Limitations  non-weight bearing status  -    Prior Level of Function  independent:  -    Equipment Currently Used at Home cane, straight;commode;walker, rolling;shower chair  -[facility]     Plans/Goals Discussed With  patient  -    Living Environment    Lives With facility resident  -[facility]     Living Arrangements assisted living  -[facility]     Transportation Available car;family or friend will provide  -[facility]     Clinical Impression    Patient/Family Goals Statement  rehab at [place] prior to returning home  -    Criteria for Skilled Therapeutic Interventions Met  yes;treatment indicated  -    Impairments Found (describe specific impairments)  gait, locomotion, and balance  -    Rehab Potential  good, to achieve stated therapy goals  -    Pain Assessment    Pain Assessment  0-10  -    Pain Score  5  -    Pain Location  Knee  -    Pain Orientation  Left  -    Pain Intervention(s)   Repositioned  -KH    Response to Interventions  tolerated  -KH    Cognitive Assessment/Intervention    Current Cognitive/Communication Assessment  functional  -KH    Orientation Status  oriented x 4  -KH    Follows Commands/Answers Questions  100% of the time  -KH    Personal Safety  WNL/WFL  -KH    Personal Safety Interventions  fall prevention program maintained;gait belt  -    ROM (Range of Motion)    General ROM Detail  [facility] except LLE  -KH    MMT (Manual Muscle Testing)    General MMT Assessment Detail  WFL  -KH    Mobility Assessment/Training    Extremity Weight-Bearing Status  left lower extremity  -KH    Left Lower Extremity Weight-Bearing  touch down weight-bearing   clarified by RN this AM  -    Bed Mobility, Assessment/Treatment    Bed Mobility, Comment  up in chair  -KH    Transfer Assessment/Treatment    Transfers, Bed-Chair [place]  contact guard assist;2 person assist required  -KH    Transfers, Chair-Bed [place]  contact guard assist;2 person assist required  -KH    Transfers, Bed-Chair-Bed, Assist Device  rolling walker  -KH    Transfers, Sit-Stand [place]  contact guard assist;2 person assist required  -KH    Transfers, Stand-Sit [place]  contact guard assist;2 person assist required  -KH    Transfers, Sit-Stand-Sit, Assist Device  rolling walker  -    Gait Assessment/Treatment    Gait, [place] Level  not appropriate to assess  -    Therapy Exercises    Right Lower Extremity  10 reps;ankle pumps/circles;hip abduction/adduction;heel slides;LAQ;quad sets  -KH    Left Lower Extremity  10 reps;ankle pumps/circles;hip abduction/adduction;SLR;quad sets   with KI donned  -    Positioning and Restraints    Pre-Treatment Position  sitting in chair/recliner  -    Post Treatment Position  chair  -KH    In Chair  reclined;call light within reach;encouraged to call for assist;notified drea  -JENNIFER      03/23/17 0253 03/22/17 7319    General Information    Equipment Currently  Used at Home  cane, straight;commode;power chair, (recliner lift);shower chair;walker, rolling  -RAÚL    Living Environment    Transportation Available car;family or friend will provide  -KB       03/22/17 0852 03/22/17 0818    General Information    Equipment Currently Used at Home wheelchair;walker, rolling;cane, straight  -DK power chair, (recliner lift)  -DK    Living Environment    Lives With  other relative(s) (specify)  -DK    Living Arrangements  assisted living  -DK    Home Accessibility  no concerns  -DK    Stair Railings at Home  none  -DK    Type of Financial/Environmental Concern  none  -DK    Transportation Available car  -DK car;family or friend will provide  -DK      User Key  (r) = Recorded By, (t) = Taken By, (c) = Cosigned By    Initials Name Provider Type     iTna Madera, PT Physical Therapist    RANDAL Yusuf, RN Registered Nurse    TYLER Lara, RN Case Manager     Brit Boggs, RN Registered Nurse    RAÚL Loya, RN Registered Nurse          Physical Therapy Education     Title: PT OT SLP Therapies (Active)     Topic: Physical Therapy (Active)     Point: Mobility training (Done)    Learning Progress Summary    Learner Readiness Method Response Comment Documented by Status   Patient Acceptance E [facility] 03/23/17 0907 Done               Point: Home exercise program (Done)    Learning Progress Summary    Learner Readiness Method Response Comment Documented by Status   Patient Acceptance E [facility] 03/23/17 0907 Done               Point: Precautions (Done)    Learning Progress Summary    Learner Readiness Method Response Comment Documented by Status   Patient Acceptance E [facility] 03/23/17 0907 Done                      User Key     Initials Effective Dates Name Provider Type [facility] 05/18/15 -  Tina Madera, PT Physical Therapist PT                PT Recommendation and Plan  Anticipated Discharge Disposition: skilled nursing facility  Planned Therapy  Interventions: bed mobility training, home exercise program, patient/family education, strengthening, transfer training  PT Frequency: daily  Plan of Care Review  Outcome Summary/Follow up Plan: Pt is s/p L knee spacer exchange. Order was clarified for TTWB on LLE with KI on at all times. Pt tolerated BLE exercises and was able to maintain TTWB while working on stand pivot transfers. Pt would benefit from PT to address strengthening and functiona mobility training.          IP PT Goals       03/23/17 0908          Bed Mobility PT LTG    Bed Mobility PT LTG, Date Established 03/23/17  -      Bed Mobility PT LTG, Time to Achieve 4 days  -KH      Bed Mobility PT LTG, Activity Type all bed mobility  -KH      Bed Mobility PT LTG, [place] Level supervision required  -      Transfer Training PT LTG    Transfer Training PT LTG, Date Established 03/23/17  -      Transfer Training PT LTG, Time to Achieve 4 days  -KH      Transfer Training PT LTG, Activity Type all transfers  -      Transfer Training PT LTG, [place] Level supervision required  -      Transfer Training PT LTG, Assist Device walker, rolling  -      Patient Education PT LTG    Patient Education PT LTG, Date Established 03/23/17  -      Patient Education PT LTG, Time to Achieve 4 days  -KH      Patient Education PT LTG, Education Type HEP;precaution per surgeon  -      Patient Education PT LTG, Education Understanding demonstrate adequately  -        User Key  (r) = Recorded By, (t) = Taken By, (c) = Cosigned By    Initials Name Provider Type    JENNIFER Madera, PT Physical Therapist                Outcome Measures       03/23/17 0900          How much help from another person do you currently need...    Turning from your back to your side while in flat bed without using bedrails? 3  -KH      Moving from lying on back to sitting on the side of a flat bed without bedrails? 3  -KH      Moving to and from a bed to a chair  (including a wheelchair)? 3  -KH      Standing up from a chair using your arms (e.g., wheelchair, bedside chair)? 3  -KH      Climbing 3-5 steps with a railing? 3  -KH      To walk in hospital room? 3  -KH      AM-PAC 6 Clicks Score 18  -KH      Functional Assessment    Outcome Measure Options AM-PAC 6 Clicks Basic Mobility (PT)  -        User Key  (r) = Recorded By, (t) = Taken By, (c) = Cosigned By    Initials Name Provider Type    JENNIFER Madear, PT Physical Therapist           Time Calculation:         PT Charges       03/23/17 0927          Time Calculation    Start Time 0850  -      Stop Time 0905  -      Time Calculation (min) 15 min  -      PT Received On 03/23/17  -      PT - Next Appointment 03/24/17  -      PT Goal Re-Cert Due Date 03/27/17  -        User Key  (r) = Recorded By, (t) = Taken By, (c) = Cosigned By    Initials Name Provider Type    JENNIFER Madera, PT Physical Therapist          Therapy Charges for Today     Code Description Service Date Service Provider Modifiers Qty    36374519471 HC PT EVAL MOD COMPLEXITY 2 3/23/2017 Tina Maedra, PT GP 1    23537347251 HC PT THER PROC EA 15 MIN 3/23/2017 Tina Madera, PT GP 1    76022134718 HC PT THER SUPP EA 15 MIN 3/23/2017 Tina Madera, PT GP 1          PT G-Codes  Outcome Measure Options: AM-PAC 6 Clicks Basic Mobility (PT)      Tina Madera, PT  3/23/2017

## 2017-03-23 NOTE — PROGRESS NOTES
Orthopedic Progress Note      Patient: Shoshana Bae  Date of Admission: 3/22/2017  YOB: 1940  Medical Record Number: [id number]    POD # :  1 Day Post-Op Procedure(s) (LRB):  TOTAL KNEE ARTHROPLASTY REVISION, ANTIBIOTIC SPACER REPLACEMENT (Left)    Systemic or Specific Complaints: No Complaints    Pain Relief: complete resolution    Physical Exam:  76 y.o.  female  Vitals:  Temp:  [97 °F (36.1 °C)-97.9 °F (36.6 °C)] 97.9 °F (36.6 °C)  Heart Rate:  [] 68  Resp:  [16-24] 16  BP: ()/(50-95) 90/70  alert and oriented  Chest: Clear to auscultation  CV: Regular Rate and Rhythm  Abd: Soft, NT, with BS +  Ext: NV intact. ROM appropriate. Calf is soft and nontender. Negative Homans Sn  Skin: Incision clean dry and intact w/out signs or  symptoms of infection.    Activity: Mobilizing Per P.T.   Weight Bearing: As Tolerated    Data Review     Admission on 03/22/2017   Component Date Value Ref Range Status   • Protime 03/22/2017 15.6* 11.7 - 14.2 Seconds Final   • INR 03/22/2017 1.29* 0.90 - 1.10 Final   • Glucose 03/22/2017 155* 70 - 130 mg/dL Final   • Color, Fluid 03/22/2017 Other   Final    orange   • Appearance, Fluid 03/22/2017 Cloudy* Clear Final   • RBC, Fluid 03/22/2017 2115  /mm3 Final   • Nucleated Cells, Fluid 03/22/2017 174  /mm3 Final   • Method: 03/22/2017 Hemacytometer Method   Final   • Neutrophils, Fluid 03/22/2017 15  % Final   • Lymphocytes, Fluid 03/22/2017 81  % Final   • Monocytes, Fluid 03/22/2017 2  % Final   • Mononuclear, Fluid 03/22/2017 2  % Final   • Glucose 03/22/2017 141* 70 - 130 mg/dL Final   • Protime 03/22/2017 15.8* 11.7 - 14.2 Seconds Final   • INR 03/22/2017 1.31* 0.90 - 1.10 Final   • Glucose 03/22/2017 169* 70 - 130 mg/dL Final   • Protime 03/23/2017 16.8* 11.7 - 14.2 Seconds Final   • INR 03/23/2017 1.41* 0.90 - 1.10 Final   • Hemoglobin 03/23/2017 10.1* 11.9 - 15.5 g/dL Final   • Hematocrit 03/23/2017 32.2* 35.6 - 45.5 % Final   • Glucose 03/23/2017  180* 70 - 130 mg/dL Final       Xr Knee 1 Or 2 View Left    Result Date: 3/23/2017  Narrative: AP AND LATERAL PROJECTIONS OF THE LEFT KNEE  CLINICAL HISTORY:  Post op left knee.  FINDINGS:  AP and lateral projections of the left knee demonstrate an antibiotic spacer in place. There are pins traversing the tibial and femoral components extending into the adjacent diametaphyses. The findings are similar in appearance when compared to the prior study of 10/10/2016 although the tibial component of the spacer may have been further revised since the prior exam of 10/10/2016. Please correlate with surgical history. Typical postoperative changes are identified within the adjacent soft tissues.  This report was finalized on 3/23/2017 3:31 AM by Dr. Zack Farrell MD.      Xr Knee 1 Or 2 View Left    Impression: Ordering physician's impression is located in the Encounter Note dated 03/16/17.     Xr Joint Survey Ap 2+ Joints    Impression: Ordering physician's impression is located in the Encounter Note dated 03/16/17.       Medications:    docusate sodium 100 mg Oral BID   furosemide 40 mg Oral BID   ketorolac 15 mg Intravenous Q8H   lisinopril 10 mg Oral QAM   meloxicam 15 mg Oral Daily   metFORMIN 500 mg Oral BID With Meals   metoprolol tartrate 50 mg Oral BID   polyethylene glycol 17 g Oral BID   warfarin 5 mg Oral Daily     •  acetaminophen  •  acetaminophen  •  glucagon (human recombinant)  •  HYDROcodone-acetaminophen  •  HYDROcodone-acetaminophen  •  melatonin  •  ondansetron **OR** ondansetron ODT **OR** ondansetron  •  promethazine **OR** promethazine    Assessment:  Doing well POD  # 1 Day Post-Op Procedure(s) (LRB):  TOTAL KNEE ARTHROPLASTY REVISION, ANTIBIOTIC SPACER REPLACEMENT (Left)  Problem List Items Addressed This Visit        Musculoskeletal and Integument    Failed total knee replacement    Relevant Medications    lactated ringers bolus 500 mL (Completed)    ceFAZolin in dextrose (ANCEF) IVPB solution 2 g  (Completed)    pregabalin (LYRICA) capsule 150 mg (Completed)    vancomycin 1750 mg/500 mL 0.9% NS IVPB ([facility]) (Completed)    Other Relevant Orders    Body Fluid Cell Count With Differential (Completed)    Body fluid cell count (Completed)    Tissue Exam    Body fluid differential (Completed)          Plan:  Continue efforts to mobilize  Continue Pain Control Measures  Continue incisional Care  DVT prophylaxis  We will consult patient's primary care physician for diabetes and hypertension    Charlee Celestin, CHAIM    Date: 3/23/2017  Time: 8:51 AM

## 2017-03-23 NOTE — PLAN OF CARE
Problem: Patient Care Overview (Adult)  Goal: Plan of Care Review    03/23/17 0908   Outcome Evaluation   Outcome Summary/Follow up Plan Pt is s/p L knee spacer exchange. Order was clarified for TTWB on LLE with KI on at all times. Pt tolerated BLE exercises and was able to maintain TTWB while working on stand pivot transfers. Pt would benefit from PT to address strengthening and functional mobility training.         Problem: Inpatient Physical Therapy  Goal: Bed Mobility Goal LTG- PT    03/23/17 0908   Bed Mobility PT LTG   Bed Mobility PT LTG, Date Established 03/23/17   Bed Mobility PT LTG, Time to Achieve 4 days   Bed Mobility PT LTG, Activity Type all bed mobility   Bed Mobility PT LTG, [place] Level supervision required       Goal: Transfer Training Goal 1 LTG- PT    03/23/17 0908   Transfer Training PT LTG   Transfer Training PT LTG, Date Established 03/23/17   Transfer Training PT LTG, Time to Achieve 4 days   Transfer Training PT LTG, Activity Type all transfers   Transfer Training PT LTG, [place] Level supervision required   Transfer Training PT LTG, Assist Device walker, rolling       Goal: Patient Education Goal LTG- PT    03/23/17 0908   Patient Education PT LTG   Patient Education PT LTG, Date Established 03/23/17   Patient Education PT LTG, Time to Achieve 4 days   Patient Education PT LTG, Education Type HEP;precaution per surgeon   Patient Education PT LTG, Education Understanding demonstrate adequately

## 2017-03-23 NOTE — CONSULTS
Name: Shoshana Bae ADMIT: 3/22/2017   : 1940  PCP: Yuval Real MD    MRN: [id number] LOS: 1 days   AGE/SEX: 76 y.o. female  ROOM: [facility]/         Inpatient Consult to Internal Medicine  Consult performed by: PAULINE MORENO  Consult ordered by: SANTHOSH WAY  Reason for consult: Hypertension and diabetes      Date of Admit: 3/22/2017  Date of Consult: 17    Subjective   History of Present Illness  Patient is a 76 y.o. female that presents to [facility] following elective CT REVISE KNEE JOINT REPLACE,1 PART.  She has been admitted to a orthopedic floor following surgery and we were asked to see and assist with her medical problems, specifically as it pertains to her blood pressure and blood sugar.  At the time of my visit she denies any chest pain, SOA, nausea, vomiting or diarrhea.  She does complain of expected postoperative discomfort.  She has tolerated a diet though not much.  Feels dry.        Past Medical History:   Diagnosis Date   • Arm fracture    • Arthritis    • Atrial fibrillation    • Back pain     WITH STANDING   • Cancer    • Cellulitis     LOWER LEGS   • Colon polyp    • DDD (degenerative disc disease), lumbar    • Diabetes mellitus     TYPE 2   • Edema    • H/O colonoscopy        • H/O mammogram     E    • Hyperlipidemia    • Hypertension    • On anticoagulant therapy    • Oral-mouth cancer    • Sarcoidosis of lung with sarcoidosis of lymph nodes    • Sleep apnea     MOUTHPIECE USED/CPAP   • Uterine cancer      Past Surgical History:   Procedure Laterality Date   • CARDIAC CATHETERIZATION     • COLONOSCOPY     • DILATATION AND CURETTAGE     • EYE SURGERY     • HERNIA REPAIR     • HYSTERECTOMY     • KNEE SURGERY Bilateral    • LUMBAR DISCECTOMY FUSION INSTRUMENTATION     • MOUTH LESION EXCISIONAL BIOPSY     • OVARIAN CYST SURGERY     • CT REVISE KNEE JOINT REPLACE,1 PART Left 10/10/2016    Procedure: TOTAL KNEE ARTHROPLASTY REVISION -  SPACER PLACEMENT;  Surgeon: Dominick Bosch MD;  Location: [facility];  Service: Orthopedics   • MA REVISE KNEE JOINT REPLACE,1 PART Left 3/22/2017    Procedure: TOTAL KNEE ARTHROPLASTY REVISION, ANTIBIOTIC SPACER REPLACEMENT;  Surgeon: Dominick Bosch MD;  Location: [facility] OR;  Service: Orthopedics   • SHOULDER SURGERY Right     FRACTURE REPAIR    • THYROID SURGERY  12/18/2007   • TONSILLECTOMY     • UMBILICAL HERNIA REPAIR       Family History   Problem Relation Age of Onset   • Heart disease Mother    • Hypertension Mother    • Cancer Mother    • Heart disease Father    • Hypertension Father    • Cancer Father    • Heart disease Sister    • Hypertension Sister    • Diabetes Sister    • Cancer Sister    • Cancer Brother    • No Known Problems Daughter    • No Known Problems Son    • No Known Problems Maternal Grandmother    • No Known Problems Paternal Grandmother    • No Known Problems Maternal Aunt    • No Known Problems Paternal Aunt    • Hypertension Other    • Heart disease Other      AFIB   • Cancer Other    • BRCA 1/2 Neg Hx    • Breast cancer Neg Hx    • Colon cancer Neg Hx    • Endometrial cancer Neg Hx    • Ovarian cancer Neg Hx      Social History   Substance Use Topics   • Smoking status: Never Smoker   • Smokeless tobacco: Never Used   • Alcohol use No     Prescriptions Prior to Admission   Medication Sig Dispense Refill Last Dose   • atorvastatin (LIPITOR) 10 MG tablet Take 1 tablet by mouth daily. (Patient taking differently: Take 10 mg by mouth Every Morning.) 90 tablet 3 3/21/2017 at 0800   • Chlorhexidine Gluconate 2 % pads Apply 1 application topically 2 (Two) Times a Day. PRE OP   3/22/2017 at 0600   • furosemide (LASIX) 40 MG tablet Take 1 tablet by mouth 2 (two) times a day. (Patient taking differently: Take 40 mg by mouth 2 (Two) Times a Day. Pt taking 100mg qd) 180 tablet 3 3/21/2017 at 0800   • glucagon, human recombinant, (GLUCAGEN DIAGNOSTIC) 1 MG injection Inject 1 mg under the  skin 1 (One) Time As Needed (hypoglycemia) for up to 1 dose. 1 each 2 3/21/2017 at 2100   • glucose blood (FREESTYLE TEST STRIPS) test strip 1 each by Other route 2 (Two) Times a Week. 50 each 12 3/22/2017 at 0700   • hydrocortisone 1 % cream APPLY TO AFFECTED AREA(S) TWO TIMES A DAY 85.05 g 2 3/21/2017 at 2100   • icosapent ethyl (VASCEPA) 1 G capsule capsule Take 2 g by mouth 2 (two) times a day with meals. 360 capsule 3 3/21/2017 at 1800   • KLOR-CON 20 MEQ CR tablet Take 1 tablet by mouth 2 (two) times a day. 180 tablet 3 3/21/2017 at 0800   • lisinopril (PRINIVIL,ZESTRIL) 10 MG tablet Take 10 mg by mouth Every Morning.   3/22/2017 at 0600   • metFORMIN (GLUCOPHAGE) 500 MG tablet 500 mg 2 (Two) Times a Day With Meals.   3/21/2017 at 1800   • metoprolol tartrate (LOPRESSOR) 50 MG tablet 50 mg 2 (Two) Times a Day.   3/22/2017 at 0600   • mupirocin (BACTROBAN) 2 % ointment Apply 1 application topically 3 (Three) Times a Day. PRE OP   3/22/2017 at 0600   • HYDROcodone-acetaminophen (NORCO) 7.5-325 MG per tablet 1-2 po q4hr prn pain 100 tablet 0 More than a month at Unknown time   • warfarin (COUMADIN) 5 MG tablet Take 1 tablet by mouth Daily. 5MG daily (Patient taking differently: Take 5 mg by mouth Daily. STOPPED 3/17/17  5MG daily Tuesday Thursday Saturday SUNDAY) 30 tablet 0 3/16/2017 at 0800   • warfarin (COUMADIN) 7.5 MG tablet Take 7.5 mg by mouth Daily. Monday Wednesday Friday   STOPPED 3/17/17   3/15/2017 at 0800     Allergies:  Codeine; Morphine; and Morphine and related    Review of Systems   Constitutional: Negative.    HENT: Negative.    Eyes: Negative.    Respiratory: Negative.    Cardiovascular: Negative.    Gastrointestinal: Negative.    Endocrine: Negative.    Genitourinary: Negative.    Musculoskeletal: Negative.    Skin: Negative.    Neurological: Negative.    Hematological: Negative.    Psychiatric/Behavioral: Negative.        Objective      Vital Signs  Temp:  [97 °F (36.1 °C)-97.9 °F (36.6  °C)] 97.9 °F (36.6 °C)  Heart Rate:  [] 68  Resp:  [16-24] 16  BP: ()/(50-95) 90/70  Body mass index is 50.25 kg/(m^2).    Physical Exam   Constitutional: She is oriented to person, place, and time. She appears well-developed and well-nourished. No distress.   HENT:   Head: Normocephalic and atraumatic.   Eyes: Conjunctivae and EOM are normal. No scleral icterus.   Neck: Normal range of motion. Neck supple. No tracheal deviation present.   Cardiovascular: Normal rate.  An irregularly irregular rhythm present.   No murmur heard.  Pulmonary/Chest: Effort normal and breath sounds normal. No respiratory distress. She has no wheezes. She has no rales.   Abdominal: Soft. Bowel sounds are normal. She exhibits no distension and no mass. There is no tenderness.   Morbidly obese   Musculoskeletal: She exhibits deformity (Left knee heavily bandaged and cannot be further examined). She exhibits no edema.   Neurological: She is alert and oriented to person, place, and time. No cranial nerve deficit.   Skin: Skin is warm and dry. No rash noted. No erythema.   CVS changes RLE.  Small bruise noted right anterior shin.  No evidence of cellulitis.   Psychiatric: She has a normal mood and affect. Her behavior is normal. Judgment and thought content normal.   Nursing note and vitals reviewed.      Results Review:    I reviewed the patient's new clinical results.      Assessment/Plan     Principal Problem:    Failed total knee replacement  Active Problems:    Sarcoidosis    Chronic atrial fibrillation    Type 2 diabetes mellitus with hyperglycemia    Essential hypertension    Chronic anticoagulation, for a fib    Morbid obesity with BMI of 50.0-59.9, adult    Obstructive sleep apnea syndrome      Assessment & Plan  - Hold METFORMIN for now.  - NOVOLOG - moderate dose correctional factor as needed.  - Monitor glucose QAC and QHS. For any BG less than 70 mg/dL, treat per hospital protocol  - HgbA1C and fasting BMP ordered for  in the morning.  Will review.    - NCS diet  - Holding parameters have been written for LOPRESSOR.   - Will hold LASIX.  Anticipate fluctuations in BP due to blood loss, hypovolemia, anesthesia, narcotics etc..   - Continue IVFs as ordered.  Creatinine is slightly elevated compared to baseline.  Will monitor.  - Monitor renal status closely and resume ACE-I for secondary renal and CAD protection as BP allows.  - MOBIC given this morning despite order to hold if Cr > 1.1.  Will discontinue this and TORADOL for now.  Received dose Toradol late last night.  - WARFARIN has been ordered for DVT prophylaxis per ortho.  Was taking prior to admission for atrial fibrillation.  - Patient encouraged to use incentive spirometer as instructed.      Thank you very much for asking [facility] to be involved in this patient's care.  We will follow along with you.      Saravanan Yo MD  [facility]  03/23/17  10:33 AM

## 2017-03-23 NOTE — PLAN OF CARE
Problem: Patient Care Overview (Adult)  Goal: Plan of Care Review  Outcome: Ongoing (interventions implemented as appropriate)    03/23/17 0253   Coping/Psychosocial Response Interventions   Plan Of Care Reviewed With patient   Patient Care Overview   Progress improving   Outcome Evaluation   Outcome Summary/Follow up Plan vss, n/v status wnl, dsg c/d/i, voiding, reports adequate pain control, tolerated up in chair and is to start PT today, on iv vancomycin       Goal: Adult Individualization and Mutuality  Outcome: Ongoing (interventions implemented as appropriate)  Goal: Discharge Needs Assessment  Outcome: Ongoing (interventions implemented as appropriate)    Problem: Perioperative Period (Adult)  Goal: Signs and Symptoms of Listed Potential Problems Will be Absent or Manageable (Perioperative Period)  Outcome: Ongoing (interventions implemented as appropriate)    Problem: Fall Risk (Adult)  Goal: Absence of Falls  Outcome: Ongoing (interventions implemented as appropriate)    Problem: Knee Replacement, Total (Adult)  Goal: Signs and Symptoms of Listed Potential Problems Will be Absent or Manageable (Knee Replacement, Total)  Outcome: Ongoing (interventions implemented as appropriate)

## 2017-03-23 NOTE — PROGRESS NOTES
Discharge Planning Assessment  [facility]     Patient Name: Shoshana Bae  MRN: [id number]  Today's Date: 3/23/2017    Admit Date: 3/22/2017          Discharge Needs Assessment       03/23/17 0900    Living Environment    Lives With facility resident    Living Arrangements assisted living    Quality Of Family Relationships supportive;helpful;involved    Discharge Needs Assessment    Concerns To Be Addressed no discharge needs identified   SNF     Readmission Within The Last 30 Days no previous admission in last 30 days    Equipment Currently Used at Home cane, straight;commode;walker, rolling;shower chair    Discharge Facility/Level Of Care Needs nursing facility, skilled    Transportation Available car;family or friend will provide            Discharge Plan       03/23/17 0857    Case Management/Social Work Plan    Plan [facility] bed available Saturday 3/25.    Patient/Family In Agreement With Plan yes    Additional Comments IMM 3/22, s/w pt at the bedside, verified facesheet and explained CCP role. Pt plans to d/c to [facility] for rehab. Rosy Mccartney notified. Pt's bed will be available on Saturday 3/25. Transfer packet in [facility].         Discharge Placement     Facility/Agency Request Status Selected? Address Phone Number Fax Number    [facility] Accepted    Yes [address] [address] [phone]        Haleigh Lara RN 3/23/2017 08:56    Bed available for d/c on Saturday 3/25. Rosy/shad following.                            Demographic Summary     None            Functional Status       03/23/17 0859    Functional Status Current    Ambulation 3-->assistive equipment and person    Transferring 3-->assistive equipment and person    Toileting 3-->assistive equipment and person    Bathing 2-->assistive person    Dressing 2-->assistive person    Eating 2-->assistive person    Communication 0-->understands/communicates without difficulty     Swallowing (if score 2 or more for any item, consult Rehab Services) 0-->swallows foods/liquids without difficulty    Change in Functional Status Since Onset of Current Illness/Injury yes    Functional Status Prior    Ambulation 0-->independent    Transferring 0-->independent    Toileting 0-->independent    Bathing 0-->independent    Dressing 0-->independent    Eating 0-->independent    Communication 0-->understands/communicates without difficulty    Swallowing 0-->swallows foods/liquids without difficulty    IADL    Medications independent    Meal Preparation independent    Housekeeping independent    Laundry independent    Shopping independent    Oral Care independent    Activity Tolerance    Usual Activity Tolerance moderate    Current Activity Tolerance fair    Cognitive/Perceptual/Developmental    Current Mental Status/Cognitive Functioning no deficits noted    Recent Changes in Mental Status/Cognitive Functioning no changes            Psychosocial     None            Abuse/Neglect     None            Legal     None            Substance Abuse     None            Patient Forms       03/23/17 0901    Patient Forms    Provider Choice List Delivered    Delivered to Patient    Method of delivery In person          Haleigh Lara RN

## 2017-03-23 NOTE — CONSULTS
Referring Provider: Dominick Bosch MD  Reason for Consultation: Infected L TKA      Subjective   History of present illness:       This is a very nice 76-year-old lady who was admitted on 03/22/2017 for replacement of an infected left total knee arthroplasty. The infectious disease service was asked to provide evaluation and opinion regarding need for antibiotics.    The patient is known to me from prior admission. Briefly she underwent bilateral knee replacements in 2006 and did well up until about 2015 when she developed pain in the left knee which was progressive. She followed up with Dr. Mayer in 06/2016 and was referred to the lymphedema clinic as well as his partner, Dr. Dominick Bosch, after plain films revealed loosening. Given increasing CRP as well as abnormal imaging it was felt most likely this was representative of a chronic prosthetic joint infection causing loosening versus aseptic loosening. She was electively admitted on 10/10/2016 and taken to the operating room by Dr. Dominick Bosch. He performed incision and debridement of her left TKA as well as complete explantation of the prosthetic material and implantation of an antibiotic spacer. Operative cultures grew coagulase-negative staphylococci, and she completed 6 weeks of antibiotic therapy on 11/21/2016. I last saw her in clinic on 01/25/2017 and she had had improvement in her ESR and CRP. She had been stable off antibiotics, and her lymphedema had been controlled. We felt at that time she was likely about as well compensated as she could be for repeat TKA. She was eventually scheduled and underwent re-implantation of her TKA on 03/22/2017.  Apparently per operative note there was some suspicion of some infection and a new knee was not placed. However, her old antibiotic spacer was removed and a new one replaced. She was then admitted.     Per the patient, she says that her left knee really has not been bothering her whatsoever. Her right leg she said has  been painful and a little bit red and hot. This started last week after she bumped a wheelchair. She did take some antibiotic that she had at home and that helped clear it up. She denies any associated fevers, chills, or night sweats. Otherwise she feels well.      Past medical history: Hypertension, atrial fibrillation, diabetes mellitus type 2, hyperlipidemia, uterine cancer, mouth cancer, osteoarthritis, RODRIGO, sarcoidosis off therapy,cellulitis of LE, venous stasis, appendectomy, tonsillectomy, bilateral total knee arthroplasties, DJD s/p lumbar fusion, thyroid surgery, bilateral cataracts, hernia repair, ovarian cyst removal, D&C ×2,       Social history: She is a [employer]. She is single and retired. She formerly worked as a grade . No history of tobacco, ethanol or drug use.     FH notable for cancer in several relatives      Medication:  Antibiotics:  IV Anti-Infectives     Ordered     Dose/Rate Route Frequency Start Stop    03/22/17 1607  vancomycin 2000 mg/500 mL 0.9% NS IVPB ([facility])     Ordering Provider:  Dominick Bosch MD    15 mg/kg × 127 kg  over 60 Minutes Intravenous Every 24 Hours 03/22/17 2100 03/22/17 2130    03/22/17 0823  ceFAZolin in dextrose (ANCEF) IVPB solution 2 g     Ordering Provider:  Dominick Bosch MD    2 g Intravenous Once 03/22/17 0900 03/22/17 1148    03/22/17 0823  vancomycin 1750 mg/500 mL 0.9% NS IVPB ([facility])     Ordering Provider:  Dominick Bosch MD    15 mg/kg × 118 kg Intravenous Once 03/22/17 0900 03/22/17 0903          Review of Systems  Pertinent items are noted in HPI, all other systems reviewed and negative    Objective     Physical Exam:   Vital Signs   Temp:  [97 °F (36.1 °C)-97.9 °F (36.6 °C)] 97.1 °F (36.2 °C)  Heart Rate:  [] 102  Resp:  [16-24] 16  BP: ()/(49-95) 82/49    GENERAL: Awake and alert, in no acute distress.   HEENT: Oropharynx is clear. Hearing is grossly normal.   EYES: PERRL. No conjunctival injection. No lid lag.   LYMPHATICS: No  lymphadenopathy of the neck or inguinal regions.   HEART: Regular rate and irr rhythm. No peripheral edema.   LUNGS: Clear to auscultation anteriorly with normal respiratory effort.   GI: Soft, nontender, nondistended. No appreciable organomegaly.   SKIN: Warm and dry without cutaneous eruptions; venous stasis changes to RLE  Lknee with drain and brace  PSYCHIATRIC: Appropriate mood, affect, insight, and judgment.     Results Review:   I reviewed the patient's new clinical results.    Lab Results   Component Value Date    WBC 7.42 03/08/2017    HGB 10.1 (L) 03/23/2017    HCT 32.2 (L) 03/23/2017    MCV 98.5 (H) 03/08/2017     03/08/2017     Lab Results   Component Value Date    GLUCOSE 126 (H) 03/08/2017    BUN 31 (H) 03/08/2017    CREATININE 1.16 (H) 03/08/2017    EGFRIFNONA 45 (L) 03/08/2017    EGFRIFAFRI  09/25/2016      Comment:      <15 Indicative of kidney failure.    BCR 26.7 (H) 03/08/2017    CO2 23.5 03/08/2017    CALCIUM 9.1 03/08/2017    ALBUMIN 3.70 09/22/2016    LABIL2 1.1 09/22/2016    AST 12 09/22/2016    ALT 15 09/22/2016     3/22/17 cell count 174 white blood cells, 81% lymphocytes.  Red blood cells 2115    Estimated Creatinine Clearance: 51.5 mL/min (by C-G formula based on Cr of 1.16).      Microbiology:  None    Radiology:  3/22/17 left knee plain films: Antibiotic spacer in place, stable findings.    Assessment/Plan   1.  Chronic L total knee PJI, suspicion for recurrent/persistent infection    I'm going to start her on vancomycin for the time being to cover her past coagulase negative staphylococci.  Ultimately, her antibiotic plan is going to depend on future orthopedic plans.  I will discuss with Dr. Bosch.    Thank you for this consult.  We will continue to follow along and tailor antibiotics as the patient's clinical course evolves.    Harvinder Arita MD  03/23/17  2:15 PM

## 2017-03-24 LAB
ANION GAP SERPL CALCULATED.3IONS-SCNC: 12.3 MMOL/L
BUN BLD-MCNC: 28 MG/DL (ref 8–23)
BUN/CREAT SERPL: 29.2 (ref 7–25)
CALCIUM SPEC-SCNC: 8.3 MG/DL (ref 8.6–10.5)
CHLORIDE SERPL-SCNC: 103 MMOL/L (ref 98–107)
CO2 SERPL-SCNC: 23.7 MMOL/L (ref 22–29)
CREAT BLD-MCNC: 0.96 MG/DL (ref 0.57–1)
GFR SERPL CREATININE-BSD FRML MDRD: 56 ML/MIN/1.73
GLUCOSE BLD-MCNC: 207 MG/DL (ref 65–99)
GLUCOSE BLDC GLUCOMTR-MCNC: 191 MG/DL (ref 70–130)
GLUCOSE BLDC GLUCOMTR-MCNC: 202 MG/DL (ref 70–130)
GLUCOSE BLDC GLUCOMTR-MCNC: 205 MG/DL (ref 70–130)
GLUCOSE BLDC GLUCOMTR-MCNC: 222 MG/DL (ref 70–130)
HBA1C MFR BLD: 6.63 % (ref 4.8–5.6)
HCT VFR BLD AUTO: 31.6 % (ref 35.6–45.5)
HGB BLD-MCNC: 9.8 G/DL (ref 11.9–15.5)
INR PPP: 1.47 (ref 0.9–1.1)
POTASSIUM BLD-SCNC: 4.2 MMOL/L (ref 3.5–5.2)
PROTHROMBIN TIME: 17.3 SECONDS (ref 11.7–14.2)
SODIUM BLD-SCNC: 139 MMOL/L (ref 136–145)

## 2017-03-24 PROCEDURE — 85018 HEMOGLOBIN: CPT | Performed by: ORTHOPAEDIC SURGERY

## 2017-03-24 PROCEDURE — 99232 SBSQ HOSP IP/OBS MODERATE 35: CPT | Performed by: INTERNAL MEDICINE

## 2017-03-24 PROCEDURE — 63710000001 INSULIN ASPART PER 5 UNITS: Performed by: HOSPITALIST

## 2017-03-24 PROCEDURE — 94799 UNLISTED PULMONARY SVC/PX: CPT

## 2017-03-24 PROCEDURE — 97110 THERAPEUTIC EXERCISES: CPT | Performed by: PHYSICAL THERAPIST

## 2017-03-24 PROCEDURE — 85014 HEMATOCRIT: CPT | Performed by: ORTHOPAEDIC SURGERY

## 2017-03-24 PROCEDURE — 82962 GLUCOSE BLOOD TEST: CPT

## 2017-03-24 PROCEDURE — 80048 BASIC METABOLIC PNL TOTAL CA: CPT | Performed by: HOSPITALIST

## 2017-03-24 PROCEDURE — 85610 PROTHROMBIN TIME: CPT | Performed by: ORTHOPAEDIC SURGERY

## 2017-03-24 PROCEDURE — 83036 HEMOGLOBIN GLYCOSYLATED A1C: CPT | Performed by: HOSPITALIST

## 2017-03-24 RX ADMIN — DOCUSATE SODIUM 100 MG: 100 CAPSULE, LIQUID FILLED ORAL at 21:02

## 2017-03-24 RX ADMIN — POLYETHYLENE GLYCOL 3350 17 G: 17 POWDER, FOR SOLUTION ORAL at 21:02

## 2017-03-24 RX ADMIN — DOCUSATE SODIUM 100 MG: 100 CAPSULE, LIQUID FILLED ORAL at 08:27

## 2017-03-24 RX ADMIN — HYDROCODONE BITARTRATE AND ACETAMINOPHEN 1 TABLET: 7.5; 325 TABLET ORAL at 14:26

## 2017-03-24 RX ADMIN — HYDROCODONE BITARTRATE AND ACETAMINOPHEN 2 TABLET: 7.5; 325 TABLET ORAL at 18:03

## 2017-03-24 RX ADMIN — METOPROLOL TARTRATE 25 MG: 25 TABLET ORAL at 21:03

## 2017-03-24 RX ADMIN — POLYETHYLENE GLYCOL 3350 17 G: 17 POWDER, FOR SOLUTION ORAL at 08:27

## 2017-03-24 RX ADMIN — HYDROCODONE BITARTRATE AND ACETAMINOPHEN 1 TABLET: 7.5; 325 TABLET ORAL at 22:21

## 2017-03-24 RX ADMIN — INSULIN ASPART 4 UNITS: 100 INJECTION, SOLUTION INTRAVENOUS; SUBCUTANEOUS at 12:54

## 2017-03-24 RX ADMIN — WARFARIN SODIUM 5 MG: 5 TABLET ORAL at 21:03

## 2017-03-24 RX ADMIN — INSULIN ASPART 4 UNITS: 100 INJECTION, SOLUTION INTRAVENOUS; SUBCUTANEOUS at 21:10

## 2017-03-24 RX ADMIN — INSULIN ASPART 2 UNITS: 100 INJECTION, SOLUTION INTRAVENOUS; SUBCUTANEOUS at 18:04

## 2017-03-24 RX ADMIN — INSULIN ASPART 4 UNITS: 100 INJECTION, SOLUTION INTRAVENOUS; SUBCUTANEOUS at 08:27

## 2017-03-24 RX ADMIN — METOPROLOL TARTRATE 25 MG: 25 TABLET ORAL at 08:27

## 2017-03-24 NOTE — PLAN OF CARE
Problem: Patient Care Overview (Adult)  Goal: Plan of Care Review  Outcome: Ongoing (interventions implemented as appropriate)    03/24/17 0240   Coping/Psychosocial Response Interventions   Plan Of Care Reviewed With patient   Patient Care Overview   Progress improving   Outcome Evaluation   Outcome Summary/Follow up Plan pain under control with pain meds. BP gradually comming up. vitals stable. voiding without difficulty. ambulates with assist x2. tOE TOUCH WEIGHT BEARING.       Goal: Adult Individualization and Mutuality  Outcome: Ongoing (interventions implemented as appropriate)  Goal: Discharge Needs Assessment  Outcome: Ongoing (interventions implemented as appropriate)    Problem: Perioperative Period (Adult)  Goal: Signs and Symptoms of Listed Potential Problems Will be Absent or Manageable (Perioperative Period)  Outcome: Outcome(s) achieved Date Met:  03/24/17    Problem: Fall Risk (Adult)  Goal: Absence of Falls  Outcome: Ongoing (interventions implemented as appropriate)    Problem: Knee Replacement, Total (Adult)  Goal: Signs and Symptoms of Listed Potential Problems Will be Absent or Manageable (Knee Replacement, Total)  Outcome: Ongoing (interventions implemented as appropriate)

## 2017-03-24 NOTE — PROGRESS NOTES
" LOS: 2 days   Primary Care Physician: Yuval Real MD     Subjective  Reported some nausea but no vomiting with standing. No pain with swallowing, abd pain, or SOA. No CP. Tolerating PO.    Vital Signs  Body mass index is 50.25 kg/(m^2).  Temp:  [97 °F (36.1 °C)-98.9 °F (37.2 °C)] 98.9 °F (37.2 °C)  Heart Rate:  [] 89  Resp:  [16] 16  BP: (102-113)/(54-72) 107/68      Objective:  General Appearance:  Comfortable and in no acute distress.    Vital signs: (most recent): Blood pressure 107/68, pulse 89, temperature 98.9 °F (37.2 °C), temperature source Oral, resp. rate 16, height 62.5\" (158.8 cm), weight 279 lb 3.2 oz (127 kg), SpO2 92 %.  Vital signs are normal.    HEENT: Normal HEENT exam.    Lungs:  Normal respiratory rate and normal effort.  Breath sounds clear to auscultation.    Heart: Normal rate.  Irregular rhythm.    Abdomen: Abdomen is soft.  There is no abdominal tenderness.     Extremities: Decreased range of motion.  There is venous stasis.  (L knee dressed)  Pulses: Distal pulses are intact.    Neurological: Patient is alert.    Pupils:  Pupils are equal, round, and reactive to light.    Skin:  Warm and dry.                Results Review:    I reviewed the patient's new clinical results.      Results from last 7 days  Lab Units 03/24/17 0442 03/23/17  0458   HEMOGLOBIN g/dL 9.8* 10.1*       Results from last 7 days  Lab Units 03/24/17  0442   SODIUM mmol/L 139   POTASSIUM mmol/L 4.2   CHLORIDE mmol/L 103   TOTAL CO2 mmol/L 23.7   BUN mg/dL 28*   CREATININE mg/dL 0.96   CALCIUM mg/dL 8.3*   GLUCOSE mg/dL 207*       Results from last 7 days  Lab Units 03/24/17  0442   INR  1.47*     Hemoglobin A1C:  Lab Results   Component Value Date    HGBA1C 6.63 (H) 03/24/2017       Glucose Range:  Glucose   Date/Time Value Ref Range Status   03/24/2017 1113 202 (H) 70 - 130 mg/dL Final   03/24/2017 0633 205 (H) 70 - 130 mg/dL Final   03/23/2017 2055 156 (H) 70 - 130 mg/dL Final   03/23/2017 1617 185 " (H) 70 - 130 mg/dL Final   03/23/2017 1121 191 (H) 70 - 130 mg/dL Final   03/23/2017 0724 180 (H) 70 - 130 mg/dL Final       Medication Review: Yes    Physical Therapy:    Assessment/Plan     Active Hospital Problems (** Indicates Principal Problem)    Diagnosis Date Noted   • **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       Assessment & Plan  -Cr improved. UA with 20-30 WBC but no dysuria. Will follow up Cx results.  -A1c 6.6. Will repeat BMP tomorrow to monitor renal function. Continue SSI. Anticipate ability to resume metformin upon discharge.  -BP is borderline low but stable. Will likely resume low dose ACE tomorrow depending on labs and BP in morning.  -Coumadin for AC for AFib and DVT ppx per Ortho. INR 1.4.  -ID following. Plan dc of antibiotics per their recommendations and resumption if symptoms worsen.  Disposition:    Albino Harmon MD  03/24/17  4:17 PM

## 2017-03-24 NOTE — PROGRESS NOTES
Acute Care - Physical Therapy Treatment Note  [facility]     Patient Name: Shoshana Bae  : 1940  MRN: [id number]  Today's Date: 3/24/2017             Admit Date: 3/22/2017    Visit Dx:    ICD-10-CM ICD-9-CM   1. Difficulty walking R26.2 719.7   2. Failed total knee replacement, subsequent encounter T84.018D V58.89    Z96.649 996.47     V43.65     Patient Active Problem List   Diagnosis   • History of total knee arthroplasty   • Pain in left knee   • Failed total knee arthroplasty   • H/O mammogram   • H/O colonoscopy   • Lymph edema   • Sarcoidosis   • Chronic atrial fibrillation   • Hyperlipidemia   • Hypertriglyceridemia   • Type 2 diabetes mellitus with hyperglycemia   • Stasis dermatitis of both legs   • Essential hypertension   • Cellulitis of left lower extremity   • Cellulitis of extremity, lower bilateral   • Chronic anticoagulation, for a fib   • Morbid obesity with BMI of 50.0-59.9, adult   • OA (osteoarthritis) of knee   • Infected prosthetic knee joint   • Obstructive sleep apnea syndrome   • Chronic venous insufficiency   • Pulmonary nodule   • Failed total knee replacement               Adult Rehabilitation Note       17 1600          Rehab Assessment/Intervention    Discipline physical therapist  -      Document Type therapy note (daily note)  -      Subjective Information agree to therapy;complains of;dizziness  -KH      Patient Effort, Rehab Treatment good  -      Symptoms Noted During/After Treatment dizziness;increased pain  -      Precautions/Limitations --   TTWB LLE  -KH      Recorded by [KH] Tina Madera, PT      Pain Assessment    Pain Assessment 0-10  -      Post Pain Score 4  -      Pain Location Knee  -      Pain Orientation Left  -      Pain Intervention(s) Repositioned  -KH      Response to Interventions unchanged  -KH      Recorded by [JENNIFER] Tina Madera, PT      Bed Mobility, Assessment/Treatment    Bed Mobility, Comment up in  chair  -KH      Recorded by [KH] Tina Madera, PT      Transfer Assessment/Treatment    Transfers, Sit-Stand [place] minimum assist (75% patient effort);2 person assist required;contact guard assist  -      Transfers, Stand-Sit [place] minimum assist (75% patient effort);2 person assist required  -KH      Transfers, Sit-Stand-Sit, Assist Device rolling walker  -KH      Transfer, Comment stood well, but immmediately lost her balance forward secondary to dizziness  -KH      Recorded by [KH] Tina Madera, PT      Gait Assessment/Treatment    Gait, [place] Level not appropriate to assess  -KH      Recorded by [KH] Tina Madera, PT      Therapy Exercises    Right Lower Extremity 10 reps;ankle pumps/circles;hip abduction/adduction;heel slides;LAQ;quad sets  -KH      Left Lower Extremity 10 reps;ankle pumps/circles;hip abduction/adduction;SLR;quad sets   with KI donned  -KH      Recorded by [KH] Tina Madera, PT      Positioning and Restraints    Pre-Treatment Position sitting in chair/recliner  -KH      Post Treatment Position chair  -KH      In Chair call light within reach;encouraged to call for assist;reclined;notified nsg  -KH      Recorded by [JENNIFER] Tina Madera, PT        User Key  (r) = Recorded By, (t) = Taken By, (c) = Cosigned By    Initials Name Effective Dates    JENNIFER Madera, PT 05/18/15 -                 IP PT Goals       03/23/17 0908          Bed Mobility PT LTG    Bed Mobility PT LTG, Date Established 03/23/17  -KH      Bed Mobility PT LTG, Time to Achieve 4 days  -KH      Bed Mobility PT LTG, Activity Type all bed mobility  -      Bed Mobility PT LTG, [place] Level supervision required  -      Transfer Training PT LTG    Transfer Training PT LTG, Date Established 03/23/17  -KH      Transfer Training PT LTG, Time to Achieve 4 days  -KH      Transfer Training PT LTG, Activity Type all transfers  -KH      Transfer  Training PT LTG, [place] Level supervision required  -      Transfer Training PT LTG, Assist Device walker, rolling  -      Patient Education PT LTG    Patient Education PT LTG, Date Established 03/23/17  -      Patient Education PT LTG, Time to Achieve 4 days  -      Patient Education PT LTG, Education Type HEP;precaution per surgeon  -      Patient Education PT LTG, Education Understanding demonstrate adequately  -        User Key  (r) = Recorded By, (t) = Taken By, (c) = Cosigned By    Initials Name Provider Type     Tina Madera, PT Physical Therapist          Physical Therapy Education     Title: PT OT SLP Therapies (Active)     Topic: Physical Therapy (Active)     Point: Mobility training (Done)    Learning Progress Summary    Learner Readiness Method Response Comment Documented by Status   Patient Acceptance E VU,NR   03/24/17 1651 Done    Acceptance E [facility] 03/23/17 0907 Done               Point: Home exercise program (Done)    Learning Progress Summary    Learner Readiness Method Response Comment Documented by Status   Patient Acceptance E VU,NR   03/24/17 1651 Done    Acceptance E [facility] 03/23/17 0907 Done               Point: Precautions (Done)    Learning Progress Summary    Learner Readiness Method Response Comment Documented by Status   Patient Acceptance E VU,NR   03/24/17 1651 Done    Acceptance E [facility] 03/23/17 0907 Done                      User Key     Initials Effective Dates Name Provider Type [facility] 05/18/15 -  Tina Madera, PT Physical Therapist PT                    PT Recommendation and Plan  Anticipated Discharge Disposition: skilled nursing facility  Planned Therapy Interventions: bed mobility training, home exercise program, patient/family education, strengthening, transfer training  PT Frequency: daily  Plan of Care Review  Outcome Summary/Follow up Plan: Performs sit-stand transfer with little assistance, but was limited by  dizziness upon standing. Tolerated HEP well.          Outcome Measures       03/24/17 1600 03/23/17 0900       How much help from another person do you currently need...    Turning from your back to your side while in flat bed without using bedrails? 3  -KH 3  -KH     Moving from lying on back to sitting on the side of a flat bed without bedrails? 3  -KH 3  -KH     Moving to and from a bed to a chair (including a wheelchair)? 3  -KH 3  -KH     Standing up from a chair using your arms (e.g., wheelchair, bedside chair)? 3  -KH 3  -KH     Climbing 3-5 steps with a railing? 3  -KH 3  -KH     To walk in hospital room? 3  -KH 3  -KH     AM-PAC 6 Clicks Score 18  -KH 18  -KH     Functional Assessment    Outcome Measure Options AM-PAC 6 Clicks Basic Mobility (PT)  -KH AM-PAC 6 Clicks Basic Mobility (PT)  -KH       User Key  (r) = Recorded By, (t) = Taken By, (c) = Cosigned By    Initials Name Provider Type    JENNIFER Madera, OLE Physical Therapist           Time Calculation:         PT Charges       03/24/17 1652          Time Calculation    Start Time 1545  -KH      Stop Time 1600  -KH      Time Calculation (min) 15 min  -KH      PT Received On 03/24/17  -JENNIFER      PT - Next Appointment 03/25/17  -JENNIFER        User Key  (r) = Recorded By, (t) = Taken By, (c) = Cosigned By    Initials Name Provider Type    JENNIFER Madera, OLE Physical Therapist          Therapy Charges for Today     Code Description Service Date Service Provider Modifiers Qty    29555595303 HC PT EVAL MOD COMPLEXITY 2 3/23/2017 Tina Madera, PT GP 1    49958984486 HC PT THER PROC EA 15 MIN 3/23/2017 Tina Madera, PT GP 1    16131201218 HC PT THER SUPP EA 15 MIN 3/23/2017 Tina Madera, PT GP 1    43884645406 HC PT THER PROC EA 15 MIN 3/24/2017 Tina Madera, PT GP 1          PT G-Codes  Outcome Measure Options: AM-PAC 6 Clicks Basic Mobility (PT)    Tina Madera, PT  3/24/2017

## 2017-03-24 NOTE — PROGRESS NOTES
ID note    CC: f/u PJI    S:  Feeling well.    Mild knee pain  No f/c/ns    O:  VSS, AF  NAD  L Knee dressed    A/P  1. Chronic L total knee PJI, suspicion for recurrent/persistent infection     Discussed with Dr. Bosch and not operative candidate owing to comorbidities and poor tissue quality.  May have low grade/indolent infection still present.  Pathology did show abundant PMNs.  We discussed potentially putting her on oral antibiotics and I had planned to place her on Bactrim since her original coag-negative staph was resistant so many oral antibiotics.  Unfortunately, she also takes Coumadin and I'm worried about bleeding risk with Bactrim and warfarin, so I think we'll just hold on antibiotics for now.  Antibiotics are more for palliation of symptoms than curing her infection.  She certainly is not septic and doesn't have large sx burden.  If she develops worsening symptoms then we can initiate antibiotic therapy.  I will discontinue her IV vancomycin today.  Plan discussed with patient in detail as well as Dr. Bosch.  Also discussed with nursing staff today.  I will not plan to see daily but please call with questions.

## 2017-03-24 NOTE — PLAN OF CARE
Problem: Patient Care Overview (Adult)  Goal: Plan of Care Review  Outcome: Ongoing (interventions implemented as appropriate)    03/24/17 4613   Coping/Psychosocial Response Interventions   Plan Of Care Reviewed With patient   Patient Care Overview   Progress improving   Outcome Evaluation   Outcome Summary/Follow up Plan 6Iv antibiotics discont. VSS. Good pain control with po meds. To rehab tomorrow         Problem: Fall Risk (Adult)  Goal: Absence of Falls  Outcome: Ongoing (interventions implemented as appropriate)    Problem: Knee Replacement, Total (Adult)  Goal: Signs and Symptoms of Listed Potential Problems Will be Absent or Manageable (Knee Replacement, Total)  Outcome: Ongoing (interventions implemented as appropriate)

## 2017-03-24 NOTE — PLAN OF CARE
Problem: Patient Care Overview (Adult)  Goal: Plan of Care Review    03/24/17 7146   Outcome Evaluation   Outcome Summary/Follow up Plan Performs sit-stand transfer with little assistance, but was limited by dizziness upon standing. Tolerated HEP well.

## 2017-03-25 VITALS
DIASTOLIC BLOOD PRESSURE: 60 MMHG | BODY MASS INDEX: 49.47 KG/M2 | SYSTOLIC BLOOD PRESSURE: 105 MMHG | WEIGHT: 279.2 LBS | HEART RATE: 92 BPM | TEMPERATURE: 97.8 F | RESPIRATION RATE: 16 BRPM | OXYGEN SATURATION: 93 % | HEIGHT: 63 IN

## 2017-03-25 LAB
ANION GAP SERPL CALCULATED.3IONS-SCNC: 11.4 MMOL/L
BUN BLD-MCNC: 24 MG/DL (ref 8–23)
BUN/CREAT SERPL: 26.1 (ref 7–25)
CALCIUM SPEC-SCNC: 8.6 MG/DL (ref 8.6–10.5)
CHLORIDE SERPL-SCNC: 101 MMOL/L (ref 98–107)
CO2 SERPL-SCNC: 22.6 MMOL/L (ref 22–29)
CREAT BLD-MCNC: 0.92 MG/DL (ref 0.57–1)
GFR SERPL CREATININE-BSD FRML MDRD: 59 ML/MIN/1.73
GLUCOSE BLD-MCNC: 171 MG/DL (ref 65–99)
GLUCOSE BLDC GLUCOMTR-MCNC: 186 MG/DL (ref 70–130)
GLUCOSE BLDC GLUCOMTR-MCNC: 221 MG/DL (ref 70–130)
HCT VFR BLD AUTO: 30.3 % (ref 35.6–45.5)
HGB BLD-MCNC: 9.6 G/DL (ref 11.9–15.5)
INR PPP: 1.54 (ref 0.9–1.1)
POTASSIUM BLD-SCNC: 4.6 MMOL/L (ref 3.5–5.2)
PROTHROMBIN TIME: 17.9 SECONDS (ref 11.7–14.2)
SODIUM BLD-SCNC: 135 MMOL/L (ref 136–145)

## 2017-03-25 PROCEDURE — 85610 PROTHROMBIN TIME: CPT | Performed by: ORTHOPAEDIC SURGERY

## 2017-03-25 PROCEDURE — 85018 HEMOGLOBIN: CPT | Performed by: ORTHOPAEDIC SURGERY

## 2017-03-25 PROCEDURE — 85014 HEMATOCRIT: CPT | Performed by: ORTHOPAEDIC SURGERY

## 2017-03-25 PROCEDURE — 94799 UNLISTED PULMONARY SVC/PX: CPT

## 2017-03-25 PROCEDURE — 63710000001 INSULIN ASPART PER 5 UNITS: Performed by: HOSPITALIST

## 2017-03-25 PROCEDURE — 80048 BASIC METABOLIC PNL TOTAL CA: CPT | Performed by: INTERNAL MEDICINE

## 2017-03-25 PROCEDURE — 82962 GLUCOSE BLOOD TEST: CPT

## 2017-03-25 RX ORDER — PSEUDOEPHEDRINE HCL 30 MG
100 TABLET ORAL 2 TIMES DAILY
Refills: 0
Start: 2017-03-25 | End: 2017-04-06

## 2017-03-25 RX ORDER — POLYETHYLENE GLYCOL 3350 17 G/17G
17 POWDER, FOR SOLUTION ORAL 2 TIMES DAILY
Start: 2017-03-25 | End: 2017-04-06

## 2017-03-25 RX ORDER — HYDROCODONE BITARTRATE AND ACETAMINOPHEN 7.5; 325 MG/1; MG/1
TABLET ORAL
Qty: 100 TABLET | Refills: 0 | Status: SHIPPED | OUTPATIENT
Start: 2017-03-25 | End: 2017-05-11 | Stop reason: SDUPTHER

## 2017-03-25 RX ORDER — LISINOPRIL 2.5 MG/1
2.5 TABLET ORAL
Status: DISCONTINUED | OUTPATIENT
Start: 2017-03-25 | End: 2017-03-25 | Stop reason: HOSPADM

## 2017-03-25 RX ADMIN — DOCUSATE SODIUM 100 MG: 100 CAPSULE, LIQUID FILLED ORAL at 08:26

## 2017-03-25 RX ADMIN — ONDANSETRON 4 MG: 4 TABLET, FILM COATED ORAL at 09:42

## 2017-03-25 RX ADMIN — METOPROLOL TARTRATE 25 MG: 25 TABLET ORAL at 08:26

## 2017-03-25 RX ADMIN — LISINOPRIL 2.5 MG: 2.5 TABLET ORAL at 11:16

## 2017-03-25 RX ADMIN — HYDROCODONE BITARTRATE AND ACETAMINOPHEN 2 TABLET: 7.5; 325 TABLET ORAL at 11:14

## 2017-03-25 RX ADMIN — INSULIN ASPART 2 UNITS: 100 INJECTION, SOLUTION INTRAVENOUS; SUBCUTANEOUS at 08:27

## 2017-03-25 RX ADMIN — POLYETHYLENE GLYCOL 3350 17 G: 17 POWDER, FOR SOLUTION ORAL at 08:26

## 2017-03-25 RX ADMIN — INSULIN ASPART 1 UNITS: 100 INJECTION, SOLUTION INTRAVENOUS; SUBCUTANEOUS at 11:49

## 2017-03-25 RX ADMIN — HYDROCODONE BITARTRATE AND ACETAMINOPHEN 2 TABLET: 7.5; 325 TABLET ORAL at 06:01

## 2017-03-25 NOTE — DISCHARGE SUMMARY
Patient Name: Shoshana Bae  Patient YOB: 1940    Date of Admission:  3/22/2017  Date of Discharge:  3/25/2017  Discharge Diagnosis: ID REVISE KNEE JOINT REPLACE,1 PART [[id number]] (TOTAL KNEE ARTHROPLASTY REVISION)  Presenting Problem/History of Present Illness: Failed total knee replacement, subsequent encounter [T84.018D, Z96.649]  Failed total knee replacement [T84.018A, Z96.659]  Admitting Physician: Dr Dominick Bosch  Consults:   Consults     Date and Time Order Name Status Description    3/23/2017 0855 Inpatient Consult to Infectious Diseases Completed     3/23/2017 0855 Inpatient Consult to Internal Medicine Completed           DETAILS OF HOSPITAL STAY:  Patient is a 76 y.o. female was admitted to the floor following the above procedure and underwent an uncomplicated hospital stay.  Patient did well with physical therapy and was ambulating well without problems.  On the day of discharge the wound was clean, dry and intact and calf was soft and non tender and Homans sign was negative.  Patient was tolerating Diet Instructions     Diet:       Diet Texture / Consistency:  Regular              without problems.  Patient will be discharged home. She adithya be toe touch weight bearing. She should have knee immobilizer on for transfers but may take off while in bed    Condition on Discharge:  Stable    Vital Signs  Temp:  [97 °F (36.1 °C)-98.9 °F (37.2 °C)] 97 °F (36.1 °C)  Heart Rate:  [76-96] 86  Resp:  [16] 16  BP: ()/(58-81) 121/73    LABS:      Admission on 03/22/2017   Component Date Value Ref Range Status   • Protime 03/22/2017 15.6* 11.7 - 14.2 Seconds Final   • INR 03/22/2017 1.29* 0.90 - 1.10 Final   • Glucose 03/22/2017 155* 70 - 130 mg/dL Final   • Color, Fluid 03/22/2017 Other   Final    orange   • Appearance, Fluid 03/22/2017 Cloudy* Clear Final   • RBC, Fluid 03/22/2017 2115  /mm3 Final   • Nucleated Cells, Fluid 03/22/2017 174  /mm3 Final   • Method: 03/22/2017 Hemacytometer Method    Final   • Case Report 03/22/2017    Final                    Value:Surgical Pathology Report                         Case: [id number]                                  Authorizing Provider:  Dominick Bosch MD           Collected:           03/22/2017 12:41 PM          Ordering Location:     [facility]  Received:            03/22/2017 12:49 PM                                 MAIN OR                                                                      Pathologist:           Rafael Page MD                                                       Specimens:   1) - Knee, Left, LEFT FEMUR                                                                         2) - Knee, Left, LEFT TIBIA ROOM #[id number]                                                    • Final Diagnosis 03/22/2017    Final                    Value:This result contains rich text formatting which cannot be displayed here.   • Intraoperative Consultation 03/22/2017    Final                    Value:This result contains rich text formatting which cannot be displayed here.   • Gross Description 03/22/2017    Final                    Value:This result contains rich text formatting which cannot be displayed here.   • Microscopic Description 03/22/2017    Final                    Value:This result contains rich text formatting which cannot be displayed here.   • Neutrophils, Fluid 03/22/2017 15  % Final   • Lymphocytes, Fluid 03/22/2017 81  % Final   • Monocytes, Fluid 03/22/2017 2  % Final   • Mononuclear, Fluid 03/22/2017 2  % Final   • Glucose 03/22/2017 141* 70 - 130 mg/dL Final   • Protime 03/22/2017 15.8* 11.7 - 14.2 Seconds Final   • INR 03/22/2017 1.31* 0.90 - 1.10 Final   • Glucose 03/22/2017 169* 70 - 130 mg/dL Final   • Protime 03/23/2017 16.8* 11.7 - 14.2 Seconds Final   • INR 03/23/2017 1.41* 0.90 - 1.10 Final   • Hemoglobin 03/23/2017 10.1* 11.9 - 15.5 g/dL Final   • Hematocrit 03/23/2017 32.2* 35.6 - 45.5 % Final   • Glucose 03/23/2017  180* 70 - 130 mg/dL Final   • Glucose 03/23/2017 191* 70 - 130 mg/dL Final   • Glucose 03/23/2017 185* 70 - 130 mg/dL Final   • Glucose 03/23/2017 156* 70 - 130 mg/dL Final   • Protime 03/24/2017 17.3* 11.7 - 14.2 Seconds Final   • INR 03/24/2017 1.47* 0.90 - 1.10 Final   • Hemoglobin 03/24/2017 9.8* 11.9 - 15.5 g/dL Final   • Hematocrit 03/24/2017 31.6* 35.6 - 45.5 % Final   • Glucose 03/24/2017 207* 65 - 99 mg/dL Final   • BUN 03/24/2017 28* 8 - 23 mg/dL Final   • Creatinine 03/24/2017 0.96  0.57 - 1.00 mg/dL Final   • Sodium 03/24/2017 139  136 - 145 mmol/L Final   • Potassium 03/24/2017 4.2  3.5 - 5.2 mmol/L Final   • Chloride 03/24/2017 103  98 - 107 mmol/L Final   • CO2 03/24/2017 23.7  22.0 - 29.0 mmol/L Final   • Calcium 03/24/2017 8.3* 8.6 - 10.5 mg/dL Final   • eGFR Non  Amer 03/24/2017 56* >60 mL/min/1.73 Final   • BUN/Creatinine Ratio 03/24/2017 29.2* 7.0 - 25.0 Final   • Anion Gap 03/24/2017 12.3  mmol/L Final   • Hemoglobin A1C 03/24/2017 6.63* 4.80 - 5.60 % Final   • Glucose 03/24/2017 205* 70 - 130 mg/dL Final   • Glucose 03/24/2017 202* 70 - 130 mg/dL Final   • Glucose 03/24/2017 191* 70 - 130 mg/dL Final   • Glucose 03/24/2017 222* 70 - 130 mg/dL Final   • Protime 03/25/2017 17.9* 11.7 - 14.2 Seconds Final   • INR 03/25/2017 1.54* 0.90 - 1.10 Final   • Hemoglobin 03/25/2017 9.6* 11.9 - 15.5 g/dL Final   • Hematocrit 03/25/2017 30.3* 35.6 - 45.5 % Final   • Glucose 03/25/2017 171* 65 - 99 mg/dL Final   • BUN 03/25/2017 24* 8 - 23 mg/dL Final   • Creatinine 03/25/2017 0.92  0.57 - 1.00 mg/dL Final   • Sodium 03/25/2017 135* 136 - 145 mmol/L Final   • Potassium 03/25/2017 4.6  3.5 - 5.2 mmol/L Final   • Chloride 03/25/2017 101  98 - 107 mmol/L Final   • CO2 03/25/2017 22.6  22.0 - 29.0 mmol/L Final   • Calcium 03/25/2017 8.6  8.6 - 10.5 mg/dL Final   • eGFR Non African Amer 03/25/2017 59* >60 mL/min/1.73 Final   • BUN/Creatinine Ratio 03/25/2017 26.1* 7.0 - 25.0 Final   • Anion Gap 03/25/2017  11.4  mmol/L Final   • Glucose 03/25/2017 186* 70 - 130 mg/dL Final       Xr Knee 1 Or 2 View Left    Result Date: 3/23/2017  Narrative: AP AND LATERAL PROJECTIONS OF THE LEFT KNEE  CLINICAL HISTORY:  Post op left knee.  FINDINGS:  AP and lateral projections of the left knee demonstrate an antibiotic spacer in place. There are pins traversing the tibial and femoral components extending into the adjacent diametaphyses. The findings are similar in appearance when compared to the prior study of 10/10/2016 although the tibial component of the spacer may have been further revised since the prior exam of 10/10/2016. Please correlate with surgical history. Typical postoperative changes are identified within the adjacent soft tissues.  This report was finalized on 3/23/2017 3:31 AM by Dr. Zack Farrell MD.      Xr Knee 1 Or 2 View Left    Impression: Ordering physician's impression is located in the Encounter Note dated 03/16/17.     Xr Joint Survey Ap 2+ Joints    Impression: Ordering physician's impression is located in the Encounter Note dated 03/16/17.       Discharge Medications   BaeShoshana   Home Medication Instructions PAT:[id number]    Printed on:03/25/17 0757   Medication Information                      atorvastatin (LIPITOR) 10 MG tablet  Take 1 tablet by mouth daily.             docusate sodium 100 MG capsule  Take 100 mg by mouth 2 (Two) Times a Day for 12 days.             furosemide (LASIX) 40 MG tablet  Take 1 tablet by mouth 2 (two) times a day.             glucagon, human recombinant, (GLUCAGEN DIAGNOSTIC) 1 MG injection  Inject 1 mg under the skin 1 (One) Time As Needed (hypoglycemia) for up to 1 dose.             glucose blood (FREESTYLE TEST STRIPS) test strip  1 each by Other route 2 (Two) Times a Week.             HYDROcodone-acetaminophen (NORCO) 7.5-325 MG per tablet  1-2 po q 4-6 hr prn pain             hydrocortisone 1 % cream  APPLY TO AFFECTED AREA(S) TWO TIMES A DAY              icosapent ethyl (VASCEPA) 1 G capsule capsule  Take 2 g by mouth 2 (two) times a day with meals.             KLOR-CON 20 MEQ CR tablet  Take 1 tablet by mouth 2 (two) times a day.             lisinopril (PRINIVIL,ZESTRIL) 10 MG tablet  Take 10 mg by mouth Every Morning.             metFORMIN (GLUCOPHAGE) 500 MG tablet  500 mg 2 (Two) Times a Day With Meals.             metoprolol tartrate (LOPRESSOR) 50 MG tablet  50 mg 2 (Two) Times a Day.             polyethylene glycol (MIRALAX) packet  Take 17 g by mouth 2 (Two) Times a Day for 12 days.             warfarin (COUMADIN) 5 MG tablet  Take 1 tablet by mouth Daily. 5MG daily             warfarin (COUMADIN) 7.5 MG tablet  Take 7.5 mg by mouth Daily. Monday Wednesday Friday   STOPPED 3/17/17                 Activity at Discharge:     Discharge Instructions: Patient is to continue with physical therapy exercises daily and continue working with the physical therapist as ordered. Patient may weight bear as tolerated. Continue CONSTANTINO hose daily and ice regularly. Patient instructed on frequent calf pumping exercises.  Patient also instructed on incentive spirometer during hospitalization and encouraged to continue to use at home regularly. Patient may shower on POD#5. The wound should be gently cleaned with antibacterial soap then allowed to dry and covered with a dry sterile dressing. The wound should be covered at all times except while showering.  Patient may change dressings daily and prn using sterile 4x4 and paper tape, and should call if any unusual drainage, redness or swelling. Follow up - I will see at [facility] in 2 weeks.  Patient will be discharged on coumadin as directed to keep INR 2-3.  Please draw PT / INR in 2 days and call to office during business hours at [phone].    Discharge Diagnosis:    Patient Active Problem List   Diagnosis   • History of total knee arthroplasty   • Pain in left knee   • Failed total knee arthroplasty   • H/O  mammogram   • H/O colonoscopy   • Lymph edema   • Sarcoidosis   • Chronic atrial fibrillation   • Hyperlipidemia   • Hypertriglyceridemia   • Type 2 diabetes mellitus with hyperglycemia   • Stasis dermatitis of both legs   • Essential hypertension   • Cellulitis of left lower extremity   • Cellulitis of extremity, lower bilateral   • Chronic anticoagulation, for a fib   • Morbid obesity with BMI of 50.0-59.9, adult   • OA (osteoarthritis) of knee   • Infected prosthetic knee joint   • Obstructive sleep apnea syndrome   • Chronic venous insufficiency   • Pulmonary nodule   • Failed total knee replacement       Follow-up Appointments  Future Appointments  Date Time Provider Department Center   5/10/2017 1:00 PM Harvinder Arita MD [facility] ID [facility] None            Dominick Bosch MD  03/25/17  7:57 AM

## 2017-03-25 NOTE — SIGNIFICANT NOTE
03/25/17 1205   Rehab Treatment   Discipline physical therapist   Rehab Evaluation   Evaluation Not Performed patient/family declined evaluation;other (see comments)  (pt refused PT today; she was currently eating lunch and reports expected to be D/C to rehab after lunch. Notified Nurse Heart in person who confirms she is scheduled to D/C to rehab this afternoon )   Recommendation   PT - Next Appointment 03/26/17

## 2017-03-25 NOTE — PROGRESS NOTES
Orthopedic Total Knee Progress Note      Patient: Shoshana Bae  Date of Admission: 3/22/2017  YOB: 1940  Medical Record Number: [id number]    POD # : 2 Days Post-Op Procedure(s) (LRB):  TOTAL KNEE ARTHROPLASTY REVISION, ANTIBIOTIC SPACER REPLACEMENT (Left)    Systemic or Specific Complaints: No Complaints    Pain Relief: some relief    Physical Exam:  76 y.o.  female  Vitals:  Temp:  [97 °F (36.1 °C)-98.9 °F (37.2 °C)] 98.2 °F (36.8 °C)  Heart Rate:  [] 80  Resp:  [16] 16  BP: (100-113)/(58-72) 100/58  alert and oriented  Chest: Clear to auscultation  CV: Regular Rate and Rhythm  Abd: Soft, NT, with BS +  Ext: NV intact. ROM appropriate. Calf is soft and nontender. Negative Homans Sn  Skin: Incision clean dry and intact w/out signs or  symptoms of infection.    Activity: Mobilizing Per P.T.   Weight Bearing: As Tolerated    Data Review     Admission on 03/22/2017   Component Date Value Ref Range Status   • Protime 03/22/2017 15.6* 11.7 - 14.2 Seconds Final   • INR 03/22/2017 1.29* 0.90 - 1.10 Final   • Glucose 03/22/2017 155* 70 - 130 mg/dL Final   • Color, Fluid 03/22/2017 Other   Final    orange   • Appearance, Fluid 03/22/2017 Cloudy* Clear Final   • RBC, Fluid 03/22/2017 2115  /mm3 Final   • Nucleated Cells, Fluid 03/22/2017 174  /mm3 Final   • Method: 03/22/2017 Hemacytometer Method   Final   • Case Report 03/22/2017    Final                    Value:Surgical Pathology Report                         Case: [id number]                                  Authorizing Provider:  Dominick Bosch MD           Collected:           03/22/2017 12:41 PM          Ordering Location[facility]     [facility]  Received:            03/22/2017 12:49 PM                                 MAIN OR                                                                      Pathologist:           Rafael Page MD                                                       Specimens:   1) - Knee, Left, LEFT FEMUR                                                                          2) - Knee, Left, LEFT TIBIA ROOM #[id number]                                                    • Final Diagnosis 03/22/2017    Final                    Value:This result contains rich text formatting which cannot be displayed here.   • Intraoperative Consultation 03/22/2017    Final                    Value:This result contains rich text formatting which cannot be displayed here.   • Gross Description 03/22/2017    Final                    Value:This result contains rich text formatting which cannot be displayed here.   • Microscopic Description 03/22/2017    Final                    Value:This result contains rich text formatting which cannot be displayed here.   • Neutrophils, Fluid 03/22/2017 15  % Final   • Lymphocytes, Fluid 03/22/2017 81  % Final   • Monocytes, Fluid 03/22/2017 2  % Final   • Mononuclear, Fluid 03/22/2017 2  % Final   • Glucose 03/22/2017 141* 70 - 130 mg/dL Final   • Protime 03/22/2017 15.8* 11.7 - 14.2 Seconds Final   • INR 03/22/2017 1.31* 0.90 - 1.10 Final   • Glucose 03/22/2017 169* 70 - 130 mg/dL Final   • Protime 03/23/2017 16.8* 11.7 - 14.2 Seconds Final   • INR 03/23/2017 1.41* 0.90 - 1.10 Final   • Hemoglobin 03/23/2017 10.1* 11.9 - 15.5 g/dL Final   • Hematocrit 03/23/2017 32.2* 35.6 - 45.5 % Final   • Glucose 03/23/2017 180* 70 - 130 mg/dL Final   • Glucose 03/23/2017 191* 70 - 130 mg/dL Final   • Glucose 03/23/2017 185* 70 - 130 mg/dL Final   • Glucose 03/23/2017 156* 70 - 130 mg/dL Final   • Protime 03/24/2017 17.3* 11.7 - 14.2 Seconds Final   • INR 03/24/2017 1.47* 0.90 - 1.10 Final   • Hemoglobin 03/24/2017 9.8* 11.9 - 15.5 g/dL Final   • Hematocrit 03/24/2017 31.6* 35.6 - 45.5 % Final   • Glucose 03/24/2017 207* 65 - 99 mg/dL Final   • BUN 03/24/2017 28* 8 - 23 mg/dL Final   • Creatinine 03/24/2017 0.96  0.57 - 1.00 mg/dL Final   • Sodium 03/24/2017 139  136 - 145 mmol/L Final   • Potassium 03/24/2017 4.2  3.5 -  5.2 mmol/L Final   • Chloride 03/24/2017 103  98 - 107 mmol/L Final   • CO2 03/24/2017 23.7  22.0 - 29.0 mmol/L Final   • Calcium 03/24/2017 8.3* 8.6 - 10.5 mg/dL Final   • eGFR Non  Amer 03/24/2017 56* >60 mL/min/1.73 Final   • BUN/Creatinine Ratio 03/24/2017 29.2* 7.0 - 25.0 Final   • Anion Gap 03/24/2017 12.3  mmol/L Final   • Hemoglobin A1C 03/24/2017 6.63* 4.80 - 5.60 % Final   • Glucose 03/24/2017 205* 70 - 130 mg/dL Final   • Glucose 03/24/2017 202* 70 - 130 mg/dL Final   • Glucose 03/24/2017 191* 70 - 130 mg/dL Final       Xr Knee 1 Or 2 View Left    Result Date: 3/23/2017  Narrative: AP AND LATERAL PROJECTIONS OF THE LEFT KNEE  CLINICAL HISTORY:  Post op left knee.  FINDINGS:  AP and lateral projections of the left knee demonstrate an antibiotic spacer in place. There are pins traversing the tibial and femoral components extending into the adjacent diametaphyses. The findings are similar in appearance when compared to the prior study of 10/10/2016 although the tibial component of the spacer may have been further revised since the prior exam of 10/10/2016. Please correlate with surgical history. Typical postoperative changes are identified within the adjacent soft tissues.  This report was finalized on 3/23/2017 3:31 AM by Dr. Zack Farrell MD.      Xr Knee 1 Or 2 View Left    Impression: Ordering physician's impression is located in the Encounter Note dated 03/16/17.     Xr Joint Survey Ap 2+ Joints    Impression: Ordering physician's impression is located in the Encounter Note dated 03/16/17.       Medications    docusate sodium 100 mg Oral BID   insulin aspart 0-9 Units Subcutaneous 4x Daily AC & at Bedtime   metoprolol tartrate 25 mg Oral BID   polyethylene glycol 17 g Oral BID   warfarin 5 mg Oral Daily     •  acetaminophen  •  acetaminophen  •  dextrose  •  dextrose  •  glucagon (human recombinant)  •  glucagon (human recombinant)  •  HYDROcodone-acetaminophen  •  HYDROcodone-acetaminophen  •   melatonin  •  ondansetron **OR** ondansetron ODT **OR** ondansetron  •  promethazine **OR** promethazine    Assessment:  Doing well POD  # 2 Days Post-Op following total knee replacement  Problem List Items Addressed This Visit        Musculoskeletal and Integument    * (Principal)Failed total knee replacement    Relevant Medications    lactated ringers bolus 500 mL (Completed)    ceFAZolin in dextrose (ANCEF) IVPB solution 2 g (Completed)    pregabalin (LYRICA) capsule 150 mg (Completed)    vancomycin 1750 mg/500 mL 0.9% NS IVPB ([facility]) (Completed)    Other Relevant Orders    Body Fluid Cell Count With Differential (Completed)    Body fluid cell count (Completed)    Tissue Exam (Completed)    Body fluid differential (Completed)          Plan:   Continue efforts to mobilize- TTWB, knee immobiliaer on when OOB  Continue Pain Control Measures  Continue incisional Care  DVT prophylaxis    Discharge Plan:Rehab tomorrow    Dominick Bosch MD    Date: 3/24/2017  Time: 8:36 PM

## 2017-03-25 NOTE — PLAN OF CARE
Problem: Patient Care Overview (Adult)  Goal: Plan of Care Review  Outcome: Ongoing (interventions implemented as appropriate)    03/25/17 0248   Coping/Psychosocial Response Interventions   Plan Of Care Reviewed With patient   Patient Care Overview   Progress improving   Outcome Evaluation   Outcome Summary/Follow up Plan PO PAIN MEDICATION CONTROLING PAIN. OOB WITH KNEE IMMOBILIZER . UP ASSIST X1 . POSSIBLE REHAB TODAY.       Goal: Adult Individualization and Mutuality  Outcome: Ongoing (interventions implemented as appropriate)  Goal: Discharge Needs Assessment  Outcome: Ongoing (interventions implemented as appropriate)    Problem: Fall Risk (Adult)  Goal: Absence of Falls  Outcome: Ongoing (interventions implemented as appropriate)    Problem: Knee Replacement, Total (Adult)  Goal: Signs and Symptoms of Listed Potential Problems Will be Absent or Manageable (Knee Replacement, Total)  Outcome: Ongoing (interventions implemented as appropriate)

## 2017-03-25 NOTE — PROGRESS NOTES
" LOS: 3 days   Primary Care Physician: Yuval Real MD     Subjective  No CP SOA NVD. Doing well.    Vital Signs  Body mass index is 50.25 kg/(m^2).  Temp:  [97 °F (36.1 °C)-98.9 °F (37.2 °C)] 97 °F (36.1 °C)  Heart Rate:  [76-96] 86  Resp:  [16] 16  BP: ()/(58-81) 121/73      Objective:  General Appearance:  Comfortable and in no acute distress.    Vital signs: (most recent): Blood pressure 121/73, pulse 86, temperature 97 °F (36.1 °C), temperature source Oral, resp. rate 16, height 62.5\" (158.8 cm), weight 279 lb 3.2 oz (127 kg), SpO2 95 %.  Vital signs are normal.    HEENT: Normal HEENT exam.    Lungs:  Normal respiratory rate and normal effort.  Breath sounds clear to auscultation.    Heart: Normal rate.  Irregular rhythm.    Abdomen: Abdomen is soft.  There is no abdominal tenderness.     Extremities: Decreased range of motion.  There is venous stasis.  (L knee dressed)  Pulses: Distal pulses are intact.    Neurological: Patient is alert.    Pupils:  Pupils are equal, round, and reactive to light.    Skin:  Warm and dry.                Results Review:    I reviewed the patient's new clinical results.      Results from last 7 days  Lab Units 03/25/17 0423 03/24/17  0442   HEMOGLOBIN g/dL 9.6* 9.8*       Results from last 7 days  Lab Units 03/25/17 0423 03/24/17  0442   SODIUM mmol/L 135* 139   POTASSIUM mmol/L 4.6 4.2   CHLORIDE mmol/L 101 103   TOTAL CO2 mmol/L 22.6 23.7   BUN mg/dL 24* 28*   CREATININE mg/dL 0.92 0.96   CALCIUM mg/dL 8.6 8.3*   GLUCOSE mg/dL 171* 207*       Results from last 7 days  Lab Units 03/25/17 0423   INR  1.54*     Hemoglobin A1C:  Lab Results   Component Value Date    HGBA1C 6.63 (H) 03/24/2017       Glucose Range:  Glucose   Date/Time Value Ref Range Status   03/25/2017 0611 186 (H) 70 - 130 mg/dL Final   03/24/2017 2035 222 (H) 70 - 130 mg/dL Final   03/24/2017 1639 191 (H) 70 - 130 mg/dL Final   03/24/2017 1113 202 (H) 70 - 130 mg/dL Final   03/24/2017 0633 205 " (H) 70 - 130 mg/dL Final   03/23/2017 2055 156 (H) 70 - 130 mg/dL Final       Medication Review: Yes    Physical Therapy:    Assessment/Plan     Active Hospital Problems (** Indicates Principal Problem)    Diagnosis Date Noted   • **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       Assessment & Plan  -Cr improved. UA with 20-30 WBC but no dysuria. Cx mixed. No antibiotics needed from renal standpoint.  -A1c 6.6. Can resume metformin on discharge. Will resume low dose lisinopril. Recommend PCP follow up for titration based on BP and repeat labs.  -Coumadin for AC for AFib and DVT ppx per Ortho. INR 1.5.  -ID following. Plan dc of antibiotics per their recommendations and resumption if symptoms worsen.  Disposition:    Albino Harmon MD  03/25/17  8:56 AM

## 2017-03-27 ENCOUNTER — TELEPHONE (OUTPATIENT)
Dept: ORTHOPEDIC SURGERY | Facility: CLINIC | Age: 77
End: 2017-03-27

## 2017-03-27 NOTE — TELEPHONE ENCOUNTER
Follow instructions in discharge summary.  Knee Immobilizer with transfers and may have off while in bed.  Dry dressing daily

## 2017-03-28 ENCOUNTER — TELEPHONE (OUTPATIENT)
Dept: ORTHOPEDIC SURGERY | Facility: CLINIC | Age: 77
End: 2017-03-28

## 2017-03-28 NOTE — TELEPHONE ENCOUNTER
Received a call from the nurse at [facility].  Patient was on long-term Coumadin prior to her surgery.  Dr. Garcia who is the attending physician there at rehabilitation will be managing her Coumadin while she is in rehabilitation.  Have asked them to keep her INR between 2 and 3

## 2017-03-29 NOTE — TELEPHONE ENCOUNTER
They don't need to change the dressing and was saturated, the knee immobilizer should be worn when the patient is out of bed.  Okay to keep the knee immobilizer off while in bed only.  This was clearly stated in the discharge summary.

## 2017-04-04 ENCOUNTER — TELEPHONE (OUTPATIENT)
Dept: ORTHOPEDIC SURGERY | Facility: CLINIC | Age: 77
End: 2017-04-04

## 2017-04-06 ENCOUNTER — TELEPHONE (OUTPATIENT)
Dept: ORTHOPEDIC SURGERY | Facility: CLINIC | Age: 77
End: 2017-04-06

## 2017-04-07 ENCOUNTER — TELEPHONE (OUTPATIENT)
Dept: ORTHOPEDIC SURGERY | Facility: CLINIC | Age: 77
End: 2017-04-07

## 2017-04-07 ENCOUNTER — OFFICE VISIT (OUTPATIENT)
Dept: ORTHOPEDIC SURGERY | Facility: CLINIC | Age: 77
End: 2017-04-07

## 2017-04-07 VITALS — HEIGHT: 63 IN | WEIGHT: 265 LBS | TEMPERATURE: 98.5 F | BODY MASS INDEX: 46.95 KG/M2

## 2017-04-07 DIAGNOSIS — Z96.652 H/O TOTAL KNEE REPLACEMENT, LEFT: Primary | ICD-10-CM

## 2017-04-07 PROCEDURE — 99024 POSTOP FOLLOW-UP VISIT: CPT | Performed by: ORTHOPAEDIC SURGERY

## 2017-04-07 NOTE — PROGRESS NOTES
Shoshana Bae : 1940 MRN: [id number] DATE: 2017    DIAGNOSIS: 2 week follow up left total knee SPACER EXCHANGE    SUBJECTIVE:Patient returns today for 2 week follow up of left total knee replacement spacer exchange. Patient reports doing well with no unusual complaints. Appears to be progressing appropriately.    OBJECTIVE:   Exam:. The incision is healing appropriately. No sign of infection. Mild skin cellulitis. Range of motion is progressing as expected. The calf is soft and nontender with a negative Homans sign.    ASSESSMENT: 2 week status post left knee replacement spacer exchange.    PLAN: 1) Staples removed and steri strips applied   2) protected WB - immobilizer on for transfers   3) continue CONSTANTINO hose or wraps   4) Continue ice PRN   5) warfarin for lifetime   6) Follow up in 4 weeks with repeat Xrays of left knee (3views)    Dominick Bosch MD  2017

## 2017-04-07 NOTE — TELEPHONE ENCOUNTER
Protime today is 20.8, INR is 1.7.  Patient presently is taking Coumadin 7mg q Mon, Wed, & Fri and Coumadin 6mg q Tues, Thurs, Sat, & Sun.  Have left orders to change Coumadin dose to take Coumadin 6mg q M-W-F and Coumadin 7mg q T-T-S-S and will recheck protime again on Monday, per RBB. AMB

## 2017-04-11 ENCOUNTER — TELEPHONE (OUTPATIENT)
Dept: ORTHOPEDIC SURGERY | Facility: CLINIC | Age: 77
End: 2017-04-11

## 2017-04-11 NOTE — TELEPHONE ENCOUNTER
ProTime today is 23.0 INR is 1.9.  Will increase her Coumadin to 6 mg every Monday and Wednesday and then 7 mg every Tuesday Thursday Friday Saturday and Sunday per RBB will recheck her ProTime again on Monday

## 2017-04-11 NOTE — TELEPHONE ENCOUNTER
Called Leti @ PT and let her know that RBB said leave Ace wrap on and to start @ the toes, remain in the knee immobilizer and still NO bending of knee.

## 2017-04-13 ENCOUNTER — OFFICE VISIT (OUTPATIENT)
Dept: FAMILY MEDICINE CLINIC | Facility: CLINIC | Age: 77
End: 2017-04-13

## 2017-04-13 VITALS
HEIGHT: 63 IN | HEART RATE: 71 BPM | TEMPERATURE: 97.9 F | RESPIRATION RATE: 16 BRPM | SYSTOLIC BLOOD PRESSURE: 110 MMHG | DIASTOLIC BLOOD PRESSURE: 80 MMHG

## 2017-04-13 DIAGNOSIS — K76.9 LESION OF LIVER: Primary | ICD-10-CM

## 2017-04-13 PROCEDURE — 99213 OFFICE O/P EST LOW 20 MIN: CPT | Performed by: INTERNAL MEDICINE

## 2017-04-13 RX ORDER — DIAPER,BRIEF,INFANT-TODD,DISP
EACH MISCELLANEOUS 2 TIMES DAILY
Qty: 85.05 G | Refills: 2 | Status: ON HOLD | OUTPATIENT
Start: 2017-04-13 | End: 2020-01-01

## 2017-04-13 RX ORDER — FUROSEMIDE 40 MG/1
100 TABLET ORAL DAILY
Qty: 270 TABLET | Refills: 1 | Status: SHIPPED | OUTPATIENT
Start: 2017-04-13 | End: 2020-01-01 | Stop reason: HOSPADM

## 2017-04-13 RX ORDER — POTASSIUM CHLORIDE 1500 MG/1
20 TABLET, EXTENDED RELEASE ORAL 2 TIMES DAILY
Qty: 180 TABLET | Refills: 3 | Status: ON HOLD | OUTPATIENT
Start: 2017-04-13 | End: 2021-01-01

## 2017-04-13 RX ORDER — LISINOPRIL 10 MG/1
10 TABLET ORAL EVERY MORNING
Qty: 90 TABLET | Refills: 1 | Status: ON HOLD | OUTPATIENT
Start: 2017-04-13 | End: 2020-01-01

## 2017-04-13 RX ORDER — METOPROLOL TARTRATE 50 MG/1
50 TABLET, FILM COATED ORAL 2 TIMES DAILY
Qty: 180 TABLET | Refills: 1 | Status: ON HOLD | OUTPATIENT
Start: 2017-04-13 | End: 2020-01-01

## 2017-04-13 RX ORDER — ICOSAPENT ETHYL 1000 MG/1
2 CAPSULE ORAL 2 TIMES DAILY WITH MEALS
Qty: 360 CAPSULE | Refills: 3 | Status: ON HOLD | OUTPATIENT
Start: 2017-04-13 | End: 2021-01-01

## 2017-04-13 RX ORDER — ATORVASTATIN CALCIUM 10 MG/1
10 TABLET, FILM COATED ORAL DAILY
Qty: 90 TABLET | Refills: 1 | Status: ON HOLD | OUTPATIENT
Start: 2017-04-13 | End: 2021-01-01

## 2017-04-13 RX ORDER — WARFARIN SODIUM 2 MG/1
TABLET ORAL
Qty: 100 TABLET | Refills: 3 | Status: SHIPPED | OUTPATIENT
Start: 2017-04-13 | End: 2017-08-11 | Stop reason: DRUGHIGH

## 2017-04-13 NOTE — PROGRESS NOTES
Subjective  complaint is follow-up after rehabilitation for her knee surgery but also abnormal liver on CAT scan  Shoshana Bae is a 76 y.o. female.     History of Present Illness   Shoshana is here today for follow-up.  She recently was released from rehabilitation and [facility]  Her knee surgery did not take.  The patient currently is in a brace.  She has complaining of some swelling in her leg with some redness.  She did have a CAT scan of the abdomen and pelvis.  It continues to show a liver lesion which is uncharacterized at the present time.  She is unsure whether she can get an MRI scan because of some hardware in her back.  The following portions of the patient's history were reviewed and updated as appropriate: allergies, current medications, past medical history and problem list.    Review of Systems   Constitutional: Negative for chills and fever.   Skin:        When the patient awakens in the morning the leg is not red.       Objective   Physical Exam   Musculoskeletal:   There is edema of her left leg extending to the dorsum of the left foot.  There is some erythema but this is likely due to the pressure of the knee brace.  I do not see any evidence of cellulitis.       Assessment/Plan   Shoshana was seen today for follow-up.    Diagnoses and all orders for this visit:    Lesion of liver  -     US Liver; Future    Other orders  -     atorvastatin (LIPITOR) 10 MG tablet; Take 1 tablet by mouth Daily.  -     furosemide (LASIX) 40 MG tablet; Take 2.5 tablets by mouth Daily. Pt taking 100mg qd  -     hydrocortisone 1 % cream; Apply  topically 2 (Two) Times a Day.  -     icosapent ethyl (VASCEPA) 1 G capsule capsule; Take 2 g by mouth 2 (Two) Times a Day With Meals.  -     KLOR-CON 20 MEQ CR tablet; Take 1 tablet by mouth 2 (Two) Times a Day.  -     lisinopril (PRINIVIL,ZESTRIL) 10 MG tablet; Take 1 tablet by mouth Every Morning.  -     metFORMIN (GLUCOPHAGE)  500 MG tablet; Take 1 tablet by mouth 2 (Two) Times a Day With Meals.  -     metoprolol tartrate (LOPRESSOR) 50 MG tablet; Take 1 tablet by mouth 2 (Two) Times a Day.  -     warfarin (COUMADIN) 2 MG tablet; 7mg daily except for Monday and Wednesday . 6 mg on Monday and Wednesday. Or as directed based on inr      Shoshana is here today for follow-up.  She does have some swelling in her left leg.  She is already on some warfarin so do not think this would be a blood clot.  She does not appear to have cellulitis.  I think this is just redness from the brace.  We did discuss her liver lesion.  She is going to check with her orthopedist and see if the hardware in her back would allow her to get an MRI scan.  In the interim we are going to get an ultrasound.  However if this were to be a metastases to her liver from her prior endometrial cancer the really would be little we would be able to do.

## 2017-04-14 DIAGNOSIS — K76.9 LIVER LESION: Primary | ICD-10-CM

## 2017-04-20 ENCOUNTER — TELEPHONE (OUTPATIENT)
Dept: ORTHOPEDIC SURGERY | Facility: CLINIC | Age: 77
End: 2017-04-20

## 2017-04-20 NOTE — TELEPHONE ENCOUNTER
Received a call from the nurse at [facility] palate is been some confusion with her pro times.  The patient is apparently moving into [facility] and the attending physician is Dr. Garcia.  Last week's ProTime apparently was called to her.  Her ProTime today is 56.6 INR is 4.7.  Have asked them to hold her Coumadin for today, tomorrow, and Saturday.  On Sunday she will begin Coumadin 5 mg a day check her ProTime again on Monday.  The patient moves into [facility] Dr. Garcia will take over management of her Coumadin

## 2017-04-24 ENCOUNTER — TELEPHONE (OUTPATIENT)
Dept: ORTHOPEDIC SURGERY | Facility: CLINIC | Age: 77
End: 2017-04-24

## 2017-04-24 NOTE — TELEPHONE ENCOUNTER
ProTime today is 23.2 INR is 1.9.  Will continue her Coumadin at 5 mg daily.  Patient is being discharged from home health and is admitting her self to [facility] living and she will now be followed by Dr. Garcia.  Patient was on Coumadin prior to her surgery and Dr. Garcia will now take over management of her Coumadin

## 2017-05-10 ENCOUNTER — OFFICE VISIT (OUTPATIENT)
Dept: INFECTIOUS DISEASES | Facility: CLINIC | Age: 77
End: 2017-05-10

## 2017-05-10 VITALS
DIASTOLIC BLOOD PRESSURE: 67 MMHG | BODY MASS INDEX: 48.55 KG/M2 | HEIGHT: 63 IN | WEIGHT: 274 LBS | TEMPERATURE: 97.8 F | SYSTOLIC BLOOD PRESSURE: 107 MMHG | HEART RATE: 68 BPM

## 2017-05-10 DIAGNOSIS — T84.59XD INFECTED PROSTHETIC KNEE JOINT, SUBSEQUENT ENCOUNTER: Primary | ICD-10-CM

## 2017-05-10 DIAGNOSIS — I89.0 LYMPHEDEMA: ICD-10-CM

## 2017-05-10 DIAGNOSIS — Z96.659 INFECTED PROSTHETIC KNEE JOINT, SUBSEQUENT ENCOUNTER: Primary | ICD-10-CM

## 2017-05-10 PROCEDURE — 99213 OFFICE O/P EST LOW 20 MIN: CPT | Performed by: INTERNAL MEDICINE

## 2017-05-11 ENCOUNTER — OFFICE VISIT (OUTPATIENT)
Dept: ORTHOPEDIC SURGERY | Facility: CLINIC | Age: 77
End: 2017-05-11

## 2017-05-11 VITALS — HEIGHT: 63 IN | BODY MASS INDEX: 48.55 KG/M2 | TEMPERATURE: 98.1 F | WEIGHT: 274 LBS

## 2017-05-11 DIAGNOSIS — Z98.890 S/P KNEE SURGERY: Primary | ICD-10-CM

## 2017-05-11 PROCEDURE — 99024 POSTOP FOLLOW-UP VISIT: CPT | Performed by: ORTHOPAEDIC SURGERY

## 2017-05-11 PROCEDURE — 73560 X-RAY EXAM OF KNEE 1 OR 2: CPT | Performed by: ORTHOPAEDIC SURGERY

## 2017-05-11 RX ORDER — HYDROCODONE BITARTRATE AND ACETAMINOPHEN 7.5; 325 MG/1; MG/1
TABLET ORAL
Qty: 60 TABLET | Refills: 0 | Status: SHIPPED | OUTPATIENT
Start: 2017-05-11 | End: 2017-11-16

## 2017-08-11 ENCOUNTER — OFFICE VISIT (OUTPATIENT)
Dept: ORTHOPEDIC SURGERY | Facility: CLINIC | Age: 77
End: 2017-08-11

## 2017-08-11 VITALS — TEMPERATURE: 98.2 F | HEIGHT: 63 IN

## 2017-08-11 DIAGNOSIS — Z98.890 S/P KNEE SURGERY: ICD-10-CM

## 2017-08-11 DIAGNOSIS — Z96.652 STATUS POST TOTAL LEFT KNEE REPLACEMENT: Primary | ICD-10-CM

## 2017-08-11 PROCEDURE — 99213 OFFICE O/P EST LOW 20 MIN: CPT | Performed by: ORTHOPAEDIC SURGERY

## 2017-08-11 PROCEDURE — 73560 X-RAY EXAM OF KNEE 1 OR 2: CPT | Performed by: ORTHOPAEDIC SURGERY

## 2017-08-11 RX ORDER — WARFARIN SODIUM 4 MG/1
TABLET ORAL
COMMUNITY
Start: 2017-08-09 | End: 2017-11-16

## 2017-08-11 RX ORDER — WARFARIN SODIUM 3 MG/1
TABLET ORAL
COMMUNITY
Start: 2017-08-02 | End: 2017-11-16

## 2017-08-11 NOTE — PROGRESS NOTES
Patient: Shoshana Bae  YOB: 1940 76 y.o. female  Medical Record Number: [id number]    Chief Complaints:   Chief Complaint   Patient presents with   • Left Knee - Post-op       History of Present Illness:Shoshana Bae is a 76 y.o. female who presents for follow-up of  Left knee removal of infected knee replacement and placement of antibiotic spacer.  She is about 4 months out from surgery.  She is having pain when she tries and going to the bathroom.  She described as an aching pain.  She's not having much pain when she seated.    Allergies:   Allergies   Allergen Reactions   • Codeine Nausea And Vomiting   • Morphine Nausea And Vomiting   • Morphine And Related        Medications:   Current Outpatient Prescriptions   Medication Sig Dispense Refill   • atorvastatin (LIPITOR) 10 MG tablet Take 1 tablet by mouth Daily. 90 tablet 1   • furosemide (LASIX) 40 MG tablet Take 2.5 tablets by mouth Daily. Pt taking 100mg qd 270 tablet 1   • glucose blood (FREESTYLE TEST STRIPS) test strip 1 each by Other route 2 (Two) Times a Week. 50 each 12   • hydrocortisone 1 % cream Apply  topically 2 (Two) Times a Day. 85.05 g 2   • icosapent ethyl (VASCEPA) 1 G capsule capsule Take 2 g by mouth 2 (Two) Times a Day With Meals. 360 capsule 3   • KLOR-CON 20 MEQ CR tablet Take 1 tablet by mouth 2 (Two) Times a Day. 180 tablet 3   • lisinopril (PRINIVIL,ZESTRIL) 10 MG tablet Take 1 tablet by mouth Every Morning. 90 tablet 1   • metFORMIN (GLUCOPHAGE) 500 MG tablet Take 1 tablet by mouth 2 (Two) Times a Day With Meals. 180 tablet 1   • metoprolol tartrate (LOPRESSOR) 50 MG tablet Take 1 tablet by mouth 2 (Two) Times a Day. 180 tablet 1   • HYDROcodone-acetaminophen (NORCO) 7.5-325 MG per tablet 1-2 po q 4-6 hr prn pain 60 tablet 0   • warfarin (COUMADIN) 3 MG tablet      • warfarin (COUMADIN) 4 MG tablet        No current facility-administered medications for this visit.          The following portions of the patient's  "history were reviewed and updated as appropriate: allergies, current medications, past family history, past medical history, past social history, past surgical history and problem list.    Review of Systems:   A 14 point review of systems was performed. All systems negative except pertinent positives/negative listed in HPI above    Physical Exam:   Vitals:    08/11/17 1351   Temp: 98.2 °F (36.8 °C)   TempSrc: Temporal Artery    Height: 62.5\" (158.8 cm)       General: A and O x 3, ASA, NAD    SCLERA:    Normal    DENTITION:   Normal  Knee incision is healed nicely she has moderate 2+ edema from mid tibial distal.  There are chronic venous stasis changes no obvious cellulitis today.  The range of motion is 10-90°    Radiology:  Xrays 3views left knee (ap,lateral, sunrise) were ordered and reviewed for evaluation of knee pain demonstrating a well-positioned antibiotic spacer there is no changes in alignment or position in comparison with previous films.      Assessment/Plan:  Left knee antibiotic spacer.  Unfortunately think further surgery including fusion or reimplantation is extraordinarily high risk given her soft tissue envelope and poor bone quality.  I recommendation is continued protected weightbearing.  She can try and ambulate bed to bathroom without the walker pain as guide.  I think in the fusion would be high risk as well.  Should she need further surgery think the only other option would be above-knee hip rotation and that would be a very difficult stress on her.  I plan on seeing her back in 3 months with repeat x-rays  "

## 2017-11-16 ENCOUNTER — OFFICE VISIT (OUTPATIENT)
Dept: ORTHOPEDIC SURGERY | Facility: CLINIC | Age: 77
End: 2017-11-16

## 2017-11-16 VITALS — HEIGHT: 62 IN | BODY MASS INDEX: 53.92 KG/M2 | TEMPERATURE: 97.5 F | WEIGHT: 293 LBS

## 2017-11-16 DIAGNOSIS — Z96.652 HISTORY OF TOTAL KNEE ARTHROPLASTY, LEFT: Primary | ICD-10-CM

## 2017-11-16 PROCEDURE — 99212 OFFICE O/P EST SF 10 MIN: CPT | Performed by: ORTHOPAEDIC SURGERY

## 2017-11-16 PROCEDURE — 73562 X-RAY EXAM OF KNEE 3: CPT | Performed by: ORTHOPAEDIC SURGERY

## 2017-11-16 RX ORDER — APIXABAN 5 MG/1
2.5 TABLET, FILM COATED ORAL EVERY 12 HOURS SCHEDULED
Status: ON HOLD | COMMUNITY
Start: 2017-11-08 | End: 2021-01-01

## 2017-11-16 RX ORDER — ERGOCALCIFEROL 1.25 MG/1
50000 CAPSULE ORAL
Status: ON HOLD | COMMUNITY
End: 2021-01-01

## 2017-11-16 NOTE — PROGRESS NOTES
Patient: Shoshana Bae  YOB: 1940 77 y.o. female  Medical Record Number: [id number]    Chief Complaints:   Chief Complaint   Patient presents with   • Left Knee - Follow-up, Pain   • Right Knee - Follow-up, Pain       History of Present Illness:Shoshana Bae is a 77 y.o. female who presents for follow-up of  Left knee antibiotic spacer.  She is about 7 months out.  She seems to be tolerating it okay she is transferring and is able to walk short distances to the bathroom.  She has mild aching throughout the knee.    Allergies:   Allergies   Allergen Reactions   • Codeine Nausea And Vomiting   • Morphine Nausea And Vomiting   • Morphine And Related        Medications:   Current Outpatient Prescriptions   Medication Sig Dispense Refill   • atorvastatin (LIPITOR) 10 MG tablet Take 1 tablet by mouth Daily. 90 tablet 1   • ELIQUIS 5 MG tablet tablet      • furosemide (LASIX) 40 MG tablet Take 2.5 tablets by mouth Daily. Pt taking 100mg qd (Patient taking differently: Take 40 mg by mouth 2 (Two) Times a Day. Pt taking 100mg qd) 270 tablet 1   • glucose blood (FREESTYLE TEST STRIPS) test strip 1 each by Other route 2 (Two) Times a Week. 50 each 12   • hydrocortisone 1 % cream Apply  topically 2 (Two) Times a Day. 85.05 g 2   • icosapent ethyl (VASCEPA) 1 G capsule capsule Take 2 g by mouth 2 (Two) Times a Day With Meals. 360 capsule 3   • KLOR-CON 20 MEQ CR tablet Take 1 tablet by mouth 2 (Two) Times a Day. 180 tablet 3   • lisinopril (PRINIVIL,ZESTRIL) 10 MG tablet Take 1 tablet by mouth Every Morning. 90 tablet 1   • metFORMIN (GLUCOPHAGE) 500 MG tablet Take 1 tablet by mouth 2 (Two) Times a Day With Meals. 180 tablet 1   • metoprolol tartrate (LOPRESSOR) 50 MG tablet Take 1 tablet by mouth 2 (Two) Times a Day. 180 tablet 1   • vitamin D (ERGOCALCIFEROL) 81011 units capsule capsule Take 50,000 Units by mouth Every 14 (Fourteen) Days.       No current facility-administered medications for this visit.   "        The following portions of the patient's history were reviewed and updated as appropriate: allergies, current medications, past family history, past medical history, past social history, past surgical history and problem list.    Review of Systems:   A 14 point review of systems was performed. All systems negative except pertinent positives/negative listed in HPI above    Physical Exam:   Vitals:    11/16/17 1333   Temp: 97.5 °F (36.4 °C)   TempSrc: Temporal Artery    Weight: 293 lb (133 kg)   Height: 62\" (157.5 cm)       General: A and O x 3, ASA, NAD    SCLERA:    Normal    DENTITION:   Normal  She has 1-2+ pitting edema from mid tibia distal.  The incision is nicely healed the knee shows about 60 or 70° arc of motion    Radiology:  Xrays 3views left knee (ap,lateral, sunrise) were ordered and reviewed for evaluation of knee pain demonstrating a well-positioned antibiotic spacer.  It is unchanged in comparison with previous films      Assessment/Plan:  7 months out from left knee antibiotic spacer.  Unfortunately I think she is not a good candidate for reimplantation and long audi fusion would also carry with it significant risk of complications.  For this reason were planning on continuing with her spacer of instructed on importance of weight management quad strengthening and I'll see her back in March for her 1 year visit with repeat x-rays  "

## 2018-01-25 ENCOUNTER — OFFICE VISIT (OUTPATIENT)
Dept: ORTHOPEDIC SURGERY | Facility: CLINIC | Age: 78
End: 2018-01-25

## 2018-01-25 VITALS — WEIGHT: 290 LBS | HEIGHT: 62 IN | BODY MASS INDEX: 53.37 KG/M2 | TEMPERATURE: 98.2 F

## 2018-01-25 DIAGNOSIS — M25.562 ACUTE PAIN OF LEFT KNEE: ICD-10-CM

## 2018-01-25 DIAGNOSIS — Z98.890 H/O KNEE SURGERY: Primary | ICD-10-CM

## 2018-01-25 DIAGNOSIS — Z96.652 S/P TOTAL KNEE ARTHROPLASTY, LEFT: ICD-10-CM

## 2018-01-25 PROCEDURE — 99213 OFFICE O/P EST LOW 20 MIN: CPT | Performed by: NURSE PRACTITIONER

## 2018-01-25 PROCEDURE — 73560 X-RAY EXAM OF KNEE 1 OR 2: CPT | Performed by: NURSE PRACTITIONER

## 2018-01-25 RX ORDER — HYDROCODONE BITARTRATE AND ACETAMINOPHEN 5; 325 MG/1; MG/1
TABLET ORAL
COMMUNITY
Start: 2018-01-17 | End: 2018-02-06 | Stop reason: HOSPADM

## 2018-01-25 RX ORDER — DEXAMETHASONE 2 MG/1
TABLET ORAL
COMMUNITY
Start: 2018-01-22 | End: 2018-02-06 | Stop reason: HOSPADM

## 2018-01-25 NOTE — PROGRESS NOTES
"Patient: Shoshana Bae  YOB: 1940 77 y.o. female  Medical Record Number: [id number]    Chief Complaints:   Chief Complaint   Patient presents with   • Left Knee - Follow-up, Pain       History of Present Illness:Shoshana Bae is a 77 y.o. 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  Patient reports she was doing fine until 5 days ago when she started with increased pain in the left knee.  Patient has ongoing issues with venous stasis resulting in recurrent cellulitis.  Patient reports that she was diagnosed with cellulitis of the lower legs a few weeks ago as well as\" infection in her right foot muscles and\" bones\" according to the patient she was started on steroids for this.  Not long after also started on Keflex.  It is unclear as to whether the patient is still on Keflex.  According to the medication list sent with her she is still on steroids.  Not sure whether that contributed to the increased pain in her left knee.  She reports low-grade fever.    Allergies:   Allergies   Allergen Reactions   • Codeine Nausea And Vomiting   • Morphine Nausea And Vomiting   • Morphine And Related        Medications:   Current Outpatient Prescriptions   Medication Sig Dispense Refill   • atorvastatin (LIPITOR) 10 MG tablet Take 1 tablet by mouth Daily. 90 tablet 1   • dexamethasone (DECADRON) 2 MG tablet      • ELIQUIS 5 MG tablet tablet      • furosemide (LASIX) 40 MG tablet Take 2.5 tablets by mouth Daily. Pt taking 100mg qd (Patient taking differently: Take 40 mg by mouth 2 (Two) Times a Day. Pt taking 100mg qd) 270 tablet 1   • glucose blood (FREESTYLE TEST STRIPS) test strip 1 each by Other route 2 (Two) Times a Week. 50 each 12   • HYDROcodone-acetaminophen (NORCO) 5-325 MG per tablet      • hydrocortisone 1 % cream Apply  topically 2 (Two) Times a Day. 85.05 g 2   • icosapent ethyl (VASCEPA) 1 G capsule capsule Take 2 g by mouth 2 (Two) Times a Day With " "Meals. 360 capsule 3   • KLOR-CON 20 MEQ CR tablet Take 1 tablet by mouth 2 (Two) Times a Day. 180 tablet 3   • lisinopril (PRINIVIL,ZESTRIL) 10 MG tablet Take 1 tablet by mouth Every Morning. 90 tablet 1   • metFORMIN (GLUCOPHAGE) 500 MG tablet Take 1 tablet by mouth 2 (Two) Times a Day With Meals. 180 tablet 1   • metoprolol tartrate (LOPRESSOR) 50 MG tablet Take 1 tablet by mouth 2 (Two) Times a Day. 180 tablet 1   • vitamin D (ERGOCALCIFEROL) 98477 units capsule capsule Take 50,000 Units by mouth Every 14 (Fourteen) Days.       No current facility-administered medications for this visit.          The following portions of the patient's history were reviewed and updated as appropriate: allergies, current medications, past family history, past medical history, past social history, past surgical history and problem list.    Review of Systems:   A 14 point review of systems was performed. All systems negative except pertinent positives/negative listed in HPI above    Physical Exam:   Vitals:    01/25/18 1023   Temp: 98.2 °F (36.8 °C)   TempSrc: Temporal Artery    Weight: 132 kg (290 lb)   Height: 157.5 cm (62\")       General: A and O x 3, ASA, NAD    SCLERA:    Normal    DENTITION:   Normal  Skin patient does have obvious venous stasis with cellulitis bilateral lower legs.  Patient reports it actually looks better.  She does have swelling left knee with no unusual erythema or warmth.    Radiology:  Xrays 3views (ap,lateral, sunrise) F knee were ordered and reviewed today secondary to pain and show an antibiotic spacer.  Comparative views are unchanged     Assessment/Plan:  Left knee antibiotic spacer secondary to infection with increased knee pain    Dr. Bosch saw the patient as well.  From this point forward it is important that the patient obviously avoid steroids since she does have a chronic infection of the left knee.  We attempted to aspirate her left knee but we're unable to obtain any fluid.  At this time we " will ask that the medical doctor or provider at the facility that started her on the steroids weaning her off as soon as possible, and we will increase her antibiotic to Keflex 500 mg 4 times a day.  We will repeat CRP and sedimentation rate and have the patient follow-up with Dr. Bosch in 1 week.  She will call immediately if she develops any increased pain, fever or chills.

## 2018-02-01 ENCOUNTER — HOSPITAL ENCOUNTER (INPATIENT)
Facility: HOSPITAL | Age: 78
LOS: 5 days | Discharge: NURSING, REHAB OR LONG-TERM CARE | End: 2018-02-06
Attending: ORTHOPAEDIC SURGERY | Admitting: ORTHOPAEDIC SURGERY

## 2018-02-01 ENCOUNTER — OFFICE VISIT (OUTPATIENT)
Dept: ORTHOPEDIC SURGERY | Facility: CLINIC | Age: 78
End: 2018-02-01

## 2018-02-01 VITALS — HEIGHT: 63 IN | TEMPERATURE: 97.5 F | WEIGHT: 290 LBS | BODY MASS INDEX: 51.38 KG/M2

## 2018-02-01 DIAGNOSIS — T84.59XS INFECTION OF TOTAL KNEE REPLACEMENT, SEQUELA: ICD-10-CM

## 2018-02-01 DIAGNOSIS — Z96.659 INFECTION OF TOTAL KNEE REPLACEMENT, SEQUELA: ICD-10-CM

## 2018-02-01 DIAGNOSIS — Z96.659 INFECTION OF TOTAL KNEE REPLACEMENT, SEQUELA: Primary | ICD-10-CM

## 2018-02-01 DIAGNOSIS — T84.59XS INFECTION OF TOTAL KNEE REPLACEMENT, SEQUELA: Primary | ICD-10-CM

## 2018-02-01 PROBLEM — T84.59XA INFECTION OF TOTAL KNEE REPLACEMENT (HCC): Status: ACTIVE | Noted: 2018-02-01

## 2018-02-01 LAB
ALBUMIN SERPL-MCNC: 2.8 G/DL (ref 3.5–5.2)
ALBUMIN/GLOB SERPL: 0.6 G/DL
ALP SERPL-CCNC: 124 U/L (ref 39–117)
ALT SERPL W P-5'-P-CCNC: 14 U/L (ref 1–33)
ANION GAP SERPL CALCULATED.3IONS-SCNC: 12.3 MMOL/L
APPEARANCE FLD: ABNORMAL
AST SERPL-CCNC: 11 U/L (ref 1–32)
BASOPHILS # BLD AUTO: 0.05 10*3/MM3 (ref 0–0.2)
BASOPHILS NFR BLD AUTO: 0.4 % (ref 0–1.5)
BILIRUB SERPL-MCNC: 0.6 MG/DL (ref 0.1–1.2)
BUN BLD-MCNC: 25 MG/DL (ref 8–23)
BUN/CREAT SERPL: 21.7 (ref 7–25)
CALCIUM SPEC-SCNC: 8.9 MG/DL (ref 8.6–10.5)
CHLORIDE SERPL-SCNC: 93 MMOL/L (ref 98–107)
CO2 SERPL-SCNC: 28.7 MMOL/L (ref 22–29)
COLOR FLD: ABNORMAL
CREAT BLD-MCNC: 1.15 MG/DL (ref 0.57–1)
CRYSTALS FLD MICRO: NORMAL
DEPRECATED RDW RBC AUTO: 49.3 FL (ref 37–54)
EOSINOPHIL # BLD AUTO: 0.14 10*3/MM3 (ref 0–0.7)
EOSINOPHIL NFR BLD AUTO: 1.2 % (ref 0.3–6.2)
ERYTHROCYTE [DISTWIDTH] IN BLOOD BY AUTOMATED COUNT: 13.9 % (ref 11.7–13)
GFR SERPL CREATININE-BSD FRML MDRD: 46 ML/MIN/1.73
GLOBULIN UR ELPH-MCNC: 4.4 GM/DL
GLUCOSE BLD-MCNC: 417 MG/DL (ref 65–99)
GLUCOSE BLDC GLUCOMTR-MCNC: 388 MG/DL (ref 70–130)
GLUCOSE BLDC GLUCOMTR-MCNC: 402 MG/DL (ref 70–130)
HCT VFR BLD AUTO: 40.3 % (ref 35.6–45.5)
HGB BLD-MCNC: 12.9 G/DL (ref 11.9–15.5)
IMM GRANULOCYTES # BLD: 0.12 10*3/MM3 (ref 0–0.03)
IMM GRANULOCYTES NFR BLD: 1 % (ref 0–0.5)
LYMPHOCYTES # BLD AUTO: 1.07 10*3/MM3 (ref 0.9–4.8)
LYMPHOCYTES NFR BLD AUTO: 8.9 % (ref 19.6–45.3)
MCH RBC QN AUTO: 31 PG (ref 26.9–32)
MCHC RBC AUTO-ENTMCNC: 32 G/DL (ref 32.4–36.3)
MCV RBC AUTO: 96.9 FL (ref 80.5–98.2)
MONOCYTES # BLD AUTO: 0.98 10*3/MM3 (ref 0.2–1.2)
MONOCYTES NFR BLD AUTO: 8.2 % (ref 5–12)
MONOCYTES NFR FLD: 1 %
NEUTROPHILS # BLD AUTO: 9.61 10*3/MM3 (ref 1.9–8.1)
NEUTROPHILS NFR BLD AUTO: 80.3 % (ref 42.7–76)
NEUTROPHILS NFR FLD MANUAL: 99 %
PLATELET # BLD AUTO: 346 10*3/MM3 (ref 140–500)
PMV BLD AUTO: 10.6 FL (ref 6–12)
POTASSIUM BLD-SCNC: 4.7 MMOL/L (ref 3.5–5.2)
PROT SERPL-MCNC: 7.2 G/DL (ref 6–8.5)
RBC # BLD AUTO: 4.16 10*6/MM3 (ref 3.9–5.2)
RBC # FLD AUTO: ABNORMAL /MM3
SODIUM BLD-SCNC: 134 MMOL/L (ref 136–145)
WBC # FLD: ABNORMAL /MM3
WBC NRBC COR # BLD: 11.97 10*3/MM3 (ref 4.5–10.7)

## 2018-02-01 PROCEDURE — 85025 COMPLETE CBC W/AUTO DIFF WBC: CPT | Performed by: ORTHOPAEDIC SURGERY

## 2018-02-01 PROCEDURE — 87205 SMEAR GRAM STAIN: CPT | Performed by: ORTHOPAEDIC SURGERY

## 2018-02-01 PROCEDURE — 87075 CULTR BACTERIA EXCEPT BLOOD: CPT | Performed by: ORTHOPAEDIC SURGERY

## 2018-02-01 PROCEDURE — 63710000001 INSULIN ASPART PER 5 UNITS: Performed by: INTERNAL MEDICINE

## 2018-02-01 PROCEDURE — 87186 SC STD MICRODIL/AGAR DIL: CPT | Performed by: ORTHOPAEDIC SURGERY

## 2018-02-01 PROCEDURE — 80053 COMPREHEN METABOLIC PANEL: CPT | Performed by: ORTHOPAEDIC SURGERY

## 2018-02-01 PROCEDURE — 82962 GLUCOSE BLOOD TEST: CPT

## 2018-02-01 PROCEDURE — 87070 CULTURE OTHR SPECIMN AEROBIC: CPT | Performed by: ORTHOPAEDIC SURGERY

## 2018-02-01 PROCEDURE — 89051 BODY FLUID CELL COUNT: CPT | Performed by: ORTHOPAEDIC SURGERY

## 2018-02-01 PROCEDURE — 89060 EXAM SYNOVIAL FLUID CRYSTALS: CPT | Performed by: ORTHOPAEDIC SURGERY

## 2018-02-01 PROCEDURE — 25010000002 VANCOMYCIN 10 G RECONSTITUTED SOLUTION: Performed by: ORTHOPAEDIC SURGERY

## 2018-02-01 PROCEDURE — 99213 OFFICE O/P EST LOW 20 MIN: CPT | Performed by: ORTHOPAEDIC SURGERY

## 2018-02-01 PROCEDURE — 83036 HEMOGLOBIN GLYCOSYLATED A1C: CPT | Performed by: INTERNAL MEDICINE

## 2018-02-01 RX ORDER — ATORVASTATIN CALCIUM 10 MG/1
10 TABLET, FILM COATED ORAL DAILY
Status: DISCONTINUED | OUTPATIENT
Start: 2018-02-02 | End: 2018-02-06 | Stop reason: HOSPADM

## 2018-02-01 RX ORDER — LISINOPRIL 2.5 MG/1
2.5 TABLET ORAL NIGHTLY
Status: DISCONTINUED | OUTPATIENT
Start: 2018-02-01 | End: 2018-02-06 | Stop reason: HOSPADM

## 2018-02-01 RX ORDER — FUROSEMIDE 40 MG/1
40 TABLET ORAL 2 TIMES DAILY
Status: ON HOLD | COMMUNITY
End: 2021-01-01

## 2018-02-01 RX ORDER — PROMETHAZINE HYDROCHLORIDE 25 MG/ML
12.5 INJECTION, SOLUTION INTRAMUSCULAR; INTRAVENOUS EVERY 6 HOURS PRN
Status: CANCELLED | OUTPATIENT
Start: 2018-02-01

## 2018-02-01 RX ORDER — ACETAMINOPHEN 325 MG/1
650 TABLET ORAL EVERY 4 HOURS PRN
Status: CANCELLED | OUTPATIENT
Start: 2018-02-01

## 2018-02-01 RX ORDER — METOPROLOL TARTRATE 50 MG/1
50 TABLET, FILM COATED ORAL EVERY 12 HOURS SCHEDULED
Status: DISCONTINUED | OUTPATIENT
Start: 2018-02-01 | End: 2018-02-06 | Stop reason: HOSPADM

## 2018-02-01 RX ORDER — DOCUSATE SODIUM 100 MG/1
100 CAPSULE, LIQUID FILLED ORAL 2 TIMES DAILY
Status: DISCONTINUED | OUTPATIENT
Start: 2018-02-01 | End: 2018-02-06 | Stop reason: HOSPADM

## 2018-02-01 RX ORDER — CEPHALEXIN 250 MG/1
250 CAPSULE ORAL DAILY
COMMUNITY
Start: 2018-01-25 | End: 2018-02-06 | Stop reason: HOSPADM

## 2018-02-01 RX ORDER — POTASSIUM CHLORIDE 750 MG/1
20 CAPSULE, EXTENDED RELEASE ORAL 2 TIMES DAILY WITH MEALS
Status: DISCONTINUED | OUTPATIENT
Start: 2018-02-01 | End: 2018-02-06 | Stop reason: HOSPADM

## 2018-02-01 RX ORDER — ONDANSETRON 4 MG/1
4 TABLET, FILM COATED ORAL EVERY 6 HOURS PRN
COMMUNITY
End: 2018-02-06 | Stop reason: HOSPADM

## 2018-02-01 RX ORDER — OXYCODONE AND ACETAMINOPHEN 7.5; 325 MG/1; MG/1
2 TABLET ORAL EVERY 4 HOURS PRN
Status: DISCONTINUED | OUTPATIENT
Start: 2018-02-01 | End: 2018-02-06 | Stop reason: HOSPADM

## 2018-02-01 RX ORDER — SODIUM CHLORIDE 0.9 % (FLUSH) 0.9 %
1-10 SYRINGE (ML) INJECTION AS NEEDED
Status: CANCELLED | OUTPATIENT
Start: 2018-02-01

## 2018-02-01 RX ORDER — PROMETHAZINE HYDROCHLORIDE 12.5 MG/1
12.5 SUPPOSITORY RECTAL EVERY 6 HOURS PRN
Status: CANCELLED | OUTPATIENT
Start: 2018-02-01

## 2018-02-01 RX ORDER — PROMETHAZINE HYDROCHLORIDE 25 MG/ML
12.5 INJECTION, SOLUTION INTRAMUSCULAR; INTRAVENOUS EVERY 6 HOURS PRN
Status: DISCONTINUED | OUTPATIENT
Start: 2018-02-01 | End: 2018-02-06 | Stop reason: HOSPADM

## 2018-02-01 RX ORDER — PROMETHAZINE HYDROCHLORIDE 12.5 MG/1
12.5 TABLET ORAL EVERY 6 HOURS PRN
Status: DISCONTINUED | OUTPATIENT
Start: 2018-02-01 | End: 2018-02-06 | Stop reason: HOSPADM

## 2018-02-01 RX ORDER — SENNA AND DOCUSATE SODIUM 50; 8.6 MG/1; MG/1
1 TABLET, FILM COATED ORAL 2 TIMES DAILY
Status: ON HOLD | COMMUNITY
End: 2021-01-01

## 2018-02-01 RX ORDER — BISACODYL 10 MG
10 SUPPOSITORY, RECTAL RECTAL DAILY PRN
Status: CANCELLED | OUTPATIENT
Start: 2018-02-01

## 2018-02-01 RX ORDER — PROMETHAZINE HYDROCHLORIDE 25 MG/1
12.5 TABLET ORAL EVERY 6 HOURS PRN
Status: CANCELLED | OUTPATIENT
Start: 2018-02-01

## 2018-02-01 RX ORDER — ACETAMINOPHEN 325 MG/1
650 TABLET ORAL EVERY 4 HOURS PRN
Status: DISCONTINUED | OUTPATIENT
Start: 2018-02-01 | End: 2018-02-06 | Stop reason: HOSPADM

## 2018-02-01 RX ORDER — NICOTINE POLACRILEX 4 MG
15 LOZENGE BUCCAL
Status: DISCONTINUED | OUTPATIENT
Start: 2018-02-01 | End: 2018-02-06 | Stop reason: HOSPADM

## 2018-02-01 RX ORDER — ACETAMINOPHEN 500 MG
500 TABLET ORAL EVERY 4 HOURS PRN
COMMUNITY
End: 2018-02-06 | Stop reason: HOSPADM

## 2018-02-01 RX ORDER — FUROSEMIDE 40 MG/1
40 TABLET ORAL
Status: DISCONTINUED | OUTPATIENT
Start: 2018-02-01 | End: 2018-02-06 | Stop reason: HOSPADM

## 2018-02-01 RX ORDER — TRIAMCINOLONE ACETONIDE 1 MG/G
1 CREAM TOPICAL 2 TIMES DAILY
Status: ON HOLD | COMMUNITY
End: 2020-01-01

## 2018-02-01 RX ORDER — DEXTROSE MONOHYDRATE 25 G/50ML
25 INJECTION, SOLUTION INTRAVENOUS
Status: DISCONTINUED | OUTPATIENT
Start: 2018-02-01 | End: 2018-02-06 | Stop reason: HOSPADM

## 2018-02-01 RX ORDER — OXYCODONE HYDROCHLORIDE 5 MG/1
5 TABLET ORAL EVERY 4 HOURS PRN
COMMUNITY
End: 2018-02-06 | Stop reason: HOSPADM

## 2018-02-01 RX ORDER — ONDANSETRON 4 MG/1
4 TABLET, FILM COATED ORAL EVERY 6 HOURS PRN
Status: DISCONTINUED | OUTPATIENT
Start: 2018-02-01 | End: 2018-02-06 | Stop reason: HOSPADM

## 2018-02-01 RX ORDER — BISACODYL 10 MG
10 SUPPOSITORY, RECTAL RECTAL DAILY PRN
Status: DISCONTINUED | OUTPATIENT
Start: 2018-02-01 | End: 2018-02-06 | Stop reason: HOSPADM

## 2018-02-01 RX ORDER — PROMETHAZINE HYDROCHLORIDE 25 MG/1
12.5 SUPPOSITORY RECTAL EVERY 6 HOURS PRN
Status: DISCONTINUED | OUTPATIENT
Start: 2018-02-01 | End: 2018-02-06 | Stop reason: HOSPADM

## 2018-02-01 RX ORDER — SODIUM CHLORIDE 0.9 % (FLUSH) 0.9 %
1-10 SYRINGE (ML) INJECTION AS NEEDED
Status: DISCONTINUED | OUTPATIENT
Start: 2018-02-01 | End: 2018-02-06 | Stop reason: HOSPADM

## 2018-02-01 RX ORDER — OXYCODONE AND ACETAMINOPHEN 7.5; 325 MG/1; MG/1
1 TABLET ORAL EVERY 4 HOURS PRN
Status: DISCONTINUED | OUTPATIENT
Start: 2018-02-01 | End: 2018-02-06 | Stop reason: HOSPADM

## 2018-02-01 RX ORDER — LACTOBACILLUS ACIDOPHILUS / LACTOBACILLUS BULGARICUS 100 MILLION CFU STRENGTH
1 GRANULES ORAL DAILY
COMMUNITY
Start: 2018-01-25 | End: 2018-02-24

## 2018-02-01 RX ADMIN — VANCOMYCIN HYDROCHLORIDE 2500 MG: 10 INJECTION, POWDER, LYOPHILIZED, FOR SOLUTION INTRAVENOUS at 23:11

## 2018-02-01 RX ADMIN — INSULIN ASPART 14 UNITS: 100 INJECTION, SOLUTION INTRAVENOUS; SUBCUTANEOUS at 23:38

## 2018-02-01 RX ADMIN — OXYCODONE HYDROCHLORIDE AND ACETAMINOPHEN 2 TABLET: 7.5; 325 TABLET ORAL at 23:18

## 2018-02-01 RX ADMIN — LISINOPRIL 2.5 MG: 2.5 TABLET ORAL at 23:10

## 2018-02-01 RX ADMIN — DOCUSATE SODIUM 100 MG: 100 CAPSULE, LIQUID FILLED ORAL at 23:10

## 2018-02-02 PROBLEM — N28.9 RENAL INSUFFICIENCY: Status: ACTIVE | Noted: 2018-02-02

## 2018-02-02 LAB
ANION GAP SERPL CALCULATED.3IONS-SCNC: 11.5 MMOL/L
BUN BLD-MCNC: 25 MG/DL (ref 8–23)
BUN/CREAT SERPL: 23.6 (ref 7–25)
CALCIUM SPEC-SCNC: 8.4 MG/DL (ref 8.6–10.5)
CHLORIDE SERPL-SCNC: 97 MMOL/L (ref 98–107)
CO2 SERPL-SCNC: 27.5 MMOL/L (ref 22–29)
CREAT BLD-MCNC: 1.06 MG/DL (ref 0.57–1)
CRP SERPL-MCNC: 21.68 MG/DL (ref 0–0.5)
ERYTHROCYTE [SEDIMENTATION RATE] IN BLOOD: 22 MM/HR (ref 0–30)
GFR SERPL CREATININE-BSD FRML MDRD: 50 ML/MIN/1.73
GLUCOSE BLD-MCNC: 294 MG/DL (ref 65–99)
GLUCOSE BLDC GLUCOMTR-MCNC: 187 MG/DL (ref 70–130)
GLUCOSE BLDC GLUCOMTR-MCNC: 194 MG/DL (ref 70–130)
GLUCOSE BLDC GLUCOMTR-MCNC: 223 MG/DL (ref 70–130)
GLUCOSE BLDC GLUCOMTR-MCNC: 235 MG/DL (ref 70–130)
GLUCOSE BLDC GLUCOMTR-MCNC: 247 MG/DL (ref 70–130)
HBA1C MFR BLD: 8.6 % (ref 4.8–5.6)
POTASSIUM BLD-SCNC: 4 MMOL/L (ref 3.5–5.2)
SODIUM BLD-SCNC: 136 MMOL/L (ref 136–145)

## 2018-02-02 PROCEDURE — 99223 1ST HOSP IP/OBS HIGH 75: CPT | Performed by: INTERNAL MEDICINE

## 2018-02-02 PROCEDURE — 80048 BASIC METABOLIC PNL TOTAL CA: CPT | Performed by: INTERNAL MEDICINE

## 2018-02-02 PROCEDURE — 63710000001 INSULIN ASPART PER 5 UNITS: Performed by: INTERNAL MEDICINE

## 2018-02-02 PROCEDURE — 86140 C-REACTIVE PROTEIN: CPT | Performed by: INTERNAL MEDICINE

## 2018-02-02 PROCEDURE — 85652 RBC SED RATE AUTOMATED: CPT | Performed by: INTERNAL MEDICINE

## 2018-02-02 PROCEDURE — 25010000002 VANCOMYCIN 10 G RECONSTITUTED SOLUTION: Performed by: INTERNAL MEDICINE

## 2018-02-02 PROCEDURE — 82962 GLUCOSE BLOOD TEST: CPT

## 2018-02-02 PROCEDURE — 25010000003 CEFTRIAXONE PER 250 MG: Performed by: INTERNAL MEDICINE

## 2018-02-02 RX ORDER — CEFTRIAXONE SODIUM 2 G/50ML
2 INJECTION, SOLUTION INTRAVENOUS EVERY 24 HOURS
Status: DISCONTINUED | OUTPATIENT
Start: 2018-02-02 | End: 2018-02-05

## 2018-02-02 RX ADMIN — METFORMIN HYDROCHLORIDE 500 MG: 500 TABLET ORAL at 08:50

## 2018-02-02 RX ADMIN — ATORVASTATIN CALCIUM 10 MG: 10 TABLET, FILM COATED ORAL at 08:50

## 2018-02-02 RX ADMIN — OXYCODONE HYDROCHLORIDE AND ACETAMINOPHEN 2 TABLET: 7.5; 325 TABLET ORAL at 16:05

## 2018-02-02 RX ADMIN — INSULIN ASPART 5 UNITS: 100 INJECTION, SOLUTION INTRAVENOUS; SUBCUTANEOUS at 12:32

## 2018-02-02 RX ADMIN — POTASSIUM CHLORIDE 20 MEQ: 750 CAPSULE, EXTENDED RELEASE ORAL at 08:50

## 2018-02-02 RX ADMIN — INSULIN ASPART 5 UNITS: 100 INJECTION, SOLUTION INTRAVENOUS; SUBCUTANEOUS at 08:49

## 2018-02-02 RX ADMIN — DOCUSATE SODIUM 100 MG: 100 CAPSULE, LIQUID FILLED ORAL at 21:34

## 2018-02-02 RX ADMIN — DOCUSATE SODIUM 100 MG: 100 CAPSULE, LIQUID FILLED ORAL at 08:50

## 2018-02-02 RX ADMIN — METFORMIN HYDROCHLORIDE 500 MG: 500 TABLET ORAL at 18:08

## 2018-02-02 RX ADMIN — METOPROLOL TARTRATE 50 MG: 50 TABLET, FILM COATED ORAL at 08:50

## 2018-02-02 RX ADMIN — APIXABAN 5 MG: 5 TABLET, FILM COATED ORAL at 21:33

## 2018-02-02 RX ADMIN — APIXABAN 5 MG: 5 TABLET, FILM COATED ORAL at 13:18

## 2018-02-02 RX ADMIN — INSULIN ASPART 3 UNITS: 100 INJECTION, SOLUTION INTRAVENOUS; SUBCUTANEOUS at 21:34

## 2018-02-02 RX ADMIN — VANCOMYCIN HYDROCHLORIDE 1500 MG: 10 INJECTION, POWDER, LYOPHILIZED, FOR SOLUTION INTRAVENOUS at 10:37

## 2018-02-02 RX ADMIN — CEFTRIAXONE SODIUM 2 G: 2 INJECTION, SOLUTION INTRAVENOUS at 16:55

## 2018-02-02 RX ADMIN — POTASSIUM CHLORIDE 20 MEQ: 750 CAPSULE, EXTENDED RELEASE ORAL at 18:08

## 2018-02-02 RX ADMIN — FUROSEMIDE 40 MG: 40 TABLET ORAL at 08:50

## 2018-02-02 RX ADMIN — INSULIN ASPART 3 UNITS: 100 INJECTION, SOLUTION INTRAVENOUS; SUBCUTANEOUS at 18:08

## 2018-02-02 RX ADMIN — LISINOPRIL 2.5 MG: 2.5 TABLET ORAL at 21:33

## 2018-02-02 NOTE — NURSING NOTE
Spoke to Camille watts/infection control and she will take pt out of isolation precautions; not sure why she is in them to begin with

## 2018-02-02 NOTE — PROGRESS NOTES
Continued Stay Note  [facility]     Patient Name: Shoshana Bae  MRN: [id number]  Today's Date: 2/2/2018    Admit Date: 2/1/2018          Discharge Plan       02/02/18 1141    Case Management/Social Work Plan    Plan [facility]    Additional Comments Rosy Mccartney confirmed that the patient is from an I.C. Medicaid bed, long term care at [facility] and can return skilled if she is d/c on IV antibiotics.  [facility] will arrange transportation for the patient upon d/c and the  is Sister Carol Wiggins [phone].  CHAY Manzano      02/02/18 1051    Case Management/Social Work Plan    Plan [facility]    Patient/Family In Agreement With Plan yes    Additional Comments Met with the patient at bedside; checked IMM, explained role of CCP, verified facesheet and discussed discharge planning needs.  The patient stated that she is a resident of [facility] and has been there since April 2017.  The patient typically uses a electric wheelchair but at [facility] has a hosptial bed, shower chair, walker and commode.  The patient's PCP is the facility MD and she uses the facility pharmacy and the staff assist her with her medications. Rosy Mccartney was contacted and will inform CCP of the patient's level of care and bedhold status.  The patient was provided with a HH/SNF list and was informed that insurance coverage is not guaranteed for an ambulance transport.  The patient stated that [facility] has a wheelchair van that can transport her home and Rosy Mccartney was asked to confirm.   CCP will follow up with Rosy Mccartney regarding the patient's level of care and bedhold status and will assist with the patient's return to [facility] CHAY Manzano              Discharge Codes     None            CHAY Leavitt

## 2018-02-02 NOTE — DISCHARGE PLACEMENT REQUEST
"Shoshana Bae (77 y.o. Female)     Date of Birth Social Security Number Address Home Phone MRN    1940  [address] [id number] [phone] [id number]    [employer] Marital Status          Protestant Single       Admission Date Admission Type Admitting Provider Attending Provider Department, Room/Bed    2/1/18 Urgent Dominick Bosch MD Brown, Reid B, MD [address], P890/1    Discharge Date Discharge Disposition Discharge Destination                      Attending Provider: Dominick Bosch MD     Allergies:  Codeine, Morphine, Morphine And Related, Phosphate    Isolation:  None   Infection:  None   Code Status:  FULL    Ht:  158.8 cm (62.52\")   Wt:  131 kg (289 lb 3.9 oz)    Admission Cmt:  None   Principal Problem:  Infection of total knee replacement [T84.59XA,Z96.659]                 Active Insurance as of 2/1/2018     Primary Coverage     Payor Plan Insurance Group Employer/Plan Group    MEDICARE MEDICARE A & B      Payor Plan Address Payor Plan Phone Number Effective From Effective To    [address] [phone] 8/1/2003     [place] [address]       Subscriber Name Subscriber Birth Date Member ID       SHSOHANA BAE 1940 [id number]           Secondary Coverage     Payor Plan Insurance Group Employer/Plan Group    [place]      Payor Plan Address Payor Plan Phone Number Effective From Effective To    [address] [phone] 5/5/2016     [place] [address]       Subscriber Name Subscriber Birth Date Member ID       SHOSHANA BEA 1940 [id number]                 Emergency Contacts      (Rel.) Home Phone Work Phone Mobile Phone    Macey Eaton (Other) [phone] -- --              "

## 2018-02-02 NOTE — CONSULTS
Referring Provider: Dominick Bosch MD  [address] [address]    Reason for Consultation: L knee PJI    History of present illness:  Ms Bae is a 76 YO who I am asked to evaluate and give opinion for L knee PJI. History is obtained from the patient, family, and review of the old medical records which I summarize/synthesize as follows: She had B knee replacmeents in 2006. She has been plagued by chronic lymphedema. In June 2016 had rising inflammatory markers and concern for prosthetic joint infection. On 10/10/16 went ot OR fo rI&D with removal of hardware nad placement of spacer. Operative cultures grew coagulase-negative staphylococci susceptible to clindamycin, rifampin, trimethoprim/sulfamethoxazole and vancomycin with an MALIK of 1. She was given 6 weeks of IV vancomycin. She did well until around March 2017 and had to have a spacer exchange. There was no role for antibiotics at that time. There was consideration of oral suppressive antibiotics but there were no good options per my review of  prior notes.    She was recently (about 2 weeks ago) on a course of oral cephalexin for LLE cellulitis. Then unfortunately around 1/20 she starting having sudden onset sharp left knee pain worse w/ walking. She had associated swelling but no fevers. She saw Dr Bosch in clinic for a knee aspiration that was purulent and had 35,000 WBCs. She was admitted and started on vancomycin.    Past Medical History:   Diagnosis Date   • Arm fracture    • Arthritis    • Atrial fibrillation    • Back pain     WITH STANDING   • Cancer    • Cellulitis     LOWER LEGS   • Colon polyp    • DDD (degenerative disc disease), lumbar    • Diabetes mellitus     TYPE 2   • Edema    • H/O colonoscopy     2014   • H/O mammogram     [facility] 2015   • Hyperlipidemia    • Hypertension    • On anticoagulant therapy    • Oral-mouth cancer    • Sarcoidosis of lung with sarcoidosis of lymph nodes    • Sleep apnea     MOUTHPIECE USED/CPAP    • Uterine cancer        Past Surgical History:   Procedure Laterality Date   • CARDIAC CATHETERIZATION  2010   • COLONOSCOPY     • DILATATION AND CURETTAGE     • EYE SURGERY     • HERNIA REPAIR     • HYSTERECTOMY     • KNEE SURGERY Bilateral    • LUMBAR DISCECTOMY FUSION INSTRUMENTATION     • MOUTH LESION EXCISIONAL BIOPSY     • OVARIAN CYST SURGERY     • NV REVISE KNEE JOINT REPLACE,1 PART Left 10/10/2016    Procedure: TOTAL KNEE ARTHROPLASTY REVISION - SPACER PLACEMENT;  Surgeon: Dominick Bosch MD;  Location: [facility] OR;  Service: Orthopedics   • NV REVISE KNEE JOINT REPLACE,1 PART Left 3/22/2017    Procedure: TOTAL KNEE ARTHROPLASTY REVISION, ANTIBIOTIC SPACER REPLACEMENT;  Surgeon: Dominick Bosch MD;  Location: [facility] OR;  Service: Orthopedics   • SHOULDER SURGERY Right     FRACTURE REPAIR    • THYROID SURGERY  12/18/2007   • TONSILLECTOMY     • UMBILICAL HERNIA REPAIR         Social History:  [facility]  Non smoker    Family History:  Both parents had cancer. No 1st degree relatives with bone/joint infections    Allergies:  No Antibiotic Allergies    Medications:    Current Facility-Administered Medications:   •  acetaminophen (TYLENOL) tablet 650 mg, 650 mg, Oral, Q4H PRN, Dominick Bosch MD  •  atorvastatin (LIPITOR) tablet 10 mg, 10 mg, Oral, Daily, Dominick Bosch MD, 10 mg at 02/02/18 0850  •  bisacodyl (DULCOLAX) suppository 10 mg, 10 mg, Rectal, Daily PRN, Dominick Bosch MD  •  dextrose (D50W) solution 25 g, 25 g, Intravenous, Q15 Min PRN, Doni Dockery MD  •  dextrose (GLUTOSE) oral gel 15 g, 15 g, Oral, Q15 Min PRN, Doni Dockery MD  •  docusate sodium (COLACE) capsule 100 mg, 100 mg, Oral, BID, Dominick Bosch MD, 100 mg at 02/02/18 0850  •  furosemide (LASIX) tablet 40 mg, 40 mg, Oral, BID, Dominick Bosch MD, 40 mg at 02/02/18 0850  •  glucagon (human recombinant) (GLUCAGEN DIAGNOSTIC) injection 1 mg, 1 mg, Subcutaneous, PRN, Doni Dockery MD  •  insulin aspart  (novoLOG) injection 0-14 Units, 0-14 Units, Subcutaneous, 4x Daily With Meals & Nightly, Doni Dockery MD, 5 Units at 02/02/18 0849  •  lisinopril (PRINIVIL,ZESTRIL) tablet 2.5 mg, 2.5 mg, Oral, Nightly, Dominick Bosch MD, 2.5 mg at 02/01/18 2310  •  metFORMIN (GLUCOPHAGE) tablet 500 mg, 500 mg, Oral, BID With Meals, Dominick Bosch MD, 500 mg at 02/02/18 0850  •  metoprolol tartrate (LOPRESSOR) tablet 50 mg, 50 mg, Oral, Q12H, Dominick Bosch MD, 50 mg at 02/02/18 0850  •  ondansetron (ZOFRAN) tablet 4 mg, 4 mg, Oral, Q6H PRN, Dominick Bosch MD  •  oxyCODONE-acetaminophen (PERCOCET) 7.5-325 MG per tablet 1 tablet, 1 tablet, Oral, Q4H PRN **OR** oxyCODONE-acetaminophen (PERCOCET) 7.5-325 MG per tablet 2 tablet, 2 tablet, Oral, Q4H PRN, Dominick Bosch MD, 2 tablet at 02/01/18 2318  •  Pharmacy to dose vancomycin, , Does not apply, Continuous PRN, Dominick Bosch MD  •  potassium chloride (MICRO-K) CR capsule 20 mEq, 20 mEq, Oral, BID With Meals, Dominick Bosch MD, 20 mEq at 02/02/18 0850  •  promethazine (PHENERGAN) tablet 12.5 mg, 12.5 mg, Oral, Q6H PRN **OR** promethazine (PHENERGAN) injection 12.5 mg, 12.5 mg, Intramuscular, Q6H PRN **OR** promethazine (PHENERGAN) injection 12.5 mg, 12.5 mg, Intravenous, Q6H PRN **OR** promethazine (PHENERGAN) suppository 12.5 mg, 12.5 mg, Rectal, Q6H PRN, Dominick Bosch MD  •  sodium chloride 0.9 % flush 1-10 mL, 1-10 mL, Intravenous, PRN, Dominick Bosch MD  •  vancomycin 1500 mg/500 mL 0.9% NS IVPB ([facility]), 1,500 mg, Intravenous, Q12H, Dominick Bosch MD      Review of Systems  All systems were reviewed and are negative unless otherwise stated above in the HPI    Objective   Vital Signs   Temp:  [97 °F (36.1 °C)-98.1 °F (36.7 °C)] 97 °F (36.1 °C)  Heart Rate:  [70-94] 94  Resp:  [16] 16  BP: (120-142)/(72-84) 142/84    Physical Exam:   General: awake, alert, NAD, very nice  Head: Normocephalic, atraumatic  Eyes: PERRL, EOMI, no scleral icterus, no conjunctival pallor, no conjunctival  hemorrhages.   ENT: MMM, OP clear, no thrush. Good dentition.   Neck: Supple, no visible thyromegaly  Cardiovascular: NR, RR, no murmurs, rubs, or gallops; no LE edema  Respiratory: Lungs are clear to ascultation bilaterally, no rales or wheezing; normal work of breathing on ambient air  GI: Abdomen is obese, soft, non-tender, non-distended, normal bowel sounds in all four quadrants; no hepatosplenomegaly, no masses palpated  : no Kelly catheter present  Musculoskeletal: R TKA incision well-healed, L knee warm and has an effusion;  normal musculature  Skin: + B lymphedema, venous stasis changes BLEs  Neurological: Alert and oriented x 3, cranial nerves 2-12 grossly intact, motor strength 5/5 in upper extremities  Psychiatric: Normal mood and affect   Lymph: no pre-auricular, post-auricular, submandibular, cervical, supraclavicular  LAD  Vasc: no cyanosis; PIV w/o erythema    Labs:     Lab Results   Component Value Date    WBC 11.97 (H) 02/01/2018    HGB 12.9 02/01/2018    HCT 40.3 02/01/2018    MCV 96.9 02/01/2018     02/01/2018       Lab Results   Component Value Date    GLUCOSE 294 (H) 02/02/2018    BUN 25 (H) 02/02/2018    CREATININE 1.06 (H) 02/02/2018    EGFRIFNONA 50 (L) 02/02/2018    EGFRIFAFRI  09/25/2016      Comment:      <15 Indicative of kidney failure.    BCR 23.6 02/02/2018    CO2 27.5 02/02/2018    CALCIUM 8.4 (L) 02/02/2018    ALBUMIN 2.80 (L) 02/01/2018    LABIL2 0.6 02/01/2018    AST 11 02/01/2018    ALT 14 02/01/2018     Lab Results   Component Value Date    BILITOT 0.6 02/01/2018     Lab Results   Component Value Date    CRP 21.68 (H) 02/02/2018     Lab Results   Component Value Date    SEDRATE 22 02/02/2018     Lab Results   Component Value Date    HGBA1C 8.60 (H) 02/01/2018     Lab Results   Component Value Date    VANCOTROUGH 17.40 09/24/2016       Arthrocentesis Labs:  36,500 WBCs (99% PMNs)  No crystals    Microbiology:  2/1 Knee Arthrocentesis Cx: NGTD    Radiology (personally  reviewed images/report):  No imaging    Assessment/Plan   1. Recurrent left knee prosthetic joint infection  -follow-up arthrocentesis cultures; repeat CBC, Crt, CRP in AM  -continue vancomycin with goal trough 15-20  -ideally would have washout followed by 6 weeks of IV antibiotics but her surgical candidacy is poor; I will give Dr Bosch a phone call to discuss  -not sure why she's in isolation; prior cultures here have been Staph epidermidis, not MRSA      2. Acute kidney injury - mild  -repeat Crt in AM    3. Uncontrolled DM2  -based on last A1c    Thank you for this consult. ID will follow.    ADDENDUM:  Knee aspirate culture with GNRs. Stop vancomycin. Start ceftriaxone 2 g IV q24h. Will f/u final culture result and adjust antibiotics if needed.

## 2018-02-02 NOTE — PROGRESS NOTES
Orthopedic Total Knee Progress Note      Patient: Shoshana Bae  Date of Admission: 2/1/2018  YOB: 1940  Medical Record Number: [id number]    Infected left knee spacer    Systemic or Specific Complaints: Pain somewhat improved first yesterday    Pain Relief: some relief    Physical Exam:  77 y.o.  female  Vitals:  Temp:  [96.5 °F (35.8 °C)-98.1 °F (36.7 °C)] 96.5 °F (35.8 °C)  Heart Rate:  [70-94] 84  Resp:  [16] 16  BP: (102-142)/(62-84) 102/62  alert and oriented  Chest: Clear to auscultation  CV: Regular Rate and Rhythm  Abd: Soft, NT, with BS +  Ext: NV intact. ROM and full. Calf is soft and nontender. Negative Homans Sn.  There is significant pitting edema from proximal tibia distal both legs with considerable cellulitis left greater than right      Activity: As tolerated  Weight Bearing: As Tolerated    Data Review     Admission on 02/01/2018   Component Date Value Ref Range Status   • Crystals, Fluid 02/01/2018 No crystals seen   Final   • Wound Culture 02/01/2018 Light growth (2+) Gram Negative Bacilli*  Preliminary         • Gram Stain Result 02/01/2018 Few (2+) Red blood cells   Preliminary   • Gram Stain Result 02/01/2018 Rare (1+) WBCs seen   Preliminary   • Gram Stain Result 02/01/2018 No organisms seen   Preliminary   • Color, Fluid 02/01/2018 Red   Final   • Appearance, Fluid 02/01/2018 Cloudy* Clear Final   • WBC, Fluid 02/01/2018 73453  /mm3 Final    Estimated count due to clots present in specimen.    • RBC, Fluid 02/01/2018 38403  /mm3 Final    Estimated count due to clots present in specimen.    • Neutrophils, Fluid 02/01/2018 99  % Final   • Monocytes, Fluid 02/01/2018 1  % Final   • WBC 02/01/2018 11.97* 4.50 - 10.70 10*3/mm3 Final   • RBC 02/01/2018 4.16  3.90 - 5.20 10*6/mm3 Final   • Hemoglobin 02/01/2018 12.9  11.9 - 15.5 g/dL Final   • Hematocrit 02/01/2018 40.3  35.6 - 45.5 % Final   • MCV 02/01/2018 96.9  80.5 - 98.2 fL Final   • MCH 02/01/2018 31.0  26.9 - 32.0 pg  Final   • MCHC 02/01/2018 32.0* 32.4 - 36.3 g/dL Final   • RDW 02/01/2018 13.9* 11.7 - 13.0 % Final   • RDW-SD 02/01/2018 49.3  37.0 - 54.0 fl Final   • MPV 02/01/2018 10.6  6.0 - 12.0 fL Final   • Platelets 02/01/2018 346  140 - 500 10*3/mm3 Final   • Neutrophil % 02/01/2018 80.3* 42.7 - 76.0 % Final   • Lymphocyte % 02/01/2018 8.9* 19.6 - 45.3 % Final   • Monocyte % 02/01/2018 8.2  5.0 - 12.0 % Final   • Eosinophil % 02/01/2018 1.2  0.3 - 6.2 % Final   • Basophil % 02/01/2018 0.4  0.0 - 1.5 % Final   • Immature Grans % 02/01/2018 1.0* 0.0 - 0.5 % Final   • Neutrophils, Absolute 02/01/2018 9.61* 1.90 - 8.10 10*3/mm3 Final   • Lymphocytes, Absolute 02/01/2018 1.07  0.90 - 4.80 10*3/mm3 Final   • Monocytes, Absolute 02/01/2018 0.98  0.20 - 1.20 10*3/mm3 Final   • Eosinophils, Absolute 02/01/2018 0.14  0.00 - 0.70 10*3/mm3 Final   • Basophils, Absolute 02/01/2018 0.05  0.00 - 0.20 10*3/mm3 Final   • Immature Grans, Absolute 02/01/2018 0.12* 0.00 - 0.03 10*3/mm3 Final   • Glucose 02/01/2018 417* 65 - 99 mg/dL Final   • BUN 02/01/2018 25* 8 - 23 mg/dL Final   • Creatinine 02/01/2018 1.15* 0.57 - 1.00 mg/dL Final   • Sodium 02/01/2018 134* 136 - 145 mmol/L Final   • Potassium 02/01/2018 4.7  3.5 - 5.2 mmol/L Final   • Chloride 02/01/2018 93* 98 - 107 mmol/L Final   • CO2 02/01/2018 28.7  22.0 - 29.0 mmol/L Final   • Calcium 02/01/2018 8.9  8.6 - 10.5 mg/dL Final   • Total Protein 02/01/2018 7.2  6.0 - 8.5 g/dL Final   • Albumin 02/01/2018 2.80* 3.50 - 5.20 g/dL Final   • ALT (SGPT) 02/01/2018 14  1 - 33 U/L Final   • AST (SGOT) 02/01/2018 11  1 - 32 U/L Final   • Alkaline Phosphatase 02/01/2018 124* 39 - 117 U/L Final   • Total Bilirubin 02/01/2018 0.6  0.1 - 1.2 mg/dL Final   • eGFR Non African Amer 02/01/2018 46* >60 mL/min/1.73 Final   • Globulin 02/01/2018 4.4  gm/dL Final   • A/G Ratio 02/01/2018 0.6  g/dL Final   • BUN/Creatinine Ratio 02/01/2018 21.7  7.0 - 25.0 Final   • Anion Gap 02/01/2018 12.3  mmol/L Final    • Glucose 02/01/2018 402* 70 - 130 mg/dL Final   • Glucose 02/01/2018 388* 70 - 130 mg/dL Final   • Hemoglobin A1C 02/01/2018 8.60* 4.80 - 5.60 % Final   • Glucose 02/02/2018 294* 65 - 99 mg/dL Final   • BUN 02/02/2018 25* 8 - 23 mg/dL Final   • Creatinine 02/02/2018 1.06* 0.57 - 1.00 mg/dL Final   • Sodium 02/02/2018 136  136 - 145 mmol/L Final   • Potassium 02/02/2018 4.0  3.5 - 5.2 mmol/L Final   • Chloride 02/02/2018 97* 98 - 107 mmol/L Final   • CO2 02/02/2018 27.5  22.0 - 29.0 mmol/L Final   • Calcium 02/02/2018 8.4* 8.6 - 10.5 mg/dL Final   • eGFR Non African Amer 02/02/2018 50* >60 mL/min/1.73 Final   • BUN/Creatinine Ratio 02/02/2018 23.6  7.0 - 25.0 Final   • Anion Gap 02/02/2018 11.5  mmol/L Final   • Sed Rate 02/02/2018 22  0 - 30 mm/hr Final   • C-Reactive Protein 02/02/2018 21.68* 0.00 - 0.50 mg/dL Final   • Glucose 02/02/2018 223* 70 - 130 mg/dL Final   • Glucose 02/02/2018 235* 70 - 130 mg/dL Final   • Glucose 02/02/2018 247* 70 - 130 mg/dL Final   • Glucose 02/02/2018 194* 70 - 130 mg/dL Final       Xr Knee 1 Or 2 View Left    Result Date: 1/25/2018  Impression: Ordering physician's impression is located in the Encounter Note dated 01/25/18. X-ray performed in the CR room.       Medications    apixaban 5 mg Oral Q12H   atorvastatin 10 mg Oral Daily   ceftriaxone 2 g Intravenous Q24H   docusate sodium 100 mg Oral BID   furosemide 40 mg Oral BID   insulin aspart 0-14 Units Subcutaneous 4x Daily With Meals & Nightly   lisinopril 2.5 mg Oral Nightly   metFORMIN 500 mg Oral BID With Meals   metoprolol tartrate 50 mg Oral Q12H   potassium chloride 20 mEq Oral BID With Meals     •  acetaminophen  •  bisacodyl  •  dextrose  •  dextrose  •  glucagon (human recombinant)  •  ondansetron  •  oxyCODONE-acetaminophen **OR** oxyCODONE-acetaminophen  •  promethazine **OR** promethazine **OR** promethazine **OR** promethazine  •  sodium chloride    Assessment:  Left knee infected spacer with gram-negative rods.   This appears to be a different organism than last infection.  I will discuss with Dr. Barros.  I don't think she is a very good surgical candidate hopefully this is something that we can suppress.  Ideally we would perform irrigation debridement and spacer exchange although I'm not sure that she would tolerate this.  Problem List Items Addressed This Visit        Musculoskeletal and Integument    * (Principal)Infection of total knee replacement    Relevant Medications    sodium chloride 0.9 % flush 1-10 mL    acetaminophen (TYLENOL) tablet 650 mg    bisacodyl (DULCOLAX) suppository 10 mg    docusate sodium (COLACE) capsule 100 mg    promethazine (PHENERGAN) tablet 12.5 mg    promethazine (PHENERGAN) injection 12.5 mg    promethazine (PHENERGAN) injection 12.5 mg    promethazine (PHENERGAN) suppository 12.5 mg    cephalexin (KEFLEX) 250 MG capsule    Other Relevant Orders    Anaerobic Culture - Aspirate, Knee, Left    Body Fluid Cell Count With Differential - Synovial Fluid, Knee, Left (Completed)    Crystal Exam, Fluid - Synovial Fluid, Knee, Left (Completed)    Gram Stain - Aspirate, Knee, Left    Wound Culture - Aspirate, Knee, Left (Completed)    Body fluid cell count - Body Fluid, (Completed)    Body fluid differential - Body Fluid, (Completed)    Activity - Ad Mary    Diet Regular; Cardiac, Consistent Carbohydrate, Renal    Fall Precautions    Full Code    Incentive Spirometry    Inpatient Admission (Completed)    Inpatient Consult to Case Management  (Completed)    Inpatient Consult to Hospitalist (Completed)    Inpatient Consult to Infectious Diseases (Completed)    Patient Isolation Contact (Completed)    Strict Intake and Output    Vital Signs    Weigh Patient    Insert Peripheral IV (Completed)    Saline Lock & Maintain IV Access    CBC Auto Differential (Completed)    Comprehensive Metabolic Panel (Completed)    Maintain Sequential Compression Device    Pharmacologic VTE Prophylaxis Not  Indicated: Anticipated Invasive Procedure / Surgery (Completed)    Place Sequential Compression Device (Completed)    VTE Risk Assessment - High Risk (Completed)          Plan:   Continue efforts to mobilize  Continue Pain Control Measures  Continue Antibiotics  DVT prophylaxis        Dominick Bosch MD    Date: 2/2/2018  Time: 6:02 PM

## 2018-02-02 NOTE — CONSULTS
Inpatient Consult to Hospitalist  Consult performed by: JONNY MONROY  Consult ordered by: CHERELLE BOSCH  Reason for consult: DM management          Patient Care Team:  Yuval Real MD as PCP - General (Internal Medicine)  Harvinder Arita MD as PCP - Claims Attributed  Hiram De La Cruz MD as Consulting Physician (Cardiology)    Chief complaint: Knee pain    Subjective     History of Present Illness    Pleasant 76 yo with complicated pmh including Afib, DM2, sarcoidosis, RODRIGO, obesity. She has had issues with infected left total knee and had previous antibiotic spacer. She was recently treated for cellulitis of the LLE with keflex. Over the past week she has had increased pain in her left knee. +warmth. Went to visit Dr. Bosch who had concern per infection and has admitted her. She had arthrocentesis earlier. Has been started on IV abx. Her BG were very high in the 400s. She is only on metformin at home but previous A1Cs had been controlled.     Review of Systems   Constitutional: Negative.    HENT: Negative.    Eyes: Negative.    Respiratory: Negative.    Cardiovascular: Positive for leg swelling. Negative for chest pain and palpitations.   Gastrointestinal: Positive for nausea. Negative for vomiting.   Endocrine: Negative.    Genitourinary: Negative.    Musculoskeletal: Positive for arthralgias, back pain and joint swelling.   Skin: Negative.    Allergic/Immunologic: Negative.    Neurological: Negative.    Hematological: Negative.    Psychiatric/Behavioral: Negative.         Past Medical History:   Diagnosis Date   • Arm fracture    • Arthritis    • Atrial fibrillation    • Back pain     WITH STANDING   • Cancer    • Cellulitis     LOWER LEGS   • Colon polyp    • DDD (degenerative disc disease), lumbar    • Diabetes mellitus     TYPE 2   • Edema    • H/O colonoscopy     2014   • H/O mammogram     [facility] 2015   • Hyperlipidemia    • Hypertension    • On anticoagulant therapy    • Oral-mouth  cancer    • Sarcoidosis of lung with sarcoidosis of lymph nodes    • Sleep apnea     MOUTHPIECE USED/CPAP   • Uterine cancer    ,   Past Surgical History:   Procedure Laterality Date   • CARDIAC CATHETERIZATION  2010   • COLONOSCOPY     • DILATATION AND CURETTAGE     • EYE SURGERY     • HERNIA REPAIR     • HYSTERECTOMY     • KNEE SURGERY Bilateral    • LUMBAR DISCECTOMY FUSION INSTRUMENTATION     • MOUTH LESION EXCISIONAL BIOPSY     • OVARIAN CYST SURGERY     • NJ REVISE KNEE JOINT REPLACE,1 PART Left 10/10/2016    Procedure: TOTAL KNEE ARTHROPLASTY REVISION - SPACER PLACEMENT;  Surgeon: Dominick Bosch MD;  Location: [facility];  Service: Orthopedics   • NJ REVISE KNEE JOINT REPLACE,1 PART Left 3/22/2017    Procedure: TOTAL KNEE ARTHROPLASTY REVISION, ANTIBIOTIC SPACER REPLACEMENT;  Surgeon: Dominick Bosch MD;  Location: [facility];  Service: Orthopedics   • SHOULDER SURGERY Right     FRACTURE REPAIR    • THYROID SURGERY  12/18/2007   • TONSILLECTOMY     • UMBILICAL HERNIA REPAIR     ,   Family History   Problem Relation Age of Onset   • Heart disease Mother    • Hypertension Mother    • Cancer Mother    • Heart disease Father    • Hypertension Father    • Cancer Father    • Heart disease Sister    • Hypertension Sister    • Diabetes Sister    • Cancer Sister    • Cancer Brother    • No Known Problems Daughter    • No Known Problems Son    • No Known Problems Maternal Grandmother    • No Known Problems Paternal Grandmother    • No Known Problems Maternal Aunt    • No Known Problems Paternal Aunt    • Hypertension Other    • Heart disease Other      AFIB   • Cancer Other    • BRCA 1/2 Neg Hx    • Breast cancer Neg Hx    • Colon cancer Neg Hx    • Endometrial cancer Neg Hx    • Ovarian cancer Neg Hx    ,   Social History   Substance Use Topics   • Smoking status: Never Smoker   • Smokeless tobacco: Never Used   • Alcohol use No   ,   Prescriptions Prior to Admission   Medication Sig Dispense Refill Last Dose   •  atorvastatin (LIPITOR) 10 MG tablet Take 1 tablet by mouth Daily. 90 tablet 1 1/31/2018 at 2000   • cephalexin (KEFLEX) 250 MG capsule Take 250 mg by mouth Daily.   2/1/2018 at Unknown time   • dexamethasone (DECADRON) 2 MG tablet    Past Week at Unknown time   • ELIQUIS 5 MG tablet tablet    2/1/2018 at 0900   • furosemide (LASIX) 40 MG tablet Take 40 mg by mouth 2 (Two) Times a Day. At 0600 and 1200   2/1/2018 at 1200   • glucose blood (FREESTYLE TEST STRIPS) test strip 1 each by Other route 2 (Two) Times a Week. 50 each 12 Taking   • icosapent ethyl (VASCEPA) 1 G capsule capsule Take 2 g by mouth 2 (Two) Times a Day With Meals. 360 capsule 3 2/1/2018 at Unknown time   • KLOR-CON 20 MEQ CR tablet Take 1 tablet by mouth 2 (Two) Times a Day. 180 tablet 3 2/1/2018 at Unknown time   • Lactobacillus (FLORANEX) pack oral packet Take 1 packet by mouth Daily.   2/1/2018 at Unknown time   • lisinopril (PRINIVIL,ZESTRIL) 10 MG tablet Take 1 tablet by mouth Every Morning. (Patient taking differently: Take 2.5 mg by mouth every night at bedtime. Hold for SBP <=120) 90 tablet 1 1/31/2018 at 2000   • metFORMIN (GLUCOPHAGE) 500 MG tablet Take 1 tablet by mouth 2 (Two) Times a Day With Meals. (Patient taking differently: Take 500 mg by mouth 2 (Two) Times a Day With Meals. At 0800 and 1700) 180 tablet 1 2/1/2018 at 1700   • metoprolol tartrate (LOPRESSOR) 50 MG tablet Take 1 tablet by mouth 2 (Two) Times a Day. 180 tablet 1 2/1/2018 at 1700   • miconazole (MICOTIN) 2 % powder Apply 1 application topically 2 (Two) Times a Day.   2/1/2018 at Unknown time   • oxyCODONE (ROXICODONE) 5 MG immediate release tablet Take 5 mg by mouth Every 4 (Four) Hours As Needed for Moderate Pain .   Past Week at Unknown time   • triamcinolone (KENALOG) 0.1 % cream Apply 1 application topically 2 (Two) Times a Day.   2/1/2018 at Unknown time   • acetaminophen (TYLENOL) 500 MG tablet Take 500 mg by mouth Every 4 (Four) Hours As Needed for Mild Pain  or  Fever.   Unknown at Unknown time   • furosemide (LASIX) 40 MG tablet Take 2.5 tablets by mouth Daily. Pt taking 100mg qd (Patient taking differently: Take 40 mg by mouth 2 (Two) Times a Day. Twice daily at 0600 and 1200) 270 tablet 1 Taking   • HYDROcodone-acetaminophen (NORCO) 5-325 MG per tablet    Taking   • hydrocortisone 1 % cream Apply  topically 2 (Two) Times a Day. 85.05 g 2 Taking   • ondansetron (ZOFRAN) 4 MG tablet Take 4 mg by mouth Every 6 (Six) Hours As Needed for Nausea or Vomiting.   Unknown at Unknown time   • sennosides-docusate sodium (SENOKOT-S) 8.6-50 MG tablet Take 1 tablet by mouth 3 (Three) Times a Day As Needed for Constipation.   Unknown at Unknown time   • vitamin D (ERGOCALCIFEROL) 99084 units capsule capsule Take 50,000 Units by mouth Every 14 (Fourteen) Days.   Unknown at Unknown time   , Scheduled Meds:      atorvastatin 10 mg Oral Daily   docusate sodium 100 mg Oral BID   furosemide 40 mg Oral BID   insulin aspart 0-14 Units Subcutaneous 4x Daily With Meals & Nightly   lisinopril 2.5 mg Oral Nightly   metFORMIN 500 mg Oral BID With Meals   metoprolol tartrate 50 mg Oral Q12H   potassium chloride 20 mEq Oral BID With Meals   , Continuous Infusions:      Pharmacy to dose vancomycin    , PRN Meds:  •  acetaminophen  •  bisacodyl  •  dextrose  •  dextrose  •  glucagon (human recombinant)  •  ondansetron  •  oxyCODONE-acetaminophen **OR** oxyCODONE-acetaminophen  •  Pharmacy to dose vancomycin  •  promethazine **OR** promethazine **OR** promethazine **OR** promethazine  •  sodium chloride and Allergies:  Codeine; Morphine; Morphine and related; and Phosphate    Objective      Vital Signs  Temp:  [97.5 °F (36.4 °C)-98.1 °F (36.7 °C)] 98 °F (36.7 °C)  Heart Rate:  [70-72] 70  Resp:  [16] 16  BP: (133-134)/(72-76) 134/76    Obese  Physical Exam   Constitutional: She is oriented to person, place, and time. She appears well-developed and well-nourished. No distress.   HENT:   Head:  Normocephalic and atraumatic.   Mouth/Throat: Oropharynx is clear and moist.   Eyes: EOM are normal. Pupils are equal, round, and reactive to light. No scleral icterus.   Neck: Normal range of motion. Neck supple.   Cardiovascular: Normal rate, regular rhythm and normal heart sounds.    No murmur heard.  Pulmonary/Chest: Effort normal and breath sounds normal.   Abdominal: Soft. Bowel sounds are normal. She exhibits no distension. There is no tenderness.   Genitourinary:   Genitourinary Comments: Deferred    Musculoskeletal: She exhibits edema (chronic venous insufficiency) and tenderness (warmth, pain, and decreased ROM of left knee).   Neurological: She is alert and oriented to person, place, and time. No cranial nerve deficit.   Skin: Skin is warm and dry. There is erythema (left shin- cellulitis vs stasis dermatitis).   Psychiatric: She has a normal mood and affect. Her behavior is normal. Thought content normal.   Nursing note and vitals reviewed.      Results Review:    I reviewed the patient's new clinical results.   CBC Auto Differential [[id number]] (Abnormal) Collected: 02/01/18 2043       Lab Status: Final result Specimen: Blood Updated: 02/01/18 2059        WBC 11.97 (H) 10*3/mm3         RBC 4.16 10*6/mm3         Hemoglobin 12.9 g/dL         Hematocrit 40.3 %         MCV 96.9 fL         MCH 31.0 pg         MCHC 32.0 (L) g/dL         RDW 13.9 (H) %         RDW-SD 49.3 fl         MPV 10.6 fL         Platelets 346 10*3/mm3         Neutrophil % 80.3 (H) %         Lymphocyte % 8.9 (L) %         Monocyte % 8.2 %         Eosinophil % 1.2 %         Basophil % 0.4 %         Immature Grans % 1.0 (H) %         Neutrophils, Absolute 9.61 (H) 10*3/mm3         Lymphocytes, Absolute 1.07 10*3/mm3         Monocytes, Absolute 0.98 10*3/mm3         Eosinophils, Absolute 0.14 10*3/mm3         Basophils, Absolute 0.05 10*3/mm3         Immature Grans, Absolute 0.12 (H) 10*3/mm3        Comprehensive Metabolic Panel [[id number]]  (Abnormal) Collected: 02/01/18 2043       Lab Status: Final result Specimen: Blood Updated: 02/01/18 2130        Glucose 417 (C) mg/dL         BUN 25 (H) mg/dL         Creatinine 1.15 (H) mg/dL         Sodium 134 (L) mmol/L         Potassium 4.7 mmol/L         Chloride 93 (L) mmol/L         CO2 28.7 mmol/L         Calcium 8.9 mg/dL         Total Protein 7.2 g/dL         Albumin 2.80 (L) g/dL         ALT (SGPT) 14 U/L         AST (SGOT) 11 U/L         Alkaline Phosphatase 124 (H) U/L         Total Bilirubin 0.6 mg/dL         eGFR Non African Amer 46 (L) mL/min/1.73         Globulin 4.4 gm/dL         A/G Ratio 0.6 g/dL         BUN/Creatinine Ratio 21.7        Anion Gap 12.3 mmol/L        Narrative:         The MDRD GFR formula is only valid for adults with stable renal function between ages 18 and 70.       Anaerobic Culture - Aspirate, Knee, Left [[id number]] Collected: 02/01/18 1741       Lab Status: In process Specimen: Aspirate from Knee, Left Updated: 02/01/18 1741       Body Fluid Cell Count With Differential - Synovial Fluid, Knee, Left [[id number]] Collected: 02/01/18 1741       Lab Status: Final result Specimen: Synovial Fluid from Knee, Left Updated: 02/01/18 1851       Narrative:         The following orders were created for panel order Body Fluid Cell Count With Differential - Synovial Fluid, Knee, Left.  Procedure                               Abnormality         Status                     ---------                               -----------         ------                     Body fluid cell count - ...[[id number]]  Abnormal            Final result               Body fluid differential ...[[id number]]                      Final result                 Please view results for these tests on the individual orders.       Crystal Exam, Fluid - Synovial Fluid, Knee, Left [[id number]] Collected: 02/01/18 1741       Lab Status: Final result Specimen: Synovial Fluid from Knee, Left Updated: 02/01/18 1850        Crystals,  Fluid No crystals seen       Wound Culture - Aspirate, Knee, Left [[id number]] Collected: 02/01/18 1741       Lab Status: In process Specimen: Aspirate from Knee, Left Updated: 02/01/18 1741       Body fluid cell count - Body Fluid, [[id number]] (Abnormal) Collected: 02/01/18 1741       Lab Status: Final result Specimen: Synovial Fluid from Knee, Left Updated: 02/01/18 1850        Color, Fluid Red        Appearance, Fluid Cloudy (A)        WBC, Fluid 66299 /mm3         RBC, Fluid 28671 /mm3        Body fluid differential - Body Fluid, [[id number]] Collected: 02/01/18 1741       Lab Status: Final result Specimen: Synovial Fluid from Knee, Left Updated: 02/01/18 1851        Neutrophils, Fluid 99 %         Monocytes, Fluid 1 %       XR Knee 1 or 2 View Left [[id number]] Reviewed: 01/25/18 1312        Order Status: Completed Resulted: 01/25/18 1131        Updated: 01/25/18 1131       Impression:         Ordering physician's impression is located in the Encounter Note dated 01/25/18. X-ray performed in the CR room.             Assessment/Plan     Principal Problem:    Infection of total knee replacement  Active Problems:    Chronic atrial fibrillation    Type 2 diabetes mellitus with hyperglycemia    Essential hypertension    Cellulitis of left lower extremity    Morbid obesity with BMI of 50.0-59.9, adult    Chronic venous insufficiency    -Arthrocentesis earlier by Dr. Bosch. Started on IV abx. Follow cultures from the knee. ID consulted  -Blood glucose very high. Started SSI novolog to control. Will adjust. Previous A1C controlled on metformin will see if need to change her meds around more or if just high due to infection currently. Continue Metformin for now though may need to hold if creatinine increases at all  -Possible cellulitis of the left shin though may be more stasis dermatitis. On IV abx.   -Continue most home HTN medications, monitor renal fx on her home diuretics  -Her eliquis is on hold    Thank you for the  consult, will closely follow Ms. Bae with you here in the hospital.     Assessment & Plan    I discussed the patients findings and my recommendations with patient and nursing staff  Reviewed previous records    Patient was seen at 1130PM on 2/1/18 but the completion of this note was delayed until after midnight due to patient care issues.   Doni Dockery MD  02/02/18  12:31 AM

## 2018-02-02 NOTE — PROGRESS NOTES
"Pharmacokinetic Consult - Vancomycin Dosing (Initial Note)    Shoshana M Bae has been consulted for pharmacy to dose vancomycin for joint infection.  Pharmacy dosing vancomycin per Brown's request.   Goal trough: 15-20 mg/L   Other antimicrobials:     Relevant clinical data and objective history reviewed:  77 y.o. female 158.8 cm (62.52\") 131 kg (289 lb 3.9 oz)    Past Medical History:   Diagnosis Date   • Arm fracture    • Arthritis    • Atrial fibrillation    • Back pain     WITH STANDING   • Cancer    • Cellulitis     LOWER LEGS   • Colon polyp    • DDD (degenerative disc disease), lumbar    • Diabetes mellitus     TYPE 2   • Edema    • H/O colonoscopy     2014   • H/O mammogram     E 2015   • Hyperlipidemia    • Hypertension    • On anticoagulant therapy    • Oral-mouth cancer    • Sarcoidosis of lung with sarcoidosis of lymph nodes    • Sleep apnea     MOUTHPIECE USED/CPAP   • Uterine cancer    No results found for: CREATININE, BUN  CrCl cannot be calculated (Patient's most recent sCr result is older than the maximum 30 days allowed.).    Lab Results   Component Value Date    WBC 7.42 03/08/2017     Temp Readings from Last 3 Encounters:   02/01/18 97.5 °F (36.4 °C)   01/25/18 98.2 °F (36.8 °C) (Temporal Artery )   11/16/17 97.5 °F (36.4 °C) (Temporal Artery )      Baseline culture/source/susceptibility.     Assessment/Plan  Will start vancomycin 2500 mg IV times one. Will monitor serum creatinine every 24 hours for the first 3 days then at least every 48 hours per dosing recommendations. Vancomycin level 15-20. Pharmacy will continue to follow daily while on vancomycin and adjust as needed.     Frantz Jean Baptiste RPH  "

## 2018-02-02 NOTE — PLAN OF CARE
Problem: Patient Care Overview (Adult)  Goal: Plan of Care Review  Outcome: Ongoing (interventions implemented as appropriate)   02/02/18 0127   Coping/Psychosocial Response Interventions   Plan Of Care Reviewed With patient   Patient Care Overview   Progress no change   Outcome Evaluation   Outcome Summary/Follow up Plan Direct admit by Dr. Bosch due to suspected infection of left knee. Aspirated with cultures sent to lab prior to admission. Edema to BLE. Hyperglycemia treated with sliding scale insulin. Pt required assist X3 to transfer from w/c to bed. Alert and oriented X4. VSS. Peripheral vascular assessment with weakened pulses r/t edema. Purewick in place due to immobility. Vancomycin started. Contact isolation. PT verbalizes understanding of educational topics to include diabetes management and hypertension managment to aide in healing process.       Problem: Fall Risk (Adult)  Goal: Identify Related Risk Factors and Signs and Symptoms  Outcome: Outcome(s) achieved Date Met: 02/02/18 02/02/18 0127   Fall Risk   Fall Risk: Related Risk Factors gait/mobility problems;polypharmacy   Fall Risk: Signs and Symptoms presence of risk factors     Goal: Absence of Falls  Outcome: Ongoing (interventions implemented as appropriate)   02/02/18 0127   Fall Risk (Adult)   Absence of Falls making progress toward outcome       Problem: Pressure Ulcer Risk (Jim Scale) (Adult,Obstetrics,Pediatric)  Goal: Identify Related Risk Factors and Signs and Symptoms  Outcome: Outcome(s) achieved Date Met: 02/02/18 02/02/18 0127   Pressure Ulcer Risk (Jim Scale)   Related Risk Factors (Pressure Ulcer Risk (Jim Scale)) body weight extremes;infection     Goal: Skin Integrity  Outcome: Ongoing (interventions implemented as appropriate)   02/02/18 0127   Pressure Ulcer Risk (Jim Scale) (Adult,Obstetrics,Pediatric)   Skin Integrity making progress toward outcome       Problem: Infection, Risk/Actual (Adult)  Goal: Identify  Related Risk Factors and Signs and Symptoms  Outcome: Outcome(s) achieved Date Met: 02/02/18 02/02/18 0127   Infection, Risk/Actual   Infection, Risk/Actual: Related Risk Factors chronic illness/condition   Signs and Symptoms (Infection, Risk/Actual) blood glucose changes;edema;pain     Goal: Infection Prevention/Resolution  Outcome: Ongoing (interventions implemented as appropriate)   02/02/18 0127   Infection, Risk/Actual (Adult)   Infection Prevention/Resolution making progress toward outcome       Problem: Pain, Acute (Adult)  Goal: Identify Related Risk Factors and Signs and Symptoms  Outcome: Outcome(s) achieved Date Met: 02/02/18 02/02/18 0127   Pain, Acute   Related Risk Factors (Acute Pain) infection   Signs and Symptoms (Acute Pain) verbalization of pain descriptors     Goal: Acceptable Pain Control/Comfort Level  Outcome: Ongoing (interventions implemented as appropriate)   02/02/18 0127   Pain, Acute (Adult)   Acceptable Pain Control/Comfort Level making progress toward outcome

## 2018-02-02 NOTE — PROGRESS NOTES
"Pharmacokinetic Consult - Vancomycin Dosing (Follow-up Note)    Shoshana Bae is on day 2 pharmacy to dose vancomycin for infection of total knee replacement.  Pharmacy dosing vancomycin per Dr Bosch's request.   Goal trough: 15-20 mg/L   ID has been consulted    Relevant clinical data and objective history reviewed:  77 y.o. female 158.8 cm (62.52\") 131 kg (289 lb 3.9 oz)    Past Medical History:   Diagnosis Date   • Arm fracture    • Arthritis    • Atrial fibrillation    • Back pain     WITH STANDING   • Cancer    • Cellulitis     LOWER LEGS   • Colon polyp    • DDD (degenerative disc disease), lumbar    • Diabetes mellitus     TYPE 2   • Edema    • H/O colonoscopy     2014   • H/O mammogram     [facility] 2015   • Hyperlipidemia    • Hypertension    • On anticoagulant therapy    • Oral-mouth cancer    • Sarcoidosis of lung with sarcoidosis of lymph nodes    • Sleep apnea     MOUTHPIECE USED/CPAP   • Uterine cancer      Creatinine   Date Value Ref Range Status   02/02/2018 1.06 (H) 0.57 - 1.00 mg/dL Final   02/01/2018 1.15 (H) 0.57 - 1.00 mg/dL Final   03/25/2017 0.92 0.57 - 1.00 mg/dL Final     BUN   Date Value Ref Range Status   02/02/2018 25 (H) 8 - 23 mg/dL Final     Estimated Creatinine Clearance: 56.5 mL/min (by C-G formula based on Cr of 1.06).    Lab Results   Component Value Date    WBC 11.97 (H) 02/01/2018     Temp Readings from Last 3 Encounters:   02/02/18 97.4 °F (36.3 °C) (Oral)   02/01/18 97.5 °F (36.4 °C)   01/25/18 98.2 °F (36.8 °C) (Temporal Artery )     Baseline culture/source/susceptibility: pending.     Anti-Infectives     Ordered     Dose/Rate Route Frequency Start Stop    02/02/18 0759  vancomycin 1500 mg/500 mL 0.9% NS IVPB ([facility])     Ordering Provider:  Dominick Bosch MD    1,500 mg Intravenous Every 12 Hours 02/02/18 1000 02/04/18 8399 02/01/18 2015  vancomycin 2500 mg/500 mL 0.9% NS IVPB ([facility])     Ordering Provider:  Dominick Bosch MD    20 mg/kg × 131 kg Intravenous Once 02/01/18 2100 " 02/01/18 2311    02/01/18 2004  Pharmacy to dose vancomycin     Ordering Provider:  Dominick Bosch MD     Does not apply Continuous PRN 02/01/18 2004 02/04/18 2003           Assessment/Plan  Pt received 2gm vancomycin last night at 23:11. Will start vanc 1500mg IV q12h x 5 more doses.  Will monitor serum creatinine at least every 48 hours per dosing recommendations. Vancomycin level not ordered since only dosing for 3 days. Pharmacy will continue to follow daily while on vancomycin and adjust as needed.     Mary Cerna [facility]    Noted new duration of therapy until 3/15. Vancomycin trought ordered for 2/4 @ 0980

## 2018-02-02 NOTE — PROGRESS NOTES
Discharge Planning Assessment  [facility]     Patient Name: Shoshana Bae  MRN: [id number]  Today's Date: 2/2/2018    Admit Date: 2/1/2018          Discharge Needs Assessment       02/02/18 1049    Living Environment    Lives With facility resident    Living Arrangements extended care facility    Home Accessibility no concerns    Stair Railings at Home none    Type of Financial/Environmental Concern none    Transportation Available ambulance;car;family or friend will provide;van, wheelchair accessible    Living Environment    Provides Primary Care For no one, unable/limited ability to care for self    Primary Care Provided By other (see comments)   Facility staff    Quality Of Family Relationships supportive    Able to Return to Prior Living Arrangements yes    Discharge Needs Assessment    Concerns To Be Addressed discharge planning concerns    Equipment Currently Used at Home wheelchair, motorized;hospital bed;shower chair;raised toilet;walker, standard    Equipment Needed After Discharge none    Discharge Facility/Level Of Care Needs rehabilitation facility;nursing facility, basic;nursing facility, intermediate;nursing facility, skilled    Current Discharge Risk dependent with mobility/activities of daily living;physical impairment    Discharge Disposition long-term care;nursing facility;rehab facility;skilled nursing facility            Discharge Plan       02/02/18 1051    Case Management/Social Work Plan    Plan [facility]    Patient/Family In Agreement With Plan yes    Additional Comments Met with the patient at bedside; checked IMM, explained role of CCP, verified facesheet and discussed discharge planning needs.  The patient stated that she is a resident of [facility] and has been there since April 2017.  The patient typically uses a electric wheelchair but at [facility] has a hosptial bed, shower chair, walker and commode.  The patient's PCP is the facility MD and she uses the  facility pharmacy and the staff assist her with her medications. Rosy Mccartney was contacted and will inform CCP of the patient's level of care and bedhold status.  The patient was provided with a HH/SNF list and was informed that insurance coverage is not guaranteed for an ambulance transport.  The patient stated that [facility] has a wheelchair van that can transport her home and Rosy Mccartney was asked to confirm.   CCP will follow up with Rosy Mccartney regarding the patient's level of care and bedhold status and will assist with the patient's return to [facility]        Discharge Placement     Facility/Agency Request Status Selected? Address Phone Number Fax Number    [facility]ending - Request Sent     [address] [id number] [phone]                Demographic Summary       02/02/18 1048    Referral Information    Admission Type inpatient    Arrived From long-term care;nursing facility;rehab facility;skilled nursing facility    Referral Source physician;nursing    Reason For Consult discharge planning    Record Reviewed history and physical;medical record            Functional Status       02/02/18 1048    Functional Status Current    Ambulation 4-->completely dependent    Transferring 3-->assistive equipment and person    Toileting 3-->assistive equipment and person    Bathing 2-->assistive person    Dressing 2-->assistive person    Eating 0-->independent    Communication 0-->understands/communicates without difficulty    Swallowing (if score 2 or more for any item, consult Rehab Services) 0-->swallows foods/liquids without difficulty    Functional Status Prior    Ambulation 3-->assistive equipment and person    Transferring 3-->assistive equipment and person    Toileting 3-->assistive equipment and person    Bathing 2-->assistive person    Dressing 2-->assistive person    Eating 0-->independent    Communication  0-->understands/communicates without difficulty    Swallowing 0-->swallows foods/liquids without difficulty    IADL    Medications assistive person    Meal Preparation assistive equipment and person    Housekeeping assistive equipment and person    Laundry assistive equipment and person    Shopping assistive equipment and person    Oral Care independent            Psychosocial     None            Abuse/Neglect     None            Legal     None            Substance Abuse     None            Patient Forms       02/02/18 1047    Patient Forms    Provider Choice List Delivered    Delivered to Patient    Method of delivery In person          CHAY Leavitt

## 2018-02-02 NOTE — PROGRESS NOTES
LOS: 1 day   Patient Care Team:  Fany Morales MD as PCP - General (Geriatric Medicine)  Harvinder Arita MD as PCP - Claims Attributed  Hiram De La Cruz MD as Consulting Physician (Cardiology)    No chief complaint on file.        Subjective   Just had pain meds & seems to be helping. Had some nausea earlier but  Ok now. Had small BM today, usually has 2-3 stools/day    Objective     Vital Signs  Temp:  [96.5 °F (35.8 °C)-98.1 °F (36.7 °C)] 96.5 °F (35.8 °C)  Heart Rate:  [70-94] 84  Resp:  [16] 16  BP: (102-142)/(62-84) 102/62    Physical Exam:  WDWN female in NAD. Alert & oriented.  Lungs clear with equal BS bilaterally  Heart RRR  Abd soft, NT, BS+  Ext 1-2+ edema.  Skin warm & dry       Results Review:     I reviewed the patient's new clinical results.    Medication Review:       LABS    Results from last 7 days  Lab Units 02/02/18  0409 02/01/18  2043   SODIUM mmol/L 136 134*   POTASSIUM mmol/L 4.0 4.7   CHLORIDE mmol/L 97* 93*   CO2 mmol/L 27.5 28.7   BUN mg/dL 25* 25*   CREATININE mg/dL 1.06* 1.15*   GLUCOSE mg/dL 294* 417*   CALCIUM mg/dL 8.4* 8.9       Results from last 7 days  Lab Units 02/01/18  2043   WBC 10*3/mm3 11.97*   HEMOGLOBIN g/dL 12.9   HEMATOCRIT % 40.3   PLATELETS 10*3/mm3 346               Assessment/Plan     Principal Problem:    Infection of total knee replacement  Active Problems:    Chronic atrial fibrillation    Type 2 diabetes mellitus with hyperglycemia - seems to be coming down now. Was in 400 range, last one down to 194    Essential hypertension    Cellulitis of left lower extremity    Morbid obesity with BMI of 50.0-59.9, adult    Chronic venous insufficiency    Renal insufficiency - cont to monitor levels          Bharati Small MD  02/02/18  4:30 PM      Time:

## 2018-02-03 LAB
ANION GAP SERPL CALCULATED.3IONS-SCNC: 11.2 MMOL/L
BACTERIA SPEC AEROBE CULT: ABNORMAL
BACTERIA SPEC AEROBE CULT: ABNORMAL
BASOPHILS # BLD AUTO: 0.04 10*3/MM3 (ref 0–0.2)
BASOPHILS NFR BLD AUTO: 0.4 % (ref 0–1.5)
BUN BLD-MCNC: 26 MG/DL (ref 8–23)
BUN/CREAT SERPL: 24.1 (ref 7–25)
CALCIUM SPEC-SCNC: 8 MG/DL (ref 8.6–10.5)
CHLORIDE SERPL-SCNC: 98 MMOL/L (ref 98–107)
CO2 SERPL-SCNC: 28.8 MMOL/L (ref 22–29)
CREAT BLD-MCNC: 1.08 MG/DL (ref 0.57–1)
CRP SERPL-MCNC: 17.59 MG/DL (ref 0–0.5)
DEPRECATED RDW RBC AUTO: 50.3 FL (ref 37–54)
EOSINOPHIL # BLD AUTO: 0.37 10*3/MM3 (ref 0–0.7)
EOSINOPHIL NFR BLD AUTO: 3.6 % (ref 0.3–6.2)
ERYTHROCYTE [DISTWIDTH] IN BLOOD BY AUTOMATED COUNT: 13.9 % (ref 11.7–13)
GFR SERPL CREATININE-BSD FRML MDRD: 49 ML/MIN/1.73
GLUCOSE BLD-MCNC: 187 MG/DL (ref 65–99)
GLUCOSE BLDC GLUCOMTR-MCNC: 162 MG/DL (ref 70–130)
GLUCOSE BLDC GLUCOMTR-MCNC: 200 MG/DL (ref 70–130)
GLUCOSE BLDC GLUCOMTR-MCNC: 213 MG/DL (ref 70–130)
GLUCOSE BLDC GLUCOMTR-MCNC: 257 MG/DL (ref 70–130)
GRAM STN SPEC: ABNORMAL
HCT VFR BLD AUTO: 37.3 % (ref 35.6–45.5)
HGB BLD-MCNC: 11.3 G/DL (ref 11.9–15.5)
IMM GRANULOCYTES # BLD: 0.07 10*3/MM3 (ref 0–0.03)
IMM GRANULOCYTES NFR BLD: 0.7 % (ref 0–0.5)
LYMPHOCYTES # BLD AUTO: 1.48 10*3/MM3 (ref 0.9–4.8)
LYMPHOCYTES NFR BLD AUTO: 14.4 % (ref 19.6–45.3)
MCH RBC QN AUTO: 30.2 PG (ref 26.9–32)
MCHC RBC AUTO-ENTMCNC: 30.3 G/DL (ref 32.4–36.3)
MCV RBC AUTO: 99.7 FL (ref 80.5–98.2)
MONOCYTES # BLD AUTO: 0.65 10*3/MM3 (ref 0.2–1.2)
MONOCYTES NFR BLD AUTO: 6.3 % (ref 5–12)
NEUTROPHILS # BLD AUTO: 7.69 10*3/MM3 (ref 1.9–8.1)
NEUTROPHILS NFR BLD AUTO: 74.6 % (ref 42.7–76)
PLATELET # BLD AUTO: 300 10*3/MM3 (ref 140–500)
PMV BLD AUTO: 10.5 FL (ref 6–12)
POTASSIUM BLD-SCNC: 4.6 MMOL/L (ref 3.5–5.2)
RBC # BLD AUTO: 3.74 10*6/MM3 (ref 3.9–5.2)
SODIUM BLD-SCNC: 138 MMOL/L (ref 136–145)
WBC NRBC COR # BLD: 10.3 10*3/MM3 (ref 4.5–10.7)

## 2018-02-03 PROCEDURE — 99232 SBSQ HOSP IP/OBS MODERATE 35: CPT | Performed by: INTERNAL MEDICINE

## 2018-02-03 PROCEDURE — 80048 BASIC METABOLIC PNL TOTAL CA: CPT | Performed by: INTERNAL MEDICINE

## 2018-02-03 PROCEDURE — 86140 C-REACTIVE PROTEIN: CPT | Performed by: INTERNAL MEDICINE

## 2018-02-03 PROCEDURE — 85025 COMPLETE CBC W/AUTO DIFF WBC: CPT | Performed by: INTERNAL MEDICINE

## 2018-02-03 PROCEDURE — 82962 GLUCOSE BLOOD TEST: CPT

## 2018-02-03 PROCEDURE — 63710000001 INSULIN ASPART PER 5 UNITS: Performed by: INTERNAL MEDICINE

## 2018-02-03 PROCEDURE — 25010000003 CEFTRIAXONE PER 250 MG: Performed by: INTERNAL MEDICINE

## 2018-02-03 RX ORDER — NYSTATIN 100000 [USP'U]/G
POWDER TOPICAL EVERY 12 HOURS SCHEDULED
Status: DISCONTINUED | OUTPATIENT
Start: 2018-02-03 | End: 2018-02-06 | Stop reason: HOSPADM

## 2018-02-03 RX ADMIN — APIXABAN 5 MG: 5 TABLET, FILM COATED ORAL at 09:22

## 2018-02-03 RX ADMIN — METFORMIN HYDROCHLORIDE 500 MG: 500 TABLET ORAL at 09:22

## 2018-02-03 RX ADMIN — OXYCODONE HYDROCHLORIDE AND ACETAMINOPHEN 2 TABLET: 7.5; 325 TABLET ORAL at 08:16

## 2018-02-03 RX ADMIN — POTASSIUM CHLORIDE 20 MEQ: 750 CAPSULE, EXTENDED RELEASE ORAL at 17:25

## 2018-02-03 RX ADMIN — ATORVASTATIN CALCIUM 10 MG: 10 TABLET, FILM COATED ORAL at 09:23

## 2018-02-03 RX ADMIN — METOPROLOL TARTRATE 50 MG: 50 TABLET, FILM COATED ORAL at 09:23

## 2018-02-03 RX ADMIN — APIXABAN 5 MG: 5 TABLET, FILM COATED ORAL at 22:07

## 2018-02-03 RX ADMIN — INSULIN ASPART 8 UNITS: 100 INJECTION, SOLUTION INTRAVENOUS; SUBCUTANEOUS at 13:41

## 2018-02-03 RX ADMIN — OXYCODONE HYDROCHLORIDE AND ACETAMINOPHEN 2 TABLET: 7.5; 325 TABLET ORAL at 22:08

## 2018-02-03 RX ADMIN — CEFTRIAXONE SODIUM 2 G: 2 INJECTION, SOLUTION INTRAVENOUS at 16:57

## 2018-02-03 RX ADMIN — DOCUSATE SODIUM 100 MG: 100 CAPSULE, LIQUID FILLED ORAL at 09:22

## 2018-02-03 RX ADMIN — OXYCODONE HYDROCHLORIDE AND ACETAMINOPHEN 2 TABLET: 7.5; 325 TABLET ORAL at 16:55

## 2018-02-03 RX ADMIN — INSULIN ASPART 5 UNITS: 100 INJECTION, SOLUTION INTRAVENOUS; SUBCUTANEOUS at 22:07

## 2018-02-03 RX ADMIN — ONDANSETRON 4 MG: 4 TABLET, FILM COATED ORAL at 09:23

## 2018-02-03 RX ADMIN — POTASSIUM CHLORIDE 20 MEQ: 750 CAPSULE, EXTENDED RELEASE ORAL at 09:23

## 2018-02-03 RX ADMIN — ONDANSETRON 4 MG: 4 TABLET, FILM COATED ORAL at 16:56

## 2018-02-03 RX ADMIN — FUROSEMIDE 40 MG: 40 TABLET ORAL at 17:25

## 2018-02-03 RX ADMIN — METFORMIN HYDROCHLORIDE 500 MG: 500 TABLET ORAL at 17:25

## 2018-02-03 RX ADMIN — NYSTATIN: 100000 POWDER TOPICAL at 22:08

## 2018-02-03 RX ADMIN — INSULIN ASPART 3 UNITS: 100 INJECTION, SOLUTION INTRAVENOUS; SUBCUTANEOUS at 09:14

## 2018-02-03 RX ADMIN — INSULIN ASPART 5 UNITS: 100 INJECTION, SOLUTION INTRAVENOUS; SUBCUTANEOUS at 17:25

## 2018-02-03 RX ADMIN — FUROSEMIDE 40 MG: 40 TABLET ORAL at 09:23

## 2018-02-03 RX ADMIN — DOCUSATE SODIUM 100 MG: 100 CAPSULE, LIQUID FILLED ORAL at 22:08

## 2018-02-03 NOTE — PLAN OF CARE
Problem: Patient Care Overview (Adult)  Goal: Plan of Care Review  Outcome: Ongoing (interventions implemented as appropriate)   02/03/18 2354   Coping/Psychosocial Response Interventions   Plan Of Care Reviewed With patient   Patient Care Overview   Progress progress toward functional goals is gradual   Outcome Evaluation   Outcome Summary/Follow up Plan Pain controlled with PO pain med - Received X 2. Intermit. episodes of nausea X 2 - Relieved with Zofran. Remains on IV antibiotic therapy. Required sliding scale insulin. Plexi's placed on both feet. Both legs and ankles edematous. Refused Lasix this morning but after reiteriated the importance of taking med,- Took afternoon dose. Reiteriated the importance of monitoring blood sugar. Plans to go to [facility] when discharged.       Problem: Fall Risk (Adult)  Goal: Absence of Falls  Outcome: Ongoing (interventions implemented as appropriate)      Problem: Pressure Ulcer Risk (Jim Scale) (Adult,Obstetrics,Pediatric)  Goal: Skin Integrity  Outcome: Ongoing (interventions implemented as appropriate)      Problem: Pain, Acute (Adult)  Goal: Acceptable Pain Control/Comfort Level  Outcome: Ongoing (interventions implemented as appropriate)

## 2018-02-03 NOTE — PROGRESS NOTES
Orthopedic Progress Note      Patient: Shoshana Bae  Date of Admission: 2/1/2018  YOB: 1940  Medical Record Number: [id number]    Post admission day 2 for left knee spacer infection    Systemic or Specific Complaints: Pain Control    Pain Relief: some relief    Physical Exam:  77 y.o.  female  Vitals:  Temp:  [96.5 °F (35.8 °C)-98.3 °F (36.8 °C)] 97.6 °F (36.4 °C)  Heart Rate:  [78-87] 87  Resp:  [16] 16  BP: (102-134)/(56-81) 105/56  alert and oriented  Chest: Clear to auscultation  CV: Regular Rate and Rhythm  Abd: Soft, NT, with BS +  Ext: NV intact. ROM painful. Calf is soft and nontender. Negative Homans Sn  Skin: Marked pretibial cellulitis bilateral legs left greater than right    Activity: As tolerated  Weight Bearing: As Tolerated    Data Review     Admission on 02/01/2018   Component Date Value Ref Range Status   • Crystals, Fluid 02/01/2018 No crystals seen   Final   • Wound Culture 02/01/2018 Light growth (2+) Gram Negative Bacilli*  Preliminary         • Gram Stain Result 02/01/2018 Few (2+) Red blood cells   Preliminary   • Gram Stain Result 02/01/2018 Rare (1+) WBCs seen   Preliminary   • Gram Stain Result 02/01/2018 No organisms seen   Preliminary   • Color, Fluid 02/01/2018 Red   Final   • Appearance, Fluid 02/01/2018 Cloudy* Clear Final   • WBC, Fluid 02/01/2018 49826  /mm3 Final    Estimated count due to clots present in specimen.    • RBC, Fluid 02/01/2018 26573  /mm3 Final    Estimated count due to clots present in specimen.    • Neutrophils, Fluid 02/01/2018 99  % Final   • Monocytes, Fluid 02/01/2018 1  % Final   • WBC 02/01/2018 11.97* 4.50 - 10.70 10*3/mm3 Final   • RBC 02/01/2018 4.16  3.90 - 5.20 10*6/mm3 Final   • Hemoglobin 02/01/2018 12.9  11.9 - 15.5 g/dL Final   • Hematocrit 02/01/2018 40.3  35.6 - 45.5 % Final   • MCV 02/01/2018 96.9  80.5 - 98.2 fL Final   • MCH 02/01/2018 31.0  26.9 - 32.0 pg Final   • MCHC 02/01/2018 32.0* 32.4 - 36.3 g/dL Final   • RDW  02/01/2018 13.9* 11.7 - 13.0 % Final   • RDW-SD 02/01/2018 49.3  37.0 - 54.0 fl Final   • MPV 02/01/2018 10.6  6.0 - 12.0 fL Final   • Platelets 02/01/2018 346  140 - 500 10*3/mm3 Final   • Neutrophil % 02/01/2018 80.3* 42.7 - 76.0 % Final   • Lymphocyte % 02/01/2018 8.9* 19.6 - 45.3 % Final   • Monocyte % 02/01/2018 8.2  5.0 - 12.0 % Final   • Eosinophil % 02/01/2018 1.2  0.3 - 6.2 % Final   • Basophil % 02/01/2018 0.4  0.0 - 1.5 % Final   • Immature Grans % 02/01/2018 1.0* 0.0 - 0.5 % Final   • Neutrophils, Absolute 02/01/2018 9.61* 1.90 - 8.10 10*3/mm3 Final   • Lymphocytes, Absolute 02/01/2018 1.07  0.90 - 4.80 10*3/mm3 Final   • Monocytes, Absolute 02/01/2018 0.98  0.20 - 1.20 10*3/mm3 Final   • Eosinophils, Absolute 02/01/2018 0.14  0.00 - 0.70 10*3/mm3 Final   • Basophils, Absolute 02/01/2018 0.05  0.00 - 0.20 10*3/mm3 Final   • Immature Grans, Absolute 02/01/2018 0.12* 0.00 - 0.03 10*3/mm3 Final   • Glucose 02/01/2018 417* 65 - 99 mg/dL Final   • BUN 02/01/2018 25* 8 - 23 mg/dL Final   • Creatinine 02/01/2018 1.15* 0.57 - 1.00 mg/dL Final   • Sodium 02/01/2018 134* 136 - 145 mmol/L Final   • Potassium 02/01/2018 4.7  3.5 - 5.2 mmol/L Final   • Chloride 02/01/2018 93* 98 - 107 mmol/L Final   • CO2 02/01/2018 28.7  22.0 - 29.0 mmol/L Final   • Calcium 02/01/2018 8.9  8.6 - 10.5 mg/dL Final   • Total Protein 02/01/2018 7.2  6.0 - 8.5 g/dL Final   • Albumin 02/01/2018 2.80* 3.50 - 5.20 g/dL Final   • ALT (SGPT) 02/01/2018 14  1 - 33 U/L Final   • AST (SGOT) 02/01/2018 11  1 - 32 U/L Final   • Alkaline Phosphatase 02/01/2018 124* 39 - 117 U/L Final   • Total Bilirubin 02/01/2018 0.6  0.1 - 1.2 mg/dL Final   • eGFR Non African Amer 02/01/2018 46* >60 mL/min/1.73 Final   • Globulin 02/01/2018 4.4  gm/dL Final   • A/G Ratio 02/01/2018 0.6  g/dL Final   • BUN/Creatinine Ratio 02/01/2018 21.7  7.0 - 25.0 Final   • Anion Gap 02/01/2018 12.3  mmol/L Final   • Glucose 02/01/2018 402* 70 - 130 mg/dL Final   • Glucose  02/01/2018 388* 70 - 130 mg/dL Final   • Hemoglobin A1C 02/01/2018 8.60* 4.80 - 5.60 % Final   • Glucose 02/02/2018 294* 65 - 99 mg/dL Final   • BUN 02/02/2018 25* 8 - 23 mg/dL Final   • Creatinine 02/02/2018 1.06* 0.57 - 1.00 mg/dL Final   • Sodium 02/02/2018 136  136 - 145 mmol/L Final   • Potassium 02/02/2018 4.0  3.5 - 5.2 mmol/L Final   • Chloride 02/02/2018 97* 98 - 107 mmol/L Final   • CO2 02/02/2018 27.5  22.0 - 29.0 mmol/L Final   • Calcium 02/02/2018 8.4* 8.6 - 10.5 mg/dL Final   • eGFR Non African Amer 02/02/2018 50* >60 mL/min/1.73 Final   • BUN/Creatinine Ratio 02/02/2018 23.6  7.0 - 25.0 Final   • Anion Gap 02/02/2018 11.5  mmol/L Final   • Sed Rate 02/02/2018 22  0 - 30 mm/hr Final   • C-Reactive Protein 02/02/2018 21.68* 0.00 - 0.50 mg/dL Final   • Glucose 02/02/2018 223* 70 - 130 mg/dL Final   • Glucose 02/02/2018 235* 70 - 130 mg/dL Final   • Glucose 02/02/2018 247* 70 - 130 mg/dL Final   • Glucose 02/02/2018 194* 70 - 130 mg/dL Final   • Glucose 02/02/2018 187* 70 - 130 mg/dL Final   • C-Reactive Protein 02/03/2018 17.59* 0.00 - 0.50 mg/dL Final   • Glucose 02/03/2018 187* 65 - 99 mg/dL Final   • BUN 02/03/2018 26* 8 - 23 mg/dL Final   • Creatinine 02/03/2018 1.08* 0.57 - 1.00 mg/dL Final   • Sodium 02/03/2018 138  136 - 145 mmol/L Final   • Potassium 02/03/2018 4.6  3.5 - 5.2 mmol/L Final   • Chloride 02/03/2018 98  98 - 107 mmol/L Final   • CO2 02/03/2018 28.8  22.0 - 29.0 mmol/L Final   • Calcium 02/03/2018 8.0* 8.6 - 10.5 mg/dL Final   • eGFR Non African Amer 02/03/2018 49* >60 mL/min/1.73 Final   • BUN/Creatinine Ratio 02/03/2018 24.1  7.0 - 25.0 Final   • Anion Gap 02/03/2018 11.2  mmol/L Final   • WBC 02/03/2018 10.30  4.50 - 10.70 10*3/mm3 Final   • RBC 02/03/2018 3.74* 3.90 - 5.20 10*6/mm3 Final   • Hemoglobin 02/03/2018 11.3* 11.9 - 15.5 g/dL Final   • Hematocrit 02/03/2018 37.3  35.6 - 45.5 % Final   • MCV 02/03/2018 99.7* 80.5 - 98.2 fL Final   • MCH 02/03/2018 30.2  26.9 - 32.0 pg  Final   • MCHC 02/03/2018 30.3* 32.4 - 36.3 g/dL Final   • RDW 02/03/2018 13.9* 11.7 - 13.0 % Final   • RDW-SD 02/03/2018 50.3  37.0 - 54.0 fl Final   • MPV 02/03/2018 10.5  6.0 - 12.0 fL Final   • Platelets 02/03/2018 300  140 - 500 10*3/mm3 Final   • Neutrophil % 02/03/2018 74.6  42.7 - 76.0 % Final   • Lymphocyte % 02/03/2018 14.4* 19.6 - 45.3 % Final   • Monocyte % 02/03/2018 6.3  5.0 - 12.0 % Final   • Eosinophil % 02/03/2018 3.6  0.3 - 6.2 % Final   • Basophil % 02/03/2018 0.4  0.0 - 1.5 % Final   • Immature Grans % 02/03/2018 0.7* 0.0 - 0.5 % Final   • Neutrophils, Absolute 02/03/2018 7.69  1.90 - 8.10 10*3/mm3 Final   • Lymphocytes, Absolute 02/03/2018 1.48  0.90 - 4.80 10*3/mm3 Final   • Monocytes, Absolute 02/03/2018 0.65  0.20 - 1.20 10*3/mm3 Final   • Eosinophils, Absolute 02/03/2018 0.37  0.00 - 0.70 10*3/mm3 Final   • Basophils, Absolute 02/03/2018 0.04  0.00 - 0.20 10*3/mm3 Final   • Immature Grans, Absolute 02/03/2018 0.07* 0.00 - 0.03 10*3/mm3 Final   • Glucose 02/03/2018 162* 70 - 130 mg/dL Final       Xr Knee 1 Or 2 View Left    Result Date: 1/25/2018  Impression: Ordering physician's impression is located in the Encounter Note dated 01/25/18. X-ray performed in the CR room.       Medications:    apixaban 5 mg Oral Q12H   atorvastatin 10 mg Oral Daily   ceftriaxone 2 g Intravenous Q24H   docusate sodium 100 mg Oral BID   furosemide 40 mg Oral BID   insulin aspart 0-14 Units Subcutaneous 4x Daily With Meals & Nightly   lisinopril 2.5 mg Oral Nightly   metFORMIN 500 mg Oral BID With Meals   metoprolol tartrate 50 mg Oral Q12H   potassium chloride 20 mEq Oral BID With Meals     •  acetaminophen  •  bisacodyl  •  dextrose  •  dextrose  •  glucagon (human recombinant)  •  ondansetron  •  oxyCODONE-acetaminophen **OR** oxyCODONE-acetaminophen  •  promethazine **OR** promethazine **OR** promethazine **OR** promethazine  •  sodium chloride    Assessment/ Plan:  Left knee infection with gram-negative  bacilli.  It is difficult to assess the chronicity of this problem.  I'm not sure whether this is a chronic infection with new organism or acute on chronic.  Regardless this is a very difficult situation.  Overall she is a very poor candidate for any operative intervention.  I'm not of the opinion that operative intervention would lead to eradication of her infection.  I feel given the whole picture she is a better candidate for suppression with IV and chronic oral medications.  Final decision will obviously depend upon whether the sensitivities are suggestive that there is reasonable oral suppressive option.  I have spoken with Dr. Ellen Reilly who is on-call this weekend and she will continue to follow her cultures with me.  I will have her continue on blood thinner and PlexiPulse for DVT prevention.  Problem List Items Addressed This Visit        Musculoskeletal and Integument    * (Principal)Infection of total knee replacement    Relevant Medications    sodium chloride 0.9 % flush 1-10 mL    acetaminophen (TYLENOL) tablet 650 mg    bisacodyl (DULCOLAX) suppository 10 mg    docusate sodium (COLACE) capsule 100 mg    promethazine (PHENERGAN) tablet 12.5 mg    promethazine (PHENERGAN) injection 12.5 mg    promethazine (PHENERGAN) injection 12.5 mg    promethazine (PHENERGAN) suppository 12.5 mg    cephalexin (KEFLEX) 250 MG capsule    Other Relevant Orders    Anaerobic Culture - Aspirate, Knee, Left    Body Fluid Cell Count With Differential - Synovial Fluid, Knee, Left (Completed)    Crystal Exam, Fluid - Synovial Fluid, Knee, Left (Completed)    Gram Stain - Aspirate, Knee, Left    Wound Culture - Aspirate, Knee, Left (Completed)    Body fluid cell count - Body Fluid, (Completed)    Body fluid differential - Body Fluid, (Completed)    Activity - Ad Mary    Diet Regular; Cardiac, Consistent Carbohydrate, Renal    Fall Precautions    Full Code    Incentive Spirometry    Inpatient Admission (Completed)    Inpatient  Consult to Case Management  (Completed)    Inpatient Consult to Hospitalist (Completed)    Inpatient Consult to Infectious Diseases (Completed)    Patient Isolation Contact (Completed)    Strict Intake and Output    Vital Signs    Weigh Patient    Insert Peripheral IV (Completed)    Saline Lock & Maintain IV Access    CBC Auto Differential (Completed)    Comprehensive Metabolic Panel (Completed)    Maintain Sequential Compression Device    Pharmacologic VTE Prophylaxis Not Indicated: Anticipated Invasive Procedure / Surgery (Completed)    Place Sequential Compression Device (Completed)    VTE Risk Assessment - High Risk (Completed)              Dominick Bosch MD    Date: 2/3/2018  Time: 8:21 AM

## 2018-02-03 NOTE — PROGRESS NOTES
LOS: 2 days     Chief Complaint:  L knee PJI    Interval History:  Afebrile, left knee continues to be painful but pain is well-controlled with medication.  She is tolerating antibiotics without abdominal pain nausea vomiting or diarrhea.  No rashes.  No shortness of breath or cough.    Vital Signs  Temp:  [96.5 °F (35.8 °C)-98.8 °F (37.1 °C)] 98.8 °F (37.1 °C)  Heart Rate:  [] 91  Resp:  [16-20] 18  BP: (102-134)/(56-82) 121/82    Physical Exam:  General: In no acute distress  Cardiovascular: RRR, no M/R/G  Respiratory: Breathing comfortably on room air  GI: Soft, NT/ND, + bowel sounds bilaterally,   Skin: No rashes, left knee warm and swollen  Extremities: + lymphedema, venous stasis changes BLEs    Antibiotics:  Ceftriaxone 2 g IV every 24 hours     Results Review:     I reviewed the patient's new clinical results.  I reviewed the patient's new imaging results and agree with the interpretation.    Lab Results   Component Value Date    WBC 10.30 02/03/2018    HGB 11.3 (L) 02/03/2018    HCT 37.3 02/03/2018    MCV 99.7 (H) 02/03/2018     02/03/2018     Lab Results   Component Value Date    GLUCOSE 187 (H) 02/03/2018    BUN 26 (H) 02/03/2018    CREATININE 1.08 (H) 02/03/2018    EGFRIFNONA 49 (L) 02/03/2018    EGFRIFAFRI  09/25/2016      Comment:      <15 Indicative of kidney failure.    BCR 24.1 02/03/2018    CO2 28.8 02/03/2018    CALCIUM 8.0 (L) 02/03/2018    ALBUMIN 2.80 (L) 02/01/2018    LABIL2 0.6 02/01/2018    AST 11 02/01/2018    ALT 14 02/01/2018     Microbiology:  2/1 Knee Arthrocentesis Cx: Serratia marcescens     Assessment/Plan   1. Recurrent left knee prosthetic joint infection  - cx with serratia  - Continue ceftriaxone 2 g IV every 24 hours for now.  We could possibly treat the patient with oral Levaquin which has the same bioavailability given orally as IV administration.  We'll leave that decision up to Dr. Arita who will see the patient again on Monday  - Cussed the case with   Hiro.  She is not a great surgical candidate therefore we will attempt to manage this infection medically only    2. Acute kidney injury - mild  - stable    3. Uncontrolled DM2 - complicating above    I have discussed this case with: Dr. Bosch     ID will see again on Monday.  Please do not hesitate to cause with further questions or concerns

## 2018-02-03 NOTE — PLAN OF CARE
Problem: Patient Care Overview (Adult)  Goal: Plan of Care Review  Outcome: Ongoing (interventions implemented as appropriate)   02/02/18 1929   Coping/Psychosocial Response Interventions   Plan Of Care Reviewed With patient   Patient Care Overview   Progress no change   Outcome Evaluation   Outcome Summary/Follow up Plan VSS, assist x2 to BSC and chair, alert and oriented, administered IV Abx vanc and rocephin-Vanc has been d/c'd, waiting on cultures from aspiration yesterday, educated pt on monitoring blood sugars, plan to continue monitoring     Goal: Adult Individualization and Mutuality  Outcome: Ongoing (interventions implemented as appropriate)      Problem: Fall Risk (Adult)  Goal: Absence of Falls  Outcome: Ongoing (interventions implemented as appropriate)      Problem: Pressure Ulcer Risk (Jim Scale) (Adult,Obstetrics,Pediatric)  Goal: Skin Integrity  Outcome: Ongoing (interventions implemented as appropriate)      Problem: Infection, Risk/Actual (Adult)  Goal: Infection Prevention/Resolution  Outcome: Ongoing (interventions implemented as appropriate)      Problem: Pain, Acute (Adult)  Goal: Acceptable Pain Control/Comfort Level  Outcome: Ongoing (interventions implemented as appropriate)

## 2018-02-03 NOTE — PROGRESS NOTES
LOS: 2 days   Patient Care Team:  Fany Morales MD as PCP - General (Geriatric Medicine)  Harvinder Arita MD as PCP - Claims Attributed  Hiram De La Cruz MD as Consulting Physician (Cardiology)    No chief complaint on file.        Subjective   Had good BM today. Feeling better today. Pain controlled until she tries to get up     Objective     Vital Signs  Temp:  [96.9 °F (36.1 °C)-98.8 °F (37.1 °C)] 98.4 °F (36.9 °C)  Heart Rate:  [] 81  Resp:  [16-20] 18  BP: (103-133)/(56-82) 127/70    Physical Exam:  WDWN female in NAD. Alert & oriented.  Lungs clear with equal BS bilaterally  Heart RRR  Abd soft, NT, BS+  Ext 1-2+ edema.  Skin warm & dry       Results Review:     I reviewed the patient's new clinical results.    Medication Review:       LABS    Results from last 7 days  Lab Units 02/03/18  0317 02/02/18  0409 02/01/18  2043   SODIUM mmol/L 138 136 134*   POTASSIUM mmol/L 4.6 4.0 4.7   CHLORIDE mmol/L 98 97* 93*   CO2 mmol/L 28.8 27.5 28.7   BUN mg/dL 26* 25* 25*   CREATININE mg/dL 1.08* 1.06* 1.15*   GLUCOSE mg/dL 187* 294* 417*   CALCIUM mg/dL 8.0* 8.4* 8.9       Results from last 7 days  Lab Units 02/03/18  0317 02/01/18  2043   WBC 10*3/mm3 10.30 11.97*   HEMOGLOBIN g/dL 11.3* 12.9   HEMATOCRIT % 37.3 40.3   PLATELETS 10*3/mm3 300 346               Assessment/Plan     Principal Problem:    Infection of total knee replacement  Active Problems:    Chronic atrial fibrillation    Type 2 diabetes mellitus with hyperglycemia - improving control.    Essential hypertension - occasional low reading. Will cont same & monitor.     Cellulitis of left lower extremity    Morbid obesity with BMI of 50.0-59.9, adult    Chronic venous insufficiency    Renal insufficiency - cont to monitor levels          Bharati Small MD  02/03/18  5:33 PM      Time:

## 2018-02-04 LAB
ANION GAP SERPL CALCULATED.3IONS-SCNC: 13.1 MMOL/L
BASOPHILS # BLD AUTO: 0.04 10*3/MM3 (ref 0–0.2)
BASOPHILS NFR BLD AUTO: 0.4 % (ref 0–1.5)
BUN BLD-MCNC: 30 MG/DL (ref 8–23)
BUN/CREAT SERPL: 26.3 (ref 7–25)
CALCIUM SPEC-SCNC: 8.2 MG/DL (ref 8.6–10.5)
CHLORIDE SERPL-SCNC: 99 MMOL/L (ref 98–107)
CO2 SERPL-SCNC: 24.9 MMOL/L (ref 22–29)
CREAT BLD-MCNC: 1.14 MG/DL (ref 0.57–1)
DEPRECATED RDW RBC AUTO: 49.5 FL (ref 37–54)
EOSINOPHIL # BLD AUTO: 0.41 10*3/MM3 (ref 0–0.7)
EOSINOPHIL NFR BLD AUTO: 4.2 % (ref 0.3–6.2)
ERYTHROCYTE [DISTWIDTH] IN BLOOD BY AUTOMATED COUNT: 13.8 % (ref 11.7–13)
GFR SERPL CREATININE-BSD FRML MDRD: 46 ML/MIN/1.73
GLUCOSE BLD-MCNC: 175 MG/DL (ref 65–99)
GLUCOSE BLDC GLUCOMTR-MCNC: 179 MG/DL (ref 70–130)
GLUCOSE BLDC GLUCOMTR-MCNC: 228 MG/DL (ref 70–130)
GLUCOSE BLDC GLUCOMTR-MCNC: 243 MG/DL (ref 70–130)
GLUCOSE BLDC GLUCOMTR-MCNC: 276 MG/DL (ref 70–130)
HCT VFR BLD AUTO: 35.6 % (ref 35.6–45.5)
HGB BLD-MCNC: 11 G/DL (ref 11.9–15.5)
IMM GRANULOCYTES # BLD: 0.07 10*3/MM3 (ref 0–0.03)
IMM GRANULOCYTES NFR BLD: 0.7 % (ref 0–0.5)
LYMPHOCYTES # BLD AUTO: 1.5 10*3/MM3 (ref 0.9–4.8)
LYMPHOCYTES NFR BLD AUTO: 15.4 % (ref 19.6–45.3)
MCH RBC QN AUTO: 30.5 PG (ref 26.9–32)
MCHC RBC AUTO-ENTMCNC: 30.9 G/DL (ref 32.4–36.3)
MCV RBC AUTO: 98.6 FL (ref 80.5–98.2)
MONOCYTES # BLD AUTO: 0.85 10*3/MM3 (ref 0.2–1.2)
MONOCYTES NFR BLD AUTO: 8.7 % (ref 5–12)
NEUTROPHILS # BLD AUTO: 6.9 10*3/MM3 (ref 1.9–8.1)
NEUTROPHILS NFR BLD AUTO: 70.6 % (ref 42.7–76)
PLATELET # BLD AUTO: 298 10*3/MM3 (ref 140–500)
PMV BLD AUTO: 10.2 FL (ref 6–12)
POTASSIUM BLD-SCNC: 4.3 MMOL/L (ref 3.5–5.2)
RBC # BLD AUTO: 3.61 10*6/MM3 (ref 3.9–5.2)
SODIUM BLD-SCNC: 137 MMOL/L (ref 136–145)
WBC NRBC COR # BLD: 9.77 10*3/MM3 (ref 4.5–10.7)

## 2018-02-04 PROCEDURE — 80048 BASIC METABOLIC PNL TOTAL CA: CPT | Performed by: INTERNAL MEDICINE

## 2018-02-04 PROCEDURE — 25010000003 CEFTRIAXONE PER 250 MG: Performed by: INTERNAL MEDICINE

## 2018-02-04 PROCEDURE — 99232 SBSQ HOSP IP/OBS MODERATE 35: CPT | Performed by: ORTHOPAEDIC SURGERY

## 2018-02-04 PROCEDURE — 85025 COMPLETE CBC W/AUTO DIFF WBC: CPT | Performed by: INTERNAL MEDICINE

## 2018-02-04 PROCEDURE — 63710000001 INSULIN ASPART PER 5 UNITS: Performed by: INTERNAL MEDICINE

## 2018-02-04 PROCEDURE — 82962 GLUCOSE BLOOD TEST: CPT

## 2018-02-04 PROCEDURE — 25010000002 ONDANSETRON PER 1 MG: Performed by: ORTHOPAEDIC SURGERY

## 2018-02-04 PROCEDURE — 94799 UNLISTED PULMONARY SVC/PX: CPT

## 2018-02-04 RX ORDER — ONDANSETRON 2 MG/ML
4 INJECTION INTRAMUSCULAR; INTRAVENOUS EVERY 6 HOURS PRN
Status: DISCONTINUED | OUTPATIENT
Start: 2018-02-04 | End: 2018-02-06 | Stop reason: HOSPADM

## 2018-02-04 RX ADMIN — ONDANSETRON 4 MG: 2 INJECTION INTRAMUSCULAR; INTRAVENOUS at 23:18

## 2018-02-04 RX ADMIN — METOPROLOL TARTRATE 50 MG: 50 TABLET, FILM COATED ORAL at 08:25

## 2018-02-04 RX ADMIN — APIXABAN 5 MG: 5 TABLET, FILM COATED ORAL at 08:25

## 2018-02-04 RX ADMIN — FUROSEMIDE 40 MG: 40 TABLET ORAL at 08:25

## 2018-02-04 RX ADMIN — NYSTATIN: 100000 POWDER TOPICAL at 20:23

## 2018-02-04 RX ADMIN — INSULIN ASPART 3 UNITS: 100 INJECTION, SOLUTION INTRAVENOUS; SUBCUTANEOUS at 08:26

## 2018-02-04 RX ADMIN — DOCUSATE SODIUM 100 MG: 100 CAPSULE, LIQUID FILLED ORAL at 09:00

## 2018-02-04 RX ADMIN — ONDANSETRON 4 MG: 4 TABLET, FILM COATED ORAL at 08:25

## 2018-02-04 RX ADMIN — INSULIN ASPART 5 UNITS: 100 INJECTION, SOLUTION INTRAVENOUS; SUBCUTANEOUS at 23:22

## 2018-02-04 RX ADMIN — METFORMIN HYDROCHLORIDE 500 MG: 500 TABLET ORAL at 08:25

## 2018-02-04 RX ADMIN — CEFTRIAXONE SODIUM 2 G: 2 INJECTION, SOLUTION INTRAVENOUS at 18:34

## 2018-02-04 RX ADMIN — DOCUSATE SODIUM 100 MG: 100 CAPSULE, LIQUID FILLED ORAL at 20:23

## 2018-02-04 RX ADMIN — METFORMIN HYDROCHLORIDE 500 MG: 500 TABLET ORAL at 18:32

## 2018-02-04 RX ADMIN — LISINOPRIL 2.5 MG: 2.5 TABLET ORAL at 20:23

## 2018-02-04 RX ADMIN — POTASSIUM CHLORIDE 20 MEQ: 750 CAPSULE, EXTENDED RELEASE ORAL at 08:25

## 2018-02-04 RX ADMIN — ATORVASTATIN CALCIUM 10 MG: 10 TABLET, FILM COATED ORAL at 08:25

## 2018-02-04 RX ADMIN — POTASSIUM CHLORIDE 20 MEQ: 750 CAPSULE, EXTENDED RELEASE ORAL at 18:32

## 2018-02-04 RX ADMIN — INSULIN ASPART 5 UNITS: 100 INJECTION, SOLUTION INTRAVENOUS; SUBCUTANEOUS at 18:32

## 2018-02-04 RX ADMIN — APIXABAN 5 MG: 5 TABLET, FILM COATED ORAL at 20:23

## 2018-02-04 RX ADMIN — OXYCODONE HYDROCHLORIDE AND ACETAMINOPHEN 2 TABLET: 7.5; 325 TABLET ORAL at 02:36

## 2018-02-04 RX ADMIN — METOPROLOL TARTRATE 50 MG: 50 TABLET, FILM COATED ORAL at 20:23

## 2018-02-04 RX ADMIN — INSULIN ASPART 8 UNITS: 100 INJECTION, SOLUTION INTRAVENOUS; SUBCUTANEOUS at 12:30

## 2018-02-04 RX ADMIN — ONDANSETRON 4 MG: 2 INJECTION INTRAMUSCULAR; INTRAVENOUS at 12:30

## 2018-02-04 RX ADMIN — FUROSEMIDE 40 MG: 40 TABLET ORAL at 18:32

## 2018-02-04 RX ADMIN — OXYCODONE HYDROCHLORIDE AND ACETAMINOPHEN 2 TABLET: 7.5; 325 TABLET ORAL at 07:24

## 2018-02-04 NOTE — PROGRESS NOTES
Orthopedic Progress Note      Patient: Shoshana Bae  Date of Admission: 2/1/2018  YOB: 1940  Medical Record Number: [id number]    POD # :  * No surgery found *      Systemic or Specific Complaints: nausea and vomiting    Pain Relief: some relief    Physical Exam:  77 y.o.  female  Vitals:  Temp:  [94.4 °F (34.7 °C)-98.4 °F (36.9 °C)] 94.4 °F (34.7 °C)  Heart Rate:  [62-90] 80  Resp:  [16-20] 18  BP: ()/(56-74) 141/74  alert and oriented  Chest: no labored breathing  CV: Regular Rate and Rhythm  Abd: Soft, NT, with BS +  Ext: NV intact. Chronic changes in the leg.  Gross NVI intact.  No drainage.  Erythema in the lower leg  Activity: as ordered      Data Review     Admission on 02/01/2018   Component Date Value Ref Range Status   • Crystals, Fluid 02/01/2018 No crystals seen   Final   • Wound Culture 02/01/2018 Light growth (2+) Serratia marcescens*  Final         • Gram Stain Result 02/01/2018 Few (2+) Red blood cells   Final   • Gram Stain Result 02/01/2018 Rare (1+) WBCs seen   Final   • Gram Stain Result 02/01/2018 No organisms seen   Final   • Color, Fluid 02/01/2018 Red   Final   • Appearance, Fluid 02/01/2018 Cloudy* Clear Final   • WBC, Fluid 02/01/2018 97554  /mm3 Final    Estimated count due to clots present in specimen.    • RBC, Fluid 02/01/2018 79903  /mm3 Final    Estimated count due to clots present in specimen.    • Neutrophils, Fluid 02/01/2018 99  % Final   • Monocytes, Fluid 02/01/2018 1  % Final   • WBC 02/01/2018 11.97* 4.50 - 10.70 10*3/mm3 Final   • RBC 02/01/2018 4.16  3.90 - 5.20 10*6/mm3 Final   • Hemoglobin 02/01/2018 12.9  11.9 - 15.5 g/dL Final   • Hematocrit 02/01/2018 40.3  35.6 - 45.5 % Final   • MCV 02/01/2018 96.9  80.5 - 98.2 fL Final   • MCH 02/01/2018 31.0  26.9 - 32.0 pg Final   • MCHC 02/01/2018 32.0* 32.4 - 36.3 g/dL Final   • RDW 02/01/2018 13.9* 11.7 - 13.0 % Final   • RDW-SD 02/01/2018 49.3  37.0 - 54.0 fl Final   • MPV 02/01/2018 10.6  6.0 -  12.0 fL Final   • Platelets 02/01/2018 346  140 - 500 10*3/mm3 Final   • Neutrophil % 02/01/2018 80.3* 42.7 - 76.0 % Final   • Lymphocyte % 02/01/2018 8.9* 19.6 - 45.3 % Final   • Monocyte % 02/01/2018 8.2  5.0 - 12.0 % Final   • Eosinophil % 02/01/2018 1.2  0.3 - 6.2 % Final   • Basophil % 02/01/2018 0.4  0.0 - 1.5 % Final   • Immature Grans % 02/01/2018 1.0* 0.0 - 0.5 % Final   • Neutrophils, Absolute 02/01/2018 9.61* 1.90 - 8.10 10*3/mm3 Final   • Lymphocytes, Absolute 02/01/2018 1.07  0.90 - 4.80 10*3/mm3 Final   • Monocytes, Absolute 02/01/2018 0.98  0.20 - 1.20 10*3/mm3 Final   • Eosinophils, Absolute 02/01/2018 0.14  0.00 - 0.70 10*3/mm3 Final   • Basophils, Absolute 02/01/2018 0.05  0.00 - 0.20 10*3/mm3 Final   • Immature Grans, Absolute 02/01/2018 0.12* 0.00 - 0.03 10*3/mm3 Final   • Glucose 02/01/2018 417* 65 - 99 mg/dL Final   • BUN 02/01/2018 25* 8 - 23 mg/dL Final   • Creatinine 02/01/2018 1.15* 0.57 - 1.00 mg/dL Final   • Sodium 02/01/2018 134* 136 - 145 mmol/L Final   • Potassium 02/01/2018 4.7  3.5 - 5.2 mmol/L Final   • Chloride 02/01/2018 93* 98 - 107 mmol/L Final   • CO2 02/01/2018 28.7  22.0 - 29.0 mmol/L Final   • Calcium 02/01/2018 8.9  8.6 - 10.5 mg/dL Final   • Total Protein 02/01/2018 7.2  6.0 - 8.5 g/dL Final   • Albumin 02/01/2018 2.80* 3.50 - 5.20 g/dL Final   • ALT (SGPT) 02/01/2018 14  1 - 33 U/L Final   • AST (SGOT) 02/01/2018 11  1 - 32 U/L Final   • Alkaline Phosphatase 02/01/2018 124* 39 - 117 U/L Final   • Total Bilirubin 02/01/2018 0.6  0.1 - 1.2 mg/dL Final   • eGFR Non African Amer 02/01/2018 46* >60 mL/min/1.73 Final   • Globulin 02/01/2018 4.4  gm/dL Final   • A/G Ratio 02/01/2018 0.6  g/dL Final   • BUN/Creatinine Ratio 02/01/2018 21.7  7.0 - 25.0 Final   • Anion Gap 02/01/2018 12.3  mmol/L Final   • Glucose 02/01/2018 402* 70 - 130 mg/dL Final   • Glucose 02/01/2018 388* 70 - 130 mg/dL Final   • Hemoglobin A1C 02/01/2018 8.60* 4.80 - 5.60 % Final   • Glucose 02/02/2018 294*  65 - 99 mg/dL Final   • BUN 02/02/2018 25* 8 - 23 mg/dL Final   • Creatinine 02/02/2018 1.06* 0.57 - 1.00 mg/dL Final   • Sodium 02/02/2018 136  136 - 145 mmol/L Final   • Potassium 02/02/2018 4.0  3.5 - 5.2 mmol/L Final   • Chloride 02/02/2018 97* 98 - 107 mmol/L Final   • CO2 02/02/2018 27.5  22.0 - 29.0 mmol/L Final   • Calcium 02/02/2018 8.4* 8.6 - 10.5 mg/dL Final   • eGFR Non African Amer 02/02/2018 50* >60 mL/min/1.73 Final   • BUN/Creatinine Ratio 02/02/2018 23.6  7.0 - 25.0 Final   • Anion Gap 02/02/2018 11.5  mmol/L Final   • Sed Rate 02/02/2018 22  0 - 30 mm/hr Final   • C-Reactive Protein 02/02/2018 21.68* 0.00 - 0.50 mg/dL Final   • Glucose 02/02/2018 223* 70 - 130 mg/dL Final   • Glucose 02/02/2018 235* 70 - 130 mg/dL Final   • Glucose 02/02/2018 247* 70 - 130 mg/dL Final   • Glucose 02/02/2018 194* 70 - 130 mg/dL Final   • Glucose 02/02/2018 187* 70 - 130 mg/dL Final   • C-Reactive Protein 02/03/2018 17.59* 0.00 - 0.50 mg/dL Final   • Glucose 02/03/2018 187* 65 - 99 mg/dL Final   • BUN 02/03/2018 26* 8 - 23 mg/dL Final   • Creatinine 02/03/2018 1.08* 0.57 - 1.00 mg/dL Final   • Sodium 02/03/2018 138  136 - 145 mmol/L Final   • Potassium 02/03/2018 4.6  3.5 - 5.2 mmol/L Final   • Chloride 02/03/2018 98  98 - 107 mmol/L Final   • CO2 02/03/2018 28.8  22.0 - 29.0 mmol/L Final   • Calcium 02/03/2018 8.0* 8.6 - 10.5 mg/dL Final   • eGFR Non African Amer 02/03/2018 49* >60 mL/min/1.73 Final   • BUN/Creatinine Ratio 02/03/2018 24.1  7.0 - 25.0 Final   • Anion Gap 02/03/2018 11.2  mmol/L Final   • WBC 02/03/2018 10.30  4.50 - 10.70 10*3/mm3 Final   • RBC 02/03/2018 3.74* 3.90 - 5.20 10*6/mm3 Final   • Hemoglobin 02/03/2018 11.3* 11.9 - 15.5 g/dL Final   • Hematocrit 02/03/2018 37.3  35.6 - 45.5 % Final   • MCV 02/03/2018 99.7* 80.5 - 98.2 fL Final   • MCH 02/03/2018 30.2  26.9 - 32.0 pg Final   • MCHC 02/03/2018 30.3* 32.4 - 36.3 g/dL Final   • RDW 02/03/2018 13.9* 11.7 - 13.0 % Final   • RDW-SD 02/03/2018  50.3  37.0 - 54.0 fl Final   • MPV 02/03/2018 10.5  6.0 - 12.0 fL Final   • Platelets 02/03/2018 300  140 - 500 10*3/mm3 Final   • Neutrophil % 02/03/2018 74.6  42.7 - 76.0 % Final   • Lymphocyte % 02/03/2018 14.4* 19.6 - 45.3 % Final   • Monocyte % 02/03/2018 6.3  5.0 - 12.0 % Final   • Eosinophil % 02/03/2018 3.6  0.3 - 6.2 % Final   • Basophil % 02/03/2018 0.4  0.0 - 1.5 % Final   • Immature Grans % 02/03/2018 0.7* 0.0 - 0.5 % Final   • Neutrophils, Absolute 02/03/2018 7.69  1.90 - 8.10 10*3/mm3 Final   • Lymphocytes, Absolute 02/03/2018 1.48  0.90 - 4.80 10*3/mm3 Final   • Monocytes, Absolute 02/03/2018 0.65  0.20 - 1.20 10*3/mm3 Final   • Eosinophils, Absolute 02/03/2018 0.37  0.00 - 0.70 10*3/mm3 Final   • Basophils, Absolute 02/03/2018 0.04  0.00 - 0.20 10*3/mm3 Final   • Immature Grans, Absolute 02/03/2018 0.07* 0.00 - 0.03 10*3/mm3 Final   • Glucose 02/03/2018 162* 70 - 130 mg/dL Final   • Glucose 02/03/2018 257* 70 - 130 mg/dL Final   • Glucose 02/03/2018 200* 70 - 130 mg/dL Final   • Glucose 02/03/2018 213* 70 - 130 mg/dL Final   • Glucose 02/04/2018 175* 65 - 99 mg/dL Final   • BUN 02/04/2018 30* 8 - 23 mg/dL Final   • Creatinine 02/04/2018 1.14* 0.57 - 1.00 mg/dL Final   • Sodium 02/04/2018 137  136 - 145 mmol/L Final   • Potassium 02/04/2018 4.3  3.5 - 5.2 mmol/L Final   • Chloride 02/04/2018 99  98 - 107 mmol/L Final   • CO2 02/04/2018 24.9  22.0 - 29.0 mmol/L Final   • Calcium 02/04/2018 8.2* 8.6 - 10.5 mg/dL Final   • eGFR Non African Amer 02/04/2018 46* >60 mL/min/1.73 Final   • BUN/Creatinine Ratio 02/04/2018 26.3* 7.0 - 25.0 Final   • Anion Gap 02/04/2018 13.1  mmol/L Final   • WBC 02/04/2018 9.77  4.50 - 10.70 10*3/mm3 Final   • RBC 02/04/2018 3.61* 3.90 - 5.20 10*6/mm3 Final   • Hemoglobin 02/04/2018 11.0* 11.9 - 15.5 g/dL Final   • Hematocrit 02/04/2018 35.6  35.6 - 45.5 % Final   • MCV 02/04/2018 98.6* 80.5 - 98.2 fL Final   • MCH 02/04/2018 30.5  26.9 - 32.0 pg Final   • MCHC 02/04/2018  30.9* 32.4 - 36.3 g/dL Final   • RDW 02/04/2018 13.8* 11.7 - 13.0 % Final   • RDW-SD 02/04/2018 49.5  37.0 - 54.0 fl Final   • MPV 02/04/2018 10.2  6.0 - 12.0 fL Final   • Platelets 02/04/2018 298  140 - 500 10*3/mm3 Final   • Neutrophil % 02/04/2018 70.6  42.7 - 76.0 % Final   • Lymphocyte % 02/04/2018 15.4* 19.6 - 45.3 % Final   • Monocyte % 02/04/2018 8.7  5.0 - 12.0 % Final   • Eosinophil % 02/04/2018 4.2  0.3 - 6.2 % Final   • Basophil % 02/04/2018 0.4  0.0 - 1.5 % Final   • Immature Grans % 02/04/2018 0.7* 0.0 - 0.5 % Final   • Neutrophils, Absolute 02/04/2018 6.90  1.90 - 8.10 10*3/mm3 Final   • Lymphocytes, Absolute 02/04/2018 1.50  0.90 - 4.80 10*3/mm3 Final   • Monocytes, Absolute 02/04/2018 0.85  0.20 - 1.20 10*3/mm3 Final   • Eosinophils, Absolute 02/04/2018 0.41  0.00 - 0.70 10*3/mm3 Final   • Basophils, Absolute 02/04/2018 0.04  0.00 - 0.20 10*3/mm3 Final   • Immature Grans, Absolute 02/04/2018 0.07* 0.00 - 0.03 10*3/mm3 Final   • Glucose 02/04/2018 179* 70 - 130 mg/dL Final   • Glucose 02/04/2018 276* 70 - 130 mg/dL Final       Xr Knee 1 Or 2 View Left    Result Date: 1/25/2018  Impression: Ordering physician's impression is located in the Encounter Note dated 01/25/18. X-ray performed in the CR room.       Medications:    apixaban 5 mg Oral Q12H   atorvastatin 10 mg Oral Daily   ceftriaxone 2 g Intravenous Q24H   docusate sodium 100 mg Oral BID   furosemide 40 mg Oral BID   insulin aspart 0-14 Units Subcutaneous 4x Daily With Meals & Nightly   lisinopril 2.5 mg Oral Nightly   metFORMIN 500 mg Oral BID With Meals   metoprolol tartrate 50 mg Oral Q12H   nystatin  Topical Q12H   potassium chloride 20 mEq Oral BID With Meals     •  acetaminophen  •  bisacodyl  •  dextrose  •  dextrose  •  glucagon (human recombinant)  •  ondansetron  •  oxyCODONE-acetaminophen **OR** oxyCODONE-acetaminophen  •  promethazine **OR** promethazine **OR** promethazine **OR** promethazine  •  sodium  chloride    Assessment:  Doing well POD  # * No surgery found *    Problem List Items Addressed This Visit        Musculoskeletal and Integument    * (Principal)Infection of total knee replacement    Relevant Medications    sodium chloride 0.9 % flush 1-10 mL    acetaminophen (TYLENOL) tablet 650 mg    bisacodyl (DULCOLAX) suppository 10 mg    docusate sodium (COLACE) capsule 100 mg    promethazine (PHENERGAN) tablet 12.5 mg    promethazine (PHENERGAN) injection 12.5 mg    promethazine (PHENERGAN) injection 12.5 mg    promethazine (PHENERGAN) suppository 12.5 mg    cephalexin (KEFLEX) 250 MG capsule    Other Relevant Orders    Anaerobic Culture - Aspirate, Knee, Left    Body Fluid Cell Count With Differential - Synovial Fluid, Knee, Left (Completed)    Crystal Exam, Fluid - Synovial Fluid, Knee, Left (Completed)    Gram Stain - Aspirate, Knee, Left    Wound Culture - Aspirate, Knee, Left (Completed)    Body fluid cell count - Body Fluid, (Completed)    Body fluid differential - Body Fluid, (Completed)    Activity - Ad Mary    Diet Regular; Cardiac, Consistent Carbohydrate, Renal    Fall Precautions    Full Code    Incentive Spirometry    Inpatient Admission (Completed)    Inpatient Consult to Case Management  (Completed)    Inpatient Consult to Hospitalist (Completed)    Inpatient Consult to Infectious Diseases (Completed)    Patient Isolation Contact (Completed)    Strict Intake and Output    Vital Signs    Weigh Patient    Insert Peripheral IV (Completed)    Saline Lock & Maintain IV Access    CBC Auto Differential (Completed)    Comprehensive Metabolic Panel (Completed)    Maintain Sequential Compression Device    Pharmacologic VTE Prophylaxis Not Indicated: Anticipated Invasive Procedure / Surgery (Completed)    Place Sequential Compression Device (Completed)    VTE Risk Assessment - High Risk (Completed)          Plan:  Continue efforts to mobilize  Continue Pain Control Measures  Continue  incisional Care  DVT prophylaxis  zofran iv.    Discharge Plan:continue with antibiotic regimen with plan per ortho and id    Lebron Mayer MD    Date: 2/4/2018  Time: 11:57 AM

## 2018-02-04 NOTE — PLAN OF CARE
Problem: Patient Care Overview (Adult)  Goal: Plan of Care Review  Outcome: Ongoing (interventions implemented as appropriate)   02/04/18 0240   Coping/Psychosocial Response Interventions   Plan Of Care Reviewed With patient   Patient Care Overview   Progress improving   Outcome Evaluation   Outcome Summary/Follow up Plan Diabetes controlled with po med and sliding scale insulin. pain well controlled.pt needs assist of 2 for pivoting to BSC.        Problem: Fall Risk (Adult)  Goal: Absence of Falls  Outcome: Ongoing (interventions implemented as appropriate)   02/04/18 0240   Fall Risk (Adult)   Absence of Falls making progress toward outcome       Problem: Infection, Risk/Actual (Adult)  Goal: Infection Prevention/Resolution  Outcome: Ongoing (interventions implemented as appropriate)   02/04/18 0240   Infection, Risk/Actual (Adult)   Infection Prevention/Resolution making progress toward outcome

## 2018-02-05 LAB
ANION GAP SERPL CALCULATED.3IONS-SCNC: 11.7 MMOL/L
BASOPHILS # BLD AUTO: 0.04 10*3/MM3 (ref 0–0.2)
BASOPHILS NFR BLD AUTO: 0.4 % (ref 0–1.5)
BUN BLD-MCNC: 23 MG/DL (ref 8–23)
BUN/CREAT SERPL: 20.7 (ref 7–25)
CALCIUM SPEC-SCNC: 8.8 MG/DL (ref 8.6–10.5)
CHLORIDE SERPL-SCNC: 92 MMOL/L (ref 98–107)
CO2 SERPL-SCNC: 29.3 MMOL/L (ref 22–29)
CREAT BLD-MCNC: 1.11 MG/DL (ref 0.57–1)
DEPRECATED RDW RBC AUTO: 48.9 FL (ref 37–54)
EOSINOPHIL # BLD AUTO: 0.34 10*3/MM3 (ref 0–0.7)
EOSINOPHIL NFR BLD AUTO: 3 % (ref 0.3–6.2)
ERYTHROCYTE [DISTWIDTH] IN BLOOD BY AUTOMATED COUNT: 13.6 % (ref 11.7–13)
GFR SERPL CREATININE-BSD FRML MDRD: 47 ML/MIN/1.73
GLUCOSE BLD-MCNC: 172 MG/DL (ref 65–99)
GLUCOSE BLDC GLUCOMTR-MCNC: 145 MG/DL (ref 70–130)
GLUCOSE BLDC GLUCOMTR-MCNC: 157 MG/DL (ref 70–130)
GLUCOSE BLDC GLUCOMTR-MCNC: 172 MG/DL (ref 70–130)
GLUCOSE BLDC GLUCOMTR-MCNC: 206 MG/DL (ref 70–130)
HCT VFR BLD AUTO: 40 % (ref 35.6–45.5)
HGB BLD-MCNC: 12.4 G/DL (ref 11.9–15.5)
IMM GRANULOCYTES # BLD: 0.05 10*3/MM3 (ref 0–0.03)
IMM GRANULOCYTES NFR BLD: 0.4 % (ref 0–0.5)
LYMPHOCYTES # BLD AUTO: 1.67 10*3/MM3 (ref 0.9–4.8)
LYMPHOCYTES NFR BLD AUTO: 14.8 % (ref 19.6–45.3)
MCH RBC QN AUTO: 30.5 PG (ref 26.9–32)
MCHC RBC AUTO-ENTMCNC: 31 G/DL (ref 32.4–36.3)
MCV RBC AUTO: 98.3 FL (ref 80.5–98.2)
MONOCYTES # BLD AUTO: 0.65 10*3/MM3 (ref 0.2–1.2)
MONOCYTES NFR BLD AUTO: 5.7 % (ref 5–12)
NEUTROPHILS # BLD AUTO: 8.56 10*3/MM3 (ref 1.9–8.1)
NEUTROPHILS NFR BLD AUTO: 75.7 % (ref 42.7–76)
NRBC BLD MANUAL-RTO: 0 /100 WBC (ref 0–0)
PLATELET # BLD AUTO: 384 10*3/MM3 (ref 140–500)
PMV BLD AUTO: 10.6 FL (ref 6–12)
POTASSIUM BLD-SCNC: 4 MMOL/L (ref 3.5–5.2)
RBC # BLD AUTO: 4.07 10*6/MM3 (ref 3.9–5.2)
SODIUM BLD-SCNC: 133 MMOL/L (ref 136–145)
WBC NRBC COR # BLD: 11.31 10*3/MM3 (ref 4.5–10.7)

## 2018-02-05 PROCEDURE — 25010000002 ONDANSETRON PER 1 MG: Performed by: ORTHOPAEDIC SURGERY

## 2018-02-05 PROCEDURE — 99232 SBSQ HOSP IP/OBS MODERATE 35: CPT | Performed by: INTERNAL MEDICINE

## 2018-02-05 PROCEDURE — 85025 COMPLETE CBC W/AUTO DIFF WBC: CPT | Performed by: INTERNAL MEDICINE

## 2018-02-05 PROCEDURE — 82962 GLUCOSE BLOOD TEST: CPT

## 2018-02-05 PROCEDURE — 80048 BASIC METABOLIC PNL TOTAL CA: CPT | Performed by: INTERNAL MEDICINE

## 2018-02-05 PROCEDURE — 63710000001 INSULIN ASPART PER 5 UNITS: Performed by: INTERNAL MEDICINE

## 2018-02-05 RX ORDER — CIPROFLOXACIN 500 MG/1
500 TABLET, FILM COATED ORAL EVERY 12 HOURS SCHEDULED
Status: DISCONTINUED | OUTPATIENT
Start: 2018-02-05 | End: 2018-02-06 | Stop reason: HOSPADM

## 2018-02-05 RX ADMIN — CIPROFLOXACIN HYDROCHLORIDE 500 MG: 500 TABLET, FILM COATED ORAL at 22:15

## 2018-02-05 RX ADMIN — INSULIN ASPART 5 UNITS: 100 INJECTION, SOLUTION INTRAVENOUS; SUBCUTANEOUS at 21:24

## 2018-02-05 RX ADMIN — LISINOPRIL 2.5 MG: 2.5 TABLET ORAL at 21:17

## 2018-02-05 RX ADMIN — OXYCODONE HYDROCHLORIDE AND ACETAMINOPHEN 2 TABLET: 7.5; 325 TABLET ORAL at 03:14

## 2018-02-05 RX ADMIN — METOPROLOL TARTRATE 50 MG: 50 TABLET, FILM COATED ORAL at 21:17

## 2018-02-05 RX ADMIN — METFORMIN HYDROCHLORIDE 500 MG: 500 TABLET ORAL at 17:36

## 2018-02-05 RX ADMIN — CIPROFLOXACIN HYDROCHLORIDE 500 MG: 500 TABLET, FILM COATED ORAL at 14:33

## 2018-02-05 RX ADMIN — POTASSIUM CHLORIDE 20 MEQ: 750 CAPSULE, EXTENDED RELEASE ORAL at 17:36

## 2018-02-05 RX ADMIN — FUROSEMIDE 40 MG: 40 TABLET ORAL at 09:08

## 2018-02-05 RX ADMIN — DOCUSATE SODIUM 100 MG: 100 CAPSULE, LIQUID FILLED ORAL at 09:08

## 2018-02-05 RX ADMIN — ONDANSETRON 4 MG: 2 INJECTION INTRAMUSCULAR; INTRAVENOUS at 22:26

## 2018-02-05 RX ADMIN — APIXABAN 5 MG: 5 TABLET, FILM COATED ORAL at 09:08

## 2018-02-05 RX ADMIN — METFORMIN HYDROCHLORIDE 500 MG: 500 TABLET ORAL at 07:40

## 2018-02-05 RX ADMIN — POTASSIUM CHLORIDE 20 MEQ: 750 CAPSULE, EXTENDED RELEASE ORAL at 07:40

## 2018-02-05 RX ADMIN — NYSTATIN: 100000 POWDER TOPICAL at 09:08

## 2018-02-05 RX ADMIN — ATORVASTATIN CALCIUM 10 MG: 10 TABLET, FILM COATED ORAL at 09:08

## 2018-02-05 RX ADMIN — INSULIN ASPART 3 UNITS: 100 INJECTION, SOLUTION INTRAVENOUS; SUBCUTANEOUS at 07:40

## 2018-02-05 RX ADMIN — APIXABAN 5 MG: 5 TABLET, FILM COATED ORAL at 21:17

## 2018-02-05 RX ADMIN — DOCUSATE SODIUM 100 MG: 100 CAPSULE, LIQUID FILLED ORAL at 21:17

## 2018-02-05 RX ADMIN — NYSTATIN: 100000 POWDER TOPICAL at 21:18

## 2018-02-05 RX ADMIN — METOPROLOL TARTRATE 50 MG: 50 TABLET, FILM COATED ORAL at 09:08

## 2018-02-05 RX ADMIN — INSULIN ASPART 3 UNITS: 100 INJECTION, SOLUTION INTRAVENOUS; SUBCUTANEOUS at 11:36

## 2018-02-05 RX ADMIN — FUROSEMIDE 40 MG: 40 TABLET ORAL at 17:36

## 2018-02-05 NOTE — PROGRESS NOTES
LOS: 3 days   Patient Care Team:  Fany Morales MD as PCP - General (Geriatric Medicine)  Harvinder Arita MD as PCP - Claims Attributed  Hiram De La Cruz MD as Consulting Physician (Cardiology)    No chief complaint on file.        Subjective   Was nauseated this am & ended up throwing up. Not sure if she had taken pain meds on empty stomach. Has felt fine this afternoon & evening      Objective     Vital Signs  Temp:  [94.4 °F (34.7 °C)-98 °F (36.7 °C)] 97.5 °F (36.4 °C)  Heart Rate:  [62-90] 82  Resp:  [16-20] 18  BP: ()/(56-74) 120/72    Physical Exam:  WDWN female in NAD. Alert & oriented.  Lungs clear with equal BS bilaterally  Heart rate controlled, seems regular  Abd soft, NT, BS+  Ext 1-2+ edema.  Skin warm & dry       Results Review:     I reviewed the patient's new clinical results.    Medication Review:       LABS    Results from last 7 days  Lab Units 02/04/18 0414 02/03/18 0317 02/02/18  0409   SODIUM mmol/L 137 138 136   POTASSIUM mmol/L 4.3 4.6 4.0   CHLORIDE mmol/L 99 98 97*   CO2 mmol/L 24.9 28.8 27.5   BUN mg/dL 30* 26* 25*   CREATININE mg/dL 1.14* 1.08* 1.06*   GLUCOSE mg/dL 175* 187* 294*   CALCIUM mg/dL 8.2* 8.0* 8.4*       Results from last 7 days  Lab Units 02/04/18 0414 02/03/18 0317 02/01/18  2043   WBC 10*3/mm3 9.77 10.30 11.97*   HEMOGLOBIN g/dL 11.0* 11.3* 12.9   HEMATOCRIT % 35.6 37.3 40.3   PLATELETS 10*3/mm3 298 300 346               Assessment/Plan     Principal Problem:    Infection of total knee replacement  Active Problems:    Chronic atrial fibrillation - on Eliquis    Type 2 diabetes mellitus with hyperglycemia - improving control.    Essential hypertension - occasional low reading. Will cont same & monitor.     Cellulitis of left lower extremity    Morbid obesity with BMI of 50.0-59.9, adult    Chronic venous insufficiency    Renal insufficiency - cont to monitor levels          Bharati Small MD  02/04/18  7:00 PM      Time:

## 2018-02-05 NOTE — PROGRESS NOTES
Orthopedic Progress Note      Patient: Shoshana Bae    YOB: 1940    Medical Record Number: [id number]    Attending Physician: Dominick Bosch MD    Date of Admission: 2/1/2018  7:59 PM    Admitting Dx:  Infection of total knee replacement, sequela [T84.59XS, Z96.659]      Current Problem List:   Patient Active Problem List   Diagnosis   • History of total knee arthroplasty   • Pain in left knee   • Failed total knee arthroplasty   • H/O mammogram   • H/O colonoscopy   • Lymph edema   • Sarcoidosis   • Chronic atrial fibrillation   • Hyperlipidemia   • Hypertriglyceridemia   • Type 2 diabetes mellitus with hyperglycemia   • Stasis dermatitis of both legs   • Essential hypertension   • Cellulitis of left lower extremity   • Cellulitis of extremity, lower bilateral   • Chronic anticoagulation, for a fib   • Morbid obesity with BMI of 50.0-59.9, adult   • OA (osteoarthritis) of knee   • Infected prosthetic knee joint   • Obstructive sleep apnea syndrome   • Chronic venous insufficiency   • Pulmonary nodule   • Failed total knee replacement   • Infection of total knee replacement   • Renal insufficiency         Past Medical History:   Diagnosis Date   • Arm fracture    • Arthritis    • Atrial fibrillation    • Back pain     WITH STANDING   • Cancer    • Cellulitis     LOWER LEGS   • Colon polyp    • DDD (degenerative disc disease), lumbar    • Diabetes mellitus     TYPE 2   • Edema    • H/O colonoscopy     2014   • H/O mammogram     E 2015   • Hyperlipidemia    • Hypertension    • On anticoagulant therapy    • Oral-mouth cancer    • Sarcoidosis of lung with sarcoidosis of lymph nodes    • Sleep apnea     MOUTHPIECE USED/CPAP   • Uterine cancer        SUBJECTIVE: 77 y.o.  female. Awake and alert.  Left knee pain some better    OBJECTIVE:   Vitals:    02/04/18 1900 02/04/18 2240 02/05/18 0248 02/05/18 0731   BP: 106/62 101/61 106/54 108/73   BP Location: Left arm Left arm Left arm Left arm   Patient  Position: Sitting Sitting Lying Sitting   Pulse: 81 72 77 81   Resp: 18 18 18 18   Temp: 97.9 °F (36.6 °C) 98.6 °F (37 °C) 98.7 °F (37.1 °C) 97 °F (36.1 °C)   TempSrc: Oral Oral Oral Oral   SpO2: 92% 94% 96% 95%   Weight:       Height:         I/O last 3 completed shifts:  In: -   Out: 475 [Urine:475]    Current Medications:  Scheduled Meds:  apixaban 5 mg Oral Q12H   atorvastatin 10 mg Oral Daily   ceftriaxone 2 g Intravenous Q24H   docusate sodium 100 mg Oral BID   furosemide 40 mg Oral BID   insulin aspart 0-14 Units Subcutaneous 4x Daily With Meals & Nightly   lisinopril 2.5 mg Oral Nightly   metFORMIN 500 mg Oral BID With Meals   metoprolol tartrate 50 mg Oral Q12H   nystatin  Topical Q12H   potassium chloride 20 mEq Oral BID With Meals     PRN Meds:.•  acetaminophen  •  bisacodyl  •  dextrose  •  dextrose  •  glucagon (human recombinant)  •  ondansetron  •  ondansetron  •  oxyCODONE-acetaminophen **OR** oxyCODONE-acetaminophen  •  promethazine **OR** promethazine **OR** promethazine **OR** promethazine  •  sodium chloride    Diagnostic Tests:   Lab Results (last 24 hours)     Procedure Component Value Units Date/Time    POC Glucose Once [[id number]]  (Abnormal) Collected:  02/04/18 1039    Specimen:  Blood Updated:  02/04/18 1040     Glucose 276 (H) mg/dL     Narrative:       Meter: [id number] : [id number] Jorge Myra    Anaerobic Culture - Aspirate, Knee, Left [[id number]]  (Normal) Collected:  02/01/18 1741    Specimen:  Aspirate from Knee, Left Updated:  02/04/18 1254     Culture No anaerobes isolated at 3 days    POC Glucose Once [[id number]]  (Abnormal) Collected:  02/04/18 1657    Specimen:  Blood Updated:  02/04/18 1705     Glucose 228 (H) mg/dL     Narrative:       Meter: [id number] : [id number] luma-id Myra    POC Glucose Once [[id number]]  (Abnormal) Collected:  02/04/18 2137    Specimen:  Blood Updated:  02/04/18 2142     Glucose 243 (H) mg/dL     Narrative:       Meter: [id number] :  [id number] Harry Vasques    CBC & Differential [[id number]] Collected:  02/05/18 0337    Specimen:  Blood Updated:  02/05/18 0429    Narrative:       The following orders were created for panel order CBC & Differential.  Procedure                               Abnormality         Status                     ---------                               -----------         ------                     CBC Auto Differential[[id number]]        Abnormal            Final result                 Please view results for these tests on the individual orders.    CBC Auto Differential [[id number]]  (Abnormal) Collected:  02/05/18 0337    Specimen:  Blood Updated:  02/05/18 0429     WBC 11.31 (H) 10*3/mm3      RBC 4.07 10*6/mm3      Hemoglobin 12.4 g/dL      Hematocrit 40.0 %      MCV 98.3 (H) fL      MCH 30.5 pg      MCHC 31.0 (L) g/dL      RDW 13.6 (H) %      RDW-SD 48.9 fl      MPV 10.6 fL      Platelets 384 10*3/mm3      Neutrophil % 75.7 %      Lymphocyte % 14.8 (L) %      Monocyte % 5.7 %      Eosinophil % 3.0 %      Basophil % 0.4 %      Immature Grans % 0.4 %      Neutrophils, Absolute 8.56 (H) 10*3/mm3      Lymphocytes, Absolute 1.67 10*3/mm3      Monocytes, Absolute 0.65 10*3/mm3      Eosinophils, Absolute 0.34 10*3/mm3      Basophils, Absolute 0.04 10*3/mm3      Immature Grans, Absolute 0.05 (H) 10*3/mm3      nRBC 0.0 /100 WBC     Basic Metabolic Panel [[id number]]  (Abnormal) Collected:  02/05/18 0337    Specimen:  Blood Updated:  02/05/18 0447     Glucose 172 (H) mg/dL      BUN 23 mg/dL      Creatinine 1.11 (H) mg/dL      Sodium 133 (L) mmol/L      Potassium 4.0 mmol/L      Chloride 92 (L) mmol/L      CO2 29.3 (H) mmol/L      Calcium 8.8 mg/dL      eGFR Non African Amer 47 (L) mL/min/1.73      BUN/Creatinine Ratio 20.7     Anion Gap 11.7 mmol/L     Narrative:       The MDRD GFR formula is only valid for adults with stable renal function between ages 18 and 70.    POC Glucose Once [[id number]]  (Abnormal) Collected:  02/05/18 0734     Specimen:  Blood Updated:  02/05/18 0738     Glucose 157 (H) mg/dL     Narrative:       Meter: [id number] : [id number] Jeanie Curry          PHYSICAL EXAM:   Left knee remains swollen, but less tender to touch.  Still complains of increased pain with weight bearing.  Culture from left knee aspirate on 2/1/18 shows Serratia marcescens.      ASSESSMENT & PLAN:  ID plans to switch patient to oral antibiotics and continue with medical management.  Patient is not a good surgical candidate.  Possibly return to [facility] today or tomorrow.         Date: 2/5/2018    Faby Salas RN    Agree with plans for conservative nonsurgical management given her significant comorbidities.  The organism is sensitive to IV and oral antibiotics and per infectious disease we'll plan on switching her to by mouth antibiotics with long-term suppression.    Dominick Bosch

## 2018-02-05 NOTE — PLAN OF CARE
Problem: Patient Care Overview (Adult)  Goal: Plan of Care Review  Outcome: Ongoing (interventions implemented as appropriate)   02/04/18 1800   Coping/Psychosocial Response Interventions   Plan Of Care Reviewed With patient   Patient Care Overview   Progress improving   Outcome Evaluation   Outcome Summary/Follow up Plan Erythema and edema in LLE decreased from yesterday. pain controlled with PO pain med. Episodes of nausea plus vomited X 1. No relief from PO Zofran. IV Zofran given with relief recieved. Remains on IV antibiotic therapy. Sliding scale insulin given. Up to chair and BSC with assist of 2. Discussed the importance of monitoring B/P due to history of hypertension. Plans to go to [facility] when discharged.        Problem: Fall Risk (Adult)  Goal: Absence of Falls  Outcome: Ongoing (interventions implemented as appropriate)      Problem: Pressure Ulcer Risk (Jim Scale) (Adult,Obstetrics,Pediatric)  Goal: Skin Integrity  Outcome: Ongoing (interventions implemented as appropriate)      Problem: Infection, Risk/Actual (Adult)  Goal: Infection Prevention/Resolution  Outcome: Ongoing (interventions implemented as appropriate)      Problem: Pain, Acute (Adult)  Goal: Acceptable Pain Control/Comfort Level  Outcome: Ongoing (interventions implemented as appropriate)

## 2018-02-05 NOTE — PROGRESS NOTES
INFECTIOUS DISEASES PROGRESS NOTE    CC: f/u PJI    S:   Swelling better in Left leg but still painful  No f/c/ns    O:  Physical Exam:  Temp:  [97 °F (36.1 °C)-98.7 °F (37.1 °C)] 97 °F (36.1 °C)  Heart Rate:  [72-90] 81  Resp:  [18] 18  BP: (101-127)/(54-73) 108/73  Physical Exam   Constitutional: She appears well-developed. No distress.   Pulmonary/Chest: Effort normal.   Neurological: She is alert.   Skin: Skin is warm and dry. There is erythema (chronic  venous changes on LE and ttp over left knee).   Psychiatric: She has a normal mood and affect. Her behavior is normal.        Diagnostics:    WBC 11.3 (p76, L 15, M 6)  H/H 12/40    Cr 1.11  glc 157-276    Microbiology:  2/1 Knee Arthrocentesis Cx: Serratia marcescens (r-cefazolin and cefoxitin)      Estimated Creatinine Clearance: 54 mL/min (by C-G formula based on Cr of 1.11).    Assessment/Plan   1. Recurrent left knee prosthetic joint infection  - cx with serratia  -d/c ceftriaxone and start PO cipro 500mg po q12 x6 weeks (stop date 3/18/18) since quinolones are really drug of choice for gram neg PJI  -Discussed with patient proper dosing administration and side effects of the antibiotic       2. Acute kidney injury - mild  - stable     3. Uncontrolled DM2 - complicating above  -cont glycemic control efforts to prevent/control infectious complications    Harvinder Arita MD  8:58 AM  02/05/18

## 2018-02-05 NOTE — PLAN OF CARE
Problem: Patient Care Overview (Adult)  Goal: Plan of Care Review  Outcome: Ongoing (interventions implemented as appropriate)   02/05/18 3816   Coping/Psychosocial Response Interventions   Plan Of Care Reviewed With patient   Patient Care Overview   Progress improving   Outcome Evaluation   Outcome Summary/Follow up Plan Pain controlled with oral meds. Declined need for meds today. No c/o nausea. Pt diabetic. stated she is on oral meds only but BS elevated due to steroids.Educated on need to monitor and maintain diabetic diet.      Goal: Adult Individualization and Mutuality  Outcome: Ongoing (interventions implemented as appropriate)    Goal: Discharge Needs Assessment  Outcome: Ongoing (interventions implemented as appropriate)      Problem: Fall Risk (Adult)  Goal: Absence of Falls  Outcome: Ongoing (interventions implemented as appropriate)      Problem: Pressure Ulcer Risk (Jim Scale) (Adult,Obstetrics,Pediatric)  Goal: Skin Integrity  Outcome: Ongoing (interventions implemented as appropriate)      Problem: Infection, Risk/Actual (Adult)  Goal: Infection Prevention/Resolution  Outcome: Ongoing (interventions implemented as appropriate)      Problem: Pain, Acute (Adult)  Goal: Acceptable Pain Control/Comfort Level  Outcome: Ongoing (interventions implemented as appropriate)

## 2018-02-06 VITALS
BODY MASS INDEX: 51.25 KG/M2 | TEMPERATURE: 97 F | HEIGHT: 63 IN | OXYGEN SATURATION: 93 % | SYSTOLIC BLOOD PRESSURE: 109 MMHG | DIASTOLIC BLOOD PRESSURE: 78 MMHG | WEIGHT: 289.24 LBS | HEART RATE: 78 BPM | RESPIRATION RATE: 18 BRPM

## 2018-02-06 LAB
ANION GAP SERPL CALCULATED.3IONS-SCNC: 7.2 MMOL/L
BACTERIA SPEC ANAEROBE CULT: NORMAL
BASOPHILS # BLD AUTO: 0.02 10*3/MM3 (ref 0–0.2)
BASOPHILS NFR BLD AUTO: 0.3 % (ref 0–1.5)
BUN BLD-MCNC: 23 MG/DL (ref 8–23)
BUN/CREAT SERPL: 22.5 (ref 7–25)
CALCIUM SPEC-SCNC: 8.3 MG/DL (ref 8.6–10.5)
CHLORIDE SERPL-SCNC: 94 MMOL/L (ref 98–107)
CO2 SERPL-SCNC: 30.8 MMOL/L (ref 22–29)
CREAT BLD-MCNC: 1.02 MG/DL (ref 0.57–1)
DEPRECATED RDW RBC AUTO: 48.9 FL (ref 37–54)
EOSINOPHIL # BLD AUTO: 0.26 10*3/MM3 (ref 0–0.7)
EOSINOPHIL NFR BLD AUTO: 3.4 % (ref 0.3–6.2)
ERYTHROCYTE [DISTWIDTH] IN BLOOD BY AUTOMATED COUNT: 13.6 % (ref 11.7–13)
GFR SERPL CREATININE-BSD FRML MDRD: 52 ML/MIN/1.73
GLUCOSE BLD-MCNC: 187 MG/DL (ref 65–99)
GLUCOSE BLDC GLUCOMTR-MCNC: 170 MG/DL (ref 70–130)
GLUCOSE BLDC GLUCOMTR-MCNC: 180 MG/DL (ref 70–130)
HCT VFR BLD AUTO: 36.7 % (ref 35.6–45.5)
HGB BLD-MCNC: 11.2 G/DL (ref 11.9–15.5)
IMM GRANULOCYTES # BLD: 0.02 10*3/MM3 (ref 0–0.03)
IMM GRANULOCYTES NFR BLD: 0.3 % (ref 0–0.5)
LYMPHOCYTES # BLD AUTO: 1.22 10*3/MM3 (ref 0.9–4.8)
LYMPHOCYTES NFR BLD AUTO: 15.8 % (ref 19.6–45.3)
MCH RBC QN AUTO: 30.1 PG (ref 26.9–32)
MCHC RBC AUTO-ENTMCNC: 30.5 G/DL (ref 32.4–36.3)
MCV RBC AUTO: 98.7 FL (ref 80.5–98.2)
MONOCYTES # BLD AUTO: 0.72 10*3/MM3 (ref 0.2–1.2)
MONOCYTES NFR BLD AUTO: 9.4 % (ref 5–12)
NEUTROPHILS # BLD AUTO: 5.46 10*3/MM3 (ref 1.9–8.1)
NEUTROPHILS NFR BLD AUTO: 70.8 % (ref 42.7–76)
PLATELET # BLD AUTO: 290 10*3/MM3 (ref 140–500)
PMV BLD AUTO: 10.4 FL (ref 6–12)
POTASSIUM BLD-SCNC: 4.5 MMOL/L (ref 3.5–5.2)
RBC # BLD AUTO: 3.72 10*6/MM3 (ref 3.9–5.2)
SODIUM BLD-SCNC: 132 MMOL/L (ref 136–145)
WBC NRBC COR # BLD: 7.7 10*3/MM3 (ref 4.5–10.7)

## 2018-02-06 PROCEDURE — 82962 GLUCOSE BLOOD TEST: CPT

## 2018-02-06 PROCEDURE — 99232 SBSQ HOSP IP/OBS MODERATE 35: CPT | Performed by: INTERNAL MEDICINE

## 2018-02-06 PROCEDURE — 85025 COMPLETE CBC W/AUTO DIFF WBC: CPT | Performed by: INTERNAL MEDICINE

## 2018-02-06 PROCEDURE — 63710000001 INSULIN ASPART PER 5 UNITS: Performed by: INTERNAL MEDICINE

## 2018-02-06 PROCEDURE — 25010000002 ONDANSETRON PER 1 MG: Performed by: ORTHOPAEDIC SURGERY

## 2018-02-06 PROCEDURE — 80048 BASIC METABOLIC PNL TOTAL CA: CPT | Performed by: INTERNAL MEDICINE

## 2018-02-06 RX ORDER — CIPROFLOXACIN 500 MG/1
500 TABLET, FILM COATED ORAL EVERY 12 HOURS SCHEDULED
Qty: 60 TABLET | Refills: 1 | Status: SHIPPED | OUTPATIENT
Start: 2018-02-06 | End: 2018-03-19

## 2018-02-06 RX ORDER — OXYCODONE AND ACETAMINOPHEN 7.5; 325 MG/1; MG/1
TABLET ORAL
Qty: 100 TABLET | Refills: 0 | Status: ON HOLD | OUTPATIENT
Start: 2018-02-06 | End: 2020-01-01

## 2018-02-06 RX ADMIN — ONDANSETRON 4 MG: 2 INJECTION INTRAMUSCULAR; INTRAVENOUS at 09:24

## 2018-02-06 RX ADMIN — CIPROFLOXACIN HYDROCHLORIDE 500 MG: 500 TABLET, FILM COATED ORAL at 09:19

## 2018-02-06 RX ADMIN — POTASSIUM CHLORIDE 20 MEQ: 750 CAPSULE, EXTENDED RELEASE ORAL at 07:45

## 2018-02-06 RX ADMIN — DOCUSATE SODIUM 100 MG: 100 CAPSULE, LIQUID FILLED ORAL at 09:20

## 2018-02-06 RX ADMIN — INSULIN ASPART 3 UNITS: 100 INJECTION, SOLUTION INTRAVENOUS; SUBCUTANEOUS at 07:46

## 2018-02-06 RX ADMIN — METFORMIN HYDROCHLORIDE 500 MG: 500 TABLET ORAL at 07:45

## 2018-02-06 RX ADMIN — ATORVASTATIN CALCIUM 10 MG: 10 TABLET, FILM COATED ORAL at 09:19

## 2018-02-06 RX ADMIN — APIXABAN 5 MG: 5 TABLET, FILM COATED ORAL at 09:19

## 2018-02-06 RX ADMIN — OXYCODONE HYDROCHLORIDE AND ACETAMINOPHEN 2 TABLET: 7.5; 325 TABLET ORAL at 05:53

## 2018-02-06 RX ADMIN — OXYCODONE HYDROCHLORIDE AND ACETAMINOPHEN 2 TABLET: 7.5; 325 TABLET ORAL at 01:22

## 2018-02-06 RX ADMIN — METOPROLOL TARTRATE 50 MG: 50 TABLET, FILM COATED ORAL at 09:19

## 2018-02-06 RX ADMIN — FUROSEMIDE 40 MG: 40 TABLET ORAL at 09:19

## 2018-02-06 RX ADMIN — NYSTATIN: 100000 POWDER TOPICAL at 09:21

## 2018-02-06 NOTE — PROGRESS NOTES
Continued Stay Note  [facility]     Patient Name: Shoshana Bae  MRN: [id number]  Today's Date: 2/6/2018    Admit Date: 2/1/2018          Discharge Plan       02/06/18 0957    Case Management/Social Work Plan    Plan [facility] 12PM    Patient/Family In Agreement With Plan yes    Additional Comments CCP met with pt who requests ambulance transport to return to SNF due to immobility. CCP gave insurance coverage disclaimer. CCP scheduled [facility] for 12:00PM, and informed [facility] Chambers) of pt's d/c today. Rosy confirms pt will return skilled and a bed is available. Packet on pt chart. Dilcia Zaldivar LCSW              Discharge Codes     None        Expected Discharge Date and Time     Expected Discharge Date Expected Discharge Time    Feb 6, 2018             Kita Zaldivar LCSW

## 2018-02-06 NOTE — PROGRESS NOTES
Continued Stay Note  [facility]     Patient Name: Shoshana Bae  MRN: [id number]  Today's Date: 2/6/2018    Admit Date: 2/1/2018          Discharge Plan       02/06/18 0957    Case Management/Social Work Plan    Plan [facility] 12PM    Patient/Family In Agreement With Plan yes    Additional Comments CCP met with pt who requests ambulance transport to return to SNF due to immobility. CCP gave insurance coverage disclaimer. CCP scheduled [facility] for 12:00PM, and left [facility]MM to inform [facility] Chambers) of pt's d/c today. Packet on pt chart. Dilcia Zaldivar LCSW              Discharge Codes     None        Expected Discharge Date and Time     Expected Discharge Date Expected Discharge Time    Feb 6, 2018             Kita Zaldivar LCSW

## 2018-02-06 NOTE — PLAN OF CARE
Problem: Patient Care Overview (Adult)  Goal: Plan of Care Review  Outcome: Outcome(s) achieved Date Met: 02/06/18 02/06/18 0823   Coping/Psychosocial Response Interventions   Plan Of Care Reviewed With patient   Patient Care Overview   Progress improving   Outcome Evaluation   Outcome Summary/Follow up Plan up to BR with walker and assist on 1. Continues with pain rated as 5 but refuses offer of medication. Required zofran X1 for nausea. Ed on continuing to monitor BS and diet due to DM. Report called to Bria Copeland. Written material and script given to .     Goal: Adult Individualization and Mutuality  Outcome: Outcome(s) achieved Date Met: 02/06/18    Goal: Discharge Needs Assessment  Outcome: Outcome(s) achieved Date Met: 02/06/18      Problem: Fall Risk (Adult)  Goal: Absence of Falls  Outcome: Outcome(s) achieved Date Met: 02/06/18      Problem: Pressure Ulcer Risk (Jim Scale) (Adult,Obstetrics,Pediatric)  Goal: Skin Integrity  Outcome: Outcome(s) achieved Date Met: 02/06/18      Problem: Infection, Risk/Actual (Adult)  Goal: Infection Prevention/Resolution  Outcome: Outcome(s) achieved Date Met: 02/06/18      Problem: Pain, Acute (Adult)  Goal: Acceptable Pain Control/Comfort Level  Outcome: Outcome(s) achieved Date Met: 02/06/18      Problem: Hypertensive Disease/Crisis (Arterial) (Adult)  Goal: Signs and Symptoms of Listed Potential Problems Will be Absent or Manageable (Hypertensive Disease/Crisis)  Outcome: Outcome(s) achieved Date Met: 02/06/18

## 2018-02-06 NOTE — PROGRESS NOTES
ID note  CC: f/u PJi  S: Leg painful overnight but better this AM and better compared to last week  No f/c/ns  Tolerated cipro just fine    AF, VSS  NAD  Left leg with chronic venous stasis changes and knee that is mildly ttp without impressive effusion  Appropriate mood and affect    Glc 145-206  Cr 1.02  WBC 7.70 (P71, L 16, M 9, e3)  H/H 11/37      A/P  1. Recurrent left knee prosthetic joint infection 2/2 serratia  - overall improved  -cont PO cipro 500mg po q12 x6 weeks (stop date 3/18/18)  -f/u 3/19/18 with Me  -no objection to d/c when okay with others         2. CKD III  - stable, abx dosed appropriately       3. Uncontrolled DM2 - complicating above  -cont glycemic control efforts to prevent/control infectious complications

## 2018-02-06 NOTE — H&P
Patient: Shoshana Bae  YOB: 1940 77 y.o. female  Medical Record Number: [id number]     Chief Complaints:       Chief Complaint   Patient presents with   • Left Knee - Follow-up, Pain   • Wound Check         History of Present Illness:Shoshana Bae is a 77 y.o. female who presents with follow up of left knee. Pain is worse over the past week. She describes it as severe. It has progressed over the last three weeks. Denies fever, chills.     Allergies:        Allergies   Allergen Reactions   • Codeine Nausea And Vomiting   • Morphine Nausea And Vomiting   • Morphine And Related     • Phosphate Nausea And Vomiting         Medications:   No current facility-administered medications for this visit.       No current outpatient prescriptions on file.                Facility-Administered Medications Ordered in Other Visits   Medication Dose Route Frequency Provider Last Rate Last Dose   • acetaminophen (TYLENOL) tablet 650 mg  650 mg Oral Q4H PRN Dominick Bosch MD         • apixaban (ELIQUIS) tablet 5 mg  5 mg Oral Q12H Dominick Bosch MD    5 mg at 02/05/18 2117   • atorvastatin (LIPITOR) tablet 10 mg  10 mg Oral Daily Dominick Bosch MD    10 mg at 02/05/18 0908   • bisacodyl (DULCOLAX) suppository 10 mg  10 mg Rectal Daily PRN Dominick Bosch MD         • ciprofloxacin (CIPRO) tablet 500 mg  500 mg Oral Q12H Harvinder Arita MD    500 mg at 02/05/18 1433   • dextrose (D50W) solution 25 g  25 g Intravenous Q15 Min PRN Doni Dockery MD         • dextrose (GLUTOSE) oral gel 15 g  15 g Oral Q15 Min PRN Doni Dockery MD         • docusate sodium (COLACE) capsule 100 mg  100 mg Oral BID Dominick Bosch MD    100 mg at 02/05/18 2117   • furosemide (LASIX) tablet 40 mg  40 mg Oral BID Dominick Bosch MD    40 mg at 02/05/18 1736   • glucagon (human recombinant) (GLUCAGEN DIAGNOSTIC) injection 1 mg  1 mg Subcutaneous PRN Doni Dockery MD         • insulin aspart (novoLOG) injection 0-14 Units   0-14 Units Subcutaneous 4x Daily With Meals & Nightly Doni Dockery MD    5 Units at 02/05/18 2124   • lisinopril (PRINIVIL,ZESTRIL) tablet 2.5 mg  2.5 mg Oral Nightly Dominick Bosch MD    2.5 mg at 02/05/18 2117   • metFORMIN (GLUCOPHAGE) tablet 500 mg  500 mg Oral BID With Meals Dominick Bosch MD    500 mg at 02/05/18 1736   • metoprolol tartrate (LOPRESSOR) tablet 50 mg  50 mg Oral Q12H Dominick Bosch MD    50 mg at 02/05/18 2117   • nystatin (MYCOSTATIN) powder    Topical Q12H Dominick Bosch MD         • ondansetron (ZOFRAN) injection 4 mg  4 mg Intravenous Q6H PRN Dominick Bosch MD    4 mg at 02/04/18 2318   • ondansetron (ZOFRAN) tablet 4 mg  4 mg Oral Q6H PRN Dominick Bosch MD    4 mg at 02/04/18 0825   • oxyCODONE-acetaminophen (PERCOCET) 7.5-325 MG per tablet 1 tablet  1 tablet Oral Q4H PRN Dominick Bosch MD           Or   • oxyCODONE-acetaminophen (PERCOCET) 7.5-325 MG per tablet 2 tablet  2 tablet Oral Q4H PRN Dominick Bosch MD    2 tablet at 02/05/18 0314   • potassium chloride (MICRO-K) CR capsule 20 mEq  20 mEq Oral BID With Meals Dominick Bosch MD    20 mEq at 02/05/18 1736   • promethazine (PHENERGAN) tablet 12.5 mg  12.5 mg Oral Q6H PRN Dominick Bosch MD           Or   • promethazine (PHENERGAN) injection 12.5 mg  12.5 mg Intramuscular Q6H PRN Dominick Bosch MD           Or   • promethazine (PHENERGAN) injection 12.5 mg  12.5 mg Intravenous Q6H PRN Dominick Bosch MD           Or   • promethazine (PHENERGAN) suppository 12.5 mg  12.5 mg Rectal Q6H PRN Dominick Bosch MD         • sodium chloride 0.9 % flush 1-10 mL  1-10 mL Intravenous PRN Dominick Bosch MD                  The following portions of the patient's history were reviewed and updated as appropriate: allergies, current medications, past family history, past medical history, past social history, past surgical history and problem list.     Review of Systems:   A 14 point review of systems was performed. All systems negative except pertinent  "positives/negative listed in HPI above     Physical Exam:    Vitals        Vitals:     02/01/18 1630   Temp: 97.5 °F (36.4 °C)   Weight: 132 kg (290 lb)   Height: 158.8 cm (62.5\")            General: A and O x 3, ASA, NAD                                              SCLERA:    Normal                                              DENTITION:   Normal  celluitlis from proximal tibia to ankle  Pain with knee ROM  2+ effusion     Chest CTA  CV - RRR                          Assessment/Plan: Left infected knee spacer. She is not a good operative candidate given herAdvanced diabetes, morbid obesity, very poor soft tissue envelopeIn history of multiple infections.I aspirated they today and we'll send it off to the lab. We're going to go ahead and admit her to the hospital to follow her cultures and I will get an infectious disease consultation        Dominick Bosch MD  2/5/2018    "

## 2018-02-06 NOTE — PROGRESS NOTES
Patient: Shoshana Bae  YOB: 1940 77 y.o. female  Medical Record Number: [id number]    Chief Complaints:   Chief Complaint   Patient presents with   • Left Knee - Follow-up, Pain   • Wound Check       History of Present Illness:Shoshana Bae is a 77 y.o. female who presents with follow up of left knee. Pain is worse over the past week. She describes it as severe. It has progressed over the last three weeks. Denies fever, chills.    Allergies:   Allergies   Allergen Reactions   • Codeine Nausea And Vomiting   • Morphine Nausea And Vomiting   • Morphine And Related    • Phosphate Nausea And Vomiting       Medications:   No current facility-administered medications for this visit.      No current outpatient prescriptions on file.     Facility-Administered Medications Ordered in Other Visits   Medication Dose Route Frequency Provider Last Rate Last Dose   • acetaminophen (TYLENOL) tablet 650 mg  650 mg Oral Q4H PRN Dominick Bosch MD       • apixaban (ELIQUIS) tablet 5 mg  5 mg Oral Q12H Dominick Bosch MD   5 mg at 02/05/18 2117   • atorvastatin (LIPITOR) tablet 10 mg  10 mg Oral Daily Dominick Bosch MD   10 mg at 02/05/18 0908   • bisacodyl (DULCOLAX) suppository 10 mg  10 mg Rectal Daily PRN Dominick Bosch MD       • ciprofloxacin (CIPRO) tablet 500 mg  500 mg Oral Q12H Harvinder Arita MD   500 mg at 02/05/18 1433   • dextrose (D50W) solution 25 g  25 g Intravenous Q15 Min PRN Doni Dockery MD       • dextrose (GLUTOSE) oral gel 15 g  15 g Oral Q15 Min PRN Doni Dockery MD       • docusate sodium (COLACE) capsule 100 mg  100 mg Oral BID Dominick Bosch MD   100 mg at 02/05/18 2117   • furosemide (LASIX) tablet 40 mg  40 mg Oral BID Dominick Bosch MD   40 mg at 02/05/18 1736   • glucagon (human recombinant) (GLUCAGEN DIAGNOSTIC) injection 1 mg  1 mg Subcutaneous PRN Doni Dockery MD       • insulin aspart (novoLOG) injection 0-14 Units  0-14 Units Subcutaneous 4x Daily With  Meals & Nightly Doni Dockery MD   5 Units at 02/05/18 2124   • lisinopril (PRINIVIL,ZESTRIL) tablet 2.5 mg  2.5 mg Oral Nightly Dominick Bosch MD   2.5 mg at 02/05/18 2117   • metFORMIN (GLUCOPHAGE) tablet 500 mg  500 mg Oral BID With Meals Dominick Bosch MD   500 mg at 02/05/18 1736   • metoprolol tartrate (LOPRESSOR) tablet 50 mg  50 mg Oral Q12H Dominick Bosch MD   50 mg at 02/05/18 2117   • nystatin (MYCOSTATIN) powder   Topical Q12H Dominick Bosch MD       • ondansetron (ZOFRAN) injection 4 mg  4 mg Intravenous Q6H PRN Dominick Bosch MD   4 mg at 02/04/18 2318   • ondansetron (ZOFRAN) tablet 4 mg  4 mg Oral Q6H PRN Dominick Bosch MD   4 mg at 02/04/18 0825   • oxyCODONE-acetaminophen (PERCOCET) 7.5-325 MG per tablet 1 tablet  1 tablet Oral Q4H PRN Dominick Bosch MD        Or   • oxyCODONE-acetaminophen (PERCOCET) 7.5-325 MG per tablet 2 tablet  2 tablet Oral Q4H PRN Dominick Bosch MD   2 tablet at 02/05/18 0314   • potassium chloride (MICRO-K) CR capsule 20 mEq  20 mEq Oral BID With Meals Dominick Bosch MD   20 mEq at 02/05/18 1736   • promethazine (PHENERGAN) tablet 12.5 mg  12.5 mg Oral Q6H PRN Dominick Bosch MD        Or   • promethazine (PHENERGAN) injection 12.5 mg  12.5 mg Intramuscular Q6H PRN Dominick Bosch MD        Or   • promethazine (PHENERGAN) injection 12.5 mg  12.5 mg Intravenous Q6H PRN Dominick Bosch MD        Or   • promethazine (PHENERGAN) suppository 12.5 mg  12.5 mg Rectal Q6H PRN Dominick Bosch MD       • sodium chloride 0.9 % flush 1-10 mL  1-10 mL Intravenous PRN Dominick Bosch MD             The following portions of the patient's history were reviewed and updated as appropriate: allergies, current medications, past family history, past medical history, past social history, past surgical history and problem list.    Review of Systems:   A 14 point review of systems was performed. All systems negative except pertinent positives/negative listed in HPI above    Physical Exam:   Vitals:     "02/01/18 1630   Temp: 97.5 °F (36.4 °C)   Weight: 132 kg (290 lb)   Height: 158.8 cm (62.5\")       General: A and O x 3, ASA, NAD    SCLERA:    Normal    DENTITION:   Normal  celluitlis from proximal tibia to ankle  Pain with knee ROM  2+ effusion    Chest CTA  CV - RRR       Assessment/Plan: Left infected knee spacer. She is not a good operative candidate given herAdvanced diabetes, morbid obesity, very poor soft tissue envelopeIn history of multiple infections.I aspirated they todayAnd we'll send it off to the lab. We're going to go ahead and admit her to the hospital to follow her cultures and I will get an infectious disease consultation      Dominick Bosch MD  2/5/2018  "

## 2018-02-06 NOTE — DISCHARGE SUMMARY
Patient Name: Shoshana Bae  Patient YOB: 1940    Date of Admission:  2/1/2018  Date of Discharge:  2/6/2018  Discharge Diagnosis:No admission procedures for hospital encounter.    Presenting Problem/History of Present Illness: Infection of total knee replacement, sequela [T84.59XS, Z96.659]  Admitting Physician: Dr. Dominick Bosch    Consults:   Consults     Date and Time Order Name Status Description    2/1/2018 2005 Inpatient Consult to Infectious Diseases Completed     2/1/2018 2005 Inpatient Consult to Hospitalist Completed           DETAILS OF HOSPITAL STAY:  Patient is a 77 y.o. female was admittedTo the hospital with complaints of severe left knee pain.  An in office aspiration demonstrated infection of an antibiotic spacer within the left knee.  She has not been a surgical candidate given her significant medical comorbidities.  After admission and infectious disease consult was obtained and we followed the aspirate results which demonstrated the bacteria to be Serratia which was sensitive to numerous antibiotics.  She was placed on ciprofloxacin by mouth and began to demonstrate some improvement in her pain symptoms especially when seated or in bed.  She still had significant discomfort with attempted ablation and weightbearing.  The plan at the time of discharge was to place her on Cipro for 42 days per infectious disease and then we will have her on suppressive oral antibiotics lifetime.    Condition on Discharge:  Stable    Vital Signs  Temp:  [96.6 °F (35.9 °C)-97.7 °F (36.5 °C)] 97.3 °F (36.3 °C)  Heart Rate:  [73-89] 73  Resp:  [16-18] 16  BP: (108-132)/(63-73) 132/72    LABS / Imaging Results:   Admission on 02/01/2018   No results displayed because visit has over 200 results.          Xr Knee 1 Or 2 View Left    Result Date: 1/25/2018  Impression: Ordering physician's impression is located in the Encounter Note dated 01/25/18. X-ray performed in the CR room.         Discharge  Medications   Shoshana Bae   Home Medication Instructions PAT:[id number]    Printed on:02/06/18 4508   Medication Information                      atorvastatin (LIPITOR) 10 MG tablet  Take 1 tablet by mouth Daily.             ciprofloxacin (CIPRO) 500 MG tablet  Take 1 tablet by mouth Every 12 (Twelve) Hours for 82 doses. Indications: Bone and/or Joint Infection             ELIQUIS 5 MG tablet tablet               furosemide (LASIX) 40 MG tablet  Take 2.5 tablets by mouth Daily. Pt taking 100mg qd             furosemide (LASIX) 40 MG tablet  Take 40 mg by mouth 2 (Two) Times a Day. At 0600 and 1200             glucose blood (FREESTYLE TEST STRIPS) test strip  1 each by Other route 2 (Two) Times a Week.             hydrocortisone 1 % cream  Apply  topically 2 (Two) Times a Day.             icosapent ethyl (VASCEPA) 1 G capsule capsule  Take 2 g by mouth 2 (Two) Times a Day With Meals.             KLOR-CON 20 MEQ CR tablet  Take 1 tablet by mouth 2 (Two) Times a Day.             Lactobacillus (FLORANEX) pack oral packet  Take 1 packet by mouth Daily.             lisinopril (PRINIVIL,ZESTRIL) 10 MG tablet  Take 1 tablet by mouth Every Morning.             metFORMIN (GLUCOPHAGE) 500 MG tablet  Take 1 tablet by mouth 2 (Two) Times a Day With Meals.             metoprolol tartrate (LOPRESSOR) 50 MG tablet  Take 1 tablet by mouth 2 (Two) Times a Day.             miconazole (MICOTIN) 2 % powder  Apply 1 application topically 2 (Two) Times a Day.             oxyCODONE-acetaminophen (PERCOCET) 7.5-325 MG per tablet  1-2 po q 4-6 hr prn             sennosides-docusate sodium (SENOKOT-S) 8.6-50 MG tablet  Take 1 tablet by mouth 3 (Three) Times a Day As Needed for Constipation.             triamcinolone (KENALOG) 0.1 % cream  Apply 1 application topically 2 (Two) Times a Day.             vitamin D (ERGOCALCIFEROL) 97365 units capsule capsule  Take 50,000 Units by mouth Every 14 (Fourteen) Days.                 Activity at  Discharge:     Discharge Instructions:Patient is allowed to transfer bed to chair as tolerated.  She can work on knee range of motion as tolerated.  Given her limited mobility close attention should be paid to DVT and ulcer precautions.  If the patient develops any fever chills worsening pain or signs or symptoms of systemic infection then please notify my office immediately.  She will be maintained on ciprofloxacin twice daily for 6 weeks.  She will have weekly labs per Dr. Arita.  And I would like to see her in a few weeks.  Based on her mobility we will either have her come to my office in 3-4 weeks or I will visit her at [facility] in [place].  Please call my office at [phone] for any questions or concerns    Discharge Diagnosis:    Patient Active Problem List   Diagnosis   • History of total knee arthroplasty   • Pain in left knee   • Failed total knee arthroplasty   • H/O mammogram   • H/O colonoscopy   • Lymph edema   • Sarcoidosis   • Chronic atrial fibrillation   • Hyperlipidemia   • Hypertriglyceridemia   • Type 2 diabetes mellitus with hyperglycemia   • Stasis dermatitis of both legs   • Essential hypertension   • Cellulitis of left lower extremity   • Cellulitis of extremity, lower bilateral   • Chronic anticoagulation, for a fib   • Morbid obesity with BMI of 50.0-59.9, adult   • OA (osteoarthritis) of knee   • Infected prosthetic knee joint   • Obstructive sleep apnea syndrome   • Chronic venous insufficiency   • Pulmonary nodule   • Failed total knee replacement   • Infection of total knee replacement   • Renal insufficiency       Follow-up Appointments  Future Appointments  Date Time Provider Department Center   3/15/2018 1:00 PM MD RENNY Bhatia [facility] None   3/19/2018 1:20 PM MD CAROL [facility] None            Dominick Bosch MD  02/06/18  7:08 AM

## 2018-02-06 NOTE — PLAN OF CARE
Problem: Patient Care Overview (Adult)  Goal: Plan of Care Review  Outcome: Ongoing (interventions implemented as appropriate)   02/06/18 0628   Coping/Psychosocial Response Interventions   Plan Of Care Reviewed With patient   Patient Care Overview   Progress improving   Outcome Evaluation   Outcome Summary/Follow up Plan VSS; pt with minimal pain; Up with x1 assist to the bathroom with no success; Pt was I&O cathed x1 with 500cc out; Pt educated on the need for monitoring her blood pressure; will continue to monitor       Problem: Fall Risk (Adult)  Goal: Absence of Falls  Outcome: Ongoing (interventions implemented as appropriate)      Problem: Pressure Ulcer Risk (Jim Scale) (Adult,Obstetrics,Pediatric)  Goal: Skin Integrity  Outcome: Ongoing (interventions implemented as appropriate)      Problem: Infection, Risk/Actual (Adult)  Goal: Infection Prevention/Resolution  Outcome: Ongoing (interventions implemented as appropriate)      Problem: Pain, Acute (Adult)  Goal: Acceptable Pain Control/Comfort Level  Outcome: Ongoing (interventions implemented as appropriate)      Problem: Hypertensive Disease/Crisis (Arterial) (Adult)  Goal: Signs and Symptoms of Listed Potential Problems Will be Absent or Manageable (Hypertensive Disease/Crisis)  Outcome: Ongoing (interventions implemented as appropriate)

## 2018-02-09 NOTE — PROGRESS NOTES
Case Management Discharge Note    Final Note: [facility]    Discharge Placement     Facility/Agency Request Status Selected? Address Phone Number Fax Number    [facility] Accepted    Yes [address] [facility] [address] [phone]        [facility]: [facility]    Discharge Codes: 03  Discharged/transferred to skilled nursing facility (SNF) with Medicare certification in anticipation of skilled care   (3) adequate

## 2018-03-09 ENCOUNTER — TELEPHONE (OUTPATIENT)
Dept: ORTHOPEDIC SURGERY | Facility: CLINIC | Age: 78
End: 2018-03-09

## 2018-03-20 ENCOUNTER — TELEPHONE (OUTPATIENT)
Dept: INFECTIOUS DISEASES | Facility: CLINIC | Age: 78
End: 2018-03-20

## 2018-03-20 NOTE — TELEPHONE ENCOUNTER
Amna, pt , states that she was unaware that the patients appointment with Dr. Arita had not been canceled yesterday.  Amna was informed by the nursing staff at pts facility that Dr. Bosch had spoke with Dr. Arita, and that both physicians were in agreement that the patient would be rescheduled at a later date.  I informed Amna that I was unaware of this information but that I would pass along to Dr. Arita and confirm when he wishes to have patient scheduled for an appointment.

## 2018-03-22 NOTE — TELEPHONE ENCOUNTER
Phone with Sabrina Arrington, (Trang Solorio's associate) and we have rescheduled Mrs. Bae for 4/3/18 per Dr. Arita's request. Joya Castellon RN

## 2018-04-03 ENCOUNTER — OFFICE VISIT (OUTPATIENT)
Dept: INFECTIOUS DISEASES | Facility: CLINIC | Age: 78
End: 2018-04-03

## 2018-04-03 VITALS
HEIGHT: 63 IN | HEART RATE: 67 BPM | TEMPERATURE: 98.6 F | DIASTOLIC BLOOD PRESSURE: 67 MMHG | SYSTOLIC BLOOD PRESSURE: 110 MMHG

## 2018-04-03 DIAGNOSIS — N18.30 CKD (CHRONIC KIDNEY DISEASE), STAGE III (HCC): ICD-10-CM

## 2018-04-03 DIAGNOSIS — A49.8: ICD-10-CM

## 2018-04-03 DIAGNOSIS — Z96.659 INFECTED PROSTHETIC KNEE JOINT, SUBSEQUENT ENCOUNTER: Primary | ICD-10-CM

## 2018-04-03 DIAGNOSIS — T84.59XD INFECTED PROSTHETIC KNEE JOINT, SUBSEQUENT ENCOUNTER: Primary | ICD-10-CM

## 2018-04-03 DIAGNOSIS — I89.0 LYMPHEDEMA: ICD-10-CM

## 2018-04-03 PROCEDURE — 99214 OFFICE O/P EST MOD 30 MIN: CPT | Performed by: INTERNAL MEDICINE

## 2018-04-03 RX ORDER — ONDANSETRON 4 MG/1
4 TABLET, FILM COATED ORAL EVERY 4 HOURS PRN
Status: ON HOLD | COMMUNITY
End: 2021-01-01

## 2018-04-03 NOTE — PROGRESS NOTES
"cc: f/u pji    Sr. Anna returns for follow-up of prosthetic joint infection.    Briefly was hospitalized in February 2018 and had Serratia infection of her left antibody spacer.  She received 6 weeks oral ciprofloxacin and did well with this.  March 22, 2018 she had her spacer replaced.  Over the past several days she has noticed increasing left lower extremity swelling that is painless and associated with clear drainage.  She has not been doing lymphedema wraps lately.  She denies any associated fevers or chills or night sweats.  She does have some easy bruising.  Knee actually does not feel all that painful.  No constitutional symptoms of infection such as fever, chills, night sweats.    Past medical history: Hypertension, atrial fibrillation, diabetes mellitus type 2, hyperlipidemia, uterine cancer, mouth cancer, osteoarthritis, RODRIGO, sarcoidosis off therapy,cellulitis of LE, venous stasis, appendectomy, tonsillectomy, bilateral total knee arthroplasties, DJD s/p lumbar fusion, thyroid surgery, bilateral cataracts, hernia repair, ovarian cyst removal, D&C ×2,     Review of Systems: Joint pain/swelling.  Lower from edema.  Easy bruising.  All other reviewed and negative except as per HPI    Blood pressure 110/67, pulse 67, temperature 98.6 °F (37 °C), height 158.8 cm (62.52\").  GENERAL: Awake and alert, in no acute distress.   Left lower extremity pain with swelling and venous stasis changes with minimal clear lymph fluid draining.  The incision appears well-healed.  There is some mild ecchymosis around the knee incision  PSYCHIATRIC: Appropriate mood, affect, insight, and judgment.       DIAGNOSTICS: Most recent laboratories from her nursing facility were reviewed.  Her creatinine on March 19, 2018 was 1.2 with a GFR 43.  White count from 3/14/18 was 5.5 with 61% neutrophils, 23% lymphocytes, 11% monocytes, 4% eosinophils.  Hemoglobin 10.1.  Hematocrit 30.  Platelets 195.      Assessment and Plan  Infected L " prosthetic knee joint/abx spacer Secondary to Serratia  --We will try to put her on suppressive ciprofloxacin 500 mg by mouth every 24 hours indefinitely to try to extend the life of the antibiotic spacer and prevent recrudescence of infection.  Prescription written for this.    Lymphedema  --Her lymphedema is worse.  She's been on and off wraps to control her lymphedema and I think were going to have to resume them at least temporarily.  I told her we'll try to do them for a week then back off and just to lump every 48-72 hours try to control lymphedema.  I don't really see any other options for her.    CKD (chronic kidney disease), stage III  --Creatinine is stable.  Continue to dose antibiotics appropriately.    Long-term current use antibiotics    Return to clinic in 4 months or sooner if needed

## 2018-08-13 ENCOUNTER — OFFICE VISIT (OUTPATIENT)
Dept: INFECTIOUS DISEASES | Facility: CLINIC | Age: 78
End: 2018-08-13

## 2018-08-13 VITALS
TEMPERATURE: 98.2 F | HEART RATE: 88 BPM | DIASTOLIC BLOOD PRESSURE: 72 MMHG | HEIGHT: 63 IN | SYSTOLIC BLOOD PRESSURE: 101 MMHG

## 2018-08-13 DIAGNOSIS — I89.0 LYMPHEDEMA: ICD-10-CM

## 2018-08-13 DIAGNOSIS — Z79.2 LONG TERM CURRENT USE OF ANTIBIOTICS: ICD-10-CM

## 2018-08-13 DIAGNOSIS — N18.30 CKD (CHRONIC KIDNEY DISEASE), STAGE III (HCC): ICD-10-CM

## 2018-08-13 DIAGNOSIS — T84.59XD INFECTED PROSTHETIC KNEE JOINT, SUBSEQUENT ENCOUNTER: Primary | ICD-10-CM

## 2018-08-13 DIAGNOSIS — Z96.659 INFECTED PROSTHETIC KNEE JOINT, SUBSEQUENT ENCOUNTER: Primary | ICD-10-CM

## 2018-08-13 PROCEDURE — 99214 OFFICE O/P EST MOD 30 MIN: CPT | Performed by: INTERNAL MEDICINE

## 2018-08-13 RX ORDER — CIPROFLOXACIN 500 MG/1
500 TABLET, FILM COATED ORAL NIGHTLY
COMMUNITY
End: 2020-01-01 | Stop reason: HOSPADM

## 2018-08-13 NOTE — PROGRESS NOTES
cc: f/u pji    Sr. Anna returns for follow-up of prosthetic joint infection.    Briefly was hospitalized in February 2018 and had Serratia infection of her left antibody spacer.  She received 6 weeks oral ciprofloxacin and did well with this.  She was last seen in clinic in April 2018 and we elected to keep her on chronic suppressive ciprofloxacin.  Since that last visit, she reports that she is done well.  She has very minimal left knee pain.  Really just with bearing weight for transfers.  She is not having any constitutional symptoms of infection such as fevers or chills or night sweats.  Her lymphedema has improved with lymphedema exercises and trying to keep her feet elevated.  She is in a specialized motorized wheelchair now.  She does have some erythema in the lower extremities but this is not bothersome to her.  She denies any missed doses or side effects from her long-term use antibiotics.        Past medical history: Hypertension, atrial fibrillation, diabetes mellitus type 2, hyperlipidemia, uterine cancer, mouth cancer, osteoarthritis, RODRIGO, sarcoidosis off therapy,cellulitis of LE, venous stasis, appendectomy, tonsillectomy, bilateral total knee arthroplasties, DJD s/p lumbar fusion, thyroid surgery, bilateral cataracts, hernia repair, ovarian cyst removal, D&C ×2,     Review of Systems:  Wt loss. itching Lower ext edema.  Easy bruising.  All other reviewed and negative except as per HPI    There were no vitals taken for this visit.  GENERAL: Awake and alert, in no acute distress.   Left lower extremity pain with swelling and venous stasis changes The incision appears well-healed without erythema  PSYCHIATRIC: Appropriate mood, affect, insight, and judgment.       DIAGNOSTICS: Most recent laboratories from her nursing facility on August 8, 2018 were reviewed.  Her creatinine was 1.3 with a GFR of 40.  White count 8.1, hemoglobin 13.2, hematocrit 39.5, platelets 218    CRP 15.4  ESR 36      Assessment  and Plan  Infected L prosthetic knee joint/abx spacer Secondary to Serratia  --Rivas quiescent.  We will continue on suppressive ciprofloxacin 500 mg by mouth every 24 hours indefinitely to try to extend the life of the antibiotic spacer and prevent recrudescence of infection.      Lymphedema  --Her lymphedema is improved.  Looks reasonably well without evidence of infection.  I told her were disconnected try to do what can do to help keep down her fluid.  She understands there is no magic answer here and may just have to trial and error.    CKD (chronic kidney disease), stage III  --Creatinine is stable.  Continue to dose antibiotics appropriately.    Long-term current use antibiotics    Return to clinic in 6 months or sooner if needed

## 2018-08-26 NOTE — PLAN OF CARE
Problem: Patient Care Overview (Adult)  Goal: Plan of Care Review  Outcome: Ongoing (interventions implemented as appropriate)   02/05/18 0424   Coping/Psychosocial Response Interventions   Plan Of Care Reviewed With patient   Patient Care Overview   Progress improving   Outcome Evaluation   Outcome Summary/Follow up Plan Pain well controlled with pain med. LLE red and swollen. Complained of nausea, relieved with Zofran. Educated patient on the importance of blood sugar monitoring r/t history of Diabetes.     Goal: Adult Individualization and Mutuality  Outcome: Ongoing (interventions implemented as appropriate)    Goal: Discharge Needs Assessment  Outcome: Ongoing (interventions implemented as appropriate)      Problem: Fall Risk (Adult)  Goal: Absence of Falls  Outcome: Ongoing (interventions implemented as appropriate)      Problem: Pressure Ulcer Risk (Jim Scale) (Adult,Obstetrics,Pediatric)  Goal: Skin Integrity  Outcome: Ongoing (interventions implemented as appropriate)      Problem: Pain, Acute (Adult)  Goal: Acceptable Pain Control/Comfort Level  Outcome: Ongoing (interventions implemented as appropriate)         Color consistent with ethnicity/race, warm, dry intact, resilient.

## 2019-11-15 ENCOUNTER — HOSPITAL ENCOUNTER (EMERGENCY)
Facility: HOSPITAL | Age: 79
Discharge: HOME | End: 2019-11-16
Attending: EMERGENCY MEDICINE | Admitting: EMERGENCY MEDICINE

## 2019-11-15 DIAGNOSIS — D68.9 COAGULOPATHY (HCC): ICD-10-CM

## 2019-11-15 DIAGNOSIS — T14.8XXA ABRASION: Primary | ICD-10-CM

## 2019-11-15 LAB
ANION GAP SERPL CALCULATED.3IONS-SCNC: 11.8 MMOL/L (ref 5–15)
BASOPHILS # BLD AUTO: 0.06 10*3/MM3 (ref 0–0.2)
BASOPHILS NFR BLD AUTO: 0.7 % (ref 0–1.5)
BUN BLD-MCNC: 27 MG/DL (ref 8–23)
BUN/CREAT SERPL: 23.7 (ref 7–25)
CALCIUM SPEC-SCNC: 8.7 MG/DL (ref 8.6–10.5)
CHLORIDE SERPL-SCNC: 96 MMOL/L (ref 98–107)
CO2 SERPL-SCNC: 26.2 MMOL/L (ref 22–29)
CREAT BLD-MCNC: 1.14 MG/DL (ref 0.57–1)
DEPRECATED RDW RBC AUTO: 49.6 FL (ref 37–54)
EOSINOPHIL # BLD AUTO: 0.15 10*3/MM3 (ref 0–0.4)
EOSINOPHIL NFR BLD AUTO: 1.7 % (ref 0.3–6.2)
ERYTHROCYTE [DISTWIDTH] IN BLOOD BY AUTOMATED COUNT: 13.8 % (ref 12.3–15.4)
GFR SERPL CREATININE-BSD FRML MDRD: 46 ML/MIN/1.73
GLUCOSE BLD-MCNC: 234 MG/DL (ref 65–99)
HCT VFR BLD AUTO: 37.5 % (ref 34–46.6)
HGB BLD-MCNC: 12.2 G/DL (ref 12–15.9)
IMM GRANULOCYTES # BLD AUTO: 0.06 10*3/MM3 (ref 0–0.05)
IMM GRANULOCYTES NFR BLD AUTO: 0.7 % (ref 0–0.5)
LYMPHOCYTES # BLD AUTO: 1.15 10*3/MM3 (ref 0.7–3.1)
LYMPHOCYTES NFR BLD AUTO: 13.2 % (ref 19.6–45.3)
MCH RBC QN AUTO: 32.1 PG (ref 26.6–33)
MCHC RBC AUTO-ENTMCNC: 32.5 G/DL (ref 31.5–35.7)
MCV RBC AUTO: 98.7 FL (ref 79–97)
MONOCYTES # BLD AUTO: 0.84 10*3/MM3 (ref 0.1–0.9)
MONOCYTES NFR BLD AUTO: 9.7 % (ref 5–12)
NEUTROPHILS # BLD AUTO: 6.44 10*3/MM3 (ref 1.7–7)
NEUTROPHILS NFR BLD AUTO: 74 % (ref 42.7–76)
NRBC BLD AUTO-RTO: 0 /100 WBC (ref 0–0.2)
PLATELET # BLD AUTO: 199 10*3/MM3 (ref 140–450)
PMV BLD AUTO: 10.1 FL (ref 6–12)
POTASSIUM BLD-SCNC: 4.4 MMOL/L (ref 3.5–5.2)
RBC # BLD AUTO: 3.8 10*6/MM3 (ref 3.77–5.28)
SODIUM BLD-SCNC: 134 MMOL/L (ref 136–145)
WBC NRBC COR # BLD: 8.7 10*3/MM3 (ref 3.4–10.8)

## 2019-11-15 PROCEDURE — 85025 COMPLETE CBC W/AUTO DIFF WBC: CPT | Performed by: EMERGENCY MEDICINE

## 2019-11-15 PROCEDURE — 99284 EMERGENCY DEPT VISIT MOD MDM: CPT

## 2019-11-15 PROCEDURE — 80048 BASIC METABOLIC PNL TOTAL CA: CPT | Performed by: EMERGENCY MEDICINE

## 2019-11-15 RX ORDER — SODIUM CHLORIDE 0.9 % (FLUSH) 0.9 %
10 SYRINGE (ML) INJECTION AS NEEDED
Status: DISCONTINUED | OUTPATIENT
Start: 2019-11-15 | End: 2019-11-16 | Stop reason: HOSPADM

## 2019-11-15 NOTE — ED PROVIDER NOTES
" EMERGENCY DEPARTMENT ENCOUNTER    CHIEF COMPLAINT  Chief Complaint: Skin tear  History given by: Patient  History limited by: None  Room Number: 37/37  PMD: Fany Morales MD      HPI:  Pt is a 79 y.o. female who presents complaining of having two possible \"skin tears\" to her groin region that was reported to her earlier today by the staff at [facility]). She reports she has been actively bleeding from this area and noticed blood dripping into the toilet today when using the restroom. She denies any fever. She is on Eliquis. Hx of A-fib.  She believes there was a hard metal bar on her chair that  pinched against her skin when she was attempting to sit down and transfer.    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   • H/O mammogram    • H/O colonoscopy    • Lymph edema 08/10/2016   • Sarcoidosis 08/10/2016   • Chronic atrial fibrillation 08/10/2016   • Hyperlipidemia 08/10/2016   • Hypertriglyceridemia 08/10/2016   • Type 2 diabetes mellitus with hyperglycemia (CMS/[facility]) 08/10/2016   • Stasis dermatitis of both legs 08/10/2016   • Essential hypertension 08/10/2016   • Cellulitis of left lower extremity 09/22/2016   • Cellulitis of extremity, lower bilateral 09/22/2016   • Chronic anticoagulation, for a fib 09/22/2016   • Morbid obesity with BMI of 50.0-59.9, adult (CMS/[facility]) 09/22/2016   • OA (osteoarthritis) of knee 10/10/2016   • Infected prosthetic knee joint (CMS/[facility]) 11/01/2016   • Obstructive sleep apnea syndrome 02/08/2017   • Chronic venous insufficiency 02/08/2017   • " Pulmonary nodule 02/08/2017   • Failed total knee replacement (CMS/HCC) 03/22/2017   • Infection of total knee replacement (CMS/HCC) 02/01/2018   • Renal insufficiency 02/02/2018     Resolved Ambulatory Problems     Diagnosis Date Noted   • No Resolved Ambulatory Problems     Past Medical History:   Diagnosis Date   • Arm fracture    • Arthritis    • Atrial fibrillation (CMS/HCC)    • Back pain    • Cancer (CMS/HCC)    • Cellulitis    • Colon polyp    • DDD (degenerative disc disease), lumbar    • Diabetes mellitus (CMS/HCC)    • Edema    • H/O colonoscopy    • H/O mammogram    • Hyperlipidemia    • Hypertension    • On anticoagulant therapy    • Oral-mouth cancer (CMS/HCC)    • 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)    • Sleep apnea    • Uterine cancer (CMS/HCC)        PAST SURGICAL HISTORY  Past Surgical History:   Procedure Laterality Date   • APPENDECTOMY     • CARDIAC CATHETERIZATION  2010   • COLONOSCOPY     • DILATATION AND CURETTAGE     • EYE SURGERY     • HERNIA REPAIR     • HYSTERECTOMY     • KNEE SURGERY Bilateral    • LUMBAR DISCECTOMY FUSION INSTRUMENTATION     • MOUTH LESION EXCISIONAL BIOPSY     • OVARIAN CYST SURGERY     • ID REVISE KNEE JOINT REPLACE,1 PART Left 10/10/2016    Procedure: TOTAL KNEE ARTHROPLASTY REVISION - SPACER PLACEMENT;  Surgeon: Dominick Bosch MD;  Location: [facility] OR;  Service: Orthopedics   • ID REVISE KNEE JOINT REPLACE,1 PART Left 3/22/2017    Procedure: TOTAL KNEE ARTHROPLASTY REVISION, ANTIBIOTIC SPACER REPLACEMENT;  Surgeon: Dominick Bosch MD;  Location: [facility];  Service: Orthopedics   • SHOULDER SURGERY Right     FRACTURE REPAIR    • THYROID SURGERY  12/18/2007   • TONSILLECTOMY     • UMBILICAL HERNIA REPAIR         FAMILY HISTORY  Family History   Problem Relation Age of Onset   • Heart disease Mother    • Hypertension Mother    • Cancer Mother    • Heart disease Father    • Hypertension Father    • Cancer Father    • Heart disease  Sister    • Hypertension Sister    • Diabetes Sister    • Cancer Sister    • Cancer Brother    • No Known Problems Daughter    • No Known Problems Son    • No Known Problems Maternal Grandmother    • No Known Problems Paternal Grandmother    • No Known Problems Maternal Aunt    • No Known Problems Paternal Aunt    • Hypertension Other    • Heart disease Other         AFIB   • Cancer Other    • BRCA 1/2 Neg Hx    • Breast cancer Neg Hx    • Colon cancer Neg Hx    • Endometrial cancer Neg Hx    • Ovarian cancer Neg Hx        SOCIAL HISTORY  Social History     Socioeconomic History   • Marital status: Single     Spouse name: Not on file   • Number of children: Not on file   • Years of education: Not on file   • Highest education level: Not on file   Occupational History   • Occupation: nun   Tobacco Use   • Smoking status: Never Smoker   • Smokeless tobacco: Never Used   Substance and Sexual Activity   • Alcohol use: No   • Drug use: No   • Sexual activity: Defer       ALLERGIES  Codeine; Morphine; Morphine and related; and Phosphate    REVIEW OF SYSTEMS  Review of Systems   Constitutional: Negative for fever.   HENT: Negative for sore throat.    Eyes: Negative.    Respiratory: Negative for cough and shortness of breath.    Cardiovascular: Negative for chest pain.   Gastrointestinal: Negative for abdominal pain, diarrhea and vomiting.   Genitourinary: Negative for dysuria.   Musculoskeletal: Negative for neck pain.   Skin: Negative for rash.        Positive for two skin tears to her groin area.   Allergic/Immunologic: Negative.    Neurological: Negative for weakness, numbness and headaches.   Hematological: Negative.    Psychiatric/Behavioral: Negative.    All other systems reviewed and are negative.      PHYSICAL EXAM  ED Triage Vitals [11/15/19 1448]   Temp Heart Rate Resp BP SpO2   98.1 °F (36.7 °C) 67 18 136/65 98 %      Temp src Heart Rate Source Patient Position BP Location FiO2 (%)   Tympanic -- -- -- --        Physical Exam   Constitutional: She is oriented to person, place, and time. No distress.   She is morbidly obese.   Eyes: EOM are normal.   Neck: Normal range of motion.   Cardiovascular: Normal rate and regular rhythm.   Pulmonary/Chest: Effort normal and breath sounds normal. No respiratory distress.   Genitourinary:   Genitourinary Comments: There is an area between the vagina and rectum, L of midline, that is about 3 or 4 mm in size with what appears to be a purplish hematoma with a small amount of oozing. She has had a hysterectomy and there is no blood in the vaginal vault, around the labia, or coming from the rectum.   Neurological: She is alert and oriented to person, place, and time. She has normal sensation and normal strength.   Skin: Skin is warm and dry.   Psychiatric: Affect normal.   Nursing note and vitals reviewed.    LAB RESULTS  Lab Results (last 24 hours)     Procedure Component Value Units Date/Time    CBC & Differential [[id number]] Collected:  11/15/19 1712    Specimen:  Blood Updated:  11/15/19 1732    Narrative:       The following orders were created for panel order CBC & Differential.  Procedure                               Abnormality         Status                     ---------                               -----------         ------                     CBC Auto Differential[[id number]]        Abnormal            Final result                 Please view results for these tests on the individual orders.    Basic Metabolic Panel [[id number]]  (Abnormal) Collected:  11/15/19 1712    Specimen:  Blood Updated:  11/15/19 1746     Glucose 234 mg/dL      BUN 27 mg/dL      Creatinine 1.14 mg/dL      Sodium 134 mmol/L      Potassium 4.4 mmol/L      Chloride 96 mmol/L      CO2 26.2 mmol/L      Calcium 8.7 mg/dL      eGFR Non African Amer 46 mL/min/1.73      BUN/Creatinine Ratio 23.7     Anion Gap 11.8 mmol/L     Narrative:       GFR Normal >60  Chronic Kidney Disease <60  Kidney Failure <15     CBC Auto Differential [[id number]]  (Abnormal) Collected:  11/15/19 1712    Specimen:  Blood Updated:  11/15/19 1732     WBC 8.70 10*3/mm3      RBC 3.80 10*6/mm3      Hemoglobin 12.2 g/dL      Hematocrit 37.5 %      MCV 98.7 fL      MCH 32.1 pg      MCHC 32.5 g/dL      RDW 13.8 %      RDW-SD 49.6 fl      MPV 10.1 fL      Platelets 199 10*3/mm3      Neutrophil % 74.0 %      Lymphocyte % 13.2 %      Monocyte % 9.7 %      Eosinophil % 1.7 %      Basophil % 0.7 %      Immature Grans % 0.7 %      Neutrophils, Absolute 6.44 10*3/mm3      Lymphocytes, Absolute 1.15 10*3/mm3      Monocytes, Absolute 0.84 10*3/mm3      Eosinophils, Absolute 0.15 10*3/mm3      Basophils, Absolute 0.06 10*3/mm3      Immature Grans, Absolute 0.06 10*3/mm3      nRBC 0.0 /100 WBC           I ordered the above labs and reviewed the results    PROGRESS AND CONSULTS  ED Course as of Nov 15 2354   Fri Nov 15, 2019   1738 Previous hemoglobin 1 year ago was 11.2. Hemoglobin: 12.2 [MM]   1738 Platelets: 199 [MM]   1816 No significant change Creatinine: (!) 1.14 [MM]      ED Course User Index  [MM] Juliocesar Field MD     1629 Ordered CBC and BMP for further evaluation.    1805 Applied pressure and a Thrombi-pad to the area. Due to location and body habitus a pressure dressing cannot be applied. There is no indication that this area requires sutures.    1823 Rechecked with pt and informed her that her hemoglobin is normal at this time. Plan to observe pt for awhile longer to be sure that she is not having continued bleeding.    2000 Per RN, pt has not had continued bleeding.    2013 Rechecked with pt, who has not had any recurrent bleeding. I discussed with her that we will not hold her Eliquis at this time, but if she has continued bleeding she may need to follow up with her PCP about holding her Eliquis for a few days. Plan to discharge. Pt understands and agrees with the plan, all questions answered.    MEDICAL DECISION MAKING  Results were  reviewed/discussed with the patient and they were also made aware of online access. Pt also made aware that some labs, such as cultures, will not be resulted during ER visit and follow up with PMD is necessary.     MDM  Number of Diagnoses or Management Options     Amount and/or Complexity of Data Reviewed  Clinical lab tests: ordered and reviewed  Decide to obtain previous medical records or to obtain history from someone other than the patient: yes    Patient Progress  Patient progress: stable         DIAGNOSIS  Final diagnoses:   Abrasion to perineum   Coagulopathy from Eliquis (CMS/[facility])       DISPOSITION  DISCHARGE    Patient discharged in stable condition.    Reviewed implications of results, diagnosis, meds, responsibility to follow up, warning signs and symptoms of possible worsening, potential complications and reasons to return to ER, including new or worsening sxs.    Patient/Family voiced understanding of above instructions.    Discussed plan for discharge, as there is no emergent indication for admission. Patient referred to primary care provider for BP management due to today's BP. Pt/family is agreeable and understands need for follow up and repeat testing.  Pt is aware that discharge does not mean that nothing is wrong but it indicates no emergency is present that requires admission and they must continue care with follow-up as given below or physician of their choice.     FOLLOW-UP  Fany Morales MD   [address]  [facility] [address] [phone]    In 3 days  Return if worsening of bleeding.  Any weakness, chest pain, shortness of breath, or any concerns.         Medication List      Changed    * furosemide 40 MG tablet  Commonly known as:  LASIX  What changed:  Another medication with the same name was changed. Make   sure you understand how and when to take each.     * furosemide 40 MG tablet  Commonly known as:  LASIX  Take 2.5 tablets by mouth Daily. Pt taking 100mg qd  What changed:     how much to take  when to take this  additional instructions     lisinopril 10 MG tablet  Commonly known as:  PRINIVIL,ZESTRIL  Take 1 tablet by mouth Every Morning.  What changed:    how much to take  when to take this  additional instructions     metFORMIN 500 MG tablet  Commonly known as:  GLUCOPHAGE  Take 1 tablet by mouth 2 (Two) Times a Day With Meals.  What changed:  additional instructions         * This list has 2 medication(s) that are the same as other medications   prescribed for you. Read the directions carefully, and ask your doctor or   other care provider to review them with you.            Latest Documented Vital Signs:  As of 11:54 PM  BP- 136/65 HR- 97 Temp- 98.1 °F (36.7 °C) (Tympanic) O2 sat- 91%    --  Documentation assistance provided by padmini Field MD for Dr. Field.  Information recorded by the scribe was done at my direction and has been verified and validated by me.     Indira Castaneda  11/15/19 2030       Juliocesar Field MD  11/15/19 4991
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
## 2019-11-16 VITALS
BODY MASS INDEX: 53.92 KG/M2 | SYSTOLIC BLOOD PRESSURE: 148 MMHG | DIASTOLIC BLOOD PRESSURE: 79 MMHG | TEMPERATURE: 98.4 F | OXYGEN SATURATION: 98 % | RESPIRATION RATE: 18 BRPM | WEIGHT: 293 LBS | HEIGHT: 62 IN | HEART RATE: 95 BPM

## 2019-11-16 NOTE — ED NOTES
Spoke with [facility] Davis - hernandez 1300. Called [facility], they have no availability at this time.      Yulissa Ortiz, RN  11/16/19 0644

## 2019-11-16 NOTE — ED NOTES
[facility] called for transport, at this time, no availability and unable to add patient to the list. Charge RN notified     Nishi Townsend RN  11/15/19 3302

## 2019-11-16 NOTE — ED NOTES
[facility] called and informed Pt will be coming back to facility.  [facility] called but there are no runs available for tonight. [facility] called to have Pt transported back to [facility], ETA for run tomorrow 11/16/19 at 0800     Padma Deleon, GLORY  11/15/19 5316

## 2019-11-16 NOTE — PROGRESS NOTES
Updated patient on ambulance; she asked this CM to contact her friend Sabrina to ask about private transport. Did so, and friend called agencies and no one was available

## 2019-11-16 NOTE — ED NOTES
[facility] called back to inform this RN that run cannot be placed at this time and we will need to call back after MN to schedule run again     Padma Deleon, GLORY  11/15/19 2049

## 2019-11-16 NOTE — DISCHARGE INSTRUCTIONS
If bleeding recurs you need to have the nurses apply direct pressure to the area that I described to you.

## 2019-11-16 NOTE — PROGRESS NOTES
[facility] called [facility] and stated ETA within about an hour; RN, patient and friend updated

## 2019-11-16 NOTE — PROGRESS NOTES
[facility] contacted for alternative ambulance transport back to facility. ETA-181. Day HOLDER and patient/friend updated

## 2020-01-01 ENCOUNTER — HOSPITAL ENCOUNTER (INPATIENT)
Facility: HOSPITAL | Age: 80
LOS: 3 days | Discharge: NURSING, REHAB OR LONG-TERM CARE | End: 2020-12-16
Attending: EMERGENCY MEDICINE | Admitting: HOSPITALIST

## 2020-01-01 ENCOUNTER — APPOINTMENT (OUTPATIENT)
Dept: GENERAL RADIOLOGY | Facility: HOSPITAL | Age: 80
End: 2020-01-01

## 2020-01-01 ENCOUNTER — TELEPHONE (OUTPATIENT)
Dept: ORTHOPEDIC SURGERY | Facility: CLINIC | Age: 80
End: 2020-01-01

## 2020-01-01 ENCOUNTER — APPOINTMENT (OUTPATIENT)
Dept: CT IMAGING | Facility: HOSPITAL | Age: 80
End: 2020-01-01

## 2020-01-01 VITALS
TEMPERATURE: 98 F | HEART RATE: 85 BPM | DIASTOLIC BLOOD PRESSURE: 82 MMHG | OXYGEN SATURATION: 92 % | WEIGHT: 288.6 LBS | SYSTOLIC BLOOD PRESSURE: 129 MMHG | BODY MASS INDEX: 53.11 KG/M2 | HEIGHT: 62 IN | RESPIRATION RATE: 16 BRPM

## 2020-01-01 DIAGNOSIS — N12 PYELONEPHRITIS: ICD-10-CM

## 2020-01-01 DIAGNOSIS — N20.0 NEPHROLITHIASIS: ICD-10-CM

## 2020-01-01 DIAGNOSIS — R59.9 ADENOPATHY: ICD-10-CM

## 2020-01-01 DIAGNOSIS — M25.562 CHRONIC PAIN OF LEFT KNEE: ICD-10-CM

## 2020-01-01 DIAGNOSIS — G89.29 CHRONIC PAIN OF LEFT KNEE: ICD-10-CM

## 2020-01-01 DIAGNOSIS — A41.9 SEPSIS WITHOUT ACUTE ORGAN DYSFUNCTION, DUE TO UNSPECIFIED ORGANISM (HCC): Primary | ICD-10-CM

## 2020-01-01 LAB
ALBUMIN SERPL-MCNC: 2.8 G/DL (ref 3.5–5.2)
ALBUMIN SERPL-MCNC: 3.9 G/DL (ref 3.5–5.2)
ALBUMIN/GLOB SERPL: 0.9 G/DL
ALBUMIN/GLOB SERPL: 1.3 G/DL
ALP SERPL-CCNC: 105 U/L (ref 39–117)
ALP SERPL-CCNC: 160 U/L (ref 39–117)
ALT SERPL W P-5'-P-CCNC: 10 U/L (ref 1–33)
ALT SERPL W P-5'-P-CCNC: 7 U/L (ref 1–33)
ANION GAP SERPL CALCULATED.3IONS-SCNC: 10.1 MMOL/L (ref 5–15)
ANION GAP SERPL CALCULATED.3IONS-SCNC: 15.2 MMOL/L (ref 5–15)
ANION GAP SERPL CALCULATED.3IONS-SCNC: 8.6 MMOL/L (ref 5–15)
AST SERPL-CCNC: 12 U/L (ref 1–32)
AST SERPL-CCNC: 13 U/L (ref 1–32)
B PARAPERT DNA SPEC QL NAA+PROBE: NOT DETECTED
B PERT DNA SPEC QL NAA+PROBE: NOT DETECTED
BACTERIA SPEC AEROBE CULT: ABNORMAL
BACTERIA SPEC AEROBE CULT: ABNORMAL
BACTERIA SPEC AEROBE CULT: NORMAL
BACTERIA SPEC AEROBE CULT: NORMAL
BACTERIA UR QL AUTO: ABNORMAL /HPF
BASOPHILS # BLD AUTO: 0.08 10*3/MM3 (ref 0–0.2)
BASOPHILS NFR BLD AUTO: 0.6 % (ref 0–1.5)
BILIRUB SERPL-MCNC: 0.5 MG/DL (ref 0–1.2)
BILIRUB SERPL-MCNC: 1.5 MG/DL (ref 0–1.2)
BILIRUB UR QL STRIP: NEGATIVE
BUN SERPL-MCNC: 27 MG/DL (ref 8–23)
BUN SERPL-MCNC: 29 MG/DL (ref 8–23)
BUN SERPL-MCNC: 34 MG/DL (ref 8–23)
BUN/CREAT SERPL: 16.4 (ref 7–25)
BUN/CREAT SERPL: 19.1 (ref 7–25)
BUN/CREAT SERPL: 25 (ref 7–25)
C PNEUM DNA NPH QL NAA+NON-PROBE: NOT DETECTED
CALCIUM SPEC-SCNC: 8.2 MG/DL (ref 8.6–10.5)
CALCIUM SPEC-SCNC: 8.6 MG/DL (ref 8.6–10.5)
CALCIUM SPEC-SCNC: 9 MG/DL (ref 8.6–10.5)
CHLORIDE SERPL-SCNC: 101 MMOL/L (ref 98–107)
CHLORIDE SERPL-SCNC: 96 MMOL/L (ref 98–107)
CHLORIDE SERPL-SCNC: 97 MMOL/L (ref 98–107)
CLARITY UR: ABNORMAL
CO2 SERPL-SCNC: 23.8 MMOL/L (ref 22–29)
CO2 SERPL-SCNC: 25.4 MMOL/L (ref 22–29)
CO2 SERPL-SCNC: 26.9 MMOL/L (ref 22–29)
COLOR UR: ABNORMAL
CREAT SERPL-MCNC: 1.36 MG/DL (ref 0.57–1)
CREAT SERPL-MCNC: 1.52 MG/DL (ref 0.57–1)
CREAT SERPL-MCNC: 1.65 MG/DL (ref 0.57–1)
D-LACTATE SERPL-SCNC: 2.8 MMOL/L (ref 0.5–2)
D-LACTATE SERPL-SCNC: 2.9 MMOL/L (ref 0.5–2)
DEPRECATED RDW RBC AUTO: 44.3 FL (ref 37–54)
DEPRECATED RDW RBC AUTO: 44.6 FL (ref 37–54)
DEPRECATED RDW RBC AUTO: 45.6 FL (ref 37–54)
EOSINOPHIL # BLD AUTO: 0 10*3/MM3 (ref 0–0.4)
EOSINOPHIL NFR BLD AUTO: 0 % (ref 0.3–6.2)
ERYTHROCYTE [DISTWIDTH] IN BLOOD BY AUTOMATED COUNT: 12.7 % (ref 12.3–15.4)
ERYTHROCYTE [DISTWIDTH] IN BLOOD BY AUTOMATED COUNT: 12.8 % (ref 12.3–15.4)
ERYTHROCYTE [DISTWIDTH] IN BLOOD BY AUTOMATED COUNT: 13 % (ref 12.3–15.4)
FLUAV SUBTYP SPEC NAA+PROBE: NOT DETECTED
FLUBV RNA ISLT QL NAA+PROBE: NOT DETECTED
GFR SERPL CREATININE-BSD FRML MDRD: 30 ML/MIN/1.73
GFR SERPL CREATININE-BSD FRML MDRD: 33 ML/MIN/1.73
GFR SERPL CREATININE-BSD FRML MDRD: 37 ML/MIN/1.73
GLOBULIN UR ELPH-MCNC: 3.1 GM/DL
GLOBULIN UR ELPH-MCNC: 3.1 GM/DL
GLUCOSE BLDC GLUCOMTR-MCNC: 179 MG/DL (ref 70–130)
GLUCOSE BLDC GLUCOMTR-MCNC: 185 MG/DL (ref 70–130)
GLUCOSE BLDC GLUCOMTR-MCNC: 202 MG/DL (ref 70–130)
GLUCOSE BLDC GLUCOMTR-MCNC: 214 MG/DL (ref 70–130)
GLUCOSE BLDC GLUCOMTR-MCNC: 225 MG/DL (ref 70–130)
GLUCOSE BLDC GLUCOMTR-MCNC: 233 MG/DL (ref 70–130)
GLUCOSE BLDC GLUCOMTR-MCNC: 240 MG/DL (ref 70–130)
GLUCOSE BLDC GLUCOMTR-MCNC: 241 MG/DL (ref 70–130)
GLUCOSE BLDC GLUCOMTR-MCNC: 242 MG/DL (ref 70–130)
GLUCOSE BLDC GLUCOMTR-MCNC: 265 MG/DL (ref 70–130)
GLUCOSE BLDC GLUCOMTR-MCNC: 269 MG/DL (ref 70–130)
GLUCOSE BLDC GLUCOMTR-MCNC: 281 MG/DL (ref 70–130)
GLUCOSE BLDC GLUCOMTR-MCNC: 308 MG/DL (ref 70–130)
GLUCOSE BLDC GLUCOMTR-MCNC: 333 MG/DL (ref 70–130)
GLUCOSE BLDC GLUCOMTR-MCNC: 351 MG/DL (ref 70–130)
GLUCOSE BLDC GLUCOMTR-MCNC: 377 MG/DL (ref 70–130)
GLUCOSE BLDC GLUCOMTR-MCNC: 398 MG/DL (ref 70–130)
GLUCOSE BLDC GLUCOMTR-MCNC: 429 MG/DL (ref 70–130)
GLUCOSE SERPL-MCNC: 228 MG/DL (ref 65–99)
GLUCOSE SERPL-MCNC: 369 MG/DL (ref 65–99)
GLUCOSE SERPL-MCNC: 458 MG/DL (ref 65–99)
GLUCOSE UR STRIP-MCNC: ABNORMAL MG/DL
HADV DNA SPEC NAA+PROBE: NOT DETECTED
HBA1C MFR BLD: 10.4 % (ref 4.8–5.6)
HCOV 229E RNA SPEC QL NAA+PROBE: NOT DETECTED
HCOV HKU1 RNA SPEC QL NAA+PROBE: NOT DETECTED
HCOV NL63 RNA SPEC QL NAA+PROBE: NOT DETECTED
HCOV OC43 RNA SPEC QL NAA+PROBE: NOT DETECTED
HCT VFR BLD AUTO: 37.9 % (ref 34–46.6)
HCT VFR BLD AUTO: 41.7 % (ref 34–46.6)
HCT VFR BLD AUTO: 46.7 % (ref 34–46.6)
HGB BLD-MCNC: 12.2 G/DL (ref 12–15.9)
HGB BLD-MCNC: 13.4 G/DL (ref 12–15.9)
HGB BLD-MCNC: 15.1 G/DL (ref 12–15.9)
HGB UR QL STRIP.AUTO: ABNORMAL
HMPV RNA NPH QL NAA+NON-PROBE: NOT DETECTED
HPIV1 RNA SPEC QL NAA+PROBE: NOT DETECTED
HPIV2 RNA SPEC QL NAA+PROBE: NOT DETECTED
HPIV3 RNA NPH QL NAA+PROBE: NOT DETECTED
HPIV4 P GENE NPH QL NAA+PROBE: NOT DETECTED
HYALINE CASTS UR QL AUTO: ABNORMAL /LPF
IMM GRANULOCYTES # BLD AUTO: 0.1 10*3/MM3 (ref 0–0.05)
IMM GRANULOCYTES NFR BLD AUTO: 0.8 % (ref 0–0.5)
KETONES UR QL STRIP: ABNORMAL
LACTATE HOLD SPECIMEN: NORMAL
LEUKOCYTE ESTERASE UR QL STRIP.AUTO: ABNORMAL
LIPASE SERPL-CCNC: 10 U/L (ref 13–60)
LYMPHOCYTES # BLD AUTO: 0.67 10*3/MM3 (ref 0.7–3.1)
LYMPHOCYTES NFR BLD AUTO: 5.1 % (ref 19.6–45.3)
M PNEUMO IGG SER IA-ACNC: NOT DETECTED
MCH RBC QN AUTO: 30.4 PG (ref 26.6–33)
MCH RBC QN AUTO: 30.5 PG (ref 26.6–33)
MCH RBC QN AUTO: 30.8 PG (ref 26.6–33)
MCHC RBC AUTO-ENTMCNC: 32.1 G/DL (ref 31.5–35.7)
MCHC RBC AUTO-ENTMCNC: 32.2 G/DL (ref 31.5–35.7)
MCHC RBC AUTO-ENTMCNC: 32.3 G/DL (ref 31.5–35.7)
MCV RBC AUTO: 94.5 FL (ref 79–97)
MCV RBC AUTO: 95 FL (ref 79–97)
MCV RBC AUTO: 95.1 FL (ref 79–97)
MONOCYTES # BLD AUTO: 0.5 10*3/MM3 (ref 0.1–0.9)
MONOCYTES NFR BLD AUTO: 3.8 % (ref 5–12)
NEUTROPHILS NFR BLD AUTO: 11.83 10*3/MM3 (ref 1.7–7)
NEUTROPHILS NFR BLD AUTO: 89.7 % (ref 42.7–76)
NITRITE UR QL STRIP: NEGATIVE
NRBC BLD AUTO-RTO: 0 /100 WBC (ref 0–0.2)
NT-PROBNP SERPL-MCNC: 1646 PG/ML (ref 0–1800)
PH UR STRIP.AUTO: 7 [PH] (ref 5–8)
PLATELET # BLD AUTO: 183 10*3/MM3 (ref 140–450)
PLATELET # BLD AUTO: 187 10*3/MM3 (ref 140–450)
PLATELET # BLD AUTO: 233 10*3/MM3 (ref 140–450)
PMV BLD AUTO: 10.5 FL (ref 6–12)
PMV BLD AUTO: 10.6 FL (ref 6–12)
PMV BLD AUTO: 10.7 FL (ref 6–12)
POTASSIUM SERPL-SCNC: 4.1 MMOL/L (ref 3.5–5.2)
POTASSIUM SERPL-SCNC: 4.6 MMOL/L (ref 3.5–5.2)
POTASSIUM SERPL-SCNC: 5 MMOL/L (ref 3.5–5.2)
PROCALCITONIN SERPL-MCNC: 0.77 NG/ML (ref 0–0.25)
PROT SERPL-MCNC: 5.9 G/DL (ref 6–8.5)
PROT SERPL-MCNC: 7 G/DL (ref 6–8.5)
PROT UR QL STRIP: ABNORMAL
QT INTERVAL: 337 MS
QT INTERVAL: 355 MS
RBC # BLD AUTO: 4.01 10*6/MM3 (ref 3.77–5.28)
RBC # BLD AUTO: 4.39 10*6/MM3 (ref 3.77–5.28)
RBC # BLD AUTO: 4.91 10*6/MM3 (ref 3.77–5.28)
RBC # UR: ABNORMAL /HPF
REF LAB TEST METHOD: ABNORMAL
RHINOVIRUS RNA SPEC NAA+PROBE: NOT DETECTED
RSV RNA NPH QL NAA+NON-PROBE: NOT DETECTED
SARS-COV-2 RNA NPH QL NAA+NON-PROBE: NOT DETECTED
SODIUM SERPL-SCNC: 133 MMOL/L (ref 136–145)
SODIUM SERPL-SCNC: 135 MMOL/L (ref 136–145)
SODIUM SERPL-SCNC: 136 MMOL/L (ref 136–145)
SP GR UR STRIP: 1.02 (ref 1–1.03)
SQUAMOUS #/AREA URNS HPF: ABNORMAL /HPF
TROPONIN T SERPL-MCNC: <0.01 NG/ML (ref 0–0.03)
UROBILINOGEN UR QL STRIP: ABNORMAL
WBC # BLD AUTO: 10.43 10*3/MM3 (ref 3.4–10.8)
WBC # BLD AUTO: 12.16 10*3/MM3 (ref 3.4–10.8)
WBC # BLD AUTO: 13.18 10*3/MM3 (ref 3.4–10.8)
WBC UR QL AUTO: ABNORMAL /HPF

## 2020-01-01 PROCEDURE — 63710000001 INSULIN LISPRO (HUMAN) PER 5 UNITS: Performed by: INTERNAL MEDICINE

## 2020-01-01 PROCEDURE — 82962 GLUCOSE BLOOD TEST: CPT

## 2020-01-01 PROCEDURE — 87186 SC STD MICRODIL/AGAR DIL: CPT | Performed by: PHYSICIAN ASSISTANT

## 2020-01-01 PROCEDURE — 63710000001 INSULIN GLARGINE PER 5 UNITS: Performed by: HOSPITALIST

## 2020-01-01 PROCEDURE — 93005 ELECTROCARDIOGRAM TRACING: CPT | Performed by: SURGERY

## 2020-01-01 PROCEDURE — 25010000003 CEFTRIAXONE PER 250 MG: Performed by: HOSPITALIST

## 2020-01-01 PROCEDURE — 99285 EMERGENCY DEPT VISIT HI MDM: CPT

## 2020-01-01 PROCEDURE — 83880 ASSAY OF NATRIURETIC PEPTIDE: CPT | Performed by: PHYSICIAN ASSISTANT

## 2020-01-01 PROCEDURE — G0378 HOSPITAL OBSERVATION PER HR: HCPCS

## 2020-01-01 PROCEDURE — 97110 THERAPEUTIC EXERCISES: CPT

## 2020-01-01 PROCEDURE — 63710000001 INSULIN LISPRO (HUMAN) PER 5 UNITS: Performed by: HOSPITALIST

## 2020-01-01 PROCEDURE — 83036 HEMOGLOBIN GLYCOSYLATED A1C: CPT | Performed by: INTERNAL MEDICINE

## 2020-01-01 PROCEDURE — 25010000002 PROCHLORPERAZINE 10 MG/2ML SOLUTION: Performed by: HOSPITALIST

## 2020-01-01 PROCEDURE — 25010000002 ONDANSETRON PER 1 MG: Performed by: INTERNAL MEDICINE

## 2020-01-01 PROCEDURE — 97162 PT EVAL MOD COMPLEX 30 MIN: CPT

## 2020-01-01 PROCEDURE — P9612 CATHETERIZE FOR URINE SPEC: HCPCS

## 2020-01-01 PROCEDURE — 85025 COMPLETE CBC W/AUTO DIFF WBC: CPT | Performed by: EMERGENCY MEDICINE

## 2020-01-01 PROCEDURE — 99231 SBSQ HOSP IP/OBS SF/LOW 25: CPT | Performed by: SURGERY

## 2020-01-01 PROCEDURE — 85027 COMPLETE CBC AUTOMATED: CPT | Performed by: HOSPITALIST

## 2020-01-01 PROCEDURE — 97110 THERAPEUTIC EXERCISES: CPT | Performed by: OCCUPATIONAL THERAPIST

## 2020-01-01 PROCEDURE — 25010000002 HYDROMORPHONE PER 4 MG: Performed by: EMERGENCY MEDICINE

## 2020-01-01 PROCEDURE — 97535 SELF CARE MNGMENT TRAINING: CPT

## 2020-01-01 PROCEDURE — 80053 COMPREHEN METABOLIC PANEL: CPT | Performed by: HOSPITALIST

## 2020-01-01 PROCEDURE — 99222 1ST HOSP IP/OBS MODERATE 55: CPT | Performed by: SURGERY

## 2020-01-01 PROCEDURE — 80053 COMPREHEN METABOLIC PANEL: CPT | Performed by: EMERGENCY MEDICINE

## 2020-01-01 PROCEDURE — 25010000002 ONDANSETRON PER 1 MG: Performed by: PHYSICIAN ASSISTANT

## 2020-01-01 PROCEDURE — 93010 ELECTROCARDIOGRAM REPORT: CPT | Performed by: INTERNAL MEDICINE

## 2020-01-01 PROCEDURE — 84145 PROCALCITONIN (PCT): CPT | Performed by: PHYSICIAN ASSISTANT

## 2020-01-01 PROCEDURE — 25010000002 CEFTRIAXONE PER 250 MG: Performed by: PHYSICIAN ASSISTANT

## 2020-01-01 PROCEDURE — 80048 BASIC METABOLIC PNL TOTAL CA: CPT | Performed by: INTERNAL MEDICINE

## 2020-01-01 PROCEDURE — 0202U NFCT DS 22 TRGT SARS-COV-2: CPT | Performed by: PHYSICIAN ASSISTANT

## 2020-01-01 PROCEDURE — 87040 BLOOD CULTURE FOR BACTERIA: CPT | Performed by: PHYSICIAN ASSISTANT

## 2020-01-01 PROCEDURE — 84484 ASSAY OF TROPONIN QUANT: CPT | Performed by: PHYSICIAN ASSISTANT

## 2020-01-01 PROCEDURE — 97530 THERAPEUTIC ACTIVITIES: CPT

## 2020-01-01 PROCEDURE — 87086 URINE CULTURE/COLONY COUNT: CPT | Performed by: PHYSICIAN ASSISTANT

## 2020-01-01 PROCEDURE — 85027 COMPLETE CBC AUTOMATED: CPT | Performed by: INTERNAL MEDICINE

## 2020-01-01 PROCEDURE — 63710000001 INSULIN GLARGINE PER 5 UNITS: Performed by: NURSE PRACTITIONER

## 2020-01-01 PROCEDURE — 74176 CT ABD & PELVIS W/O CONTRAST: CPT

## 2020-01-01 PROCEDURE — 63710000001 INSULIN REGULAR HUMAN PER 5 UNITS: Performed by: PHYSICIAN ASSISTANT

## 2020-01-01 PROCEDURE — 71045 X-RAY EXAM CHEST 1 VIEW: CPT

## 2020-01-01 PROCEDURE — 81001 URINALYSIS AUTO W/SCOPE: CPT | Performed by: EMERGENCY MEDICINE

## 2020-01-01 PROCEDURE — 97166 OT EVAL MOD COMPLEX 45 MIN: CPT

## 2020-01-01 PROCEDURE — 93005 ELECTROCARDIOGRAM TRACING: CPT | Performed by: PHYSICIAN ASSISTANT

## 2020-01-01 PROCEDURE — 83605 ASSAY OF LACTIC ACID: CPT | Performed by: PHYSICIAN ASSISTANT

## 2020-01-01 PROCEDURE — 83690 ASSAY OF LIPASE: CPT | Performed by: EMERGENCY MEDICINE

## 2020-01-01 RX ORDER — HYDROCODONE BITARTRATE AND ACETAMINOPHEN 7.5; 325 MG/1; MG/1
1 TABLET ORAL EVERY 8 HOURS PRN
COMMUNITY
End: 2020-01-01 | Stop reason: HOSPADM

## 2020-01-01 RX ORDER — DULAGLUTIDE 1.5 MG/.5ML
1.5 INJECTION, SOLUTION SUBCUTANEOUS
COMMUNITY
End: 2020-01-01 | Stop reason: HOSPADM

## 2020-01-01 RX ORDER — HYDROCODONE BITARTRATE AND ACETAMINOPHEN 7.5; 325 MG/1; MG/1
1 TABLET ORAL EVERY 6 HOURS PRN
Qty: 12 TABLET | Refills: 0 | Status: ON HOLD | OUTPATIENT
Start: 2020-01-01 | End: 2021-01-01

## 2020-01-01 RX ORDER — SODIUM CHLORIDE 9 MG/ML
100 INJECTION, SOLUTION INTRAVENOUS CONTINUOUS
Status: DISCONTINUED | OUTPATIENT
Start: 2020-01-01 | End: 2020-01-01

## 2020-01-01 RX ORDER — DEXTROSE MONOHYDRATE 25 G/50ML
25 INJECTION, SOLUTION INTRAVENOUS
Status: DISCONTINUED | OUTPATIENT
Start: 2020-01-01 | End: 2020-01-01 | Stop reason: HOSPADM

## 2020-01-01 RX ORDER — HYDROMORPHONE HYDROCHLORIDE 1 MG/ML
0.5 INJECTION, SOLUTION INTRAMUSCULAR; INTRAVENOUS; SUBCUTANEOUS ONCE
Status: COMPLETED | OUTPATIENT
Start: 2020-01-01 | End: 2020-01-01

## 2020-01-01 RX ORDER — CIPROFLOXACIN 500 MG/1
500 TABLET, FILM COATED ORAL NIGHTLY
Status: DISCONTINUED | OUTPATIENT
Start: 2020-01-01 | End: 2020-01-01

## 2020-01-01 RX ORDER — METOPROLOL SUCCINATE 25 MG/1
25 TABLET, EXTENDED RELEASE ORAL ONCE
Status: COMPLETED | OUTPATIENT
Start: 2020-01-01 | End: 2020-01-01

## 2020-01-01 RX ORDER — HYDROCODONE BITARTRATE AND ACETAMINOPHEN 7.5; 325 MG/1; MG/1
1 TABLET ORAL EVERY 6 HOURS PRN
Qty: 12 TABLET | Refills: 0 | Status: SHIPPED | OUTPATIENT
Start: 2020-01-01 | End: 2020-01-01

## 2020-01-01 RX ORDER — NICOTINE POLACRILEX 4 MG
15 LOZENGE BUCCAL
Status: DISCONTINUED | OUTPATIENT
Start: 2020-01-01 | End: 2020-01-01 | Stop reason: HOSPADM

## 2020-01-01 RX ORDER — HYDROCODONE BITARTRATE AND ACETAMINOPHEN 7.5; 325 MG/1; MG/1
1 TABLET ORAL EVERY 6 HOURS PRN
Status: DISCONTINUED | OUTPATIENT
Start: 2020-01-01 | End: 2020-01-01 | Stop reason: HOSPADM

## 2020-01-01 RX ORDER — AMOXICILLIN 250 MG
1 CAPSULE ORAL 3 TIMES DAILY PRN
Status: DISCONTINUED | OUTPATIENT
Start: 2020-01-01 | End: 2020-01-01 | Stop reason: HOSPADM

## 2020-01-01 RX ORDER — FUROSEMIDE 40 MG/1
40 TABLET ORAL
Status: DISCONTINUED | OUTPATIENT
Start: 2020-01-01 | End: 2020-01-01

## 2020-01-01 RX ORDER — SODIUM CHLORIDE 0.9 % (FLUSH) 0.9 %
10 SYRINGE (ML) INJECTION AS NEEDED
Status: DISCONTINUED | OUTPATIENT
Start: 2020-01-01 | End: 2020-01-01 | Stop reason: HOSPADM

## 2020-01-01 RX ORDER — ONDANSETRON 2 MG/ML
4 INJECTION INTRAMUSCULAR; INTRAVENOUS ONCE
Status: COMPLETED | OUTPATIENT
Start: 2020-01-01 | End: 2020-01-01

## 2020-01-01 RX ORDER — AMOXICILLIN 500 MG/1
500 CAPSULE ORAL 3 TIMES DAILY
Qty: 20 CAPSULE | Refills: 0 | Status: ON HOLD | OUTPATIENT
Start: 2020-01-01 | End: 2021-01-01

## 2020-01-01 RX ORDER — CEFTRIAXONE SODIUM 1 G/50ML
1 INJECTION, SOLUTION INTRAVENOUS ONCE
Status: COMPLETED | OUTPATIENT
Start: 2020-01-01 | End: 2020-01-01

## 2020-01-01 RX ORDER — ICOSAPENT ETHYL 1000 MG/1
2 CAPSULE ORAL 2 TIMES DAILY WITH MEALS
Status: DISCONTINUED | OUTPATIENT
Start: 2020-01-01 | End: 2020-01-01 | Stop reason: HOSPADM

## 2020-01-01 RX ORDER — UREA 10 %
3 LOTION (ML) TOPICAL NIGHTLY PRN
Status: DISCONTINUED | OUTPATIENT
Start: 2020-01-01 | End: 2020-01-01 | Stop reason: HOSPADM

## 2020-01-01 RX ORDER — ASCORBIC ACID 500 MG
500 TABLET ORAL DAILY
Status: DISCONTINUED | OUTPATIENT
Start: 2020-01-01 | End: 2020-01-01

## 2020-01-01 RX ORDER — HYDROCODONE BITARTRATE AND ACETAMINOPHEN 7.5; 325 MG/1; MG/1
1 TABLET ORAL 2 TIMES DAILY
COMMUNITY
End: 2020-01-01 | Stop reason: HOSPADM

## 2020-01-01 RX ORDER — INSULIN GLARGINE 100 [IU]/ML
10 INJECTION, SOLUTION SUBCUTANEOUS ONCE
Status: COMPLETED | OUTPATIENT
Start: 2020-01-01 | End: 2020-01-01

## 2020-01-01 RX ORDER — TROLAMINE SALICYLATE 10 G/100G
CREAM TOPICAL AS NEEDED
Status: ON HOLD | COMMUNITY
End: 2021-01-01

## 2020-01-01 RX ORDER — CAMPHOR 0.45 %
GEL (GRAM) TOPICAL 3 TIMES DAILY PRN
COMMUNITY
End: 2020-01-01 | Stop reason: HOSPADM

## 2020-01-01 RX ORDER — METOPROLOL SUCCINATE 25 MG/1
25 TABLET, EXTENDED RELEASE ORAL NIGHTLY
Status: ON HOLD | COMMUNITY
End: 2021-01-01

## 2020-01-01 RX ORDER — BISACODYL 10 MG
10 SUPPOSITORY, RECTAL RECTAL DAILY PRN
Status: ON HOLD | COMMUNITY
End: 2021-01-01

## 2020-01-01 RX ORDER — SPIRONOLACTONE 25 MG/1
25 TABLET ORAL 2 TIMES DAILY
COMMUNITY
End: 2020-01-01 | Stop reason: HOSPADM

## 2020-01-01 RX ORDER — POTASSIUM CHLORIDE 1.5 G/1.77G
20 POWDER, FOR SOLUTION ORAL 2 TIMES DAILY
Status: DISCONTINUED | OUTPATIENT
Start: 2020-01-01 | End: 2020-01-01

## 2020-01-01 RX ORDER — SPIRONOLACTONE 25 MG/1
25 TABLET ORAL 2 TIMES DAILY
Status: DISCONTINUED | OUTPATIENT
Start: 2020-01-01 | End: 2020-01-01

## 2020-01-01 RX ORDER — ONDANSETRON 4 MG/1
4 TABLET, FILM COATED ORAL EVERY 6 HOURS PRN
Status: DISCONTINUED | OUTPATIENT
Start: 2020-01-01 | End: 2020-01-01 | Stop reason: HOSPADM

## 2020-01-01 RX ORDER — INSULIN GLARGINE 100 [IU]/ML
100 INJECTION, SOLUTION SUBCUTANEOUS DAILY
Status: DISCONTINUED | OUTPATIENT
Start: 2020-01-01 | End: 2020-01-01

## 2020-01-01 RX ORDER — ACETAMINOPHEN 325 MG/1
650 TABLET ORAL EVERY 4 HOURS PRN
Status: DISCONTINUED | OUTPATIENT
Start: 2020-01-01 | End: 2020-01-01 | Stop reason: HOSPADM

## 2020-01-01 RX ORDER — PROCHLORPERAZINE EDISYLATE 5 MG/ML
10 INJECTION INTRAMUSCULAR; INTRAVENOUS EVERY 6 HOURS PRN
Status: DISCONTINUED | OUTPATIENT
Start: 2020-01-01 | End: 2020-01-01 | Stop reason: HOSPADM

## 2020-01-01 RX ORDER — INSULIN GLARGINE 100 [IU]/ML
30 INJECTION, SOLUTION SUBCUTANEOUS 2 TIMES DAILY
COMMUNITY
End: 2020-01-01 | Stop reason: HOSPADM

## 2020-01-01 RX ORDER — POTASSIUM CHLORIDE 750 MG/1
20 TABLET, FILM COATED, EXTENDED RELEASE ORAL 2 TIMES DAILY WITH MEALS
Status: DISCONTINUED | OUTPATIENT
Start: 2020-01-01 | End: 2020-01-01

## 2020-01-01 RX ORDER — METOPROLOL SUCCINATE 25 MG/1
25 TABLET, EXTENDED RELEASE ORAL NIGHTLY
Status: DISCONTINUED | OUTPATIENT
Start: 2020-01-01 | End: 2020-01-01

## 2020-01-01 RX ORDER — ATORVASTATIN CALCIUM 20 MG/1
10 TABLET, FILM COATED ORAL NIGHTLY
Status: DISCONTINUED | OUTPATIENT
Start: 2020-01-01 | End: 2020-01-01

## 2020-01-01 RX ORDER — METOPROLOL SUCCINATE 25 MG/1
25 TABLET, EXTENDED RELEASE ORAL
Status: DISCONTINUED | OUTPATIENT
Start: 2020-01-01 | End: 2020-01-01 | Stop reason: HOSPADM

## 2020-01-01 RX ORDER — INSULIN GLARGINE 100 [IU]/ML
25 INJECTION, SOLUTION SUBCUTANEOUS EVERY 12 HOURS SCHEDULED
Refills: 12 | Status: ON HOLD
Start: 2020-01-01 | End: 2021-01-01

## 2020-01-01 RX ORDER — FAMOTIDINE 20 MG/1
20 TABLET, FILM COATED ORAL
Status: DISCONTINUED | OUTPATIENT
Start: 2020-01-01 | End: 2020-01-01 | Stop reason: HOSPADM

## 2020-01-01 RX ORDER — INSULIN GLARGINE 100 [IU]/ML
25 INJECTION, SOLUTION SUBCUTANEOUS EVERY 12 HOURS SCHEDULED
Status: DISCONTINUED | OUTPATIENT
Start: 2020-01-01 | End: 2020-01-01 | Stop reason: HOSPADM

## 2020-01-01 RX ORDER — INSULIN GLARGINE 100 [IU]/ML
15 INJECTION, SOLUTION SUBCUTANEOUS EVERY 12 HOURS SCHEDULED
Status: DISCONTINUED | OUTPATIENT
Start: 2020-01-01 | End: 2020-01-01

## 2020-01-01 RX ORDER — ONDANSETRON 2 MG/ML
4 INJECTION INTRAMUSCULAR; INTRAVENOUS EVERY 6 HOURS PRN
Status: DISCONTINUED | OUTPATIENT
Start: 2020-01-01 | End: 2020-01-01 | Stop reason: HOSPADM

## 2020-01-01 RX ORDER — POLYETHYLENE GLYCOL 3350 17 G/17G
17 POWDER, FOR SOLUTION ORAL DAILY
Status: DISCONTINUED | OUTPATIENT
Start: 2020-01-01 | End: 2020-01-01 | Stop reason: HOSPADM

## 2020-01-01 RX ORDER — ACETAMINOPHEN 500 MG
500 TABLET ORAL EVERY 6 HOURS PRN
Status: ON HOLD | COMMUNITY
End: 2021-01-01

## 2020-01-01 RX ORDER — ASCORBIC ACID 500 MG
500 TABLET ORAL DAILY
Status: ON HOLD | COMMUNITY
End: 2021-01-01

## 2020-01-01 RX ORDER — POLYETHYLENE GLYCOL 3350 17 G/17G
17 POWDER, FOR SOLUTION ORAL DAILY
Status: DISCONTINUED | OUTPATIENT
Start: 2020-01-01 | End: 2020-01-01

## 2020-01-01 RX ORDER — POTASSIUM CHLORIDE 1.5 G/1.77G
40 POWDER, FOR SOLUTION ORAL 2 TIMES DAILY
Status: DISCONTINUED | OUTPATIENT
Start: 2020-01-01 | End: 2020-01-01

## 2020-01-01 RX ORDER — HYDROCODONE BITARTRATE AND ACETAMINOPHEN 7.5; 325 MG/1; MG/1
1 TABLET ORAL 2 TIMES DAILY
Status: DISCONTINUED | OUTPATIENT
Start: 2020-01-01 | End: 2020-01-01

## 2020-01-01 RX ORDER — POLYETHYLENE GLYCOL 3350 17 G/17G
17 POWDER, FOR SOLUTION ORAL DAILY
Status: ON HOLD | COMMUNITY
End: 2021-01-01

## 2020-01-01 RX ORDER — NYSTATIN 100000 [USP'U]/G
1 POWDER TOPICAL 2 TIMES DAILY
Status: ON HOLD | COMMUNITY
End: 2021-01-01

## 2020-01-01 RX ORDER — NITROGLYCERIN 0.4 MG/1
0.4 TABLET SUBLINGUAL
Status: DISCONTINUED | OUTPATIENT
Start: 2020-01-01 | End: 2020-01-01 | Stop reason: HOSPADM

## 2020-01-01 RX ORDER — CEFTRIAXONE SODIUM 2 G/50ML
2 INJECTION, SOLUTION INTRAVENOUS EVERY 24 HOURS
Status: DISCONTINUED | OUTPATIENT
Start: 2020-01-01 | End: 2020-01-01 | Stop reason: HOSPADM

## 2020-01-01 RX ORDER — ATORVASTATIN CALCIUM 20 MG/1
10 TABLET, FILM COATED ORAL NIGHTLY
Status: DISCONTINUED | OUTPATIENT
Start: 2020-01-01 | End: 2020-01-01 | Stop reason: HOSPADM

## 2020-01-01 RX ADMIN — INSULIN GLARGINE 25 UNITS: 100 INJECTION, SOLUTION SUBCUTANEOUS at 08:47

## 2020-01-01 RX ADMIN — CEFTRIAXONE SODIUM 2 G: 2 INJECTION, SOLUTION INTRAVENOUS at 12:44

## 2020-01-01 RX ADMIN — INSULIN LISPRO 4 UNITS: 100 INJECTION, SOLUTION INTRAVENOUS; SUBCUTANEOUS at 08:27

## 2020-01-01 RX ADMIN — INSULIN LISPRO 5 UNITS: 100 INJECTION, SOLUTION INTRAVENOUS; SUBCUTANEOUS at 08:48

## 2020-01-01 RX ADMIN — POLYETHYLENE GLYCOL 3350 17 G: 17 POWDER, FOR SOLUTION ORAL at 08:52

## 2020-01-01 RX ADMIN — INSULIN LISPRO 5 UNITS: 100 INJECTION, SOLUTION INTRAVENOUS; SUBCUTANEOUS at 12:19

## 2020-01-01 RX ADMIN — INSULIN LISPRO 2 UNITS: 100 INJECTION, SOLUTION INTRAVENOUS; SUBCUTANEOUS at 08:48

## 2020-01-01 RX ADMIN — POLYETHYLENE GLYCOL 3350 17 G: 17 POWDER, FOR SOLUTION ORAL at 08:35

## 2020-01-01 RX ADMIN — METOPROLOL SUCCINATE 25 MG: 25 TABLET, EXTENDED RELEASE ORAL at 09:53

## 2020-01-01 RX ADMIN — INSULIN LISPRO 5 UNITS: 100 INJECTION, SOLUTION INTRAVENOUS; SUBCUTANEOUS at 12:45

## 2020-01-01 RX ADMIN — FAMOTIDINE 20 MG: 20 TABLET, FILM COATED ORAL at 17:32

## 2020-01-01 RX ADMIN — APIXABAN 2.5 MG: 2.5 TABLET, FILM COATED ORAL at 21:11

## 2020-01-01 RX ADMIN — APIXABAN 2.5 MG: 2.5 TABLET, FILM COATED ORAL at 20:16

## 2020-01-01 RX ADMIN — APIXABAN 2.5 MG: 2.5 TABLET, FILM COATED ORAL at 21:46

## 2020-01-01 RX ADMIN — APIXABAN 2.5 MG: 2.5 TABLET, FILM COATED ORAL at 21:30

## 2020-01-01 RX ADMIN — INSULIN LISPRO 4 UNITS: 100 INJECTION, SOLUTION INTRAVENOUS; SUBCUTANEOUS at 16:48

## 2020-01-01 RX ADMIN — INSULIN LISPRO 4 UNITS: 100 INJECTION, SOLUTION INTRAVENOUS; SUBCUTANEOUS at 17:32

## 2020-01-01 RX ADMIN — INSULIN LISPRO 8 UNITS: 100 INJECTION, SOLUTION INTRAVENOUS; SUBCUTANEOUS at 12:20

## 2020-01-01 RX ADMIN — INSULIN GLARGINE 25 UNITS: 100 INJECTION, SOLUTION SUBCUTANEOUS at 21:11

## 2020-01-01 RX ADMIN — ONDANSETRON 4 MG: 2 INJECTION INTRAMUSCULAR; INTRAVENOUS at 06:47

## 2020-01-01 RX ADMIN — CEFTRIAXONE SODIUM 2 G: 2 INJECTION, SOLUTION INTRAVENOUS at 11:53

## 2020-01-01 RX ADMIN — INSULIN LISPRO 5 UNITS: 100 INJECTION, SOLUTION INTRAVENOUS; SUBCUTANEOUS at 11:37

## 2020-01-01 RX ADMIN — METOPROLOL SUCCINATE 25 MG: 25 TABLET, EXTENDED RELEASE ORAL at 08:48

## 2020-01-01 RX ADMIN — PROCHLORPERAZINE EDISYLATE 10 MG: 5 INJECTION INTRAMUSCULAR; INTRAVENOUS at 17:11

## 2020-01-01 RX ADMIN — ATORVASTATIN CALCIUM 10 MG: 20 TABLET, FILM COATED ORAL at 20:16

## 2020-01-01 RX ADMIN — INSULIN GLARGINE 25 UNITS: 100 INJECTION, SOLUTION SUBCUTANEOUS at 08:30

## 2020-01-01 RX ADMIN — INSULIN LISPRO 5 UNITS: 100 INJECTION, SOLUTION INTRAVENOUS; SUBCUTANEOUS at 08:28

## 2020-01-01 RX ADMIN — APIXABAN 2.5 MG: 2.5 TABLET, FILM COATED ORAL at 08:35

## 2020-01-01 RX ADMIN — CEFTRIAXONE SODIUM 2 G: 2 INJECTION, SOLUTION INTRAVENOUS at 11:37

## 2020-01-01 RX ADMIN — HYDROCODONE BITARTRATE AND ACETAMINOPHEN 1 TABLET: 7.5; 325 TABLET ORAL at 09:01

## 2020-01-01 RX ADMIN — FAMOTIDINE 20 MG: 20 TABLET, FILM COATED ORAL at 08:27

## 2020-01-01 RX ADMIN — SODIUM CHLORIDE, PRESERVATIVE FREE 10 ML: 5 INJECTION INTRAVENOUS at 08:52

## 2020-01-01 RX ADMIN — SODIUM CHLORIDE 100 ML/HR: 9 INJECTION, SOLUTION INTRAVENOUS at 01:12

## 2020-01-01 RX ADMIN — SODIUM CHLORIDE, POTASSIUM CHLORIDE, SODIUM LACTATE AND CALCIUM CHLORIDE 1000 ML: 600; 310; 30; 20 INJECTION, SOLUTION INTRAVENOUS at 14:51

## 2020-01-01 RX ADMIN — INSULIN LISPRO 4 UNITS: 100 INJECTION, SOLUTION INTRAVENOUS; SUBCUTANEOUS at 17:04

## 2020-01-01 RX ADMIN — METOPROLOL SUCCINATE 25 MG: 25 TABLET, EXTENDED RELEASE ORAL at 08:52

## 2020-01-01 RX ADMIN — INSULIN LISPRO 5 UNITS: 100 INJECTION, SOLUTION INTRAVENOUS; SUBCUTANEOUS at 17:32

## 2020-01-01 RX ADMIN — INSULIN LISPRO 6 UNITS: 100 INJECTION, SOLUTION INTRAVENOUS; SUBCUTANEOUS at 11:53

## 2020-01-01 RX ADMIN — POLYETHYLENE GLYCOL 3350 17 G: 17 POWDER, FOR SOLUTION ORAL at 08:48

## 2020-01-01 RX ADMIN — HYDROCODONE BITARTRATE AND ACETAMINOPHEN 1 TABLET: 7.5; 325 TABLET ORAL at 08:38

## 2020-01-01 RX ADMIN — ONDANSETRON 4 MG: 2 INJECTION INTRAMUSCULAR; INTRAVENOUS at 20:20

## 2020-01-01 RX ADMIN — POLYETHYLENE GLYCOL 3350 17 G: 17 POWDER, FOR SOLUTION ORAL at 08:27

## 2020-01-01 RX ADMIN — HYDROMORPHONE HYDROCHLORIDE 0.5 MG: 1 INJECTION, SOLUTION INTRAMUSCULAR; INTRAVENOUS; SUBCUTANEOUS at 13:40

## 2020-01-01 RX ADMIN — INSULIN GLARGINE 15 UNITS: 100 INJECTION, SOLUTION SUBCUTANEOUS at 08:46

## 2020-01-01 RX ADMIN — INSULIN LISPRO 7 UNITS: 100 INJECTION, SOLUTION INTRAVENOUS; SUBCUTANEOUS at 17:14

## 2020-01-01 RX ADMIN — INSULIN LISPRO 2 UNITS: 100 INJECTION, SOLUTION INTRAVENOUS; SUBCUTANEOUS at 08:55

## 2020-01-01 RX ADMIN — FAMOTIDINE 20 MG: 20 TABLET, FILM COATED ORAL at 08:51

## 2020-01-01 RX ADMIN — INSULIN HUMAN 10 UNITS: 100 INJECTION, SOLUTION PARENTERAL at 14:50

## 2020-01-01 RX ADMIN — INSULIN LISPRO 5 UNITS: 100 INJECTION, SOLUTION INTRAVENOUS; SUBCUTANEOUS at 16:47

## 2020-01-01 RX ADMIN — APIXABAN 2.5 MG: 2.5 TABLET, FILM COATED ORAL at 08:48

## 2020-01-01 RX ADMIN — ATORVASTATIN CALCIUM 10 MG: 20 TABLET, FILM COATED ORAL at 21:11

## 2020-01-01 RX ADMIN — APIXABAN 2.5 MG: 2.5 TABLET, FILM COATED ORAL at 08:52

## 2020-01-01 RX ADMIN — INSULIN GLARGINE 25 UNITS: 100 INJECTION, SOLUTION SUBCUTANEOUS at 21:02

## 2020-01-01 RX ADMIN — HYDROCODONE BITARTRATE AND ACETAMINOPHEN 1 TABLET: 7.5; 325 TABLET ORAL at 16:14

## 2020-01-01 RX ADMIN — CEFTRIAXONE SODIUM 2 G: 2 INJECTION, SOLUTION INTRAVENOUS at 12:19

## 2020-01-01 RX ADMIN — HYDROCODONE BITARTRATE AND ACETAMINOPHEN 1 TABLET: 7.5; 325 TABLET ORAL at 03:24

## 2020-01-01 RX ADMIN — INSULIN LISPRO 7 UNITS: 100 INJECTION, SOLUTION INTRAVENOUS; SUBCUTANEOUS at 08:35

## 2020-01-01 RX ADMIN — INSULIN GLARGINE 15 UNITS: 100 INJECTION, SOLUTION SUBCUTANEOUS at 00:08

## 2020-01-01 RX ADMIN — INSULIN LISPRO 5 UNITS: 100 INJECTION, SOLUTION INTRAVENOUS; SUBCUTANEOUS at 17:11

## 2020-01-01 RX ADMIN — INSULIN GLARGINE 25 UNITS: 100 INJECTION, SOLUTION SUBCUTANEOUS at 21:31

## 2020-01-01 RX ADMIN — INSULIN LISPRO 5 UNITS: 100 INJECTION, SOLUTION INTRAVENOUS; SUBCUTANEOUS at 11:53

## 2020-01-01 RX ADMIN — ONDANSETRON 4 MG: 2 INJECTION INTRAMUSCULAR; INTRAVENOUS at 13:39

## 2020-01-01 RX ADMIN — ONDANSETRON 4 MG: 2 INJECTION INTRAMUSCULAR; INTRAVENOUS at 12:19

## 2020-01-01 RX ADMIN — INSULIN LISPRO 5 UNITS: 100 INJECTION, SOLUTION INTRAVENOUS; SUBCUTANEOUS at 17:04

## 2020-01-01 RX ADMIN — FAMOTIDINE 20 MG: 20 TABLET, FILM COATED ORAL at 16:36

## 2020-01-01 RX ADMIN — ONDANSETRON 4 MG: 2 INJECTION INTRAMUSCULAR; INTRAVENOUS at 14:31

## 2020-01-01 RX ADMIN — CEFTRIAXONE SODIUM 1 G: 1 INJECTION, SOLUTION INTRAVENOUS at 14:09

## 2020-01-01 RX ADMIN — INSULIN GLARGINE 10 UNITS: 100 INJECTION, SOLUTION SUBCUTANEOUS at 12:20

## 2020-01-01 RX ADMIN — FAMOTIDINE 20 MG: 20 TABLET, FILM COATED ORAL at 08:48

## 2020-01-01 RX ADMIN — INSULIN LISPRO 5 UNITS: 100 INJECTION, SOLUTION INTRAVENOUS; SUBCUTANEOUS at 08:52

## 2020-01-01 RX ADMIN — ATORVASTATIN CALCIUM 10 MG: 20 TABLET, FILM COATED ORAL at 21:30

## 2020-01-01 RX ADMIN — FAMOTIDINE 20 MG: 20 TABLET, FILM COATED ORAL at 17:04

## 2020-01-01 RX ADMIN — APIXABAN 2.5 MG: 2.5 TABLET, FILM COATED ORAL at 08:27

## 2020-01-01 RX ADMIN — FAMOTIDINE 20 MG: 20 TABLET, FILM COATED ORAL at 16:47

## 2020-01-01 RX ADMIN — INSULIN LISPRO 4 UNITS: 100 INJECTION, SOLUTION INTRAVENOUS; SUBCUTANEOUS at 11:37

## 2020-01-01 RX ADMIN — INSULIN GLARGINE 25 UNITS: 100 INJECTION, SOLUTION SUBCUTANEOUS at 08:52

## 2020-01-01 RX ADMIN — ONDANSETRON 4 MG: 2 INJECTION INTRAMUSCULAR; INTRAVENOUS at 00:09

## 2020-01-01 RX ADMIN — METOPROLOL SUCCINATE 25 MG: 25 TABLET, EXTENDED RELEASE ORAL at 08:27

## 2020-01-01 RX ADMIN — INSULIN LISPRO 6 UNITS: 100 INJECTION, SOLUTION INTRAVENOUS; SUBCUTANEOUS at 12:44

## 2020-01-01 RX ADMIN — SODIUM CHLORIDE 100 ML/HR: 9 INJECTION, SOLUTION INTRAVENOUS at 12:18

## 2020-12-12 PROBLEM — A41.9 SEPSIS WITHOUT ACUTE ORGAN DYSFUNCTION (HCC): Status: ACTIVE | Noted: 2020-01-01

## 2020-12-12 NOTE — ED TRIAGE NOTES
Pt arrives via EMS from [facility] for RLQ abdominal pain that started at 1800 last night. Also complains of vomiting that started this morning. Pt is a known diabetic and her sugar for EMS was 404. Patient masked at arrival and triage staff wore all appropriate PPE during entire encounter with patient.

## 2020-12-12 NOTE — ED NOTES
Patient's O2 saturation dropped down to 85% on room air. Placed patient on 2L of O2 and her saturation went to 91%. MD aware. Will continue to monitor.     Cecily Senior RN  12/12/20 3947

## 2020-12-12 NOTE — ED PROVIDER NOTES
I have supervised the care provided by the midlevel provider.    We have discussed this patient's history, physical exam, and treatment plan.   I have reviewed the note and have personally examined the patient and agree with the plan of care.  See attached attending note.  My personal findings are below:    Patient complains of right lower quadrant pain since yesterday.  She has also had nausea and vomiting.  Denies fever, chills, chest pain, shortness of breath, diarrhea, or dysuria.  She has a history of atrial fibrillation and is on Eliquis.    On exam: Awake and alert.  Mildly dry mucous membranes.  Heart is irregularly irregular and mildly tachycardic.  Lungs are clear bilaterally.  There is right lower quadrant tenderness without rebound or guarding.  No CVA tenderness.    Plan is to obtain labs, chest x-ray, and CT abdomen/pelvis.    EKG          EKG time: 1426  Rhythm/Rate: Atrial fibrillation, rate 119  P waves and HI: Irregular, varying  QRS, axis: LAD, anterior Q waves  ST and T waves: Normal    Interpreted Contemporaneously by me, independently viewed  EKG is not significantly changed compared to prior EKG done on 3/8/2017      1440: Labs reviewed.  Lactic acid, procalcitonin, white blood cell count, glucose, and creatinine are elevated.  IV fluids and IV insulin have been ordered.  Urinalysis is consistent with a UTI and the patient is getting Rocephin.  Chest x-ray is negative acute.  CT abdomen/pelvis is pending.    1506: CT results discussed with Dr. Garcia (radiologist).  Images independently viewed by me.  There is a 2 cm stone in the right renal pelvis without hydronephrosis.  There is some right perinephric stranding.  There are enlarged lymph nodes to the left of the abdominal aorta.  Follow-up CT with IV contrast, bone scan, or tissue sampling is recommended for further evaluation.    Hiram Ball MD  12/12/20 1442       Hiram Ball MD  12/12/20 1447       Hiram Ball,  MD  12/12/20 1505

## 2020-12-12 NOTE — ED PROVIDER NOTES
EMERGENCY DEPARTMENT ENCOUNTER    Room Number:  E566/1  Date seen:  12/12/2020  Time seen: 13:14 EST  PCP: Fany Morales MD  Historian: patient      HPI:  Chief Complaint: Right lower quadrant abdominal pain    A complete HPI/ROS/PMH/PSH/SH/FH are unobtainable due to: None    Context: Shoshana Bae is a 80 y.o. female who presents to the ED for evaluation of right lower quadrant abdominal pain that started yesterday afternoon during her lymphedema massage.  She states that therapist massage that area and she started having sudden pain.  It was intermittent and relatively mild until yesterday evening around dinnertime where it became constant and more severe and has persisted since.  It does not radiate, it is tender to the touch nothing else makes it worse or better.  She has had nausea and a couple episodes of vomiting, denies any diarrhea hematuria or dysuria.  She also denies any chest pain, shortness of breath fevers chills cough congestion.  She has a history of a hysterectomy with BSO and appendectomy.        PAST MEDICAL HISTORY  Active Ambulatory Problems     Diagnosis Date Noted   • History of total knee arthroplasty 06/27/2016   • Pain in left knee 06/27/2016   • Failed total knee arthroplasty (CMS/Formerly [facility]) 06/27/2016   • H/O mammogram    • H/O colonoscopy    • Lymph edema 08/10/2016   • Sarcoidosis 08/10/2016   • Chronic atrial fibrillation (CMS/Formerly [facility]) 08/10/2016   • Hyperlipidemia 08/10/2016   • Hypertriglyceridemia 08/10/2016   • Type 2 diabetes mellitus with hyperglycemia (CMS/Formerly [facility]) 08/10/2016   • Stasis dermatitis of both legs 08/10/2016   • Essential hypertension 08/10/2016   • Cellulitis of left lower extremity 09/22/2016   • Cellulitis of extremity, lower bilateral 09/22/2016   • Chronic anticoagulation, for a fib 09/22/2016   • Morbid obesity with BMI of 50.0-59.9, adult (CMS/Formerly [facility]) 09/22/2016   • OA (osteoarthritis) of knee 10/10/2016   • Infected prosthetic knee joint (CMS/Formerly [facility])  11/01/2016   • Obstructive sleep apnea syndrome 02/08/2017   • Chronic venous insufficiency 02/08/2017   • Pulmonary nodule 02/08/2017   • Failed total knee replacement (CMS/HCC) 03/22/2017   • Infection of total knee replacement (CMS/HCC) 02/01/2018   • Renal insufficiency 02/02/2018     Resolved Ambulatory Problems     Diagnosis Date Noted   • No Resolved Ambulatory Problems     Past Medical History:   Diagnosis Date   • Arm fracture    • Arthritis    • Atrial fibrillation (CMS/HCC)    • Back pain    • Cancer (CMS/HCC)    • Cellulitis    • Colon polyp    • DDD (degenerative disc disease), lumbar    • Diabetes mellitus (CMS/HCC)    • Edema    • Hypertension    • On anticoagulant therapy    • Oral-mouth cancer (CMS/HCC)    • 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)    • Sleep apnea    • Uterine cancer (CMS/HCC)          PAST SURGICAL HISTORY  Past Surgical History:   Procedure Laterality Date   • APPENDECTOMY     • CARDIAC CATHETERIZATION  2010   • COLONOSCOPY     • DILATATION AND CURETTAGE     • EYE SURGERY     • HERNIA REPAIR     • HYSTERECTOMY     • KNEE SURGERY Bilateral    • LUMBAR DISCECTOMY FUSION INSTRUMENTATION     • MOUTH LESION EXCISIONAL BIOPSY     • OVARIAN CYST SURGERY     • MI REVISE KNEE JOINT REPLACE,1 PART Left 10/10/2016    Procedure: TOTAL KNEE ARTHROPLASTY REVISION - SPACER PLACEMENT;  Surgeon: Dominick Bosch MD;  Location: [facility] OR;  Service: Orthopedics   • MI REVISE KNEE JOINT REPLACE,1 PART Left 3/22/2017    Procedure: TOTAL KNEE ARTHROPLASTY REVISION, ANTIBIOTIC SPACER REPLACEMENT;  Surgeon: Dominick Bosch MD;  Location: [facility] OR;  Service: Orthopedics   • SHOULDER SURGERY Right     FRACTURE REPAIR    • THYROID SURGERY  12/18/2007   • TONSILLECTOMY     • UMBILICAL HERNIA REPAIR           FAMILY HISTORY  Family History   Problem Relation Age of Onset   • Heart disease Mother    • Hypertension Mother    • Cancer Mother    • Heart disease Father    • Hypertension  Father    • Cancer Father    • Heart disease Sister    • Hypertension Sister    • Diabetes Sister    • Cancer Sister    • Cancer Brother    • No Known Problems Daughter    • No Known Problems Son    • No Known Problems Maternal Grandmother    • No Known Problems Paternal Grandmother    • No Known Problems Maternal Aunt    • No Known Problems Paternal Aunt    • Hypertension Other    • Heart disease Other         AFIB   • Cancer Other    • BRCA 1/2 Neg Hx    • Breast cancer Neg Hx    • Colon cancer Neg Hx    • Endometrial cancer Neg Hx    • Ovarian cancer Neg Hx          SOCIAL HISTORY  Social History     Socioeconomic History   • Marital status: Single     Spouse name: Not on file   • Number of children: Not on file   • Years of education: Not on file   • Highest education level: Not on file   Occupational History   • Occupation: nun   Tobacco Use   • Smoking status: Never Smoker   • Smokeless tobacco: Never Used   Substance and Sexual Activity   • Alcohol use: No   • Drug use: No   • Sexual activity: Defer         ALLERGIES  Codeine, Morphine, Morphine and related, and Phosphate        REVIEW OF SYSTEMS  Review of Systems     All systems reviewed and negative except for those discussed in HPI.       PHYSICAL EXAM  ED Triage Vitals [12/12/20 1242]   Temp Heart Rate Resp BP SpO2   98.1 °F (36.7 °C) (!) 129 22 133/91 96 %      Temp src Heart Rate Source Patient Position BP Location FiO2 (%)   Tympanic Monitor Lying -- --         GENERAL: not distressed  HENT: atraumatic  EYES: no scleral icterus  CV: regular rhythm, regular rate  RESPIRATORY: normal effort  ABDOMEN: Morbidly obese, soft and nondistended, there is right lower quadrant tenderness with some faint bruising of the right lower abdominal wall.  There is no guarding or rigidity, normal bowel sounds  MUSCULOSKELETAL: no deformity, chronic bilateral lower extremity edema, the left leg is wrapped due to lymphedema  NEURO: alert, moves all extremities, follows  commands  SKIN: warm, dry    Vital signs and nursing notes reviewed.          LAB RESULTS  Recent Results (from the past 24 hour(s))   Comprehensive Metabolic Panel    Collection Time: 12/12/20  1:16 PM    Specimen: Blood   Result Value Ref Range    Glucose 458 (C) 65 - 99 mg/dL    BUN 27 (H) 8 - 23 mg/dL    Creatinine 1.65 (H) 0.57 - 1.00 mg/dL    Sodium 136 136 - 145 mmol/L    Potassium 5.0 3.5 - 5.2 mmol/L    Chloride 97 (L) 98 - 107 mmol/L    CO2 23.8 22.0 - 29.0 mmol/L    Calcium 9.0 8.6 - 10.5 mg/dL    Total Protein 7.0 6.0 - 8.5 g/dL    Albumin 3.90 3.50 - 5.20 g/dL    ALT (SGPT) 10 1 - 33 U/L    AST (SGOT) 13 1 - 32 U/L    Alkaline Phosphatase 160 (H) 39 - 117 U/L    Total Bilirubin 1.5 (H) 0.0 - 1.2 mg/dL    eGFR Non African Amer 30 (L) >60 mL/min/1.73    Globulin 3.1 gm/dL    A/G Ratio 1.3 g/dL    BUN/Creatinine Ratio 16.4 7.0 - 25.0    Anion Gap 15.2 (H) 5.0 - 15.0 mmol/L   Lipase    Collection Time: 12/12/20  1:16 PM    Specimen: Blood   Result Value Ref Range    Lipase 10 (L) 13 - 60 U/L   CBC Auto Differential    Collection Time: 12/12/20  1:16 PM    Specimen: Blood   Result Value Ref Range    WBC 13.18 (H) 3.40 - 10.80 10*3/mm3    RBC 4.91 3.77 - 5.28 10*6/mm3    Hemoglobin 15.1 12.0 - 15.9 g/dL    Hematocrit 46.7 (H) 34.0 - 46.6 %    MCV 95.1 79.0 - 97.0 fL    MCH 30.8 26.6 - 33.0 pg    MCHC 32.3 31.5 - 35.7 g/dL    RDW 13.0 12.3 - 15.4 %    RDW-SD 45.6 37.0 - 54.0 fl    MPV 10.5 6.0 - 12.0 fL    Platelets 233 140 - 450 10*3/mm3    Neutrophil % 89.7 (H) 42.7 - 76.0 %    Lymphocyte % 5.1 (L) 19.6 - 45.3 %    Monocyte % 3.8 (L) 5.0 - 12.0 %    Eosinophil % 0.0 (L) 0.3 - 6.2 %    Basophil % 0.6 0.0 - 1.5 %    Immature Grans % 0.8 (H) 0.0 - 0.5 %    Neutrophils, Absolute 11.83 (H) 1.70 - 7.00 10*3/mm3    Lymphocytes, Absolute 0.67 (L) 0.70 - 3.10 10*3/mm3    Monocytes, Absolute 0.50 0.10 - 0.90 10*3/mm3    Eosinophils, Absolute 0.00 0.00 - 0.40 10*3/mm3    Basophils, Absolute 0.08 0.00 - 0.20 10*3/mm3     Immature Grans, Absolute 0.10 (H) 0.00 - 0.05 10*3/mm3    nRBC 0.0 0.0 - 0.2 /100 WBC   Procalcitonin    Collection Time: 12/12/20  1:16 PM    Specimen: Blood   Result Value Ref Range    Procalcitonin 0.77 (H) 0.00 - 0.25 ng/mL   BNP    Collection Time: 12/12/20  1:16 PM    Specimen: Blood   Result Value Ref Range    proBNP 1,646.0 0.0-1,800.0 pg/mL   Troponin    Collection Time: 12/12/20  1:16 PM    Specimen: Blood   Result Value Ref Range    Troponin T <0.010 0.000 - 0.030 ng/mL   Urinalysis With Microscopic If Indicated (No Culture) - Urine, Catheter    Collection Time: 12/12/20  1:20 PM    Specimen: Urine, Catheter   Result Value Ref Range    Color, UA Red (A) Yellow, Straw    Appearance, UA Turbid (A) Clear    pH, UA 7.0 5.0 - 8.0    Specific Gravity, UA 1.020 1.005 - 1.030    Glucose, UA >=1000 mg/dL (3+) (A) Negative    Ketones, UA 15 mg/dL (1+) (A) Negative    Bilirubin, UA Negative Negative    Blood, UA Large (3+) (A) Negative    Protein,  mg/dL (2+) (A) Negative    Leuk Esterase, UA Small (1+) (A) Negative    Nitrite, UA Negative Negative    Urobilinogen, UA 0.2 E.U./dL 0.2 - 1.0 E.U./dL   Urinalysis, Microscopic Only - Urine, Catheter    Collection Time: 12/12/20  1:20 PM    Specimen: Urine, Catheter   Result Value Ref Range    RBC, UA 21-30 (A) None Seen, 0-2 /HPF    WBC, UA Too Numerous to Count (A) None Seen, 0-2 /HPF    Bacteria, UA 1+ (A) None Seen /HPF    Squamous Epithelial Cells, UA 3-6 (A) None Seen, 0-2 /HPF    Hyaline Casts, UA 3-6 None Seen /LPF    Methodology Automated Microscopy    Lactic Acid, Plasma    Collection Time: 12/12/20  1:49 PM    Specimen: Blood   Result Value Ref Range    Lactate 2.9 (C) 0.5 - 2.0 mmol/L   Lactic Acid, Reflex Timer (This will reflex a repeat order 3-3:15 hours after ordered.)    Collection Time: 12/12/20  1:49 PM    Specimen: Blood   Result Value Ref Range    Hold Tube Hold for add-ons.    ECG 12 Lead    Collection Time: 12/12/20  2:26 PM   Result Value  Ref Range    QT Interval 355 ms   POC Glucose Once    Collection Time: 12/12/20  3:35 PM    Specimen: Blood   Result Value Ref Range    Glucose 398 (H) 70 - 130 mg/dL   Respiratory Panel PCR w/COVID-19(SARS-CoV-2) JOSEE/TY/ILIANA/PAD/COR/MAD/NAVEEN In-House, NP Swab in UTM/VTM, 3-4 HR TAT - Swab, Nasopharynx    Collection Time: 12/12/20  3:45 PM    Specimen: Nasopharynx; Swab   Result Value Ref Range    ADENOVIRUS, PCR Not Detected Not Detected    Coronavirus 229E Not Detected Not Detected    Coronavirus HKU1 Not Detected Not Detected    Coronavirus NL63 Not Detected Not Detected    Coronavirus OC43 Not Detected Not Detected    COVID19 Not Detected Not Detected - Ref. Range    Human Metapneumovirus Not Detected Not Detected    Human Rhinovirus/Enterovirus Not Detected Not Detected    Influenza A PCR Not Detected Not Detected    Influenza B PCR Not Detected Not Detected    Parainfluenza Virus 1 Not Detected Not Detected    Parainfluenza Virus 2 Not Detected Not Detected    Parainfluenza Virus 3 Not Detected Not Detected    Parainfluenza Virus 4 Not Detected Not Detected    RSV, PCR Not Detected Not Detected    Bordetella pertussis pcr Not Detected Not Detected    Bordetella parapertussis PCR Not Detected Not Detected    Chlamydophila pneumoniae PCR Not Detected Not Detected    Mycoplasma pneumo by PCR Not Detected Not Detected   Lactic Acid, Reflex    Collection Time: 12/12/20  5:42 PM    Specimen: Blood   Result Value Ref Range    Lactate 2.8 (C) 0.5 - 2.0 mmol/L       Ordered the above labs and independently reviewed the results.        RADIOLOGY  CT Abdomen Pelvis Without Contrast   Final Result   1. There appears to be pathologic retroperitoneal joe enlargement   adjacent to the left aspect of the descending thoracic aorta and along   the left common iliac vessels where nodes are difficult to differentiate   from adjacent vessels without contrast. Contrasted CT would be best to   further differentiate. Alternatively,  PET CT may be helpful.   2. Nephrolithiasis most extensively on the right with 2 cm in length   right renal pelvic stone. Right perinephric stranding is new when   compared to prior exam May 2013 and may indicate upper tract infection.   There is no hydronephrosis.   3. Large gallstone.   4. Chronic low density structure along the posterior right lobe of the   liver without change compared with May 2013 supporting benign etiology.   Periumbilical hernia contains fat and colonic loops without evidence for   incarceration.       Discussed with Dr. Ball in the emergency department 12/12/2020 at   3:05 PM.       Radiation dose reduction techniques were utilized, including automated   exposure control and exposure modulation based on body size.       This report was finalized on 12/12/2020 4:41 PM by Dr. Yannick Enrique M.D.          XR Chest 1 View   Final Result          I ordered the above noted radiological studies. Reviewed by me and discussed with radiologist.  See dictation for official radiology interpretation.    PROCEDURES  Procedures        MEDICATIONS GIVEN IN ER  Medications   sodium chloride 0.9 % flush 10 mL (has no administration in time range)   nitroglycerin (NITROSTAT) SL tablet 0.4 mg (has no administration in time range)   acetaminophen (TYLENOL) tablet 650 mg (has no administration in time range)   ondansetron (ZOFRAN) tablet 4 mg (has no administration in time range)     Or   ondansetron (ZOFRAN) injection 4 mg (has no administration in time range)   melatonin tablet 3 mg (has no administration in time range)   apixaban (ELIQUIS) tablet 2.5 mg (has no administration in time range)   HYDROmorphone (DILAUDID) injection 0.5 mg (0.5 mg Intravenous Given 12/12/20 1340)   ondansetron (ZOFRAN) injection 4 mg (4 mg Intravenous Given 12/12/20 1339)   cefTRIAXone (ROCEPHIN) IVPB 1 g (0 g Intravenous Stopped 12/12/20 1439)   insulin regular (humuLIN R,novoLIN R) injection 10 Units (10 Units Intravenous  Given 12/12/20 1450)   lactated ringers bolus 1,000 mL (0 mL Intravenous Stopped 12/12/20 2002)             PROGRESS AND CONSULTS    Differential diagnosis includes but is not limited to:  - hepatobiliary pathology such as cholecystitis, cholangitis, and symptomatic cholelithiasis  - Pancreatitis  - Dyspepsia  - Small bowel obstruction  - Appendicitis  - Diverticulitis  - UTI including pyelonephritis  - Ureteral stone  - Zoster  - Colitis, including infectious and ischemic  - Atypical ACS      ED Course as of Dec 12 2005   Sat Dec 12, 2020   1338 WBC, UA(!): Too Numerous to Count [KA]   1338 Bacteria, UA(!): 1+ [KA]   1338 RBC, UA(!): 21-30 [KA]   1338 WBC(!): 13.18 [KA]   1351 Patient O2 sat noted to be normal on room air at initial presentation.  When she was moved into the stretcher she is 80% on room air.  She denies any shortness of breath.  It may be related to morbid obesity, she denies any fever or upper respiratory symptoms chest pain, is not symptomatic.  We will further evaluate with BNP troponin EKG and chest x-ray.    [KA]   1425 Insulin ordered for hyperglycemia.  No significant anion gap and serum bicarb is within normal range.   Glucose(!!): 458 [KA]   1446 Procalcitonin(!): 0.77 [KA]   1447 proBNP: 1,646.0 [KA]   1447 Troponin T: <0.010 [KA]   1447 IV fluids ordered   Lactate(!!): 2.9 [KA]   1533 I discussed the patient with Dr. Walls, hospitalist for [facility] including labs presentation and imaging.  She agrees to admit the patient for further evaluation and treatment.    [KA]   1618 I discussed the patient with Dr. Cordero of urology.  He agrees to consult.  No immediate intervention indicated due to no hydronephrosis on CT scan.    [KA]      ED Course User Index  [KA] Ludmila Dahl PA        Reviewed pt's history and workup with Dr. Ball.  After a bedside evaluation; they agree with the plan of care      Patient was placed in face mask in first look. Patient was wearing facemask each time I  entered the room and throughout our encounter. I wore protective equipment throughout this patient encounter including a face mask, eye shield and gloves. Hand hygiene was performed before donning protective equipment and after removal when leaving the room.        DIAGNOSIS  Final diagnoses:   Sepsis without acute organ dysfunction, due to unspecified organism (CMS/HCC)   Pyelonephritis   Adenopathy   Nephrolithiasis               Latest Documented Vital Signs:  As of 20:05 EST  BP- 110/70 HR- 97 Temp- 97.9 °F (36.6 °C) (Oral) O2 sat- 94%       Ludmila Dahl PA  12/12/20 2006

## 2020-12-13 PROBLEM — N39.0 UTI (URINARY TRACT INFECTION), BACTERIAL: Status: ACTIVE | Noted: 2020-01-01

## 2020-12-13 PROBLEM — A49.9 UTI (URINARY TRACT INFECTION), BACTERIAL: Status: ACTIVE | Noted: 2020-01-01

## 2020-12-13 NOTE — PROGRESS NOTES
"    DAILY PROGRESS NOTE  [facility]    Patient Identification:  Name: Shoshana Bae  Age: 80 y.o.  Sex: female  :  1940  MRN: [id number]         Primary Care Physician: Fany Morales MD    Subjective:  Interval History: Feels a little better as opposed to admission but still not close to baseline.  She is admits to some abdominal discomfort that is kind of generalized in her lower abdomen.  She denies any flank tenderness.  Denies any altered mentation.  She is a little bit nauseous but no emesis.  Denies any chest pain or troubles breathing.    Objective: Sickly and elderly but conversational with fluent speech and otherwise pleasant.  No family at bedside    Scheduled Meds:apixaban, 2.5 mg, Oral, Q12H  atorvastatin, 10 mg, Oral, Nightly  cefTRIAXone, 2 g, Intravenous, Q24H  icosapent ethyl, 2 g, Oral, BID With Meals  insulin glargine, 10 Units, Subcutaneous, Once  insulin glargine, 25 Units, Subcutaneous, Q12H  insulin lispro, 0-9 Units, Subcutaneous, TID With Meals  insulin lispro, 5 Units, Subcutaneous, TID With Meals  [START ON 2020] metoprolol succinate XL, 25 mg, Oral, Q24H  polyethylene glycol, 17 g, Oral, Daily      Continuous Infusions:sodium chloride, 100 mL/hr        Vital signs in last 24 hours:  Temp:  [97.8 °F (36.6 °C)-98.7 °F (37.1 °C)] 98.2 °F (36.8 °C)  Heart Rate:  [] 122  Resp:  [16-22] 18  BP: (100-133)/(61-99) 116/99    Intake/Output:    Intake/Output Summary (Last 24 hours) at 2020 1042  Last data filed at 2020 0530  Gross per 24 hour   Intake 480 ml   Output --   Net 480 ml       Exam:  /99 (BP Location: Right arm, Patient Position: Lying)   Pulse (!) 122   Temp 98.2 °F (36.8 °C) (Oral)   Resp 18   Ht 157.5 cm (62\")   Wt 129 kg (284 lb 9.6 oz)   SpO2 94%   BMI 52.05 kg/m²     General Appearance:    Alert, cooperative, AAOx3                          Head:    Normocephalic, without obvious abnormality, atraumatic       "                     Eyes:  Nonicterus                         throat:   Oral mucosa pink and dry                           Neck:   Supple, no meningismus or JVD                         Lungs:    Clear to auscultation bilaterally, respirations unlabored                 Chest Wall:    No tenderness or deformity                          Heart:  Irregularly irregular rate and rhythm, S1 and S2 normal                  Abdomen:     Soft, very mild lower abdominal tenderness to palpation but no rebound or guarding with good bowel sounds, morbidly obese with a BMI of 52+                 extremities: Chronic lymphedema and skin changes but nothing acute nor is there any presence of cellulitis.  I did personally remove both lower extremity leg wraps.                        Pulses:   Pulses palpable in all extremities                  Neurologic:   CNII-XII intact, moving all while supine though globally weak     Data Review:  Labs in chart were reviewed.    Assessment:  Active Hospital Problems    Diagnosis  POA   • **UTI (urinary tract infection), bacterial [N39.0, A49.9]  Unknown   • Sepsis without acute organ dysfunction (CMS/HCC) [A41.9]  Yes   • Obstructive sleep apnea syndrome [G47.33]  Yes   • Type 2 diabetes mellitus with hyperglycemia (CMS/HCC) [E11.65]  Yes   • Chronic atrial fibrillation (CMS/HCC) [I48.20]  Yes      Resolved Hospital Problems   No resolved problems to display.       Plan:    Sepsis secondary to UTI with possible right-sided pyelonephritis with 2 cm kidney stone with perinephric stranding but no hydronephrosis   -Rocephin 2 g - awaiting final urine culture/sensitivity   -Chest x-ray negative   -BC NGTD -lactate trending in right direction as is WBC   -Continue IVF and remains off Lasix    Retroperitoneal lymphadenopathy in a patient with complaints of abdominal discomfort as well as a past history of an ovarian type of cancer as well as oral cancer   -Surgical consultation placed   -Gallstones also  noted   -Consider urological consultation pending GS input    A. fib with mild RVR at times secondary to sepsis   -Toprol resumed and adjusted to daily   -AC on Eliquis    DM2 with CKD 3   -Uncontrolled with an A1c of 10.4   -Increase Lantus to 25 units twice daily and give an additional 10 units now to fulfill the full 25 units   -Blood sugars with hyperglycemia staying in the 3-400 range   -Add 5 units scheduled Humalog in addition to sliding scale for now    No ARF -CKD 3 at baseline    Lymphedema -remove leg wraps today and OT can further address this tomorrow    HLD on statin    No additional DVT prophylaxis warranted since already on Eliquis    Pepcid for GI prophylaxis    Cm Mcintyre MD  12/13/2020  10:42 EST

## 2020-12-13 NOTE — PLAN OF CARE
Pt tachycardic in afib but otherwise vss, possible 6 second cardiac pause this AM, Dr Mcintyre aware. Pt c/o nausea with a few episodes of emesis today, zofran PRN. [place] for abd pain. 2L NC, A&O, can get up w/ max 2 assist to BSC. Insulin adjusted per [facility] for high BS. General surgery and urology consulted today; pt requested that I update sister Geraldo (health care surrogate) on findings. Plan is to watch for a few days to see if symptoms improve with tx of pyelonephritis so hopefully she can avoid a gallbladder surgery.

## 2020-12-13 NOTE — PLAN OF CARE
Problem: Fall Injury Risk  Goal: Absence of Fall and Fall-Related Injury  Intervention: Identify and Manage Contributors to Fall Injury Risk  Recent Flowsheet Documentation  Taken 12/13/2020 0128 by Kerline Collado RN  Medication Review/Management: medications reviewed  Self-Care Promotion:   independence encouraged   BADL personal objects within reach  Taken 12/13/2020 0041 by Kerline Collado RN  Medication Review/Management: medications reviewed  Taken 12/12/2020 2203 by Kerline Collado RN  Medication Review/Management: medications reviewed  Taken 12/12/2020 2145 by Kerline Collado RN  Medication Review/Management: medications reviewed  Self-Care Promotion:   independence encouraged   BADL personal objects within reach  Intervention: Promote Injury-Free Environment  Recent Flowsheet Documentation  Taken 12/13/2020 0128 by Kerline Collado RN  Safety Promotion/Fall Prevention:   activity supervised   assistive device/personal items within reach   clutter free environment maintained   fall prevention program maintained   lighting adjusted   nonskid shoes/slippers when out of bed   room organization consistent   safety round/check completed  Taken 12/13/2020 0041 by Kerline Collado RN  Safety Promotion/Fall Prevention:   activity supervised   assistive device/personal items within reach   clutter free environment maintained   fall prevention program maintained   lighting adjusted   nonskid shoes/slippers when out of bed   room organization consistent   safety round/check completed  Taken 12/12/2020 2203 by Kerline Collado RN  Safety Promotion/Fall Prevention:   activity supervised   clutter free environment maintained   assistive device/personal items within reach   fall prevention program maintained   lighting adjusted   nonskid shoes/slippers when out of bed   room organization consistent   safety round/check completed  Taken 12/12/2020 2145 by Kerline Collado RN  Safety Promotion/Fall Prevention:   activity supervised    assistive device/personal items within reach   clutter free environment maintained   fall prevention program maintained   lighting adjusted   nonskid shoes/slippers when out of bed   room organization consistent   safety round/check completed   Goal Outcome Evaluation:  Plan of Care Reviewed With: patient  Progress: no change  Outcome Summary: Monitor pain,labs,and vitals. Patient admitted from the ER with sepsis. Contacted Dr. Walls for admission orders. Accu checks AC/HS. Nausea and a small amount of emesis noted on current shift-treated with PRN medications per orders. No C/O pain on current shift. 2L O2 NC. Will continue to monitor.

## 2020-12-13 NOTE — THERAPY EVALUATION
Patient Name: Shoshana Bae  : 1940    MRN: [id number]                              Today's Date: 2020       Admit Date: 2020    Visit Dx:     ICD-10-CM ICD-9-CM   1. Sepsis without acute organ dysfunction, due to unspecified organism (CMS/[facility])  A41.9 038.9     995.91   2. Pyelonephritis  N12 590.80   3. Adenopathy  R59.1 785.6   4. Nephrolithiasis  N20.0 592.0     Patient Active Problem List   Diagnosis   • History of total knee arthroplasty   • Pain in left knee   • Failed total knee arthroplasty (CMS/HCC)   • H/O mammogram   • H/O colonoscopy   • Lymph edema   • Sarcoidosis   • Chronic atrial fibrillation (CMS/HCC)   • Hyperlipidemia   • Hypertriglyceridemia   • Type 2 diabetes mellitus with hyperglycemia (CMS/HCC)   • Stasis dermatitis of both legs   • Essential hypertension   • Cellulitis of left lower extremity   • Cellulitis of extremity, lower bilateral   • Chronic anticoagulation, for a fib   • Morbid obesity with BMI of 50.0-59.9, adult (CMS/[facility])   • OA (osteoarthritis) of knee   • Infected prosthetic knee joint (CMS/HCC)   • Obstructive sleep apnea syndrome   • Chronic venous insufficiency   • Pulmonary nodule   • Failed total knee replacement (CMS/HCC)   • Infection of total knee replacement (CMS/HCC)   • Renal insufficiency   • Sepsis without acute organ dysfunction (CMS/HCC)   • UTI (urinary tract infection), bacterial     Past Medical History:   Diagnosis Date   • Arm fracture    • Arthritis    • Atrial fibrillation (CMS/HCC)    • Back pain     WITH STANDING   • Cancer (CMS/HCC)    • Cellulitis     LOWER LEGS   • Colon polyp    • DDD (degenerative disc disease), lumbar    • Diabetes mellitus (CMS/HCC)     TYPE 2   • Edema    • H/O colonoscopy        • H/O mammogram     [facility]    • Hyperlipidemia    • Hypertension    • On anticoagulant therapy    • Oral-mouth cancer (CMS/HCC)    • 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)    • Sleep apnea     MOUTHPIECE  USED/CPAP   • Uterine cancer (CMS/HCC)      Past Surgical History:   Procedure Laterality Date   • APPENDECTOMY     • CARDIAC CATHETERIZATION  2010   • COLONOSCOPY     • DILATATION AND CURETTAGE     • EYE SURGERY     • HERNIA REPAIR     • HYSTERECTOMY     • KNEE SURGERY Bilateral    • LUMBAR DISCECTOMY FUSION INSTRUMENTATION     • MOUTH LESION EXCISIONAL BIOPSY     • OVARIAN CYST SURGERY     • MI REVISE KNEE JOINT REPLACE,1 PART Left 10/10/2016    Procedure: TOTAL KNEE ARTHROPLASTY REVISION - SPACER PLACEMENT;  Surgeon: Dominick Bosch MD;  Location: [facility] OR;  Service: Orthopedics   • MI REVISE KNEE JOINT REPLACE,1 PART Left 3/22/2017    Procedure: TOTAL KNEE ARTHROPLASTY REVISION, ANTIBIOTIC SPACER REPLACEMENT;  Surgeon: Dominick Bosch MD;  Location: [facility] OR;  Service: Orthopedics   • SHOULDER SURGERY Right     FRACTURE REPAIR    • THYROID SURGERY  12/18/2007   • TONSILLECTOMY     • UMBILICAL HERNIA REPAIR       General Information     Row Name 12/13/20 1506          Physical Therapy Time and Intention    Document Type  evaluation  -DO     Mode of Treatment  physical therapy  -DO     Row Name 12/13/20 1506          General Information    Patient Profile Reviewed  yes  -DO     Prior Level of Function  independent:;all household mobility  -DO     Existing Precautions/Restrictions  fall;oxygen therapy device and L/min  -DO     Barriers to Rehab  previous functional deficit  -DO     Row Name 12/13/20 1506          Living Environment    Lives With  facility resident pt reports she lives at [facility] but was independent with transfers and getting around in her electric w/c.  -DO     Row Name 12/13/20 1506          Home Main Entrance    Number of Stairs, Main Entrance  none  -DO     Row Name 12/13/20 1506          Stairs Within Home, Primary    Number of Stairs, Within Home, Primary  none  -DO     Row Name 12/13/20 1506          Cognition    Orientation Status (Cognition)  oriented x 4  -DO     Row Name  12/13/20 1506          Safety Issues, Functional Mobility    Impairments Affecting Function (Mobility)  endurance/activity tolerance;strength  -DO       User Key  (r) = Recorded By, (t) = Taken By, (c) = Cosigned By    Initials Name Provider Type    DO Alen Blas PT Physical Therapist        Mobility     Row Name 12/13/20 1509          Bed Mobility    Bed Mobility  supine-sit;sit-supine  -DO     Supine-Sit [place] (Bed Mobility)  moderate assist (50% patient effort)  -DO     Sit-Supine [place] (Bed Mobility)  moderate assist (50% patient effort)  -DO     Assistive Device (Bed Mobility)  bed rails;draw sheet  -DO     [facility] Name 12/13/20 1509          Sit-Stand Transfer    Sit-Stand [place] (Transfers)  not tested  -DO     Row Name 12/13/20 1509          Gait/Stairs (Locomotion)    [place] Level (Gait)  not tested  -DO       User Key  (r) = Recorded By, (t) = Taken By, (c) = Cosigned By    Initials Name Provider Type    DO Alen Blas PT Physical Therapist        Obj/Interventions     [facility] Name 12/13/20 1509          Range of Motion Comprehensive    General Range of Motion  no range of motion deficits identified  -DO     Row Name 12/13/20 1509          Strength Comprehensive (MMT)    General Manual Muscle Testing (MMT) Assessment  -- Pt demonstrates functional strength of 3/5 in the BUE/BLE.  -DO     Row Name 12/13/20 1509          Motor Skills    Therapeutic Exercise  -- x10 LAQs and ankle pumps BLE  -DO     Row Name 12/13/20 1509          Balance    Balance Assessment  sitting static balance  -DO     Static Sitting Balance  mild impairment;sitting, edge of bed;supported  -DO     Row Name 12/13/20 1509          Sensory Assessment (Somatosensory)    Sensory Assessment (Somatosensory)  sensation intact  -DO       User Key  (r) = Recorded By, (t) = Taken By, (c) = Cosigned By    Initials Name Provider Type    DO Alen Blas PT Physical Therapist        Goals/Plan     [facility] Name 12/13/20  1514          Bed Mobility Goal 1 (PT)    Activity/Assistive Device (Bed Mobility Goal 1, PT)  bed mobility activities, all  -DO     [place] Level/Cues Needed (Bed Mobility Goal 1, PT)  contact guard assist  -DO     Time Frame (Bed Mobility Goal 1, PT)  long term goal (LTG);1 week  -DO     Progress/Outcomes (Bed Mobility Goal 1, PT)  goal ongoing  -DO     Row Name 12/13/20 1514          Transfer Goal 1 (PT)    Activity/Assistive Device (Transfer Goal 1, PT)  transfers, all  -DO     [place] Level/Cues Needed (Transfer Goal 1, PT)  minimum assist (75% or more patient effort)  -DO     Time Frame (Transfer Goal 1, PT)  long term goal (LTG);1 week  -DO     Progress/Outcome (Transfer Goal 1, PT)  goal ongoing  -DO       User Key  (r) = Recorded By, (t) = Taken By, (c) = Cosigned By    Initials Name Provider Type    DO Alen Blas, PT Physical Therapist        Clinical Impression     Row Name 12/13/20 1510          Pain    Additional Documentation  Pain Scale: FACES Pre/Post-Treatment (Group)  -DO     Row Name 12/13/20 1510          Pain Scale: FACES Pre/Post-Treatment    Pain: FACES Scale, Pretreatment  2-->hurts little bit  -DO     Posttreatment Pain Rating  2-->hurts little bit  -DO     Row Name 12/13/20 1510          Plan of Care Review    Plan of Care Reviewed With  patient  -DO     Outcome Summary  Pt is an 80 y.o. female admitted on 12/12 for R lower quadrant pain and is being treated for sepsis. PMH includes HTN, morbid obesity, and a-fib. Per pt, she lives at [facility] and is able to perform stand/squat pivot transfers from the bed/toilet to her w/c. She mentions she does not need assistance to get  around in her w/c at [facility]. She presents to PT with impaired mobility. Pt performed bed mobility with Modx1 to sit at EOB. She needs HHAx1 and support at trunk to come up to sitting position, along with some assistance at the BLE to clear them from the bed. Pt able to tolerate sitting EOB for  approx 3 minutes while performing seated therex. Pt needs CGAx1 at trunk on occasion, and cuing for hand placement in order to assist in maintaining upright trunk posture. Further mobility not appropriate at this time due to deconditioning and difficulty holding herself up at the EOB. She may benefit from skilled PT to address her impairments and improve her independence with functional mobility. PT anticipates d/c back to [facility] pending progress.  -DO     Row Name 12/13/20 1510          Therapy Assessment/Plan (PT)    Rehab Potential (PT)  good, to achieve stated therapy goals  -DO     Criteria for Skilled Interventions Met (PT)  yes  -DO     Row Name 12/13/20 1510          Positioning and Restraints    Pre-Treatment Position  in bed  -DO     Post Treatment Position  bed  -DO     In Bed  supine;call light within reach;encouraged to call for assist;exit alarm on  -DO       User Key  (r) = Recorded By, (t) = Taken By, (c) = Cosigned By    Initials Name Provider Type    DO Alen Blas PT Physical Therapist        Outcome Measures     Row Name 12/13/20 5235          How much help from another person do you currently need...    Turning from your back to your side while in flat bed without using bedrails?  3  -DO     Moving from lying on back to sitting on the side of a flat bed without bedrails?  2  -DO     Moving to and from a bed to a chair (including a wheelchair)?  2  -DO     Standing up from a chair using your arms (e.g., wheelchair, bedside chair)?  2  -DO     Climbing 3-5 steps with a railing?  1  -DO     To walk in hospital room?  1  -DO     AM-PAC 6 Clicks Score (PT)  11  -DO     [facility] Name 12/13/20 1515          Functional Assessment    Outcome Measure Options  AM-PAC 6 Clicks Basic Mobility (PT)  -DO       User Key  (r) = Recorded By, (t) = Taken By, (c) = Cosigned By    Initials Name Provider Type    DO Alen Blas PT Physical Therapist        Physical Therapy Education                 Title:  PT OT SLP Therapies (Done)     Topic: Physical Therapy (Done)     Point: Mobility training (Done)     Learning Progress Summary           Patient Acceptance, E, VU by DO at 12/13/2020 1515                   Point: Home exercise program (Done)     Learning Progress Summary           Patient Acceptance, E, VU by DO at 12/13/2020 1515                   Point: Body mechanics (Done)     Learning Progress Summary           Patient Acceptance, E, VU by DO at 12/13/2020 1515                   Point: Precautions (Done)     Learning Progress Summary           Patient Acceptance, E, VU by DO at 12/13/2020 1515                               User Key     Initials Effective Dates Name Provider Type Discipline    DO 03/07/18 -  Alen Blas, PT Physical Therapist PT              PT Recommendation and Plan  Planned Therapy Interventions (PT): bed mobility training, strengthening, transfer training  Plan of Care Reviewed With: patient  Outcome Summary: Pt is an 80 y.o. female admitted on 12/12 for R lower quadrant pain and is being treated for sepsis. PMH includes HTN, morbid obesity, and a-fib. Per pt, she lives at [facility] and is able to perform stand/squat pivot transfers from the bed/toilet to her w/c. She mentions she does not need assistance to get  around in her w/c at [facility]. She presents to PT with impaired mobility. Pt performed bed mobility with Modx1 to sit at EOB. She needs HHAx1 and support at trunk to come up to sitting position, along with some assistance at the BLE to clear them from the bed. Pt able to tolerate sitting EOB for approx 3 minutes while performing seated therex. Pt needs CGAx1 at trunk on occasion, and cuing for hand placement in order to assist in maintaining upright trunk posture. Further mobility not appropriate at this time due to deconditioning and difficulty holding herself up at the EOB. She may benefit from skilled PT to address her impairments and improve her independence with  functional mobility. PT anticipates d/c back to [facility] pending progress.     Time Calculation:   PT Charges     Row Name 12/13/20 1516             Time Calculation    Start Time  1447  -DO      Stop Time  1502  -DO      Time Calculation (min)  15 min  -DO      PT Received On  12/13/20  -DO      PT - Next Appointment  12/14/20  -DO      PT Goal Re-Cert Due Date  12/20/20  -DO        User Key  (r) = Recorded By, (t) = Taken By, (c) = Cosigned By    Initials Name Provider Type    DO Alen Blas, PT Physical Therapist        Therapy Charges for Today     Code Description Service Date Service Provider Modifiers Qty    42112721438 HC PT EVAL MOD COMPLEXITY 2 12/13/2020 Alen Blas, PT GP 1    08732366172 HC PT THER PROC EA 15 MIN 12/13/2020 Alen Blas, PT GP 1          PT G-Codes  Outcome Measure Options: AM-PAC 6 Clicks Basic Mobility (PT)  AM-PAC 6 Clicks Score (PT): 11    Alen Blas PT  12/13/2020

## 2020-12-13 NOTE — CONSULTS
Urology Consult Note    Patient:Shoshana Bae :1940  Room:[facility]  Admit Date2020  Age:80 y.o.     SEX:female     DOS:2020     MR:[id number]     Visit:[id number]       Attending: Cm Mcintyre MD  Referring Provider: CHAIM Kelley  Reason for Consultation: Pyelonephritis    Patient Care Team:  Fany Morales MD as PCP - General (Geriatric Medicine)  Hiram De La Cruz MD as Consulting Physician (Cardiology)    Chief complaint I had pain   Subjective .     History of present illness:    Patient is a nursing home resident, brought to ER with complaint of right lower quadrant pain. Had CT which notes large right renal stone burden and stranding without hydronephrosis.  Prior to this admission she denies any gross hematuria or urinary incontinence. Currently, her pain is managed. She is on Rocephin and has a UCS pending.    Review of Systems  Constitutional:  No recent weight gain/loss, fever, chills  Opthalmalogic: No change in vision or blurred vision  Otolaryngeal:  No epistaxis  Cardiovascular: No recent chest pain  Pulmonary:  No cough, sputum production, hemoptysis  Gastrointestinal: No melena, hematochezia, BRBPR, hematemesis  Genitourinary:  See HPI  Hematologic:  +Bruises easily  Musculoskeletal: Right sided pain   Neurologic:  No Seizures, new focal deficits      History  Past Medical History:   Diagnosis Date   • Arm fracture    • Arthritis    • Atrial fibrillation (CMS/HCC)    • Back pain     WITH STANDING   • Cancer (CMS/HCC)    • Cellulitis     LOWER LEGS   • Colon polyp    • DDD (degenerative disc disease), lumbar    • Diabetes mellitus (CMS/HCC)     TYPE 2   • Edema    • H/O colonoscopy        • H/O mammogram     E    • Hyperlipidemia    • Hypertension    • On anticoagulant therapy    • Oral-mouth cancer (CMS/HCC)    • 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)    • Sleep apnea     MOUTHPIECE USED/CPAP   • Uterine cancer (CMS/HCC)       Past Surgical History:   Procedure Laterality Date   • APPENDECTOMY     • CARDIAC CATHETERIZATION  2010   • COLONOSCOPY     • DILATATION AND CURETTAGE     • EYE SURGERY     • HERNIA REPAIR     • HYSTERECTOMY     • KNEE SURGERY Bilateral    • LUMBAR DISCECTOMY FUSION INSTRUMENTATION     • MOUTH LESION EXCISIONAL BIOPSY     • OVARIAN CYST SURGERY     • SC REVISE KNEE JOINT REPLACE,1 PART Left 10/10/2016    Procedure: TOTAL KNEE ARTHROPLASTY REVISION - SPACER PLACEMENT;  Surgeon: Dominick Bosch MD;  Location: [facility] OR;  Service: Orthopedics   • SC REVISE KNEE JOINT REPLACE,1 PART Left 3/22/2017    Procedure: TOTAL KNEE ARTHROPLASTY REVISION, ANTIBIOTIC SPACER REPLACEMENT;  Surgeon: Dominick Bosch MD;  Location: [facility] OR;  Service: Orthopedics   • SHOULDER SURGERY Right     FRACTURE REPAIR    • THYROID SURGERY  12/18/2007   • TONSILLECTOMY     • UMBILICAL HERNIA REPAIR       Social History     Socioeconomic History   • Marital status: Single     Spouse name: Not on file   • Number of children: Not on file   • Years of education: Not on file   • Highest education level: Not on file   Occupational History   • Occupation: nun   Tobacco Use   • Smoking status: Never Smoker   • Smokeless tobacco: Never Used   Substance and Sexual Activity   • Alcohol use: No   • Drug use: No   • Sexual activity: Defer     Family History   Problem Relation Age of Onset   • Heart disease Mother    • Hypertension Mother    • Cancer Mother    • Heart disease Father    • Hypertension Father    • Cancer Father    • Heart disease Sister    • Hypertension Sister    • Diabetes Sister    • Cancer Sister    • Cancer Brother    • No Known Problems Daughter    • No Known Problems Son    • No Known Problems Maternal Grandmother    • No Known Problems Paternal Grandmother    • No Known Problems Maternal Aunt    • No Known Problems Paternal Aunt    • Hypertension Other    • Heart disease Other         AFIB   • Cancer Other    • BRCA 1/2 Neg Hx     • Breast cancer Neg Hx    • Colon cancer Neg Hx    • Endometrial cancer Neg Hx    • Ovarian cancer Neg Hx      Allergies   Allergen Reactions   • Codeine Nausea And Vomiting   • Morphine Nausea And Vomiting   • Morphine And Related    • Phosphate Nausea And Vomiting     Prior to Admission medications    Medication Sig Start Date End Date Taking? Authorizing Provider   acetaminophen (TYLENOL) 500 MG tablet Take 500 mg by mouth Every 6 (Six) Hours As Needed for Mild Pain .   Yes Lashanda Hylton MD   atorvastatin (LIPITOR) 10 MG tablet Take 1 tablet by mouth Daily.  Patient taking differently: Take 10 mg by mouth Every Night. 4/13/17  Yes Yuval Real MD   ciprofloxacin (CIPRO) 500 MG tablet Take 500 mg by mouth Every Night.   Yes Lashanda Hylton MD   diphenhydrAMINE-zinc acetate (Benadryl Itch Stopping) 1-0.1 % cream Apply  topically to the appropriate area as directed 3 (Three) Times a Day As Needed for Itching.   Yes Lashanda Hylton MD   Dulaglutide (Trulicity) 1.5 MG/0.5ML solution pen-injector Inject 1.5 mg under the skin into the appropriate area as directed. Q Friday   Yes Lashanda Hylton MD   ELIQUIS 5 MG tablet tablet 2.5 mg Every 12 (Twelve) Hours. 11/8/17  Yes Lashanda Hylton MD   FLUTICASONE PROPIONATE, INHAL, IN Inhale 50 mcg.   Yes Lashanda Hylton MD   furosemide (LASIX) 40 MG tablet Take 2.5 tablets by mouth Daily. Pt taking 100mg qd  Patient taking differently: Take 40 mg by mouth 2 (Two) Times a Day. Twice daily at 0600 and 1200 4/13/17  Yes Yuval Real MD   furosemide (LASIX) 40 MG tablet Take 40 mg by mouth 2 (Two) Times a Day. At 0600 and 1200   Yes Lashanda Hylton MD   glucose blood (FREESTYLE TEST STRIPS) test strip 1 each by Other route 2 (Two) Times a Week. 2/9/17  Yes Yuval Real MD   HYDROcodone-acetaminophen (NORCO) 7.5-325 MG per tablet Take 1 tablet by mouth 2 (Two) Times a Day.   Yes Lashanda Hylton MD    HYDROcodone-acetaminophen (NORCO) 7.5-325 MG per tablet Take 1 tablet by mouth Every 8 (Eight) Hours As Needed for Moderate Pain . For max of 8 tabs daily between scheduled and PRN   Yes Lashanda Hylton MD   icosapent ethyl (VASCEPA) 1 G capsule capsule Take 2 g by mouth 2 (Two) Times a Day With Meals. 4/13/17  Yes Yuval Real MD   insulin glargine (LANTUS) 100 UNIT/ML injection Inject 30 Units under the skin into the appropriate area as directed 2 (Two) Times a Day. Hold BG <100   Yes Lashanda Hylton MD   metoprolol succinate XL (TOPROL-XL) 25 MG 24 hr tablet Take 25 mg by mouth Every Night.   Yes Lashanda Hylton MD   nystatin (MYCOSTATIN) 902705 UNIT/GM powder Apply 1 application topically to the appropriate area as directed 2 (Two) Times a Day. abd folds and under breasts   Yes Lashanda Hylton MD   ondansetron (ZOFRAN) 4 MG tablet Take 4 mg by mouth Every 4 (Four) Hours As Needed for Nausea or Vomiting.   Yes Lashanda Hylton MD   polyethylene glycol (EQ ClearLax) 17 GM/SCOOP powder Take 17 g by mouth Daily.   Yes Lashanda Hylton MD   POTASSIUM CHLORIDE PO Take 40 mEq by mouth 2 (Two) Times a Day.   Yes Lashanda Hylton MD   sennosides-docusate sodium (SENOKOT-S) 8.6-50 MG tablet Take 1 tablet by mouth 2 (two) times a day.   Yes Lashanda Hylton MD   spironolactone (ALDACTONE) 25 MG tablet Take 25 mg by mouth 2 (Two) Times a Day.   Yes Lashanda Hylton MD   trolamine salicylate (ASPERCREME) 10 % cream Apply  topically to the appropriate area as directed As Needed for Muscle / Joint Pain.   Yes Lashanda Hylton MD   vitamin C (ASCORBIC ACID) 500 MG tablet Take 500 mg by mouth Daily.   Yes Lashanda Hylton MD   bisacodyl (Biscolax) 10 MG suppository Insert 10 mg into the rectum Daily As Needed.    Lashanda Hylton MD   KLOR-CON 20 MEQ CR tablet Take 1 tablet by mouth 2 (Two) Times a Day. 4/13/17   Yuval Real MD   vitamin D  "(ERGOCALCIFEROL) 89526 units capsule capsule Take 50,000 Units by mouth Every 14 (Fourteen) Days.    Provider, MD Lashanda         Objective     tMax 24 hours:  Temp (24hrs), Av.1 °F (36.7 °C), Min:97.8 °F (36.6 °C), Max:98.7 °F (37.1 °C)    Vital Sign Ranges:  Temp:  [97.8 °F (36.6 °C)-98.7 °F (37.1 °C)] 98.2 °F (36.8 °C)  Heart Rate:  [] 122  Resp:  [16-22] 18  BP: (100-133)/(61-99) 116/99  Intake and Output Last 3 Shifts:  I/O last 3 completed shifts:  In: 480 [P.O.:480]  Out: -       Physical Exam:     Vital signs: /99 (BP Location: Right arm, Patient Position: Lying)   Pulse (!) 122   Temp 98.2 °F (36.8 °C) (Oral)   Resp 18   Ht 157.5 cm (62\")   Wt 129 kg (284 lb 9.6 oz)   SpO2 94%   BMI 52.05 kg/m²   94%     General: Well developed, well nourished, alert and oriented x 3    Appears stated age. No apparent distress.    HEENT: Moist mucous membranes of the oral mucosa & nasal mucosa.    Lips are not cyanotic.    Extraocular movements intact    Neck:  Trachea position is midline.     Respiratory: Respiratory rhythm & depth: Normal.    Respiratory effort:  Normal.        Cardiac: RRR peripheral pulse     GI:  Morbidly obese, soft.    Skin:  Inspection: No rash.    Palpation:  Warm, dry. No induration.     Extremities: No clubbing & no cyanosis.    No edema    :  In brief, has female purwick. Urine is kelsey yellow.            Results Review:     Lab Results (last 24 hours)     Procedure Component Value Units Date/Time    POC Glucose Once [[id number]]  (Abnormal) Collected: 20 112    Specimen: Blood Updated: 20 1126     Glucose 377 mg/dL     POC Glucose Once [[id number]]  (Abnormal) Collected: 20 1123    Specimen: Blood Updated: 20 1126     Glucose 429 mg/dL     Urine Culture - Urine, Urine, Catheter [[id number]]  (Normal) Collected: 20 1320    Specimen: Urine, Catheter Updated: 20 0848     Urine Culture Growth present, too young to evaluate    Basic " Metabolic Panel [[id number]]  (Abnormal) Collected: 12/13/20 0708    Specimen: Blood from Hand, Left Updated: 12/13/20 0809     Glucose 369 mg/dL      BUN 29 mg/dL      Creatinine 1.52 mg/dL      Sodium 133 mmol/L      Potassium 4.6 mmol/L      Chloride 96 mmol/L      CO2 26.9 mmol/L      Calcium 8.6 mg/dL      eGFR Non African Amer 33 mL/min/1.73      BUN/Creatinine Ratio 19.1     Anion Gap 10.1 mmol/L     Narrative:      GFR Normal >60  Chronic Kidney Disease <60  Kidney Failure <15      Hemoglobin A1c [[id number]]  (Abnormal) Collected: 12/13/20 0708    Specimen: Blood Updated: 12/13/20 0754     Hemoglobin A1C 10.40 %     Narrative:      Hemoglobin A1C Ranges:    Increased Risk for Diabetes  5.7% to 6.4%  Diabetes                     >= 6.5%  Diabetic Goal                < 7.0%    CBC (No Diff) [[id number]]  (Abnormal) Collected: 12/13/20 0708    Specimen: Blood Updated: 12/13/20 0745     WBC 12.16 10*3/mm3      RBC 4.39 10*6/mm3      Hemoglobin 13.4 g/dL      Hematocrit 41.7 %      MCV 95.0 fL      MCH 30.5 pg      MCHC 32.1 g/dL      RDW 12.7 %      RDW-SD 44.6 fl      MPV 10.6 fL      Platelets 183 10*3/mm3     POC Glucose Once [[id number]]  (Abnormal) Collected: 12/13/20 0601    Specimen: Blood Updated: 12/13/20 0603     Glucose 333 mg/dL     POC Glucose Once [[id number]]  (Abnormal) Collected: 12/13/20 0007    Specimen: Blood Updated: 12/13/20 0010     Glucose 351 mg/dL     Lactic Acid, Reflex [[id number]]  (Abnormal) Collected: 12/12/20 1742    Specimen: Blood Updated: 12/12/20 1828     Lactate 2.8 mmol/L     COVID PRE-OP / PRE-PROCEDURE SCREENING ORDER (NO ISOLATION) - Swab, Nasopharynx [[id number]]  (Normal) Collected: 12/12/20 1545    Specimen: Swab from Nasopharynx Updated: 12/12/20 4983    Narrative:      The following orders were created for panel order COVID PRE-OP / PRE-PROCEDURE SCREENING ORDER (NO ISOLATION) - Swab, Nasopharynx.  Procedure                               Abnormality         Status                      ---------                               -----------         ------                     Respiratory Panel PCR w/...[[id number]]  Normal              Final result                 Please view results for these tests on the individual orders.    Respiratory Panel PCR w/COVID-19(SARS-CoV-2) JOSEE/TY/ILIANA/PAD/COR/MAD/NAVEEN In-House, NP Swab in UTM/VTM, 3-4 HR TAT - Swab, Nasopharynx [[id number]]  (Normal) Collected: 12/12/20 1545    Specimen: Swab from Nasopharynx Updated: 12/12/20 1736     ADENOVIRUS, PCR Not Detected     Coronavirus 229E Not Detected     Coronavirus HKU1 Not Detected     Coronavirus NL63 Not Detected     Coronavirus OC43 Not Detected     COVID19 Not Detected     Human Metapneumovirus Not Detected     Human Rhinovirus/Enterovirus Not Detected     Influenza A PCR Not Detected     Influenza B PCR Not Detected     Parainfluenza Virus 1 Not Detected     Parainfluenza Virus 2 Not Detected     Parainfluenza Virus 3 Not Detected     Parainfluenza Virus 4 Not Detected     RSV, PCR Not Detected     Bordetella pertussis pcr Not Detected     Bordetella parapertussis PCR Not Detected     Chlamydophila pneumoniae PCR Not Detected     Mycoplasma pneumo by PCR Not Detected    Narrative:      Fact sheet for providers: https://docs.[employer]/wp-content/uploads/YDX9995-2371-PL2.1-EUA-Provider-Fact-Sheet-3.pdf    Fact sheet for patients: https://docs.[employer]/wp-content/uploads/PUO5031-1590-ZJ6.1-EUA-Patient-Fact-Sheet-1.pdf    Test performed by PCR.    Lactic Acid, Reflex Timer (This will reflex a repeat order 3-3:15 hours after ordered.) [[id number]] Collected: 12/12/20 1349    Specimen: Blood Updated: 12/12/20 1730     Hold Tube Hold for add-ons.     Comment: Auto resulted.       POC Glucose Once [[id number]]  (Abnormal) Collected: 12/12/20 1535    Specimen: Blood Updated: 12/12/20 1537     Glucose 398 mg/dL     Procalcitonin [[id number]]  (Abnormal) Collected: 12/12/20 1316    Specimen: Blood Updated:  "12/12/20 1443     Procalcitonin 0.77 ng/mL     Narrative:      As a Marker for Sepsis (Non-Neonates):   1. <0.5 ng/mL represents a low risk of severe sepsis and/or septic shock.  1. >2 ng/mL represents a high risk of severe sepsis and/or septic shock.    As a Marker for Lower Respiratory Tract Infections that require antibiotic therapy:  PCT on Admission     Antibiotic Therapy             6-12 Hrs later  > 0.5                Strongly Recommended            >0.25 - <0.5         Recommended  0.1 - 0.25           Discouraged                   Remeasure/reassess PCT  <0.1                 Strongly Discouraged          Remeasure/reassess PCT      As 28 day mortality risk marker: \"Change in Procalcitonin Result\" (> 80 % or <=80 %) if Day 0 (or Day 1) and Day 4 values are available. Refer to http://www.[facility] Medicalpct-calculator.com/   Change in PCT <=80 %   A decrease of PCT levels below or equal to 80 % defines a positive change in PCT test result representing a higher risk for 28-day all-cause mortality of patients diagnosed with severe sepsis or septic shock.  Change in PCT > 80 %   A decrease of PCT levels of more than 80 % defines a negative change in PCT result representing a lower risk for 28-day all-cause mortality of patients diagnosed with severe sepsis or septic shock.                Results may be falsely decreased if patient taking Biotin.     BNP [[id number]]  (Normal) Collected: 12/12/20 1316    Specimen: Blood Updated: 12/12/20 1441     proBNP 1,646.0 pg/mL     Narrative:      Among patients with dyspnea, NT-proBNP is highly sensitive for the detection of acute congestive heart failure. In addition NT-proBNP of <300 pg/ml effectively rules out acute congestive heart failure with 99% negative predictive value.    Results may be falsely decreased if patient taking Biotin.      Troponin [[id number]]  (Normal) Collected: 12/12/20 1316    Specimen: Blood Updated: 12/12/20 1441     Troponin T <0.010 ng/mL     Narrative: "      Troponin T Reference Range:  <= 0.03 ng/mL-   Negative for AMI  >0.03 ng/mL-     Abnormal for myocardial necrosis.  Clinicians would have to utilize clinical acumen, EKG, Troponin and serial changes to determine if it is an Acute Myocardial Infarction or myocardial injury due to an underlying chronic condition.       Results may be falsely decreased if patient taking Biotin.      Lactic Acid, Plasma [[id number]]  (Abnormal) Collected: 12/12/20 1349    Specimen: Blood Updated: 12/12/20 1426     Lactate 2.9 mmol/L     Comprehensive Metabolic Panel [[id number]]  (Abnormal) Collected: 12/12/20 1316    Specimen: Blood Updated: 12/12/20 1405     Glucose 458 mg/dL      BUN 27 mg/dL      Creatinine 1.65 mg/dL      Sodium 136 mmol/L      Potassium 5.0 mmol/L      Chloride 97 mmol/L      CO2 23.8 mmol/L      Calcium 9.0 mg/dL      Total Protein 7.0 g/dL      Albumin 3.90 g/dL      ALT (SGPT) 10 U/L      AST (SGOT) 13 U/L      Alkaline Phosphatase 160 U/L      Total Bilirubin 1.5 mg/dL      eGFR Non African Amer 30 mL/min/1.73      Globulin 3.1 gm/dL      A/G Ratio 1.3 g/dL      BUN/Creatinine Ratio 16.4     Anion Gap 15.2 mmol/L     Narrative:      GFR Normal >60  Chronic Kidney Disease <60  Kidney Failure <15      Blood Culture - Blood, Arm, Right [[id number]] Collected: 12/12/20 1349    Specimen: Blood from Arm, Right Updated: 12/12/20 1355    Blood Culture - Blood, Arm, Left [[id number]] Collected: 12/12/20 1349    Specimen: Blood from Arm, Left Updated: 12/12/20 1355    Lipase [[id number]]  (Abnormal) Collected: 12/12/20 1316    Specimen: Blood Updated: 12/12/20 1352     Lipase 10 U/L     CBC & Differential [[id number]]  (Abnormal) Collected: 12/12/20 1316    Specimen: Blood Updated: 12/12/20 1335    Narrative:      The following orders were created for panel order CBC & Differential.  Procedure                               Abnormality         Status                     ---------                               -----------          ------                     CBC Auto Differential[[id number]]        Abnormal            Final result                 Please view results for these tests on the individual orders.    CBC Auto Differential [[id number]]  (Abnormal) Collected: 12/12/20 1316    Specimen: Blood Updated: 12/12/20 1335     WBC 13.18 10*3/mm3      RBC 4.91 10*6/mm3      Hemoglobin 15.1 g/dL      Hematocrit 46.7 %      MCV 95.1 fL      MCH 30.8 pg      MCHC 32.3 g/dL      RDW 13.0 %      RDW-SD 45.6 fl      MPV 10.5 fL      Platelets 233 10*3/mm3      Neutrophil % 89.7 %      Lymphocyte % 5.1 %      Monocyte % 3.8 %      Eosinophil % 0.0 %      Basophil % 0.6 %      Immature Grans % 0.8 %      Neutrophils, Absolute 11.83 10*3/mm3      Lymphocytes, Absolute 0.67 10*3/mm3      Monocytes, Absolute 0.50 10*3/mm3      Eosinophils, Absolute 0.00 10*3/mm3      Basophils, Absolute 0.08 10*3/mm3      Immature Grans, Absolute 0.10 10*3/mm3      nRBC 0.0 /100 WBC     Urinalysis, Microscopic Only - Urine, Catheter [[id number]]  (Abnormal) Collected: 12/12/20 1320    Specimen: Urine, Catheter Updated: 12/12/20 1334     RBC, UA 21-30 /HPF      WBC, UA Too Numerous to Count /HPF      Bacteria, UA 1+ /HPF      Squamous Epithelial Cells, UA 3-6 /HPF      Hyaline Casts, UA 3-6 /LPF      Methodology Automated Microscopy    Urinalysis With Microscopic If Indicated (No Culture) - Urine, Catheter [[id number]]  (Abnormal) Collected: 12/12/20 1320    Specimen: Urine, Catheter Updated: 12/12/20 1334     Color, UA Red     Comment: Any Substance that causes an abnormal urine color can alter the accuracy of the chemical reactions.        Appearance, UA Turbid     pH, UA 7.0     Specific Gravity, UA 1.020     Glucose, UA >=1000 mg/dL (3+)     Ketones, UA 15 mg/dL (1+)     Bilirubin, UA Negative     Blood, UA Large (3+)     Protein,  mg/dL (2+)     Leuk Esterase, UA Small (1+)     Nitrite, UA Negative     Urobilinogen, UA 0.2 E.U./dL           Urine Culture    Date Value Ref Range Status   12/12/2020 Growth present, too young to evaluate  Preliminary        Imaging Results (Last 7 Days)     Procedure Component Value Units Date/Time    CT Abdomen Pelvis Without Contrast [[id number]] Collected: 12/12/20 1628     Updated: 12/12/20 1644    Narrative:      CT ABDOMEN PELVIS WITHOUT CONTRAST     HISTORY: Right lower quadrant pain.     TECHNIQUE: CT includes axial imaging from the lung base to the  trochanters without IV or oral contrast.     COMPARISON: CT ABDOMEN PELVIS WITH IV CONTRAST 05/09/2013.     FINDINGS: There is dependent lower lobe atelectasis and the lung bases  otherwise appear clear. Heart size is enlarged. Posterior to the right  lobe of the liver there is a low-density oblong structure associated  with calcification in its caudal margin. This contacts the hepatic  capsule and measures approximately 2 cm in thickness and is without  change compared to 2013 supporting a benign etiology. The liver, spleen,  adrenal glands, pancreas appear normal. There is a large gallstone that  measures approximately 5.6 x 3.9 cm. No gallbladder wall thickening or  pericholecystic inflammation are evident.     Within the right renal pelvis there is a stone or conglomerate of  adjacent stones measuring 2 cm in length by 0.6 cm in thickness. There  is also a right posterior interpolar calyceal stone measuring  approximately 2.4 x 0.8 cm. There are small right lower pole calyceal  stones. There is right perinephric stranding that is new when compared  to prior exam May 2013. 3 mm left mid to lower pole renal calyceal stone  is present. There is no left hydronephrosis. No ureteral stone is  evident.     There is retroperitoneal joe enlargement. The left periaortic node  anterior to the left psoas muscle measures approximately 3 cm in  thickness. There is also increased soft tissue density along the left  common iliac artery that is difficult to differentiate from an  adjacent  vascularity without contrast, though there is apparent pathologic joe  enlargement in this region. No inguinal joe enlargement is evident.  There is a periumbilical hernia containing fat and a small loop of colon  without evidence for incarceration. Surgical clips are present in the  right abdomen.     There has been previous posterolateral instrumented fusion at  L3-L4-L5-S1. The bones appear osteopenic.       Impression:      1. There appears to be pathologic retroperitoneal joe enlargement  adjacent to the left aspect of the descending thoracic aorta and along  the left common iliac vessels where nodes are difficult to differentiate  from adjacent vessels without contrast. Contrasted CT would be best to  further differentiate. Alternatively, PET CT may be helpful.  2. Nephrolithiasis most extensively on the right with 2 cm in length  right renal pelvic stone. Right perinephric stranding is new when  compared to prior exam May 2013 and may indicate upper tract infection.  There is no hydronephrosis.  3. Large gallstone.  4. Chronic low density structure along the posterior right lobe of the  liver without change compared with May 2013 supporting benign etiology.  Periumbilical hernia contains fat and colonic loops without evidence for  incarceration.     Discussed with Dr. Ball in the emergency department 12/12/2020 at  3:05 PM.     Radiation dose reduction techniques were utilized, including automated  exposure control and exposure modulation based on body size.     This report was finalized on 12/12/2020 4:41 PM by Dr. Yannick Enrique M.D.       XR Chest 1 View [[id number]] Collected: 12/12/20 1514     Updated: 12/12/20 1534    Narrative:      ONE VIEW PORTABLE CHEST     HISTORY: Hypoxia. Shortness of breath.     The lungs are moderately well-expanded and clear and unchanged from  10/12/2016. The heart remains mildly enlarged and no acute abnormality  is seen.     This report was finalized on  12/12/2020 3:31 PM by Dr. Lennox Brady M.D.                Inpatient Meds:   Current Meds:      Scheduled Meds:apixaban, 2.5 mg, Oral, Q12H  atorvastatin, 10 mg, Oral, Nightly  cefTRIAXone, 2 g, Intravenous, Q24H  famotidine, 20 mg, Oral, BID AC  icosapent ethyl, 2 g, Oral, BID With Meals  insulin glargine, 10 Units, Subcutaneous, Once  insulin glargine, 25 Units, Subcutaneous, Q12H  insulin lispro, 0-9 Units, Subcutaneous, TID With Meals  insulin lispro, 5 Units, Subcutaneous, TID With Meals  [START ON 12/14/2020] metoprolol succinate XL, 25 mg, Oral, Q24H  polyethylene glycol, 17 g, Oral, Daily       Continuous Infusions:sodium chloride, 100 mL/hr       PRN Meds:.•  acetaminophen  •  dextrose  •  dextrose  •  glucagon (human recombinant)  •  HYDROcodone-acetaminophen  •  melatonin  •  nitroglycerin  •  ondansetron **OR** ondansetron  •  sennosides-docusate  •  [COMPLETED] Insert peripheral IV **AND** sodium chloride      Impression:      UTI (urinary tract infection), bacterial    Chronic atrial fibrillation (CMS/HCC)    Type 2 diabetes mellitus with hyperglycemia (CMS/HCC)    Obstructive sleep apnea syndrome    Sepsis without acute organ dysfunction (CMS/HCC)      Plan:  Continue Rocephin until sensitivity results    Will plan outpatient surgical intervention for right nephrolithiasis once infection has resolved.   Please schedule outpatient follow up with Dr. Butterfield with [facility], [phone] in 1-2 weeks.     Please call if additional care needed.     I discussed the patients findings and my recommendations with patient.    Mary Eaton, APRN  12/13/20  12:07 EST

## 2020-12-13 NOTE — PLAN OF CARE
Goal Outcome Evaluation:  Plan of Care Reviewed With: patient  Progress: no change  Outcome Summary: Pt is an 80 y.o. female admitted on 12/12 for R lower quadrant pain and is being treated for sepsis. PMH includes HTN, morbid obesity, and a-fib. Per pt, she lives at [facility] and is able to perform stand/squat pivot transfers from the bed/toilet to her w/c. She mentions she does not need assistance to get  around in her w/c at [facility]. She presents to PT with impaired mobility. Pt performed bed mobility with Modx1 to sit at EOB. She needs HHAx1 and support at trunk to come up to sitting position, along with some assistance at the BLE to clear them from the bed. Pt able to tolerate sitting EOB for approx 3 minutes while performing seated therex. Pt needs CGAx1 at trunk on occasion, and cuing for hand placement in order to assist in maintaining upright trunk posture. Further mobility not appropriate at this time due to deconditioning and difficulty holding herself up at the EOB. She may benefit from skilled PT to address her impairments and improve her independence with functional mobility. PT anticipates d/c back to [facility] pending progress. Patient was wearing a face mask during this therapy encounter. Therapist used appropriate personal protective equipment including eye protection, mask, and gloves.  Mask used was standard procedure mask. Appropriate PPE was worn during the entire therapy session. Hand hygiene was completed before and after therapy session. Patient is not in enhanced droplet precautions.

## 2020-12-13 NOTE — CONSULTS
SUMMARY (A/P):    80-year-old lady with 2 issues that I have been asked to address.  She has a very large gallstone without any evidence of acute inflammation or other complicating features.  To the degree that her symptom report to me is reliable, the gallbladder certainly could be the source of the symptoms.  There is no urgency in taking her to surgery and I would rather see how she does over the next day or 2 before committing her to an operation.    She has adenopathy suggested on CT scan of the chest and abdomen/pelvis.  This will be difficult to access percutaneously with CT-guided biopsy.  It would be equally if not more difficult to access surgically given her BMI of 52.  What ever the etiology, the adenopathy does not represent an urgent problem and may be better to work-up further with PET scan and/or follow-up CT scans in the future.    Currently she is being treated for probable pyelonephritis.  She is not a good candidate for intravenous contrast with CT scan due to her chronic kidney disease.  Given that her pyelonephritis affects the right kidney, this could account for her right sided and other abdominal symptoms and I would rather see if the symptoms improve as she continues to get antibiotics and thereby hopefully avoid surgical intervention in this extremely high risk individual.      CC:    Abdominal pain    HPI:    80-year-old lady indicates to me that she presented to the emergency room yesterday secondary to abdominal pain.  She says that she has been having intermittent terrible right mid abdominal pain and mid epigastric pain since last Thursday and it has been associated with nausea and vomiting.  She indicates that food intake has worsened her symptoms intermittently over those days as well.  The history she gives me differs from the emergency room history which indicates right lower quadrant pain, the hospitalist admission note which indicates right lower quadrant abdominal pain, and the  hospitalist note from today which indicates generalized lower abdominal pain.  After reading the other notes, I asked her again about her history and both by verbal report and pointing to the portions of her abdomen that hurt, she emphasizes that the pain is in the right midabdomen and midepigastrium.    RADIOLOGY/ENDOSCOPY:    • CT abdomen pelvis 12/12/2020: Pathologic retroperitoneal joe enlargement adjacent to the left aspect of the descending thoracic aorta and along the left common iliac vessels where nodes are difficult to differentiate from adjacent vessels without contrast.  Nephrolithiasis.  Right perinephric stranding.  Large gallstone.  Periumbilical hernia.  I reviewed the images and concur with the report, noting that the adenopathy described would be difficult to access with percutaneous biopsy.    LABS:    • Glucose 369, BUN 29, creatinine 1.52  • WBC 12.2  • Hemoglobin 13.4  • Hemoglobin A1c 10.4  • Lactate 2.8  • COVID-19: Negative    PHYSICAL EXAM:   • Constitutional: Obese, no acute distress  • Vital signs: Blood pressure 116/99, heart rate 122, respirations 18, temperature 98.2, weight 284 pounds, height 62 inches, BMI 52-Discussed with patient increased perioperative risks associated with obesity including increased risks of DVT, infection, seromas, poor wound healing and hernias (with abdominal surgery).  • Eyes: Conjunctiva normal, sclera nonicteric  • ENMT: Hearing grossly normal, oral mucosa moist  • Neck: Supple, no palpable mass, trachea midline  • Respiratory: Clear to auscultation, normal inspiratory effort  • Cardiovascular: Regular rate, no murmur, no carotid bruit  • Gastrointestinal: Soft, marked truncal obesity, mild right upper quadrant tenderness, no palpable mass, no obvious hepatosplenomegaly (obesity precludes accurate assessment for this), negative for palpable hernia (despite one being evident on CT), bowel sounds normal  • Lymphatics (palpable nodes):  cervical-negative,  "axillary-negative, inguinal-negative.    • Skin:  Warm, dry.  Significant bilateral lower extremity venous stasis changes with mild peripheral edema on right, moderate edema on left.  • Musculoskeletal: Symmetric strength, no asymmetric muscular atrophy, left lower extremity is shorter than right secondary to prior issues with knee replacement on left  • Psychiatric: Alert and oriented ×3, normal affect     ALLERGIES:   • Codeine, morphine, phosphate-all cause nausea and vomiting    MEDICATIONS:   • Lipitor  • Ciprofloxacin  • Trulicity  • Eliquis  • Lasix  • Norco  • Insulin  • Toprol  • Zofran  • Aldactone  • Ascorbic acid  • Potassium    PMH:    • She states history of ovarian or uterine cancer 20 years ago (she is not sure which), underwent total abdominal hysterectomy and bilateral salpingo-oophorectomy and was told stage was early enough that no further treatment required  • 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, indicated no further treatment was felt necessary  • Adenomatous polyps, last colonoscopy 2014  • Atrial fibrillation  • Morbid obesity  • Poorly controlled diabetes  • Hypertension  • Hyperlipidemia  • Sleep apnea    PSH:    • Total abdominal hysterectomy with bilateral salpingo-oophorectomy  • Umbilical hernia repair  • Right total knee replacement  • Left total knee replacement x3  • Back surgery    FAMILY HISTORY:    • Colorectal cancer in mother  • Gallbladder disease in father and brother    SOCIAL HISTORY:   • Denies tobacco use  • Denies alcohol use  • [facility]    ROS:    Influenza Like Illness: no fever, no  cough, no  sore throat, no  body aches, no loss of sense of taste or smell, no known exposure to person with Covid-19.  All other systems reviewed and negative other than presenting complaints.    ISA CAN M.D.    "

## 2020-12-13 NOTE — H&P
HISTORY AND PHYSICAL   [facility]        Patient Identification:  Name: Shoshana Bae  Age: 80 y.o.  Sex: female  :  1940  MRN: [id number]                     Primary Care Physician: Fany Morales MD    Chief Complaint:  80 year old female who  Presented with right lower quadrant pain which she first noted yesterday; she was getting her lymphedema massage and noted that she was tender in her right lower abdomen; this got worse last night and more constant; she denies fever or chills; had has some nausea and vomiting; no sick contacts    History of Present Illness:   As above    Past Medical History:  Past Medical History:   Diagnosis Date   • Arm fracture    • Arthritis    • Atrial fibrillation (CMS/HCC)    • Back pain     WITH STANDING   • Cancer (CMS/HCC)    • Cellulitis     LOWER LEGS   • Colon polyp    • DDD (degenerative disc disease), lumbar    • Diabetes mellitus (CMS/HCC)     TYPE 2   • Edema    • H/O colonoscopy        • H/O mammogram     [facility]    • Hyperlipidemia    • Hypertension    • On anticoagulant therapy    • Oral-mouth cancer (CMS/HCC)    • 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)    • Sleep apnea     MOUTHPIECE USED/CPAP   • Uterine cancer (CMS/HCC)      Past Surgical History:  Past Surgical History:   Procedure Laterality Date   • APPENDECTOMY     • CARDIAC CATHETERIZATION     • COLONOSCOPY     • DILATATION AND CURETTAGE     • EYE SURGERY     • HERNIA REPAIR     • HYSTERECTOMY     • KNEE SURGERY Bilateral    • LUMBAR DISCECTOMY FUSION INSTRUMENTATION     • MOUTH LESION EXCISIONAL BIOPSY     • OVARIAN CYST SURGERY     • FL REVISE KNEE JOINT REPLACE,1 PART Left 10/10/2016    Procedure: TOTAL KNEE ARTHROPLASTY REVISION - SPACER PLACEMENT;  Surgeon: Dominick Bosch MD;  Location: [facility] OR;  Service: Orthopedics   • FL REVISE KNEE JOINT REPLACE,1 PART Left 3/22/2017    Procedure: TOTAL KNEE ARTHROPLASTY REVISION, ANTIBIOTIC  SPACER REPLACEMENT;  Surgeon: Dominick Bosch MD;  Location: [facility] OR;  Service: Orthopedics   • SHOULDER SURGERY Right     FRACTURE REPAIR    • THYROID SURGERY  12/18/2007   • TONSILLECTOMY     • UMBILICAL HERNIA REPAIR        Home Meds:  Medications Prior to Admission   Medication Sig Dispense Refill Last Dose   • acetaminophen (TYLENOL) 500 MG tablet Take 500 mg by mouth Every 6 (Six) Hours As Needed for Mild Pain .      • atorvastatin (LIPITOR) 10 MG tablet Take 1 tablet by mouth Daily. (Patient taking differently: Take 10 mg by mouth Every Night.) 90 tablet 1 12/11/2020 at Unknown time   • ciprofloxacin (CIPRO) 500 MG tablet Take 500 mg by mouth Every Night.   12/11/2020 at Unknown time   • diphenhydrAMINE-zinc acetate (Benadryl Itch Stopping) 1-0.1 % cream Apply  topically to the appropriate area as directed 3 (Three) Times a Day As Needed for Itching.   12/12/2020 at Unknown time   • Dulaglutide (Trulicity) 1.5 MG/0.5ML solution pen-injector Inject  under the skin into the appropriate area as directed.      • ELIQUIS 5 MG tablet tablet 2.5 mg.   12/11/2020 at Unknown time   • FLUTICASONE PROPIONATE, INHAL, IN Inhale.   12/11/2020 at Unknown time   • furosemide (LASIX) 40 MG tablet Take 2.5 tablets by mouth Daily. Pt taking 100mg qd (Patient taking differently: Take 40 mg by mouth 2 (Two) Times a Day. Twice daily at 0600 and 1200) 270 tablet 1    • furosemide (LASIX) 40 MG tablet Take 40 mg by mouth 2 (Two) Times a Day. At 0600 and 1200   12/11/2020 at Unknown time   • glucose blood (FREESTYLE TEST STRIPS) test strip 1 each by Other route 2 (Two) Times a Week. 50 each 12    • HYDROcodone-acetaminophen (NORCO) 7.5-325 MG per tablet Take 1 tablet by mouth 2 (Two) Times a Day.   12/11/2020 at Unknown time   • HYDROcodone-acetaminophen (NORCO) 7.5-325 MG per tablet Take 1 tablet by mouth Every 8 (Eight) Hours As Needed for Moderate Pain . For max of 8 tabs daily between scheduled and PRN   12/12/2020 at  Unknown time   • hydrocortisone 1 % cream Apply  topically 2 (Two) Times a Day. 85.05 g 2    • icosapent ethyl (VASCEPA) 1 G capsule capsule Take 2 g by mouth 2 (Two) Times a Day With Meals. 360 capsule 3    • insulin aspart (novoLOG) 100 UNIT/ML injection Inject 100 Units under the skin into the appropriate area as directed 3 (Three) Times a Day Before Meals.      • insulin glargine (LANTUS) 100 UNIT/ML injection Inject 100 Units under the skin into the appropriate area as directed Daily.      • metoprolol succinate XL (TOPROL-XL) 25 MG 24 hr tablet Take 25 mg by mouth Every Night.   12/11/2020 at Unknown time   • ondansetron (ZOFRAN) 4 MG tablet Take 4 mg by mouth Every 8 (Eight) Hours As Needed for Nausea or Vomiting.      • polyethylene glycol (EQ ClearLax) 17 GM/SCOOP powder Take 17 g by mouth Daily.   12/11/2020 at Unknown time   • potassium chloride (KLOR-CON) 20 MEQ packet Take 20 mEq by mouth 2 (Two) Times a Day.   12/11/2020 at Unknown time   • sennosides-docusate sodium (SENOKOT-S) 8.6-50 MG tablet Take 1 tablet by mouth 3 (Three) Times a Day As Needed for Constipation.      • spironolactone (ALDACTONE) 25 MG tablet Take 25 mg by mouth 2 (Two) Times a Day.   12/11/2020 at Unknown time   • trolamine salicylate (ASPERCREME) 10 % cream Apply  topically to the appropriate area as directed As Needed for Muscle / Joint Pain.   12/11/2020 at Unknown time   • vitamin C (ASCORBIC ACID) 500 MG tablet Take 500 mg by mouth Daily.      • bisacodyl (Biscolax) 10 MG suppository Insert 10 mg into the rectum Daily As Needed.   More than a month at Unknown time   • KLOR-CON 20 MEQ CR tablet Take 1 tablet by mouth 2 (Two) Times a Day. 180 tablet 3    • lisinopril (PRINIVIL,ZESTRIL) 10 MG tablet Take 1 tablet by mouth Every Morning. (Patient taking differently: Take 2.5 mg by mouth every night at bedtime. Hold for SBP <=120) 90 tablet 1    • metFORMIN (GLUCOPHAGE) 500 MG tablet Take 1 tablet by mouth 2 (Two) Times a Day With  Meals. (Patient taking differently: Take 500 mg by mouth 2 (Two) Times a Day With Meals. At 0800 and 1700) 180 tablet 1    • metoprolol tartrate (LOPRESSOR) 50 MG tablet Take 1 tablet by mouth 2 (Two) Times a Day. 180 tablet 1    • miconazole (MICOTIN) 2 % powder Apply 1 application topically 2 (Two) Times a Day.      • oxyCODONE-acetaminophen (PERCOCET) 7.5-325 MG per tablet 1-2 po q 4-6 hr prn 100 tablet 0    • triamcinolone (KENALOG) 0.1 % cream Apply 1 application topically 2 (Two) Times a Day.      • vitamin D (ERGOCALCIFEROL) 80181 units capsule capsule Take 50,000 Units by mouth Every 14 (Fourteen) Days.          Allergies:  Allergies   Allergen Reactions   • Codeine Nausea And Vomiting   • Morphine Nausea And Vomiting   • Morphine And Related    • Phosphate Nausea And Vomiting     Immunizations:    There is no immunization history on file for this patient.  Social History:   Social History     Social History Narrative   • Not on file     Social History     Socioeconomic History   • Marital status: Single     Spouse name: Not on file   • Number of children: Not on file   • Years of education: Not on file   • Highest education level: Not on file   Occupational History   • Occupation: nun   Tobacco Use   • Smoking status: Never Smoker   • Smokeless tobacco: Never Used   Substance and Sexual Activity   • Alcohol use: No   • Drug use: No   • Sexual activity: Defer       Family History:  Family History   Problem Relation Age of Onset   • Heart disease Mother    • Hypertension Mother    • Cancer Mother    • Heart disease Father    • Hypertension Father    • Cancer Father    • Heart disease Sister    • Hypertension Sister    • Diabetes Sister    • Cancer Sister    • Cancer Brother    • No Known Problems Daughter    • No Known Problems Son    • No Known Problems Maternal Grandmother    • No Known Problems Paternal Grandmother    • No Known Problems Maternal Aunt    • No Known Problems Paternal Aunt    • Hypertension  "Other    • Heart disease Other         AFIB   • Cancer Other    • BRCA 1/2 Neg Hx    • Breast cancer Neg Hx    • Colon cancer Neg Hx    • Endometrial cancer Neg Hx    • Ovarian cancer Neg Hx         Review of Systems  See history of present illness and past medical history.  Patient denies headache, dizziness, syncope, falls, trauma, change in vision, change in hearing, change in taste, changes in weight, changes in appetite, focal weakness, numbness, or paresthesia.  Patient denies chest pain, palpitations, dyspnea, orthopnea, PND, cough, sinus pressure, rhinorrhea, epistaxis, hemoptysis, nausea, vomiting,hematemesis, diarrhea, constipation or hematchezia.  Denies cold or heat intolerance, polydipsia, polyuria, polyphagia. Denies hematuria, pyuria, dysuria, hesitancy, frequency or urgency. Denies consumption of raw and under cooked meats foods or change in water source.  Denies fever, chills, sweats, night sweats.  Denies missing any routine medications. Remainder of ROS is negative.    Objective:  T Max 24 hrs: Temp (24hrs), Av.2 °F (36.8 °C), Min:97.9 °F (36.6 °C), Max:98.7 °F (37.1 °C)    Vitals Ranges:   Temp:  [97.9 °F (36.6 °C)-98.7 °F (37.1 °C)] 97.9 °F (36.6 °C)  Heart Rate:  [] 97  Resp:  [16-22] 18  BP: (100-133)/(61-91) 110/70      Exam:  /70 (BP Location: Right arm, Patient Position: Lying)   Pulse 97   Temp 97.9 °F (36.6 °C) (Oral)   Resp 18   Ht 157.5 cm (62\")   Wt 127 kg (279 lb)   SpO2 94%   BMI 51.03 kg/m²     General Appearance:    Alert, cooperative, no distress, appears stated age   Head:    Normocephalic, without obvious abnormality, atraumatic   Eyes:    PERRL, conjunctivae/corneas clear, EOM's intact, both eyes   Ears:    Normal external ear canals, both ears   Nose:   Nares normal, septum midline, mucosa normal, no drainage    or sinus tenderness   Throat:   Lips, mucosa, and tongue normal   Neck:   Supple, symmetrical, trachea midline, no adenopathy;     thyroid:  no " enlargement/tenderness/nodules; no carotid    bruit or JVD   Back:     Symmetric, no curvature, ROM normal, no CVA tenderness   Lungs:     Decreased breath sounds bilaterally, respirations unlabored   Chest Wall:    No tenderness or deformity    Heart:    Regular rate and rhythm, S1 and S2 normal, no murmur, rub   or gallop   Abdomen:     Soft, nontender, bowel sounds active all four quadrants,     no masses, no hepatomegaly, no splenomegaly   Extremities:   Extremities normal, atraumatic, no cyanosis or edema   Pulses:   2+ and symmetric all extremities   Skin:   Skin color, texture, turgor normal, no rashes or lesions   Lymph nodes:   Cervical, supraclavicular, and axillary nodes normal   Neurologic:   CNII-XII intact, normal strength, sensation intact throughout      .    Data Review:  Labs in chart were reviewed.  WBC   Date Value Ref Range Status   12/12/2020 13.18 (H) 3.40 - 10.80 10*3/mm3 Final     Hemoglobin   Date Value Ref Range Status   12/12/2020 15.1 12.0 - 15.9 g/dL Final     Hematocrit   Date Value Ref Range Status   12/12/2020 46.7 (H) 34.0 - 46.6 % Final     Platelets   Date Value Ref Range Status   12/12/2020 233 140 - 450 10*3/mm3 Final     Sodium   Date Value Ref Range Status   12/12/2020 136 136 - 145 mmol/L Final     Potassium   Date Value Ref Range Status   12/12/2020 5.0 3.5 - 5.2 mmol/L Final     Chloride   Date Value Ref Range Status   12/12/2020 97 (L) 98 - 107 mmol/L Final     CO2   Date Value Ref Range Status   12/12/2020 23.8 22.0 - 29.0 mmol/L Final     BUN   Date Value Ref Range Status   12/12/2020 27 (H) 8 - 23 mg/dL Final     Creatinine   Date Value Ref Range Status   12/12/2020 1.65 (H) 0.57 - 1.00 mg/dL Final     Glucose   Date Value Ref Range Status   12/12/2020 458 (C) 65 - 99 mg/dL Final     Calcium   Date Value Ref Range Status   12/12/2020 9.0 8.6 - 10.5 mg/dL Final     AST (SGOT)   Date Value Ref Range Status   12/12/2020 13 1 - 32 U/L Final     ALT (SGPT)   Date Value Ref  Range Status   12/12/2020 10 1 - 33 U/L Final     Alkaline Phosphatase   Date Value Ref Range Status   12/12/2020 160 (H) 39 - 117 U/L Final     No results found for: APTT, INR             Imaging Results (All)     Procedure Component Value Units Date/Time    CT Abdomen Pelvis Without Contrast [[id number]] Collected: 12/12/20 1628     Updated: 12/12/20 1644    Narrative:      CT ABDOMEN PELVIS WITHOUT CONTRAST     HISTORY: Right lower quadrant pain.     TECHNIQUE: CT includes axial imaging from the lung base to the  trochanters without IV or oral contrast.     COMPARISON: CT ABDOMEN PELVIS WITH IV CONTRAST 05/09/2013.     FINDINGS: There is dependent lower lobe atelectasis and the lung bases  otherwise appear clear. Heart size is enlarged. Posterior to the right  lobe of the liver there is a low-density oblong structure associated  with calcification in its caudal margin. This contacts the hepatic  capsule and measures approximately 2 cm in thickness and is without  change compared to 2013 supporting a benign etiology. The liver, spleen,  adrenal glands, pancreas appear normal. There is a large gallstone that  measures approximately 5.6 x 3.9 cm. No gallbladder wall thickening or  pericholecystic inflammation are evident.     Within the right renal pelvis there is a stone or conglomerate of  adjacent stones measuring 2 cm in length by 0.6 cm in thickness. There  is also a right posterior interpolar calyceal stone measuring  approximately 2.4 x 0.8 cm. There are small right lower pole calyceal  stones. There is right perinephric stranding that is new when compared  to prior exam May 2013. 3 mm left mid to lower pole renal calyceal stone  is present. There is no left hydronephrosis. No ureteral stone is  evident.     There is retroperitoneal joe enlargement. The left periaortic node  anterior to the left psoas muscle measures approximately 3 cm in  thickness. There is also increased soft tissue density along the  left  common iliac artery that is difficult to differentiate from an adjacent  vascularity without contrast, though there is apparent pathologic joe  enlargement in this region. No inguinal joe enlargement is evident.  There is a periumbilical hernia containing fat and a small loop of colon  without evidence for incarceration. Surgical clips are present in the  right abdomen.     There has been previous posterolateral instrumented fusion at  L3-L4-L5-S1. The bones appear osteopenic.       Impression:      1. There appears to be pathologic retroperitoneal joe enlargement  adjacent to the left aspect of the descending thoracic aorta and along  the left common iliac vessels where nodes are difficult to differentiate  from adjacent vessels without contrast. Contrasted CT would be best to  further differentiate. Alternatively, PET CT may be helpful.  2. Nephrolithiasis most extensively on the right with 2 cm in length  right renal pelvic stone. Right perinephric stranding is new when  compared to prior exam May 2013 and may indicate upper tract infection.  There is no hydronephrosis.  3. Large gallstone.  4. Chronic low density structure along the posterior right lobe of the  liver without change compared with May 2013 supporting benign etiology.  Periumbilical hernia contains fat and colonic loops without evidence for  incarceration.     Discussed with Dr. Ball in the emergency department 12/12/2020 at  3:05 PM.     Radiation dose reduction techniques were utilized, including automated  exposure control and exposure modulation based on body size.     This report was finalized on 12/12/2020 4:41 PM by Dr. Yannick Enrique M.D.       XR Chest 1 View [[id number]] Collected: 12/12/20 1514     Updated: 12/12/20 1534    Narrative:      ONE VIEW PORTABLE CHEST     HISTORY: Hypoxia. Shortness of breath.     The lungs are moderately well-expanded and clear and unchanged from  10/12/2016. The heart remains mildly  enlarged and no acute abnormality  is seen.     This report was finalized on 12/12/2020 3:31 PM by Dr. Lennox Brady M.D.           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, Z96.659   • H/O mammogram Z92.89   • H/O colonoscopy Z98.890   • Lymph edema I89.0   • Sarcoidosis D86.9   • Chronic atrial fibrillation (CMS/[facility]) I48.20   • Hyperlipidemia E78.5   • Hypertriglyceridemia E78.1   • Type 2 diabetes mellitus with hyperglycemia (CMS/[facility]) E11.65   • Stasis dermatitis of both legs I87.2   • Essential hypertension I10   • Cellulitis of left lower extremity L03.116   • Cellulitis of extremity, lower bilateral L03.119   • Chronic anticoagulation, for a fib Z79.01   • Morbid obesity with BMI of 50.0-59.9, adult (CMS/[facility]) E66.01, Z68.43   • OA (osteoarthritis) of knee M17.10   • Infected prosthetic knee joint (CMS/[facility]) T84.59XA, Z96.659   • Obstructive sleep apnea syndrome G47.33   • Chronic venous insufficiency I87.2   • Pulmonary nodule R91.1   • 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, Z96.659   • Infection of total knee replacement (CMS/[facility]) T84.59XA, Z96.659   • Renal insufficiency N28.9   • Sepsis without acute organ dysfunction (CMS/[facility]) A41.9       Assessment:    Sepsis without acute organ dysfunction (CMS/[facility])  atrial fibrillation  Sarcoidosis  Sleep apnea  Obesity  Diabetes  uti  ckd3  leukocytosis    Plan:  Will continue antibiotics  Await culture results  Ct noted  Insulin sliding scale and continue home insulin  Trend creatinine  Continue eliquis  D/w patient,nurse and ED provider    Angie Walls MD  12/12/2020  20:57 EST

## 2020-12-14 NOTE — PLAN OF CARE
Goal Outcome Evaluation:  Plan of Care Reviewed With: patient  Progress: improving  Outcome Summary: Pt demonstrates improved strength and balance, as evidenced by increased independence with bed mobility and ability to stand/transfer to chair. Pt completed LE ther ex for strengthening with cues. Will continue to strengthen and advance activity as tolerated.

## 2020-12-14 NOTE — DISCHARGE PLACEMENT REQUEST
"Shoshana Magana (80 y.o. Female)     Date of Birth Social Security Number Address Home Phone MRN    1940  [id number] [facility] [address] [phone] [id number]    Worship Marital Status          Zoroastrian Single       Admission Date Admission Type Admitting Provider Attending Provider Department, Room/Bed    12/12/20 Emergency Stingl, MD Francisco Javier Gregory Troy Andrew, MD [address], E566/1    Discharge Date Discharge Disposition Discharge Destination                       Attending Provider: Cm Mcintyre MD    Allergies: Codeine, Morphine, Morphine And Related, Phosphate    Isolation: None   Infection: None   Code Status: No CPR    Ht: 157.5 cm (62\")   Wt: 128 kg (283 lb)    Admission Cmt: None   Principal Problem: UTI (urinary tract infection), bacterial [N39.0,A49.9]                 Active Insurance as of 12/12/2020     Primary Coverage     Payor Plan Insurance Group Employer/Plan Group    MEDICARE MEDICARE A & B      Payor Plan Address Payor Plan Phone Number Payor Plan Fax Number Effective Dates    [address] [phone]  8/1/2003 - None Entered    [facility] [id number]       Subscriber Name Subscriber Birth Date Member ID       SHOSHANA MAGANA 1940 [id number]           Secondary Coverage     Payor Plan Insurance Group Employer/Plan Group    [place]      Payor Plan Address Payor Plan Phone Number Payor Plan Fax Number Effective Dates    [address] [phone]  5/5/2016 - None Entered    [facility] [id number]       Subscriber Name Subscriber Birth Date Member ID       SHOSHANA MAGANA 1940 [id number]                 Emergency Contacts      (Rel.) Home Phone Work Phone Mobile Phone    Sister Geraldo Broderick (Surrogate) -- -- [phone]    BIJU RICHARDSON (Friend) [phone] -- --    SANDEE AUSTIN (Friend) [phone] -- --              "

## 2020-12-14 NOTE — THERAPY EVALUATION
Patient Name: Shoshana Bae  : 1940    MRN: [id number]                              Today's Date: 2020       Admit Date: 2020    Visit Dx:     ICD-10-CM ICD-9-CM   1. Sepsis without acute organ dysfunction, due to unspecified organism (CMS/[facility])  A41.9 038.9     995.91   2. Pyelonephritis  N12 590.80   3. Adenopathy  R59.1 785.6   4. Nephrolithiasis  N20.0 592.0     Patient Active Problem List   Diagnosis   • History of total knee arthroplasty   • Pain in left knee   • Failed total knee arthroplasty (CMS/HCC)   • H/O mammogram   • H/O colonoscopy   • Lymph edema   • Sarcoidosis   • Chronic atrial fibrillation (CMS/HCC)   • Hyperlipidemia   • Hypertriglyceridemia   • Type 2 diabetes mellitus with hyperglycemia (CMS/HCC)   • Stasis dermatitis of both legs   • Essential hypertension   • Cellulitis of left lower extremity   • Cellulitis of extremity, lower bilateral   • Chronic anticoagulation, for a fib   • Morbid obesity with BMI of 50.0-59.9, adult (CMS/[facility])   • OA (osteoarthritis) of knee   • Infected prosthetic knee joint (CMS/HCC)   • Obstructive sleep apnea syndrome   • Chronic venous insufficiency   • Pulmonary nodule   • Failed total knee replacement (CMS/HCC)   • Infection of total knee replacement (CMS/HCC)   • Renal insufficiency   • Sepsis without acute organ dysfunction (CMS/HCC)   • UTI (urinary tract infection), bacterial     Past Medical History:   Diagnosis Date   • Arm fracture    • Arthritis    • Atrial fibrillation (CMS/HCC)    • Back pain     WITH STANDING   • Cancer (CMS/HCC)    • Cellulitis     LOWER LEGS   • Colon polyp    • DDD (degenerative disc disease), lumbar    • Diabetes mellitus (CMS/HCC)     TYPE 2   • Edema    • H/O colonoscopy        • H/O mammogram     [facility]    • Hyperlipidemia    • Hypertension    • On anticoagulant therapy    • Oral-mouth cancer (CMS/HCC)    • 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)    • Sleep apnea     MOUTHPIECE  USED/CPAP   • Uterine cancer (CMS/HCC)      Past Surgical History:   Procedure Laterality Date   • APPENDECTOMY     • CARDIAC CATHETERIZATION  2010   • COLONOSCOPY     • DILATATION AND CURETTAGE     • EYE SURGERY     • HERNIA REPAIR     • HYSTERECTOMY     • KNEE SURGERY Bilateral    • LUMBAR DISCECTOMY FUSION INSTRUMENTATION     • MOUTH LESION EXCISIONAL BIOPSY     • OVARIAN CYST SURGERY     • ID REVISE KNEE JOINT REPLACE,1 PART Left 10/10/2016    Procedure: TOTAL KNEE ARTHROPLASTY REVISION - SPACER PLACEMENT;  Surgeon: Dominick Bosch MD;  Location: [facility] OR;  Service: Orthopedics   • ID REVISE KNEE JOINT REPLACE,1 PART Left 3/22/2017    Procedure: TOTAL KNEE ARTHROPLASTY REVISION, ANTIBIOTIC SPACER REPLACEMENT;  Surgeon: Dominick Bosch MD;  Location: [facility] OR;  Service: Orthopedics   • SHOULDER SURGERY Right     FRACTURE REPAIR    • THYROID SURGERY  12/18/2007   • TONSILLECTOMY     • UMBILICAL HERNIA REPAIR       General Information     Row Name 12/14/20 1210          OT Time and Intention    Document Type  evaluation  -RB     Mode of Treatment  individual therapy;occupational therapy  -RB     Row Name 12/14/20 1210          General Information    Patient Profile Reviewed  yes  -RB     Prior Level of Function  independent:;ADL's;transfer  -RB     Existing Precautions/Restrictions  fall;oxygen therapy device and L/min 2L  -RB     Barriers to Rehab  medically complex;previous functional deficit  -RB     Row Name 12/14/20 1210          Living Environment    Lives With  facility resident  -RB     Row Name 12/14/20 1210          Home Main Entrance    Number of Stairs, Main Entrance  none  -RB     Row Name 12/14/20 1210          Cognition    Orientation Status (Cognition)  oriented x 4  -RB     Row Name 12/14/20 1210          Safety Issues, Functional Mobility    Impairments Affecting Function (Mobility)  strength;balance;endurance/activity tolerance;shortness of breath  -RB       User Key  (r) = Recorded By,  (t) = Taken By, (c) = Cosigned By    Initials Name Provider Type    Ronda Schofield OT Occupational Therapist        Mobility/ADL's     Row Name 12/14/20 1210          Bed Mobility    Comment (Bed Mobility)  Up in chair following PT session  -RB     Row Name 12/14/20 1210          Transfers    Comment (Transfers)  Declined. No standing occurred due to fatigue post PT session and transfer to chair  -RB     Row Name 12/14/20 1210          Activities of Daily Living    BADL Assessment/Intervention  bathing;upper body dressing;lower body dressing;grooming  -RB     Row Name 12/14/20 1210          Bathing Assessment/Intervention    [place] Level (Bathing)  bathing skills;moderate assist (50% patient effort)  -RB     Position (Bathing)  supported sitting  -RB     Row Name 12/14/20 1210          Upper Body Dressing Assessment/Training    [place] Level (Upper Body Dressing)  upper body dressing skills;minimum assist (75% patient effort)  -RB     Position (Upper Body Dressing)  supported sitting  -RB     Row Name 12/14/20 1210          Lower Body Dressing Assessment/Training    [place] Level (Lower Body Dressing)  lower body dressing skills;dependent (less than 25% patient effort)  -RB     Position (Lower Body Dressing)  supported sitting  -RB     Row Name 12/14/20 1210          Grooming Assessment/Training    [place] Level (Grooming)  grooming skills;set up  -RB     Position (Grooming)  supported sitting  -RB       User Key  (r) = Recorded By, (t) = Taken By, (c) = Cosigned By    Initials Name Provider Type    Ronda Schofield OT Occupational Therapist        Obj/Interventions     Row Name 12/14/20 1212          Sensory Assessment (Somatosensory)    Sensory Assessment (Somatosensory)  -- Impaired sensation in BLE, neuropathy  -RB     Row Name 12/14/20 1212          Vision Assessment/Intervention    Visual Impairment/Limitations  WFL  -RB     Row Name 12/14/20 1212          Range of Motion  Comprehensive    General Range of Motion  no range of motion deficits identified  -RB     Row Name 12/14/20 1212          Strength Comprehensive (MMT)    Comment, General Manual Muscle Testing (MMT) Assessment  BUE strength 3+/5  -RB     Row Name 12/14/20 1212          Balance    Static Sitting Balance  WFL  -RB     Dynamic Sitting Balance  mild impairment  -RB     Balance Interventions  sitting  -RB       User Key  (r) = Recorded By, (t) = Taken By, (c) = Cosigned By    Initials Name Provider Type    RB Ronda Sancehz OT Occupational Therapist        Goals/Plan     Row Name 12/14/20 1255          Bed Mobility Goal 1 (OT)    Activity/Assistive Device (Bed Mobility Goal 1, OT)  bed mobility activities, all  -RB     [place] Level/Cues Needed (Bed Mobility Goal 1, OT)  supervision required  -RB     Time Frame (Bed Mobility Goal 1, OT)  short term goal (STG);2 weeks  -RB     Progress/Outcomes (Bed Mobility Goal 1, OT)  continuing progress toward goal  -RB     Row Name 12/14/20 1255          Transfer Goal 1 (OT)    Activity/Assistive Device (Transfer Goal 1, OT)  transfers, all  -RB     [place] Level/Cues Needed (Transfer Goal 1, OT)  supervision required  -RB     Time Frame (Transfer Goal 1, OT)  short term goal (STG);2 weeks  -RB     Progress/Outcome (Transfer Goal 1, OT)  continuing progress toward goal  -RB     Row Name 12/14/20 1255          Bathing Goal 1 (OT)    Activity/Device (Bathing Goal 1, OT)  bathing skills, all  -RB     [place] Level/Cues Needed (Bathing Goal 1, OT)  supervision required  -RB     Time Frame (Bathing Goal 1, OT)  short term goal (STG);2 weeks  -RB     Progress/Outcomes (Bathing Goal 1, OT)  continuing progress toward goal  -RB     Row Name 12/14/20 1255          Dressing Goal 1 (OT)    Activity/Device (Dressing Goal 1, OT)  dressing skills, all  -RB     [place]/Cues Needed (Dressing Goal 1, OT)  supervision required  -RB     Time Frame (Dressing Goal 1, OT)  short  term goal (STG);2 weeks  -RB     Progress/Outcome (Dressing Goal 1, OT)  continuing progress toward goal  -RB     Row Name 12/14/20 1255          Toileting Goal 1 (OT)    Activity/Device (Toileting Goal 1, OT)  toileting skills, all  -RB     [place] Level/Cues Needed (Toileting Goal 1, OT)  supervision required  -RB     Time Frame (Toileting Goal 1, OT)  short term goal (STG);2 weeks  -RB     Progress/Outcome (Toileting Goal 1, OT)  continuing progress toward goal  -RB     Row Name 12/14/20 1255          Grooming Goal 1 (OT)    Activity/Device (Grooming Goal 1, OT)  grooming skills, all  -RB     [place] (Grooming Goal 1, OT)  supervision required  -RB     Time Frame (Grooming Goal 1, OT)  short term goal (STG);2 weeks  -RB     Progress/Outcome (Grooming Goal 1, OT)  continuing progress toward goal  -RB     Row Name 12/14/20 1255          Self-Feeding Goal 1 (OT)    Activity/Device (Self-Feeding Goal 1, OT)  self-feeding skills, all  -RB     [place] Level/Cues Needed (Self-Feeding Goal 1, OT)  supervision required  -RB     Time Frame (Self-Feeding Goal 1, OT)  short term goal (STG);2 weeks  -RB     Progress/Outcomes (Self-Feeding Goal 1, OT)  continuing progress toward goal  -RB     Row Name 12/14/20 1255          Therapy Assessment/Plan (OT)    Planned Therapy Interventions (OT)  activity tolerance training;adaptive equipment training;BADL retraining;transfer/mobility retraining;strengthening exercise;ROM/therapeutic exercise;patient/caregiver education/training;occupation/activity based interventions;functional balance retraining  -RB       User Key  (r) = Recorded By, (t) = Taken By, (c) = Cosigned By    Initials Name Provider Type    RB Ronda Sanchez, OT Occupational Therapist        Clinical Impression     Row Name 12/14/20 1232          Pain Assessment    Additional Documentation  Pain Scale: Numbers Pre/Post-Treatment (Group)  -RB     Row Name 12/14/20 1232          Pain Scale: Numbers  Pre/Post-Treatment    Pretreatment Pain Rating  0/10 - no pain  -RB     Posttreatment Pain Rating  0/10 - no pain  -RB     Pain Intervention(s)  Repositioned;Rest  -RB     Row Name 12/14/20 1232          Plan of Care Review    Plan of Care Reviewed With  patient  -RB     Progress  improving  -RB     Row Name 12/14/20 1232          Therapy Assessment/Plan (OT)    Rehab Potential (OT)  good, to achieve stated therapy goals  -RB     Criteria for Skilled Therapeutic Interventions Met (OT)  yes;skilled treatment is necessary  -RB     Therapy Frequency (OT)  3 times/wk  -RB     Row Name 12/14/20 1232          Therapy Plan Review/Discharge Plan (OT)    Anticipated Discharge Disposition (OT)  skilled nursing facility;extended care facility  -RB     Row Name 12/14/20 1232          Vital Signs    Pre SpO2 (%)  96  -RB     O2 Delivery Pre Treatment  supplemental O2 2  -RB     Intra SpO2 (%)  95  -RB     O2 Delivery Intra Treatment  supplemental O2 2  -RB     Post SpO2 (%)  95  -RB     O2 Delivery Post Treatment  supplemental O2 2  -RB     Pre Patient Position  Sitting  -RB     Intra Patient Position  Sitting  -RB     Post Patient Position  Sitting  -RB     Row Name 12/14/20 1232          Positioning and Restraints    Pre-Treatment Position  sitting in chair/recliner  -RB     Post Treatment Position  chair  -RB     In Chair  reclined;call light within reach;encouraged to call for assist;exit alarm on  -RB       User Key  (r) = Recorded By, (t) = Taken By, (c) = Cosigned By    Initials Name Provider Type    RB Ronda Sanchez, OT Occupational Therapist        Outcome Measures     Row Name 12/14/20 1256          How much help from another is currently needed...    Putting on and taking off regular lower body clothing?  1  -RB     Bathing (including washing, rinsing, and drying)  2  -RB     Toileting (which includes using toilet bed pan or urinal)  1  -RB     Putting on and taking off regular upper body clothing  3  -RB      Taking care of personal grooming (such as brushing teeth)  3  -RB     Eating meals  3  -RB     AM-PAC 6 Clicks Score (OT)  13  -RB     Row Name 12/14/20 9002          Functional Assessment    Outcome Measure Options  AM-PAC 6 Clicks Daily Activity (OT)  -RB       User Key  (r) = Recorded By, (t) = Taken By, (c) = Cosigned By    Initials Name Provider Type    Ronda Schofield OT Occupational Therapist        Occupational Therapy Education                 Title: PT OT SLP Therapies (In Progress)     Topic: Occupational Therapy (Not Started)     Point: ADL training (Not Started)     Description:   Instruct learner(s) on proper safety adaptation and remediation techniques during self care or transfers.   Instruct in proper use of assistive devices.              Learner Progress:  Not documented in this visit.          Point: Home exercise program (Not Started)     Description:   Instruct learner(s) on appropriate technique for monitoring, assisting and/or progressing therapeutic exercises/activities.              Learner Progress:  Not documented in this visit.          Point: Precautions (Not Started)     Description:   Instruct learner(s) on prescribed precautions during self-care and functional transfers.              Learner Progress:  Not documented in this visit.          Point: Body mechanics (Not Started)     Description:   Instruct learner(s) on proper positioning and spine alignment during self-care, functional mobility activities and/or exercises.              Learner Progress:  Not documented in this visit.                          OT Recommendation and Plan  Planned Therapy Interventions (OT): activity tolerance training, adaptive equipment training, BADL retraining, transfer/mobility retraining, strengthening exercise, ROM/therapeutic exercise, patient/caregiver education/training, occupation/activity based interventions, functional balance retraining  Therapy Frequency (OT): 3 times/wk  Plan of Care  Review  Plan of Care Reviewed With: patient  Progress: improving     Time Calculation:   Time Calculation- OT     Row Name 12/14/20 1209             Time Calculation- OT    OT Start Time  1051  -RB      OT Stop Time  1110  -RB      OT Time Calculation (min)  19 min  -RB      Total Timed Code Minutes- OT  10 minute(s)  -RB      OT Received On  12/14/20  -RB      OT - Next Appointment  12/16/20  -      OT Goal Re-Cert Due Date  12/28/20  -        User Key  (r) = Recorded By, (t) = Taken By, (c) = Cosigned By    Initials Name Provider Type    RB Ronda Sanchez OT Occupational Therapist        Therapy Charges for Today     Code Description Service Date Service Provider Modifiers Qty    [id number]  OT EVAL MOD COMPLEXITY 2 12/14/2020 Ronda Sanchez OT GO 1    [id number]  OT SELF CARE/MGMT/TRAIN EA 15 MIN 12/14/2020 Ronda Sanchez OT GO 1               Ronda Sanchez OT  12/14/2020

## 2020-12-14 NOTE — PLAN OF CARE
Goal Outcome Evaluation:  Plan of Care Reviewed With: patient  Progress: improving  Outcome Summary: VSS, denies pain, no c/o nausea/vomitting, tolerating diet well, IV antibiotics per MD order, skin care, q2 turn, will continue to monitor pt.

## 2020-12-14 NOTE — PROGRESS NOTES
Discharge Planning Assessment  [facility]     Patient Name: Shoshana Bae  MRN: [id number]  Today's Date: 12/14/2020    Admit Date: 12/12/2020    Discharge Needs Assessment     Row Name 12/14/20 1649       Living Environment    Lives With  facility resident    Current Living Arrangements  extended care facility    Provides Primary Care For  no one       Transition Planning    Patient/Family Anticipated Services at Transition  none    Transportation Anticipated  health plan transportation       Discharge Needs Assessment    Equipment Currently Used at Home  wheelchair, motorized        Discharge Plan     Row Name 12/14/20 1658       Plan    Plan  Return to [facility]    Patient/Family in Agreement with Plan  yes    Plan Comments  Met with pt at bedside who states she resides at [facility] and plan will be to return there at discharge.  Spoke with Tammy/Bria who confirms that pt is IC level of care and can return skilled.  Referral placedin [facility].  Pt states she will need ambulance for transport back.  STACEY Mullen RN        Continued Care and Services - Admitted Since 12/12/2020     Destination     Service Provider Request Status Selected Services Address Phone Fax Patient Preferred    [facility]  Pending - Request Sent N/A [address] [phone] --                Demographic Summary     Row Name 12/14/20 1649       General Information    Admission Type  inpatient    Arrived From  home    Referral Source  admission list    Reason for Consult  discharge planning    Preferred Language  English       Contact Information    Permission Granted to Share Info With  permission denied        Functional Status    No documentation.       Psychosocial    No documentation.       Abuse/Neglect    No documentation.       Legal    No documentation.       Substance Abuse    No documentation.       Patient Forms    No documentation.           Dayana Mullen,  RN

## 2020-12-14 NOTE — THERAPY TREATMENT NOTE
Patient Name: Shoshana Bae  : 1940    MRN: [id number]                              Today's Date: 2020       Admit Date: 2020    Visit Dx:     ICD-10-CM ICD-9-CM   1. Sepsis without acute organ dysfunction, due to unspecified organism (CMS/[facility])  A41.9 038.9     995.91   2. Pyelonephritis  N12 590.80   3. Adenopathy  R59.1 785.6   4. Nephrolithiasis  N20.0 592.0     Patient Active Problem List   Diagnosis   • History of total knee arthroplasty   • Pain in left knee   • Failed total knee arthroplasty (CMS/HCC)   • H/O mammogram   • H/O colonoscopy   • Lymph edema   • Sarcoidosis   • Chronic atrial fibrillation (CMS/HCC)   • Hyperlipidemia   • Hypertriglyceridemia   • Type 2 diabetes mellitus with hyperglycemia (CMS/HCC)   • Stasis dermatitis of both legs   • Essential hypertension   • Cellulitis of left lower extremity   • Cellulitis of extremity, lower bilateral   • Chronic anticoagulation, for a fib   • Morbid obesity with BMI of 50.0-59.9, adult (CMS/[facility])   • OA (osteoarthritis) of knee   • Infected prosthetic knee joint (CMS/HCC)   • Obstructive sleep apnea syndrome   • Chronic venous insufficiency   • Pulmonary nodule   • Failed total knee replacement (CMS/HCC)   • Infection of total knee replacement (CMS/HCC)   • Renal insufficiency   • Sepsis without acute organ dysfunction (CMS/HCC)   • UTI (urinary tract infection), bacterial     Past Medical History:   Diagnosis Date   • Arm fracture    • Arthritis    • Atrial fibrillation (CMS/HCC)    • Back pain     WITH STANDING   • Cancer (CMS/HCC)    • Cellulitis     LOWER LEGS   • Colon polyp    • DDD (degenerative disc disease), lumbar    • Diabetes mellitus (CMS/HCC)     TYPE 2   • Edema    • H/O colonoscopy        • H/O mammogram     [facility]    • Hyperlipidemia    • Hypertension    • On anticoagulant therapy    • Oral-mouth cancer (CMS/HCC)    • 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)    • Sleep apnea     MOUTHPIECE  USED/CPAP   • Uterine cancer (CMS/HCC)      Past Surgical History:   Procedure Laterality Date   • APPENDECTOMY     • CARDIAC CATHETERIZATION  2010   • COLONOSCOPY     • DILATATION AND CURETTAGE     • EYE SURGERY     • HERNIA REPAIR     • HYSTERECTOMY     • KNEE SURGERY Bilateral    • LUMBAR DISCECTOMY FUSION INSTRUMENTATION     • MOUTH LESION EXCISIONAL BIOPSY     • OVARIAN CYST SURGERY     • OH REVISE KNEE JOINT REPLACE,1 PART Left 10/10/2016    Procedure: TOTAL KNEE ARTHROPLASTY REVISION - SPACER PLACEMENT;  Surgeon: Dominick Bosch MD;  Location: [facility];  Service: Orthopedics   • OH REVISE KNEE JOINT REPLACE,1 PART Left 3/22/2017    Procedure: TOTAL KNEE ARTHROPLASTY REVISION, ANTIBIOTIC SPACER REPLACEMENT;  Surgeon: Dominick Bosch MD;  Location: [facility];  Service: Orthopedics   • SHOULDER SURGERY Right     FRACTURE REPAIR    • THYROID SURGERY  12/18/2007   • TONSILLECTOMY     • UMBILICAL HERNIA REPAIR       General Information     Row Name 12/14/20 1047          Physical Therapy Time and Intention    Document Type  therapy note (daily note)  -EE     Mode of Treatment  individual therapy;physical therapy  -EE     Row Name 12/14/20 1047          General Information    Existing Precautions/Restrictions  fall;oxygen therapy device and L/min  -EE     Barriers to Rehab  previous functional deficit  -EE     Row Name 12/14/20 1047          Cognition    Orientation Status (Cognition)  oriented x 4  -EE     Row Name 12/14/20 1047          Safety Issues, Functional Mobility    Impairments Affecting Function (Mobility)  strength;balance;endurance/activity tolerance  -EE       User Key  (r) = Recorded By, (t) = Taken By, (c) = Cosigned By    Initials Name Provider Type    EE Mansi Chatterjee PT Physical Therapist        Mobility     Row Name 12/14/20 1050          Bed Mobility    Supine-Sit [place] (Bed Mobility)  contact guard;verbal cues  -EE     Assistive Device (Bed Mobility)  head of bed elevated;bed  rails  -EE     Comment (Bed Mobility)  increased time and vc's for sequencing with bed mobility  -EE     Row Name 12/14/20 1050          Transfers    Comment (Transfers)  STS x2 from bed, x1 from lower chair surface  -EE     Row Name 12/14/20 1050          Bed-Chair Transfer    Bed-Chair [place] (Transfers)  moderate assist (50% patient effort);2 person assist;verbal cues;nonverbal cues (demo/gesture) HHA  -EE     Row Name 12/14/20 1050          Sit-Stand Transfer    Sit-Stand [place] (Transfers)  minimum assist (75% patient effort);2 person assist;verbal cues;nonverbal cues (demo/gesture) HHA  -EE     Row Name 12/14/20 1050          Gait/Stairs (Locomotion)    [place] Level (Gait)  moderate assist (50% patient effort);2 person assist;verbal cues HHA  -EE     Distance in Feet (Gait)  2'  -EE     Deviations/Abnormal Patterns (Gait)  festinating/shuffling  -EE     Bilateral Gait Deviations  forward flexed posture  -EE     Comment (Gait/Stairs)  pt able to take several shuffled steps from bed to chair  -EE       User Key  (r) = Recorded By, (t) = Taken By, (c) = Cosigned By    Initials Name Provider Type    EE Mansi Chatterjee PT Physical Therapist        Obj/Interventions     Row Name 12/14/20 1053          Motor Skills    Therapeutic Exercise  -- Supine B quad sets and glute sets x10 reps. Seated B LAQ, shoulder circles, shoulder flex/ext and elbow flex/ext x10 reps.  -EE     Row Name 12/14/20 1053          Balance    Balance Assessment  sitting static balance;sitting dynamic balance;standing static balance  -EE     Static Sitting Balance  WFL;sitting, edge of bed SBA  -EE     Dynamic Sitting Balance  mild impairment;sitting, edge of bed;supported CGA  -EE     Static Standing Balance  moderate impairment min x 2 with HHA  -EE     Balance Interventions  static;sitting;standing  -EE     Comment, Balance  Sitting EOB for 7 min with SBA/CGA. Static standing 3 x 30 sec with min x2 and HHA. Requires vc's and  tc's for upright posture while standing.  -EE       User Key  (r) = Recorded By, (t) = Taken By, (c) = Cosigned By    Initials Name Provider Type    EE Mansi Chatterjee, PT Physical Therapist        Goals/Plan    No documentation.       Clinical Impression     Row Name 12/14/20 1055          Pain    Additional Documentation  Pain Scale: Numbers Pre/Post-Treatment (Group)  -EE     Row Name 12/14/20 1055          Pain Scale: Numbers Pre/Post-Treatment    Pretreatment Pain Rating  0/10 - no pain  -EE     Posttreatment Pain Rating  0/10 - no pain  -EE     Row Name 12/14/20 1055          Plan of Care Review    Plan of Care Reviewed With  patient  -EE     Progress  improving  -EE     Outcome Summary  Pt demonstrates improved strength and balance, as evidenced by increased independence with bed mobility and ability to stand/transfer to chair. Pt completed LE ther ex for strengthening with cues. Will continue to strengthen and advance activity as tolerated.  -EE     Row Name 12/14/20 1055          Positioning and Restraints    Pre-Treatment Position  in bed  -EE     Post Treatment Position  chair  -EE     In Chair  reclined;call light within reach;encouraged to call for assist;exit alarm on;legs elevated;notified nsg  -EE       User Key  (r) = Recorded By, (t) = Taken By, (c) = Cosigned By    Initials Name Provider Type    Mansi Gandara PT Physical Therapist        Outcome Measures     Row Name 12/14/20 1056          How much help from another person do you currently need...    Turning from your back to your side while in flat bed without using bedrails?  3  -EE     Moving from lying on back to sitting on the side of a flat bed without bedrails?  3  -EE     Moving to and from a bed to a chair (including a wheelchair)?  2  -EE     Standing up from a chair using your arms (e.g., wheelchair, bedside chair)?  2  -EE     Climbing 3-5 steps with a railing?  1  -EE     To walk in hospital room?  1  -EE     AM-PAC 6 Clicks Score  (PT)  12  -EE       User Key  (r) = Recorded By, (t) = Taken By, (c) = Cosigned By    Initials Name Provider Type    EE Mansi Chatterjee, PT Physical Therapist        Physical Therapy Education                 Title: PT OT SLP Therapies (Done)     Topic: Physical Therapy (Done)     Point: Mobility training (Done)     Learning Progress Summary           Patient Acceptance, E,TB, VU,NR by EE at 12/14/2020 1056    Acceptance, E, VU by DO at 12/13/2020 1515                   Point: Home exercise program (Done)     Learning Progress Summary           Patient Acceptance, E,TB, VU,NR by EE at 12/14/2020 1056    Acceptance, E, VU by DO at 12/13/2020 1515                   Point: Body mechanics (Done)     Learning Progress Summary           Patient Acceptance, E,TB, VU,NR by EE at 12/14/2020 1056    Acceptance, E, VU by DO at 12/13/2020 1515                   Point: Precautions (Done)     Learning Progress Summary           Patient Acceptance, E, VU by DO at 12/13/2020 1515                               User Key     Initials Effective Dates Name Provider Type Discipline    EE 04/03/18 -  Mansi Chatterjee, PT Physical Therapist PT    DO 03/07/18 -  Alen Blas, PT Physical Therapist PT              PT Recommendation and Plan     Plan of Care Reviewed With: patient  Progress: improving  Outcome Summary: Pt demonstrates improved strength and balance, as evidenced by increased independence with bed mobility and ability to stand/transfer to chair. Pt completed LE ther ex for strengthening with cues. Will continue to strengthen and advance activity as tolerated.     Time Calculation:   PT Charges     Row Name 12/14/20 1057             Time Calculation    Start Time  1022  -EE      Stop Time  1046  -EE      Time Calculation (min)  24 min  -EE      PT Received On  12/14/20  -EE      PT - Next Appointment  12/15/20  -EE         Time Calculation- PT    Total Timed Code Minutes- PT  24 minute(s)  -EE        User Key  (r) = Recorded By, (t)  = Taken By, (c) = Cosigned By    Initials Name Provider Type     Mansi Chatterjee PT Physical Therapist        Therapy Charges for Today     Code Description Service Date Service Provider Modifiers Qty    61201393036  PT THER PROC EA 15 MIN 12/14/2020 Mansi Chatterjee, PT GP 1    [id number]  PT THERAPEUTIC ACT EA 15 MIN 12/14/2020 Mansi Chatterjee, PT GP 1    [id number]  PT THER SUPP EA 15 MIN 12/14/2020 Mansi Chatterjee, PT GP 2          PT G-Codes  Outcome Measure Options: AM-PAC 6 Clicks Basic Mobility (PT)  AM-PAC 6 Clicks Score (PT): 12     Patient was intermittently wearing a face mask during this therapy encounter. Therapist used appropriate personal protective equipment including eye protection, mask, and gloves.  Mask used was standard procedure mask. Appropriate PPE was worn during the entire therapy session. Hand hygiene was completed before and after therapy session. Patient is not in enhanced droplet precautions.       Mansi Chatterjee PT  12/14/2020

## 2020-12-14 NOTE — PLAN OF CARE
Goal Outcome Evaluation:  Plan of Care Reviewed With: patient  Progress: improving  Outcome Summary: VSS, pt resting very well all evening, no c/o pain or nausea. IV NS@100/hr. Purewick in place with tea colored urine.  Turning pt if agreeable - refuses at times. 2L O2 via nasal cannula. Chronic Afib on monitor. Will CTM.

## 2020-12-14 NOTE — PROGRESS NOTES
"    DAILY PROGRESS NOTE  [facility]    Patient Identification:  Name: Shoshana Bae  Age: 80 y.o.  Sex: female  :  1940  MRN: [id number]         Primary Care Physician: Fany Morales MD    Subjective:  Interval History: Feeling much better as opposed to yesterday.  She denies any new nausea vomiting.  Abdominal pain is improved.  She denies any new diarrhea chest pain troubles breathing or any altered mentation.    Objective: No family at bedside though RN present.  Case discussed.  Patient clinically improved.  Pleasant and conversational    Scheduled Meds:apixaban, 2.5 mg, Oral, Q12H  atorvastatin, 10 mg, Oral, Nightly  cefTRIAXone, 2 g, Intravenous, Q24H  famotidine, 20 mg, Oral, BID AC  icosapent ethyl, 2 g, Oral, BID With Meals  insulin glargine, 25 Units, Subcutaneous, Q12H  insulin lispro, 0-9 Units, Subcutaneous, TID With Meals  insulin lispro, 5 Units, Subcutaneous, TID With Meals  metoprolol succinate XL, 25 mg, Oral, Q24H  polyethylene glycol, 17 g, Oral, Daily      Continuous Infusions:sodium chloride, 100 mL/hr, Last Rate: 100 mL/hr (20 0112)        Vital signs in last 24 hours:  Temp:  [97.8 °F (36.6 °C)-98.7 °F (37.1 °C)] 98.7 °F (37.1 °C)  Heart Rate:  [] 93  Resp:  [18] 18  BP: (102-142)/(72-81) 142/81    Intake/Output:    Intake/Output Summary (Last 24 hours) at 2020 1134  Last data filed at 2020 0500  Gross per 24 hour   Intake 1431 ml   Output 1300 ml   Net 131 ml       Exam:  /81 (BP Location: Right arm, Patient Position: Lying)   Pulse 93   Temp 98.7 °F (37.1 °C) (Oral)   Resp 18   Ht 157.5 cm (62\")   Wt 128 kg (283 lb)   SpO2 95%   BMI 51.76 kg/m²     General Appearance:    Alert, cooperative, AAOx3                          Head:    Normocephalic, without obvious abnormality, atraumatic                           Eyes:  Normal conjunctivo with no icterus                         throat:   Oral mucosa pink and moist       "                   Lungs:    Clear to auscultation bilaterally, respirations unlabored                          Heart:    Regular rate and rhythm, S1 and S2 normal                 Abdomen:     Soft, nontender, bowel sounds active, no rebound or guarding                 extremities: Chronic lymphedema with stasis changes but no cellulitis, leg wraps remain off at this time and discussed with RN, no cyanosis                         Pulses:   Pulses palpable in lower extremities                  Neurologic:   CNII-XII intact, no focal deficits noted     Data Review:  Labs in chart were reviewed.    Assessment:  Active Hospital Problems    Diagnosis  POA   • **UTI (urinary tract infection), bacterial [N39.0, A49.9]  Unknown   • Sepsis without acute organ dysfunction (CMS/HCC) [A41.9]  Yes   • Obstructive sleep apnea syndrome [G47.33]  Yes   • Type 2 diabetes mellitus with hyperglycemia (CMS/[facility]) [E11.65]  Yes   • Chronic atrial fibrillation (CMS/[facility]) [I48.20]  Yes      Resolved Hospital Problems   No resolved problems to display.       Plan:    Sepsis secondary to UTI with right-sided pyelonephritis with 2 cm kidney stone with perinephric stranding but no hydronephrosis              -Rocephin 2 g - awaiting final urine culture/sensitivity              -Chest x-ray negative              -BC NGTD -lactate/leukocytosis both resolved              -SLIVF and remains off Lasix another day   -Wean O2 as we have dropped her down to 1 L because she is 97% on 2 L and has no complaints of hypoxia     Retroperitoneal lymphadenopathy in a patient with complaints of abdominal discomfort as well as a past history of an ovarian type of cancer as well as oral cancer              -Surgical consultation appreciated/agreed              -Incidental gallstones doubt cholecystitis   -Hopefully we can transition a lot of her further surgical care to an outpatient setting              -Urological consultation was noted yesterday     A. fib with  mild/improving RVR at times secondary to sepsis              -Toprol resumed and adjusted to daily              -AC on Eliquis     DM2 with CKD 3              -Uncontrolled with an A1c of 10.4              -Continue increased Lantus to 25 units twice daily               -Blood sugars with hyperglycemia 225-308              -Continue additional 5 units scheduled Humalog in addition to sliding scale for now     No ARF -CKD 3 at baseline     Lymphedema -remove leg wraps today and OT can further address this tomorrow     HLD on statin     No additional DVT prophylaxis warranted since already on Eliquis     Pepcid for GI prophylaxis    Cm Mcintyre MD  12/14/2020  11:34 EST

## 2020-12-14 NOTE — NURSING NOTE
"[facility] dept - consult for edema to BLE and skin concerns to buttock region. Patient reports has compression with ace applied at residential LTC facility. Legs cleansed with soap and water, lotion applied, tubular cotton stockinette applied to BLE followed by 2 layer 4\" ace wraps applied to BLE toe to knee. This to be removed and rewrapped daily by floor nursing staff.   Patient with mild redness but chronic darkening of skin to buttock/coccyx region, no appreciated open areas in skin. Patient sits at all times, not ambulatory. Zguard barrier cream applied and should be used regularly.    "

## 2020-12-14 NOTE — PLAN OF CARE
Pt admitted to [facility] 2/2 to sepsis secondary to UTI with right-sided pyelonephritis with 2 cm kidney stone with perinephric stranding but no hydronephrosis. Pt is a sister with the Shankar hebert. Pt resides at [facility] where she reports being independent with ADL's and transfers utilizing her wheelchair. Today, pt sitting in bedside chair post PT session. Pt was able to scoot towards edge of chair with Min A. Mod A provided for total body bathing and dressing. Pt with impaired activity tolerance and on 2L O2 nc with sats >94%. Pt reports she is much weaker than her baseline, however, she has assistance as needed in her nursing facility and wishes to return there. Pt may benefit from a short term rehab stay as she is not at her baseline to transfer herself and complete ADL's indep.     Patient was wearing a face mask during this therapy encounter. Therapist used appropriate personal protective equipment including mask, goggles and gloves. Mask used was standard procedure mask. Appropriate PPE was worn during the entire therapy session. Hand hygiene was completed before and after therapy session. Patient is not in enhanced droplet precautions.

## 2020-12-15 NOTE — DISCHARGE SUMMARY
[facility]    Date of Discharge:  12/15/2020    PCP: Fany Morales MD    Discharge Diagnosis:   Active Hospital Problems    Diagnosis  POA   • **UTI (urinary tract infection), bacterial [N39.0, A49.9]  Unknown   • Sepsis without acute organ dysfunction (CMS/HCC) [A41.9]  Yes   • Obstructive sleep apnea syndrome [G47.33]  Yes   • Type 2 diabetes mellitus with hyperglycemia (CMS/[facility]) [E11.65]  Yes   • Chronic atrial fibrillation (CMS/[facility]) [I48.20]  Yes      Resolved Hospital Problems   No resolved problems to display.          Consults     Date and Time Order Name Status Description    12/13/2020 1149 Inpatient Urology Consult Completed     12/13/2020 1033 Inpatient General Surgery Consult Completed     12/12/2020 1520 [facility] (on-call MD unless specified) Details Completed     12/12/2020 1520 Urology (on-call MD unless specified) Completed         Hospital Course  Please see history and physical for details. Patient is a 80 y.o. female initially admitted for abdominal pain.  Patient was found to be septic secondary to UTI with probable pyelonephritis right-sided compounded by 2 cm kidney stone with perinephric stranding but no aspects of hydronephrosis.  Patient was maintained on Rocephin adjusted to 2 g and she has received 4 days worth while here and she can start oral amoxicillin tomorrow.  Blood cultures remain no growth to date and sepsis at admission has resolved.  She was found to have retroperitoneal lymphadenopathy.  I did consult general surgery as well as urology while here.  Both of which plan additional follow-up in an outpatient setting but no surgical correction was endorsed.  Gallstones were incidentally noted on exam and patient felt otherwise too high risk to electively remove those as we do not feel that is compounding her current situation.  She had some mild RVR secondary to sepsis but that is resolved by disposition and she has a  chronic history of JORY rutledge anticoagulated on Eliquis rate controlled currently on metoprolol.  Her diabetic control is very poor as evident by an A1c of 10.4 prior to admission.  I suggest strong dietary compliance at nursing home but I have adjusted her regimen to Lantus 25 units twice daily with giving scheduled Humalog in addition to sliding scale.  To simplify her course I will continue with 8 units of Humalog before meals and the 25 units of Lantus scheduled twice daily and defer further management to PCP.  There is no acute renal failure and her CKD 3 is at baseline.  She can continue use of her lymphedema wraps as performed prior to admission.  At this juncture she is very proactive to return to her nursing home and I feel she is medically stable from my perspective.  No family at bedside though case discussed with patient as well as with RN to ensure a smooth transition.  CCP is help coordinate needs to return to [facility] and I did asked the RN to confirm coverage with CCP regarding new insulin adjustments at discharge.    Addendum -came back by 5 E. to sign narcotic prescription.  Discussed with RN to ensure all medication changes were covered post discharge and it was conveyed to me that CCP said all was okay     Condition on Discharge: Improved.     Temp:  [98 °F (36.7 °C)-98.2 °F (36.8 °C)] 98.1 °F (36.7 °C)  Heart Rate:  [90-96] 93  Resp:  [16] 16  BP: ()/(64-85) 130/79  Body mass index is 51.76 kg/m².    Physical Exam  Constitutional:       Comments: Morbidly obese with a BMI of 52 and need for assistance from staff with movement   HENT:      Head: Normocephalic.      Nose: Nose normal.      Mouth/Throat:      Mouth: Mucous membranes are moist.      Pharynx: Oropharynx is clear.   Eyes:      General: No scleral icterus.     Conjunctiva/sclera: Conjunctivae normal.   Cardiovascular:      Rate and Rhythm: Normal rate and regular rhythm.   Pulmonary:      Effort: Pulmonary effort is normal.  No respiratory distress.      Breath sounds: Normal breath sounds.   Abdominal:      General: Bowel sounds are normal.      Palpations: Abdomen is soft.      Tenderness: There is no abdominal tenderness.   Musculoskeletal:      Comments: Chronic lymphedema without cellulitis   Skin:     General: Skin is warm and dry.   Neurological:      Mental Status: She is alert and oriented to person, place, and time.   Psychiatric:         Mood and Affect: Mood normal.         Behavior: Behavior normal.         Thought Content: Thought content normal.     [unfilled]    Disposition: Skilled Nursing Facility (DC - External)       Discharge Medications      New Medications      Instructions Start Date   amoxicillin 500 MG capsule  Commonly known as: AMOXIL   500 mg, Oral, 3 Times Daily      insulin lispro 100 UNIT/ML injection  Commonly known as: humaLOG   8 Units, Subcutaneous, 3 Times Daily With Meals         Changes to Medications      Instructions Start Date   atorvastatin 10 MG tablet  Commonly known as: LIPITOR  What changed: when to take this   10 mg, Oral, Daily      furosemide 40 MG tablet  Commonly known as: LASIX  What changed: Another medication with the same name was removed. Continue taking this medication, and follow the directions you see here.   40 mg, Oral, 2 Times Daily, At 0600 and 1200       HYDROcodone-acetaminophen 7.5-325 MG per tablet  Commonly known as: NORCO  What changed:   · when to take this  · reasons to take this  · Another medication with the same name was removed. Continue taking this medication, and follow the directions you see here.   1 tablet, Oral, Every 6 Hours PRN      insulin glargine 100 UNIT/ML injection  Commonly known as: LANTUS  What changed:   · how much to take  · when to take this  · additional instructions   25 Units, Subcutaneous, Every 12 Hours Scheduled         Continue These Medications      Instructions Start Date   acetaminophen 500 MG tablet  Commonly known as: TYLENOL   500  mg, Oral, Every 6 Hours PRN      Biscolax 10 MG suppository  Generic drug: bisacodyl   10 mg, Rectal, Daily PRN      Eliquis 5 MG tablet tablet  Generic drug: apixaban   2.5 mg, Every 12 Hours Scheduled      EQ ClearLax 17 GM/SCOOP powder  Generic drug: polyethylene glycol   17 g, Oral, Daily      FLUTICASONE PROPIONATE (INHAL) IN   50 mcg, Inhalation      glucose blood test strip  Commonly known as: FREESTYLE TEST STRIPS   1 each, Other, 2 Times Weekly      icosapent ethyl 1 g capsule capsule  Commonly known as: VASCEPA   2 g, Oral, 2 Times Daily With Meals      KLOR-CON 20 MEQ CR tablet  Generic drug: potassium chloride   20 mEq, Oral, 2 Times Daily      metoprolol succinate XL 25 MG 24 hr tablet  Commonly known as: TOPROL-XL   25 mg, Oral, Nightly      nystatin 721483 UNIT/GM powder  Commonly known as: MYCOSTATIN   1 application, Topical, 2 Times Daily, abd folds and under breasts       ondansetron 4 MG tablet  Commonly known as: ZOFRAN   4 mg, Oral, Every 4 Hours PRN      sennosides-docusate 8.6-50 MG per tablet  Commonly known as: PERICOLACE   1 tablet, Oral, 2 times daily      trolamine salicylate 10 % cream  Commonly known as: ASPERCREME   Topical, As Needed      vitamin C 500 MG tablet  Commonly known as: ASCORBIC ACID   500 mg, Oral, Daily      vitamin D 1.25 MG (36010 UT) capsule capsule  Commonly known as: ERGOCALCIFEROL   50,000 Units, Oral, Every 14 Days         Stop These Medications    Benadryl Itch Stopping 1-0.1 % cream  Generic drug: diphenhydrAMINE-zinc acetate     ciprofloxacin 500 MG tablet  Commonly known as: CIPRO     POTASSIUM CHLORIDE PO     spironolactone 25 MG tablet  Commonly known as: ALDACTONE     Trulicity 1.5 MG/0.5ML solution pen-injector  Generic drug: Dulaglutide             Additional Instructions for the Follow-ups that You Need to Schedule     Discharge Follow-up with PCP   As directed       Currently Documented PCP:    Fany Morales MD    PCP Phone Number:     [phone]     Follow Up Details: PCP 2 weeks general surgery and urology per their recommendations           Follow-up Information     Fany Morales MD .    Specialty: Geriatric Medicine  Why: PCP 2 weeks general surgery and urology per their recommendations  Contact information:  [address]  [facility] [address] [phone]                  Pending Labs     Order Current Status    Blood Culture - Blood, Arm, Left Preliminary result    Blood Culture - Blood, Arm, Right Preliminary result    Urine Culture - Urine, Urine, Catheter Preliminary result         Cm Mcintyre MD  12/15/20  11:44 EST    Discharge time spent greater than 30 minutes.

## 2020-12-15 NOTE — PROGRESS NOTES
"Physicians Statement of Medical Necessity for  Ambulance Transportation    GENERAL INFORMATION     Name: Shoshana Bae  YOB: 1940  Medicare #: [id number]  Transport Date: 12/16/2020 (Valid for round trips this date, or for scheduled repetitive trips for 60 days from the date signed below.)  Origin: [facility]  Destination: [facility]  Is the Patient's stay covered under Medicare Part A (PPS/DRG?)Yes   Closest appropriate facility? Yes  If this a hosp-hosp transfer? No  Is this a hospice patient? No    MEDICAL NECESSITY QUESTIONAIRE    Ambulance Transportation is medically necessary only if other means of transportation are contraindicated or would be potentially harmful to the patient.  To meet this requirement, the patient must be either \"bed confined\" or suffer from a condition such that transport by means other than an ambulance is contraindicated by the patient's condition.  The following questions must be answered by the healthcare professional signing below for this form to be valid:     1) Describe the MEDICAL CONDITION (physical and/or mental) of this patient AT THE TIME OF AMBULANCE TRANSPORT that requires the patient to be transported in an ambulance, and why transport by other means is contraindicated by the patient's condition:   Past Medical History:   Diagnosis Date   • Arm fracture    • Arthritis    • Atrial fibrillation (CMS/HCC)    • Back pain     WITH STANDING   • Cancer (CMS/HCC)    • Cellulitis     LOWER LEGS   • Colon polyp    • DDD (degenerative disc disease), lumbar    • Diabetes mellitus (CMS/HCC)     TYPE 2   • Edema    • H/O colonoscopy     2014   • H/O mammogram     E 2015   • Hyperlipidemia    • Hypertension    • On anticoagulant therapy    • Oral-mouth cancer (CMS/HCC)    • 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)    • Sleep apnea     MOUTHPIECE USED/CPAP   • Uterine cancer (CMS/HCC)       Past Surgical History:   Procedure " "Laterality Date   • APPENDECTOMY     • CARDIAC CATHETERIZATION  2010   • COLONOSCOPY     • DILATATION AND CURETTAGE     • EYE SURGERY     • HERNIA REPAIR     • HYSTERECTOMY     • KNEE SURGERY Bilateral    • LUMBAR DISCECTOMY FUSION INSTRUMENTATION     • MOUTH LESION EXCISIONAL BIOPSY     • OVARIAN CYST SURGERY     • UT REVISE KNEE JOINT REPLACE,1 PART Left 10/10/2016    Procedure: TOTAL KNEE ARTHROPLASTY REVISION - SPACER PLACEMENT;  Surgeon: Dominick Bosch MD;  Location: [facility];  Service: Orthopedics   • UT REVISE KNEE JOINT REPLACE,1 PART Left 3/22/2017    Procedure: TOTAL KNEE ARTHROPLASTY REVISION, ANTIBIOTIC SPACER REPLACEMENT;  Surgeon: Dominick Bosch MD;  Location: [facility];  Service: Orthopedics   • SHOULDER SURGERY Right     FRACTURE REPAIR    • THYROID SURGERY  12/18/2007   • TONSILLECTOMY     • UMBILICAL HERNIA REPAIR        2) Is this patient \"bed confined\" as defined below?Yes    To be \"bed confined\" the patient must satisfy all three of the following criteria:  (1) unable to get up from bed without assistance; AND (2) unable to ambulate;  AND (3) unable to sit in a chair or wheelchair.  3) Can this patient safely be transported by car or wheelchair van (I.e., may safely sit during transport, without an attendant or monitoring?)No   4. In addition to completing questions 1-3 above, please check any of the following conditions that apply*:          *Note: supporting documentation for any boxes checked must be maintained in the patient's medical records Requires oxygen - unable to self administer and Unable to tolerate seated position for time needed to transport      SIGNATURE OF PHYSICIAN OR OTHER AUTHORIZED HEALTHCARE PROFESSIONAL    I certify that the above information is true and correct based on my evaluation of this patient, and represent that the patient requires transport by ambulance and that other forms of transport are contraindicated.  I understand that this information will be " used by the Centers for Medicare and Medicaid Services (CMS) to support the determiniation of medical necessity for ambulance services, and I represent that I have personal knowledge of the patient's condition at the time of transport.       If this box is checked, I also certify that the patient is physically or mentally incapable of signing the ambulance service's claim form and that the institution with which I am affiliated has furnished care, services or assistance to the patient.  My signature below is made on behalf of the patient pursuant to 42 .36(b)(4). In accordance with 42 .37, the specific reason(s) that the patient is physically or mentally incapable of signing the claim for is as follows:     Signature of Physician or Healthcare Professional  Date/Time:        (For Scheduled repetitive transport, this form is not valid for transports performed more than 60 days after this date).                                                                                                                                            --------------------------------------------------------------------------------------------  Printed Name and Credentials of Physician or Authorized Healthcare Professional     *Form must be signed by patient's attending physician for scheduled, repetitive transports,.  For non-repetitive ambulance transports, if unable to obtain the signature of the attending physician, any of the following may sign (please select below):     Physician  Clinical Nurse Specialist  Registered Nurse     Physician Assistant  Discharge Planner  Licensed Practical Nurse     Nurse Practitioner

## 2020-12-15 NOTE — PLAN OF CARE
Problem: Fall Injury Risk  Goal: Absence of Fall and Fall-Related Injury  Outcome: Ongoing, Progressing  Intervention: Promote Injury-Free Environment  Recent Flowsheet Documentation  Taken 12/14/2020 2115 by Kristina White RN  Safety Promotion/Fall Prevention:   activity supervised   fall prevention program maintained   nonskid shoes/slippers when out of bed   safety round/check completed     Problem: Adult Inpatient Plan of Care  Goal: Plan of Care Review  Outcome: Ongoing, Progressing  Goal: Patient-Specific Goal (Individualized)  Outcome: Ongoing, Progressing  Goal: Absence of Hospital-Acquired Illness or Injury  Outcome: Ongoing, Progressing  Intervention: Identify and Manage Fall Risk  Recent Flowsheet Documentation  Taken 12/14/2020 2115 by Kristina White RN  Safety Promotion/Fall Prevention:   activity supervised   fall prevention program maintained   nonskid shoes/slippers when out of bed   safety round/check completed  Goal: Optimal Comfort and Wellbeing  Outcome: Ongoing, Progressing  Intervention: Provide Person-Centered Care  Recent Flowsheet Documentation  Taken 12/14/2020 2115 by Kristina White RN  Trust Relationship/Rapport:   care explained   choices provided  Goal: Readiness for Transition of Care  Outcome: Ongoing, Progressing     Problem: Skin Injury Risk Increased  Goal: Skin Health and Integrity  Outcome: Ongoing, Progressing   Goal Outcome Evaluation:  Plan of Care Reviewed With: patient  Progress: improving

## 2020-12-15 NOTE — PROGRESS NOTES
IMPRESSION & PLAN:  80-year-old lady with right upper quadrant pain, large gallstone, and pyelonephritis.  As her pyelonephritis has resolved, her right upper quadrant pain has also resolved indicating that the pyelonephritis is the principal source of her pain.  The gallstone is incidental.  At her age and with her significant medical comorbidities, I would not recommend cholecystectomy unless definite biliary colic or cholecystitis ensues.  I will be available as needed.    CC: Follow-up abdominal pain    HPI: Feels much better, indicates she is no longer having right upper quadrant pain    PE:    Awake, alert  VS: Afebrile vital signs stable  Delete that abdomen: Soft, nondistended, nontender

## 2020-12-15 NOTE — PROGRESS NOTES
Continued Stay Note  [facility]     Patient Name: Shoshana Bae  MRN: [id number]  Today's Date: 12/15/2020    Admit Date: 12/12/2020    Discharge Plan     Row Name 12/15/20 1253       Plan    Plan  Return to [facility]    Patient/Family in Agreement with Plan  yes    Plan Comments  Discharge order noted.  Pt to return to [facility].  [facility] confirms bed availability.  Ambulance arranged for 5:30PM.  STACEY Mullen RN        Discharge Codes    No documentation.       Expected Discharge Date and Time     Expected Discharge Date Expected Discharge Time    Dec 15, 2020             Dayana Mullen RN

## 2020-12-15 NOTE — PLAN OF CARE
Goal Outcome Evaluation:  Plan of Care Reviewed With: patient  Progress: improving  Outcome Summary: VSS, denies pain, denies nausea, IV antibiotics per order, skin care, q2 turn, will continue to monitor pt.

## 2020-12-15 NOTE — PROGRESS NOTES
Continued Stay Note  [facility]     Patient Name: Shoshana Bae  MRN: [id number]  Today's Date: 12/15/2020    Admit Date: 12/12/2020    Discharge Plan     Row Name 12/15/20 4740       Plan    Plan  Return to [facility]    Patient/Family in Agreement with Plan  yes    Plan Comments  Received call from [facility] who states pt cannot return today now.  Will need to return tomorrow due to testing being within within the rehab facility.  Ambulance rescheduled for 6pm tomorrow.  MD notified.  STACEY Mullen RN    Row Name 12/15/20 1853       Plan    Plan  Return to [facility]    Patient/Family in Agreement with Plan  yes    Plan Comments  Discharge order noted.  Pt to return to [facility].  [facility] confirms bed availability.  Ambulance arranged for 5:30PM.  STACEY Mullen RN        Discharge Codes    No documentation.       Expected Discharge Date and Time     Expected Discharge Date Expected Discharge Time    Dec 15, 2020             Dayana Mullen RN

## 2020-12-16 NOTE — DISCHARGE SUMMARY
[facility]    Date of Discharge:  12/16/2020    PCP: Fany Morales MD    Discharge Diagnosis:   Active Hospital Problems    Diagnosis  POA   • **UTI (urinary tract infection), bacterial [N39.0, A49.9]  Unknown   • Sepsis without acute organ dysfunction (CMS/HCC) [A41.9]  Yes   • Obstructive sleep apnea syndrome [G47.33]  Yes   • Type 2 diabetes mellitus with hyperglycemia (CMS/[facility]) [E11.65]  Yes   • Chronic atrial fibrillation (CMS/[facility]) [I48.20]  Yes      Resolved Hospital Problems   No resolved problems to display.          Consults     Date and Time Order Name Status Description    12/13/2020 1149 Inpatient Urology Consult Completed     12/13/2020 1033 Inpatient General Surgery Consult Completed     12/12/2020 1520 [facility] (on-call MD unless specified) Details Completed     12/12/2020 1520 Urology (on-call MD unless specified) Completed         Hospital Course  Please see history and physical for details. Patient is a 80 y.o. female  initially admitted for abdominal pain.  Patient was found to be septic secondary to UTI with probable pyelonephritis right-sided compounded by 2 cm kidney stone with perinephric stranding but no aspects of hydronephrosis.  Patient was maintained on Rocephin adjusted to 2 g and she has received 4 days worth while here and she can start oral amoxicillin tomorrow.  Blood cultures remain no growth to date and sepsis at admission has resolved.  She was found to have retroperitoneal lymphadenopathy.  I did consult general surgery as well as urology while here.  Both of which plan additional follow-up in an outpatient setting but no surgical correction was endorsed.  Gallstones were incidentally noted on exam and patient felt otherwise too high risk to electively remove those as we do not feel that is compounding her current situation.  She had some mild RVR secondary to sepsis but that is resolved by disposition and she has a  chronic history of JORY rutledge anticoagulated on Eliquis rate controlled currently on metoprolol.  Her diabetic control is very poor as evident by an A1c of 10.4 prior to admission.  I suggest strong dietary compliance at nursing home but I have adjusted her regimen to Lantus 25 units twice daily with giving scheduled Humalog in addition to sliding scale.  To simplify her course I will continue with 8 units of Humalog before meals and the 25 units of Lantus scheduled twice daily and defer further management to PCP.  There is no acute renal failure and her CKD 3 is at baseline.  She can continue use of her lymphedema wraps as performed prior to admission.  At this juncture she is very proactive to return to her nursing home and I feel she is medically stable from my perspective.  No family at bedside though case discussed with patient as well as with RN to ensure a smooth transition.  CCP is help coordinate needs to return to [facility] and I did asked the RN to confirm coverage with CCP regarding new insulin adjustments at discharge.     Addendum -discharge was canceled secondary to the accepting facility.  Patient had to stay an additional day and no new issues have arisen.  She still complains of some discomfort in her right ankle but there has been no issue with trauma.  She seems concerned about previous leg wrap being a bit too tight and working with physical therapy.  I am not endorsing any further work-up today and would see how she does upon return to facility.  If this were to persist consider x-ray imaging but nothing is consistent with fracture on my exam at this time       Condition on Discharge: Improved.     Temp:  [98.1 °F (36.7 °C)-98.5 °F (36.9 °C)] 98.1 °F (36.7 °C)  Heart Rate:  [] 92  Resp:  [16] 16  BP: (101-149)/(60-89) 101/60  Body mass index is 52.79 kg/m².    Physical Exam  HENT:      Head: Normocephalic.      Nose: Nose normal.      Mouth/Throat:      Mouth: Mucous membranes are  moist.      Pharynx: Oropharynx is clear.   Eyes:      General: No scleral icterus.     Conjunctiva/sclera: Conjunctivae normal.   Cardiovascular:      Rate and Rhythm: Normal rate and regular rhythm.   Pulmonary:      Effort: Pulmonary effort is normal. No respiratory distress.      Breath sounds: Normal breath sounds.   Abdominal:      General: Bowel sounds are normal.      Palpations: Abdomen is soft.      Tenderness: There is no abdominal tenderness. There is no right CVA tenderness.      Comments: Morbidly obese   Musculoskeletal:      Comments: Bilateral lower extremities wrapped.  Patient initially withdraws right ankle upon palpation but after you conversatate with her and keep examining, there was no significant abnormalities consistent with the need for x-ray imaging   Skin:     General: Skin is warm and dry.      Coloration: Skin is not jaundiced.   Neurological:      General: No focal deficit present.      Mental Status: She is alert and oriented to person, place, and time.   Psychiatric:         Mood and Affect: Mood normal.         Thought Content: Thought content normal.     [unfilled]    Disposition: Skilled Nursing Facility (DC - External)       Discharge Medications      New Medications      Instructions Start Date   amoxicillin 500 MG capsule  Commonly known as: AMOXIL   500 mg, Oral, 3 Times Daily      insulin lispro 100 UNIT/ML injection  Commonly known as: humaLOG   8 Units, Subcutaneous, 3 Times Daily With Meals         Changes to Medications      Instructions Start Date   atorvastatin 10 MG tablet  Commonly known as: LIPITOR  What changed: when to take this   10 mg, Oral, Daily      furosemide 40 MG tablet  Commonly known as: LASIX  What changed: Another medication with the same name was removed. Continue taking this medication, and follow the directions you see here.   40 mg, Oral, 2 Times Daily, At 0600 and 1200       HYDROcodone-acetaminophen 7.5-325 MG per tablet  Commonly known as:  MARTIN  What changed:   · when to take this  · reasons to take this  · Another medication with the same name was removed. Continue taking this medication, and follow the directions you see here.   1 tablet, Oral, Every 6 Hours PRN      insulin glargine 100 UNIT/ML injection  Commonly known as: LANTUS  What changed:   · how much to take  · when to take this  · additional instructions   25 Units, Subcutaneous, Every 12 Hours Scheduled         Continue These Medications      Instructions Start Date   acetaminophen 500 MG tablet  Commonly known as: TYLENOL   500 mg, Oral, Every 6 Hours PRN      Biscolax 10 MG suppository  Generic drug: bisacodyl   10 mg, Rectal, Daily PRN      Eliquis 5 MG tablet tablet  Generic drug: apixaban   2.5 mg, Every 12 Hours Scheduled      EQ ClearLax 17 GM/SCOOP powder  Generic drug: polyethylene glycol   17 g, Oral, Daily      FLUTICASONE PROPIONATE (INHAL) IN   50 mcg, Inhalation      glucose blood test strip  Commonly known as: FREESTYLE TEST STRIPS   1 each, Other, 2 Times Weekly      icosapent ethyl 1 g capsule capsule  Commonly known as: VASCEPA   2 g, Oral, 2 Times Daily With Meals      KLOR-CON 20 MEQ CR tablet  Generic drug: potassium chloride   20 mEq, Oral, 2 Times Daily      metoprolol succinate XL 25 MG 24 hr tablet  Commonly known as: TOPROL-XL   25 mg, Oral, Nightly      nystatin 169325 UNIT/GM powder  Commonly known as: MYCOSTATIN   1 application, Topical, 2 Times Daily, abd folds and under breasts       ondansetron 4 MG tablet  Commonly known as: ZOFRAN   4 mg, Oral, Every 4 Hours PRN      sennosides-docusate 8.6-50 MG per tablet  Commonly known as: PERICOLACE   1 tablet, Oral, 2 times daily      trolamine salicylate 10 % cream  Commonly known as: ASPERCREME   Topical, As Needed      vitamin C 500 MG tablet  Commonly known as: ASCORBIC ACID   500 mg, Oral, Daily      vitamin D 1.25 MG (72411 UT) capsule capsule  Commonly known as: ERGOCALCIFEROL   50,000 Units, Oral, Every 14  Days         Stop These Medications    Benadryl Itch Stopping 1-0.1 % cream  Generic drug: diphenhydrAMINE-zinc acetate     ciprofloxacin 500 MG tablet  Commonly known as: CIPRO     POTASSIUM CHLORIDE PO     spironolactone 25 MG tablet  Commonly known as: ALDACTONE     Trulicity 1.5 MG/0.5ML solution pen-injector  Generic drug: Dulaglutide             Additional Instructions for the Follow-ups that You Need to Schedule     Discharge Follow-up with PCP   As directed       Currently Documented PCP:    Fany Morales MD    PCP Phone Number:    [phone]     Follow Up Details: PCP 2 weeks general surgery and urology per their recommendations           Follow-up Information     Fany Morales MD .    Specialty: Geriatric Medicine  Why: PCP 2 weeks general surgery and urology per their recommendations  Contact information:  [address]  [facility] [address] [phone]             Emerson Butterfield MD. Go on 1/5/2021.    Specialty: Urology  Why: at 1:45 pm.  Contact information:  [address] [place] [address] [phone]                  Pending Labs     Order Current Status    Blood Culture - Blood, Arm, Left Preliminary result    Blood Culture - Blood, Arm, Right Preliminary result         Cm Mcintyre MD  12/16/20  10:32 EST    Discharge time spent greater than 30 minutes.

## 2020-12-16 NOTE — PLAN OF CARE
Goal Outcome Evaluation:  Plan of Care Reviewed With: patient  Progress: improving  Outcome Summary: pt completed B UE AROM 10x2 w rest breaks. pt fatigued this date and not up for bathing or EOB act. but will dc soon back to rehab. pt cont to benefit from OT to incr ADL. strenght, balance, tsf    OT wore mask, gloves, hand hygiene, protective eye wear . Pt wore mask. Pt not in enhanced precautions.

## 2020-12-16 NOTE — THERAPY TREATMENT NOTE
Patient Name: Shoshana Bae  : 1940    MRN: [id number]                              Today's Date: 2020       Admit Date: 2020    Visit Dx:     ICD-10-CM ICD-9-CM   1. Sepsis without acute organ dysfunction, due to unspecified organism (CMS/[facility])  A41.9 038.9     995.91   2. Pyelonephritis  N12 590.80   3. Adenopathy  R59.1 785.6   4. Nephrolithiasis  N20.0 592.0   5. 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[facility])   • H/O mammogram   • H/O colonoscopy   • Lymph edema   • Sarcoidosis   • Chronic atrial fibrillation (CMS/[facility])   • Hyperlipidemia   • Hypertriglyceridemia   • Type 2 diabetes mellitus with hyperglycemia (CMS/[facility])   • Stasis dermatitis of both legs   • Essential hypertension   • Cellulitis of left lower extremity   • Cellulitis of extremity, lower bilateral   • Chronic anticoagulation, for a fib   • Morbid obesity with BMI of 50.0-59.9, adult (CMS/[facility])   • OA (osteoarthritis) of knee   • Infected prosthetic knee joint (CMS/[facility])   • Obstructive sleep apnea syndrome   • Chronic venous insufficiency   • Pulmonary nodule   • Failed total knee replacement (CMS/[facility])   • Infection of total knee replacement (CMS/[facility])   • Renal insufficiency   • Sepsis without acute organ dysfunction (CMS/[facility])   • UTI (urinary tract infection), bacterial     Past Medical History:   Diagnosis Date   • Arm fracture    • Arthritis    • Atrial fibrillation (CMS/[facility])    • Back pain     WITH STANDING   • Cancer (CMS/HCC)    • Cellulitis     LOWER LEGS   • Colon polyp    • DDD (degenerative disc disease), lumbar    • Diabetes mellitus (CMS/[facility])     TYPE 2   • Edema    • H/O colonoscopy        • H/O mammogram     BHE    • Hyperlipidemia    • Hypertension    • On anticoagulant therapy    • Oral-mouth cancer (CMS/HCC)    • 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)    • Sleep apnea     MOUTHPIECE USED/CPAP   • Uterine cancer (CMS/HCC)      Past Surgical History:   Procedure Laterality Date   • APPENDECTOMY     • CARDIAC CATHETERIZATION  2010   • COLONOSCOPY     • DILATATION AND CURETTAGE     • EYE SURGERY     • HERNIA REPAIR     • HYSTERECTOMY     • KNEE SURGERY Bilateral    • LUMBAR DISCECTOMY FUSION INSTRUMENTATION     • MOUTH LESION EXCISIONAL BIOPSY     • OVARIAN CYST SURGERY     • NE REVISE KNEE JOINT REPLACE,1 PART Left 10/10/2016    Procedure: TOTAL KNEE ARTHROPLASTY REVISION - SPACER PLACEMENT;  Surgeon: Dominick Bosch MD;  Location: [facility];  Service: Orthopedics   • NE REVISE KNEE JOINT REPLACE,1 PART Left 3/22/2017    Procedure: TOTAL KNEE ARTHROPLASTY REVISION, ANTIBIOTIC SPACER REPLACEMENT;  Surgeon: Dominick Bsoch MD;  Location: [facility];  Service: Orthopedics   • SHOULDER SURGERY Right     FRACTURE REPAIR    • THYROID SURGERY  12/18/2007   • TONSILLECTOMY     • UMBILICAL HERNIA REPAIR       General Information     Row Name 12/16/20 1541          OT Time and Intention    Document Type  therapy note (daily note)  -     Mode of Treatment  individual therapy;occupational therapy  -     Row Name 12/16/20 1541          General Information    Existing Precautions/Restrictions  fall  -     Row Name 12/16/20 1541          Cognition    Orientation Status (Cognition)  oriented x 4  -       User Key  (r) = Recorded By, (t) = Taken By, (c) = Cosigned By    Initials Name Provider Type    Janelle Alejandra OTR Occupational Therapist          Mobility/ADL's     Row Name 12/16/20 1541          Bed Mobility    Comment (Bed Mobility)  pt wanted to remain in bed  -     Row Name 12/16/20 1541          Bathing Assessment/Intervention    Comment (Bathing)  already washed up  -       User Key  (r) = Recorded By, (t) = Taken By, (c) = Cosigned By    Initials Name Provider Type    Janelle Alejandra OTR  Occupational Therapist        Obj/Interventions     Row Name 12/16/20 1542          Shoulder (Therapeutic Exercise)    Shoulder (Therapeutic Exercise)  AROM (active range of motion)  -     Shoulder AROM (Therapeutic Exercise)  bilateral;flexion;extension;10 repetitions;2 sets;supine  -     Row Name 12/16/20 1542          Elbow/Forearm (Therapeutic Exercise)    Elbow/Forearm (Therapeutic Exercise)  AROM (active range of motion)  -     Elbow/Forearm AROM (Therapeutic Exercise)  flexion;extension;bilateral;supination;pronation;supine;2 sets;10 repetitions  -     Row Name 12/16/20 1542          Therapeutic Exercise    Therapeutic Exercise  elbow/forearm;shoulder  -       User Key  (r) = Recorded By, (t) = Taken By, (c) = Cosigned By    Initials Name Provider Type    Janelle Alejandra, KIP Occupational Therapist        Goals/Plan    No documentation.       Clinical Impression     Row Name 12/16/20 1542          Pain Scale: Numbers Pre/Post-Treatment    Pretreatment Pain Rating  0/10 - no pain  -     Posttreatment Pain Rating  0/10 - no pain  -     Row Name 12/16/20 1542          Plan of Care Review    Plan of Care Reviewed With  patient  -     Progress  improving  -     Outcome Summary  pt completed B UE AROM 10x2 w rest breaks. pt fatigued this date and not up for bathing or EOB act. but will dc soon back to rehab. pt cont to benefit from OT to incr ADL. strenght, balance, tsf  -     Row Name 12/16/20 1542          Positioning and Restraints    Pre-Treatment Position  in bed  -     Post Treatment Position  bed  -     In Bed  supine;call light within reach;encouraged to call for assist;exit alarm on  -       User Key  (r) = Recorded By, (t) = Taken By, (c) = Cosigned By    Initials Name Provider Type    Janelle Alejandra OTR Occupational Therapist        Outcome Measures     Row Name 12/16/20 9573          How much help from another is currently needed...    Putting on and  taking off regular lower body clothing?  2  -KP     Bathing (including washing, rinsing, and drying)  2  -KP     Toileting (which includes using toilet bed pan or urinal)  1  -KP     Putting on and taking off regular upper body clothing  2  -KP     Taking care of personal grooming (such as brushing teeth)  3  -KP     Eating meals  3  -KP     AM-PAC 6 Clicks Score (OT)  13  -KP       User Key  (r) = Recorded By, (t) = Taken By, (c) = Cosigned By    Initials Name Provider Type    Janelle Alejandra, OTR Occupational Therapist        Occupational Therapy Education                 Title: PT OT SLP Therapies (In Progress)     Topic: Occupational Therapy (Not Started)     Point: ADL training (Not Started)     Description:   Instruct learner(s) on proper safety adaptation and remediation techniques during self care or transfers.   Instruct in proper use of assistive devices.              Learner Progress:  Not documented in this visit.          Point: Home exercise program (Not Started)     Description:   Instruct learner(s) on appropriate technique for monitoring, assisting and/or progressing therapeutic exercises/activities.              Learner Progress:  Not documented in this visit.          Point: Precautions (Not Started)     Description:   Instruct learner(s) on prescribed precautions during self-care and functional transfers.              Learner Progress:  Not documented in this visit.          Point: Body mechanics (Not Started)     Description:   Instruct learner(s) on proper positioning and spine alignment during self-care, functional mobility activities and/or exercises.              Learner Progress:  Not documented in this visit.                          OT Recommendation and Plan     Plan of Care Review  Plan of Care Reviewed With: patient  Progress: improving  Outcome Summary: pt completed B UE AROM 10x2 w rest breaks. pt fatigued this date and not up for bathing or EOB act. but will dc soon back  to rehab. pt cont to benefit from OT to incr ADL. strenght, balance, tsf     Time Calculation:   Time Calculation- OT     Row Name 12/16/20 1544             Time Calculation- OT    OT Start Time  1447  -      OT Stop Time  1459  -      OT Time Calculation (min)  12 min  -      Total Timed Code Minutes- OT  12 minute(s)  -      OT Received On  12/16/20  -      OT - Next Appointment  12/18/20  -        User Key  (r) = Recorded By, (t) = Taken By, (c) = Cosigned By    Initials Name Provider Type    Janelle Alejandra OTR Occupational Therapist        Therapy Charges for Today     Code Description Service Date Service Provider Modifiers Qty    [id number] HC OT THER PROC EA 15 MIN 12/16/2020 Janelle Corbett OTR GO 1               KIP Willis  12/16/2020

## 2020-12-16 NOTE — THERAPY TREATMENT NOTE
Patient Name: Shoshana Bae  : 1940    MRN: [id number]                              Today's Date: 2020       Admit Date: 2020    Visit Dx:     ICD-10-CM ICD-9-CM   1. Sepsis without acute organ dysfunction, due to unspecified organism (CMS/[facility])  A41.9 038.9     995.91   2. Pyelonephritis  N12 590.80   3. Adenopathy  R59.1 785.6   4. Nephrolithiasis  N20.0 592.0   5. 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[facility])   • H/O mammogram   • H/O colonoscopy   • Lymph edema   • Sarcoidosis   • Chronic atrial fibrillation (CMS/[facility])   • Hyperlipidemia   • Hypertriglyceridemia   • Type 2 diabetes mellitus with hyperglycemia (CMS/[facility])   • Stasis dermatitis of both legs   • Essential hypertension   • Cellulitis of left lower extremity   • Cellulitis of extremity, lower bilateral   • Chronic anticoagulation, for a fib   • Morbid obesity with BMI of 50.0-59.9, adult (CMS/[facility])   • OA (osteoarthritis) of knee   • Infected prosthetic knee joint (CMS/[facility])   • Obstructive sleep apnea syndrome   • Chronic venous insufficiency   • Pulmonary nodule   • Failed total knee replacement (CMS/[facility])   • Infection of total knee replacement (CMS/[facility])   • Renal insufficiency   • Sepsis without acute organ dysfunction (CMS/[facility])   • UTI (urinary tract infection), bacterial     Past Medical History:   Diagnosis Date   • Arm fracture    • Arthritis    • Atrial fibrillation (CMS/[facility])    • Back pain     WITH STANDING   • Cancer (CMS/HCC)    • Cellulitis     LOWER LEGS   • Colon polyp    • DDD (degenerative disc disease), lumbar    • Diabetes mellitus (CMS/[facility])     TYPE 2   • Edema    • H/O colonoscopy        • H/O mammogram     BHE    • Hyperlipidemia    • Hypertension    • On anticoagulant therapy    • Oral-mouth cancer (CMS/HCC)    • 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)    • Sleep apnea     MOUTHPIECE USED/CPAP   • Uterine cancer (CMS/HCC)      Past Surgical History:   Procedure Laterality Date   • APPENDECTOMY     • CARDIAC CATHETERIZATION  2010   • COLONOSCOPY     • DILATATION AND CURETTAGE     • EYE SURGERY     • HERNIA REPAIR     • HYSTERECTOMY     • KNEE SURGERY Bilateral    • LUMBAR DISCECTOMY FUSION INSTRUMENTATION     • MOUTH LESION EXCISIONAL BIOPSY     • OVARIAN CYST SURGERY     • OK REVISE KNEE JOINT REPLACE,1 PART Left 10/10/2016    Procedure: TOTAL KNEE ARTHROPLASTY REVISION - SPACER PLACEMENT;  Surgeon: Dominick Bosch MD;  Location: [facility];  Service: Orthopedics   • OK REVISE KNEE JOINT REPLACE,1 PART Left 3/22/2017    Procedure: TOTAL KNEE ARTHROPLASTY REVISION, ANTIBIOTIC SPACER REPLACEMENT;  Surgeon: Dominick Bosch MD;  Location: [facility];  Service: Orthopedics   • SHOULDER SURGERY Right     FRACTURE REPAIR    • THYROID SURGERY  12/18/2007   • TONSILLECTOMY     • UMBILICAL HERNIA REPAIR       General Information     Row Name 12/16/20 1417          Physical Therapy Time and Intention    Document Type  therapy note (daily note)  (Pended)   -CN     Mode of Treatment  individual therapy;physical therapy  (Pended)   -CN     Row Name 12/16/20 1417          General Information    Patient Profile Reviewed  yes  (Pended)   -CN     Existing Precautions/Restrictions  fall  (Pended)   -CN     Row Name 12/16/20 1417          Living Environment    Lives With  facility resident  (Pended)   -CN     Row Name 12/16/20 1417          Cognition    Orientation Status (Cognition)  oriented x 4  (Pended)   -CN     Row Name 12/16/20 1417          Safety Issues, Functional Mobility    Impairments Affecting Function (Mobility)  strength;balance;endurance/activity tolerance;shortness of breath  (Pended)   -CN       User Key  (r) = Recorded By, (t) = Taken By, (c) = Cosigned By    Initials Name Provider Type    Taylor Klein, PT Student PT  Student        Mobility     Row Name 12/16/20 1418          Bed Mobility    Bed Mobility  supine-sit-supine  (Pended)   -CN     Supine-Sit-Supine [place] (Bed Mobility)  supervision  (Pended)   -CN     Assistive Device (Bed Mobility)  head of bed elevated;bed rails;draw sheet  (Pended)   -CN     Row Name 12/16/20 1418          Transfers    Comment (Transfers)  Attempted x1 STS transfer from EOB but unable to come into full standing due to pt complaints of R heel pain  (Pended)   -CN     Row Name 12/16/20 1418          Sit-Stand Transfer    Sit-Stand [place] (Transfers)  minimum assist (75% patient effort);2 person assist;verbal cues;nonverbal cues (demo/gesture)  (Pended)   -CN     Row Name 12/16/20 1418          Gait/Stairs (Locomotion)    [place] Level (Gait)  not tested  (Pended)   -CN       User Key  (r) = Recorded By, (t) = Taken By, (c) = Cosigned By    Initials Name Provider Type    Taylor Klein, PT Student PT Student        Obj/Interventions     Row Name 12/16/20 1419          Motor Skills    Therapeutic Exercise  --  (Pended)  Sitting EOB B ankle pumps, LAQ x10 reps ea  -CN     Row Name 12/16/20 1419          Balance    Balance Assessment  sitting static balance;sitting dynamic balance  (Pended)   -CN     Static Sitting Balance  WFL  (Pended)   -CN     Dynamic Sitting Balance  WFL  (Pended)   -CN       User Key  (r) = Recorded By, (t) = Taken By, (c) = Cosigned By    Initials Name Provider Type    Taylor Klein, PT Student PT Student        Goals/Plan    No documentation.       Clinical Impression     Row Name 12/16/20 1419          Pain    Additional Documentation  Pain Scale: FACES Pre/Post-Treatment (Group)  (Pended)   -CN     Row Name 12/16/20 1419          Pain Scale: Numbers Pre/Post-Treatment    Pain Intervention(s)  Rest;Repositioned  (Pended)   -CN     Row Name 12/16/20 1419          Pain Scale: FACES Pre/Post-Treatment    Pain: FACES Scale, Pretreatment  4-->hurts little  more  (Pended)   -CN     Posttreatment Pain Rating  4-->hurts little more  (Pended)   -CN     Pain Location - Side  Right  (Pended)   -CN     Pain Location  foot  (Pended)   -CN     Row Name 12/16/20 1419          Plan of Care Review    Plan of Care Reviewed With  patient  (Pended)   -CN     Outcome Summary  Pt demonstrated independence with supine to sit. Pt adequately performed LE HEP sitting EOB. Pt attempted x1 STS transfer from EOB but unable to come into full standing due to pt complaints of R heel pain. Taylor HOLDER, notified. Continue to progress functional mobility as able.  (Pended)   -CN     Row Name 12/16/20 1419          Therapy Assessment/Plan (PT)    Rehab Potential (PT)  good, to achieve stated therapy goals  (Pended)   -CN     Criteria for Skilled Interventions Met (PT)  yes  (Pended)   -CN     Row Name 12/16/20 1419          Positioning and Restraints    Pre-Treatment Position  in bed  (Pended)   -CN     Post Treatment Position  bed  (Pended)   -CN     In Bed  fowlers;call light within reach;encouraged to call for assist;exit alarm on;legs elevated;notified nsg  (Pended)   -CN       User Key  (r) = Recorded By, (t) = Taken By, (c) = Cosigned By    Initials Name Provider Type    Taylor Klein, PT Student PT Student        Outcome Measures     Row Name 12/16/20 1422          How much help from another person do you currently need...    Turning from your back to your side while in flat bed without using bedrails?  3  (Pended)   -CN     Moving from lying on back to sitting on the side of a flat bed without bedrails?  3  (Pended)   -CN     Moving to and from a bed to a chair (including a wheelchair)?  2  (Pended)   -CN     Standing up from a chair using your arms (e.g., wheelchair, bedside chair)?  2  (Pended)   -CN     Climbing 3-5 steps with a railing?  1  (Pended)   -CN     To walk in hospital room?  1  (Pended)   -CN     AM-PAC 6 Clicks Score (PT)  12  (Pended)   -CN     Row Name 12/16/20 3050           Functional Assessment    Outcome Measure Options  AM-PAC 6 Clicks Basic Mobility (PT)  (Pended)   -CN       User Key  (r) = Recorded By, (t) = Taken By, (c) = Cosigned By    Initials Name Provider Type    Taylor Klein, PT Student PT Student        Physical Therapy Education                 Title: PT OT SLP Therapies (In Progress)     Topic: Physical Therapy (Done)     Point: Mobility training (Done)     Learning Progress Summary           Patient Acceptance, E,TB, VU,DU by CN at 12/16/2020 1422    Acceptance, E,TB, VU,NR by EE at 12/14/2020 1056    Acceptance, E, VU by DO at 12/13/2020 1515                   Point: Home exercise program (Done)     Learning Progress Summary           Patient Acceptance, E,TB, VU,DU by CN at 12/16/2020 1422    Acceptance, E,TB, VU,NR by EE at 12/14/2020 1056    Acceptance, E, VU by DO at 12/13/2020 1515                   Point: Body mechanics (Done)     Learning Progress Summary           Patient Acceptance, E,TB, VU,DU by CN at 12/16/2020 1422    Acceptance, E,TB, VU,NR by EE at 12/14/2020 1056    Acceptance, E, VU by DO at 12/13/2020 1515                   Point: Precautions (Done)     Learning Progress Summary           Patient Acceptance, E,TB, VU,DU by CN at 12/16/2020 1422    Acceptance, E, VU by DO at 12/13/2020 1515                               User Key     Initials Effective Dates Name Provider Type Discipline     04/03/18 -  Mansi Chatterjee, PT Physical Therapist PT    DO 03/07/18 -  Alen Blas, PT Physical Therapist PT    DARY 10/23/20 -  Taylor Galvan, PT Student PT Student PT              PT Recommendation and Plan     Plan of Care Reviewed With: (P) patient  Outcome Summary: (P) Pt demonstrated independence with supine to sit. Pt adequately performed LE HEP sitting EOB. Pt attempted x1 STS transfer from EOB but unable to come into full standing due to pt complaints of R heel pain. RNTaylor, notified. Continue to progress functional mobility as able.      Time Calculation:   PT Charges     Row Name 12/16/20 1422             Time Calculation    Start Time  1359  (Pended)   -CN      Stop Time  1416  (Pended)   -CN      Time Calculation (min)  17 min  (Pended)   -CN      PT Received On  12/16/20  (Pended)   -CN      PT - Next Appointment  12/17/20  (Pended)   -CN         Time Calculation- PT    Total Timed Code Minutes- PT  17 minute(s)  (Pended)   -CN        User Key  (r) = Recorded By, (t) = Taken By, (c) = Cosigned By    Initials Name Provider Type    Taylor Klein, PT Student PT Student        Therapy Charges for Today     Code Description Service Date Service Provider Modifiers Qty    64830661116 HC PT THER PROC EA 15 MIN 12/16/2020 Taylor Galvan PT Student GP 1          PT G-Codes  Outcome Measure Options: (P) AM-PAC 6 Clicks Basic Mobility (PT)  AM-PAC 6 Clicks Score (PT): (P) 12  AM-PAC 6 Clicks Score (OT): 13     Patient was intermittently wearing a face mask during this therapy encounter. Therapist used appropriate personal protective equipment including eye protection, mask, and gloves.  Mask used was standard procedure mask. Appropriate PPE was worn during the entire therapy session. Hand hygiene was completed before and after therapy session. Patient is not in enhanced droplet precautions.       Taylor Galvan PT Student  12/16/2020

## 2020-12-16 NOTE — PLAN OF CARE
Goal Outcome Evaluation:  Plan of Care Reviewed With: (P) patient  Progress: improving  Outcome Summary: (P) Pt demonstrated independence with supine to sit. Pt adequately performed LE HEP sitting EOB. Pt attempted x1 STS transfer from EOB but unable to come into full standing due to pt complaints of R heel pain. RNTaylor, notified. Continue to progress functional mobility as able.

## 2020-12-16 NOTE — PLAN OF CARE
Problem: Fall Injury Risk  Goal: Absence of Fall and Fall-Related Injury  Outcome: Ongoing, Progressing  Intervention: Promote Injury-Free Environment  Recent Flowsheet Documentation  Taken 12/15/2020 2029 by Kristina White RN  Safety Promotion/Fall Prevention:   activity supervised   fall prevention program maintained   nonskid shoes/slippers when out of bed   safety round/check completed     Problem: Adult Inpatient Plan of Care  Goal: Plan of Care Review  Outcome: Ongoing, Progressing  Goal: Patient-Specific Goal (Individualized)  Outcome: Ongoing, Progressing  Goal: Absence of Hospital-Acquired Illness or Injury  Outcome: Ongoing, Progressing  Intervention: Identify and Manage Fall Risk  Recent Flowsheet Documentation  Taken 12/15/2020 2029 by Kristina White, RN  Safety Promotion/Fall Prevention:   activity supervised   fall prevention program maintained   nonskid shoes/slippers when out of bed   safety round/check completed  Goal: Optimal Comfort and Wellbeing  Outcome: Ongoing, Progressing  Goal: Readiness for Transition of Care  Outcome: Ongoing, Progressing     Problem: Skin Injury Risk Increased  Goal: Skin Health and Integrity  Outcome: Ongoing, Progressing   Goal Outcome Evaluation:  Plan of Care Reviewed With: patient  Progress: improving

## 2020-12-17 NOTE — PROGRESS NOTES
Case Management Discharge Note      Final Note: [facility] skilled via [facility]         Selected Continued Care - Discharged on 12/16/2020 Admission date: 12/12/2020 - Discharge disposition: Skilled Nursing Facility (DC - External)    Destination Coordination complete    Service Provider Selected Services Address Phone Fax Patient Preferred    [facility] [address] [facility] [address] [phone] --          Durable Medical Equipment    No services have been selected for the patient.              Dialysis/Infusion    No services have been selected for the patient.              Home Medical Care    No services have been selected for the patient.              Therapy    No services have been selected for the patient.              Community Resources    No services have been selected for the patient.                  Transportation Services  Ambulance: [facility]    Final Discharge Disposition Code: 03 - skilled nursing facility (SNF)

## 2020-12-21 ENCOUNTER — LAB REQUISITION (OUTPATIENT)
Dept: LAB | Facility: HOSPITAL | Age: 80
End: 2020-12-21

## 2020-12-21 DIAGNOSIS — Z00.00 ROUTINE GENERAL MEDICAL EXAMINATION AT A HEALTH CARE FACILITY: ICD-10-CM

## 2020-12-21 LAB — NT-PROBNP SERPL-MCNC: 1824 PG/ML (ref 0–1800)

## 2020-12-21 PROCEDURE — 83880 ASSAY OF NATRIURETIC PEPTIDE: CPT

## 2021-01-01 ENCOUNTER — APPOINTMENT (OUTPATIENT)
Dept: CT IMAGING | Facility: HOSPITAL | Age: 81
End: 2021-01-01

## 2021-01-01 ENCOUNTER — HOSPITAL ENCOUNTER (INPATIENT)
Facility: HOSPITAL | Age: 81
LOS: 3 days | End: 2021-04-02
Attending: EMERGENCY MEDICINE | Admitting: INTERNAL MEDICINE

## 2021-01-01 ENCOUNTER — APPOINTMENT (OUTPATIENT)
Dept: GENERAL RADIOLOGY | Facility: HOSPITAL | Age: 81
End: 2021-01-01

## 2021-01-01 ENCOUNTER — HOSPITAL ENCOUNTER (INPATIENT)
Facility: HOSPITAL | Age: 81
LOS: 1 days | End: 2021-04-02
Attending: INTERNAL MEDICINE | Admitting: INTERNAL MEDICINE

## 2021-01-01 VITALS
HEART RATE: 105 BPM | TEMPERATURE: 99.1 F | OXYGEN SATURATION: 95 % | RESPIRATION RATE: 20 BRPM | SYSTOLIC BLOOD PRESSURE: 132 MMHG | WEIGHT: 261.91 LBS | DIASTOLIC BLOOD PRESSURE: 83 MMHG | BODY MASS INDEX: 48.2 KG/M2 | HEIGHT: 62 IN

## 2021-01-01 VITALS — TEMPERATURE: 99.8 F | SYSTOLIC BLOOD PRESSURE: 75 MMHG | RESPIRATION RATE: 22 BRPM | DIASTOLIC BLOOD PRESSURE: 46 MMHG

## 2021-01-01 DIAGNOSIS — N39.0 ACUTE UTI: ICD-10-CM

## 2021-01-01 DIAGNOSIS — R14.0 ABDOMINAL DISTENTION: ICD-10-CM

## 2021-01-01 DIAGNOSIS — I48.91 ATRIAL FIBRILLATION, UNSPECIFIED TYPE (HCC): ICD-10-CM

## 2021-01-01 DIAGNOSIS — N17.9 AKI (ACUTE KIDNEY INJURY) (HCC): Primary | ICD-10-CM

## 2021-01-01 DIAGNOSIS — K43.9 VENTRAL HERNIA WITHOUT OBSTRUCTION OR GANGRENE: ICD-10-CM

## 2021-01-01 DIAGNOSIS — D72.829 LEUKOCYTOSIS, UNSPECIFIED TYPE: ICD-10-CM

## 2021-01-01 LAB
ABO GROUP BLD: NORMAL
ALBUMIN SERPL-MCNC: 2.4 G/DL (ref 3.5–5.2)
ALBUMIN SERPL-MCNC: 2.4 G/DL (ref 3.5–5.2)
ALBUMIN SERPL-MCNC: 3.1 G/DL (ref 3.5–5.2)
ALBUMIN/GLOB SERPL: 0.6 G/DL
ALBUMIN/GLOB SERPL: 0.6 G/DL
ALBUMIN/GLOB SERPL: 0.8 G/DL
ALP SERPL-CCNC: 141 U/L (ref 39–117)
ALP SERPL-CCNC: 160 U/L (ref 39–117)
ALP SERPL-CCNC: 245 U/L (ref 39–117)
ALT SERPL W P-5'-P-CCNC: 135 U/L (ref 1–33)
ALT SERPL W P-5'-P-CCNC: 231 U/L (ref 1–33)
ALT SERPL W P-5'-P-CCNC: 93 U/L (ref 1–33)
ANION GAP SERPL CALCULATED.3IONS-SCNC: 13.7 MMOL/L (ref 5–15)
ANION GAP SERPL CALCULATED.3IONS-SCNC: 14.6 MMOL/L (ref 5–15)
ANION GAP SERPL CALCULATED.3IONS-SCNC: 14.9 MMOL/L (ref 5–15)
ANION GAP SERPL CALCULATED.3IONS-SCNC: 15 MMOL/L (ref 5–15)
ANION GAP SERPL CALCULATED.3IONS-SCNC: 15.8 MMOL/L (ref 5–15)
AST SERPL-CCNC: 151 U/L (ref 1–32)
AST SERPL-CCNC: 20 U/L (ref 1–32)
AST SERPL-CCNC: 44 U/L (ref 1–32)
BACTERIA SPEC AEROBE CULT: ABNORMAL
BACTERIA UR QL AUTO: ABNORMAL /HPF
BILIRUB SERPL-MCNC: 0.6 MG/DL (ref 0–1.2)
BILIRUB SERPL-MCNC: 1 MG/DL (ref 0–1.2)
BILIRUB SERPL-MCNC: 1.8 MG/DL (ref 0–1.2)
BILIRUB UR QL STRIP: NEGATIVE
BLD GP AB SCN SERPL QL: NEGATIVE
BUN SERPL-MCNC: 49 MG/DL (ref 8–23)
BUN SERPL-MCNC: 52 MG/DL (ref 8–23)
BUN SERPL-MCNC: 60 MG/DL (ref 8–23)
BUN SERPL-MCNC: 65 MG/DL (ref 8–23)
BUN SERPL-MCNC: 72 MG/DL (ref 8–23)
BUN/CREAT SERPL: 14.5 (ref 7–25)
BUN/CREAT SERPL: 15.7 (ref 7–25)
BUN/CREAT SERPL: 17.6 (ref 7–25)
BUN/CREAT SERPL: 18.3 (ref 7–25)
BUN/CREAT SERPL: 20.1 (ref 7–25)
CALCIUM SPEC-SCNC: 8.4 MG/DL (ref 8.6–10.5)
CALCIUM SPEC-SCNC: 8.5 MG/DL (ref 8.6–10.5)
CALCIUM SPEC-SCNC: 8.6 MG/DL (ref 8.6–10.5)
CALCIUM SPEC-SCNC: 8.8 MG/DL (ref 8.6–10.5)
CALCIUM SPEC-SCNC: 9.5 MG/DL (ref 8.6–10.5)
CHLORIDE SERPL-SCNC: 102 MMOL/L (ref 98–107)
CHLORIDE SERPL-SCNC: 91 MMOL/L (ref 98–107)
CHLORIDE SERPL-SCNC: 96 MMOL/L (ref 98–107)
CHLORIDE SERPL-SCNC: 97 MMOL/L (ref 98–107)
CHLORIDE SERPL-SCNC: 99 MMOL/L (ref 98–107)
CLARITY UR: ABNORMAL
CO2 SERPL-SCNC: 21.1 MMOL/L (ref 22–29)
CO2 SERPL-SCNC: 21.3 MMOL/L (ref 22–29)
CO2 SERPL-SCNC: 21.4 MMOL/L (ref 22–29)
CO2 SERPL-SCNC: 22 MMOL/L (ref 22–29)
CO2 SERPL-SCNC: 23.2 MMOL/L (ref 22–29)
COLOR UR: ABNORMAL
CREAT SERPL-MCNC: 3.32 MG/DL (ref 0.57–1)
CREAT SERPL-MCNC: 3.37 MG/DL (ref 0.57–1)
CREAT SERPL-MCNC: 3.41 MG/DL (ref 0.57–1)
CREAT SERPL-MCNC: 3.56 MG/DL (ref 0.57–1)
CREAT SERPL-MCNC: 3.59 MG/DL (ref 0.57–1)
D-LACTATE SERPL-SCNC: 2.8 MMOL/L (ref 0.5–2)
D-LACTATE SERPL-SCNC: 3.9 MMOL/L (ref 0.5–2)
DEPRECATED RDW RBC AUTO: 47.5 FL (ref 37–54)
DEPRECATED RDW RBC AUTO: 47.6 FL (ref 37–54)
DEPRECATED RDW RBC AUTO: 47.7 FL (ref 37–54)
ERYTHROCYTE [DISTWIDTH] IN BLOOD BY AUTOMATED COUNT: 13.7 % (ref 12.3–15.4)
ERYTHROCYTE [DISTWIDTH] IN BLOOD BY AUTOMATED COUNT: 14 % (ref 12.3–15.4)
ERYTHROCYTE [DISTWIDTH] IN BLOOD BY AUTOMATED COUNT: 14.2 % (ref 12.3–15.4)
FERRITIN SERPL-MCNC: 825 NG/ML (ref 13–150)
GFR SERPL CREATININE-BSD FRML MDRD: 12 ML/MIN/1.73
GFR SERPL CREATININE-BSD FRML MDRD: 12 ML/MIN/1.73
GFR SERPL CREATININE-BSD FRML MDRD: 13 ML/MIN/1.73
GFR SERPL CREATININE-BSD FRML MDRD: ABNORMAL ML/MIN/{1.73_M2}
GLOBULIN UR ELPH-MCNC: 3.8 GM/DL
GLOBULIN UR ELPH-MCNC: 3.9 GM/DL
GLOBULIN UR ELPH-MCNC: 4.1 GM/DL
GLUCOSE BLDC GLUCOMTR-MCNC: 245 MG/DL (ref 70–130)
GLUCOSE BLDC GLUCOMTR-MCNC: 253 MG/DL (ref 70–130)
GLUCOSE BLDC GLUCOMTR-MCNC: 264 MG/DL (ref 70–130)
GLUCOSE BLDC GLUCOMTR-MCNC: 280 MG/DL (ref 70–130)
GLUCOSE BLDC GLUCOMTR-MCNC: 288 MG/DL (ref 70–130)
GLUCOSE BLDC GLUCOMTR-MCNC: 322 MG/DL (ref 70–130)
GLUCOSE BLDC GLUCOMTR-MCNC: 330 MG/DL (ref 70–130)
GLUCOSE BLDC GLUCOMTR-MCNC: 330 MG/DL (ref 70–130)
GLUCOSE BLDC GLUCOMTR-MCNC: 337 MG/DL (ref 70–130)
GLUCOSE BLDC GLUCOMTR-MCNC: 375 MG/DL (ref 70–130)
GLUCOSE SERPL-MCNC: 282 MG/DL (ref 65–99)
GLUCOSE SERPL-MCNC: 332 MG/DL (ref 65–99)
GLUCOSE SERPL-MCNC: 374 MG/DL (ref 65–99)
GLUCOSE SERPL-MCNC: 409 MG/DL (ref 65–99)
GLUCOSE SERPL-MCNC: 447 MG/DL (ref 65–99)
GLUCOSE UR STRIP-MCNC: ABNORMAL MG/DL
HBA1C MFR BLD: 10.5 % (ref 4.8–5.6)
HCT VFR BLD AUTO: 39.6 % (ref 34–46.6)
HCT VFR BLD AUTO: 40 % (ref 34–46.6)
HCT VFR BLD AUTO: 40.3 % (ref 34–46.6)
HCT VFR BLD AUTO: 40.4 % (ref 34–46.6)
HCT VFR BLD AUTO: 41.2 % (ref 34–46.6)
HCT VFR BLD AUTO: 41.2 % (ref 34–46.6)
HCT VFR BLD AUTO: 44.3 % (ref 34–46.6)
HCT VFR BLD AUTO: 44.9 % (ref 34–46.6)
HGB BLD-MCNC: 12.8 G/DL (ref 12–15.9)
HGB BLD-MCNC: 13.2 G/DL (ref 12–15.9)
HGB BLD-MCNC: 13.3 G/DL (ref 12–15.9)
HGB BLD-MCNC: 13.4 G/DL (ref 12–15.9)
HGB BLD-MCNC: 13.4 G/DL (ref 12–15.9)
HGB BLD-MCNC: 13.5 G/DL (ref 12–15.9)
HGB BLD-MCNC: 14.6 G/DL (ref 12–15.9)
HGB BLD-MCNC: 15.1 G/DL (ref 12–15.9)
HGB UR QL STRIP.AUTO: ABNORMAL
HYALINE CASTS UR QL AUTO: ABNORMAL /LPF
IRON 24H UR-MRATE: 30 MCG/DL (ref 37–145)
IRON SATN MFR SERPL: 13 % (ref 20–50)
KETONES UR QL STRIP: NEGATIVE
LEUKOCYTE ESTERASE UR QL STRIP.AUTO: ABNORMAL
LIPASE SERPL-CCNC: 17 U/L (ref 13–60)
LYMPHOCYTES # BLD MANUAL: 0.3 10*3/MM3 (ref 0.7–3.1)
LYMPHOCYTES # BLD MANUAL: 1.68 10*3/MM3 (ref 0.7–3.1)
LYMPHOCYTES NFR BLD MANUAL: 1 % (ref 19.6–45.3)
LYMPHOCYTES NFR BLD MANUAL: 10 % (ref 19.6–45.3)
LYMPHOCYTES NFR BLD MANUAL: 2 % (ref 5–12)
LYMPHOCYTES NFR BLD MANUAL: 3 % (ref 5–12)
MAGNESIUM SERPL-MCNC: 2.1 MG/DL (ref 1.6–2.4)
MAGNESIUM SERPL-MCNC: 2.2 MG/DL (ref 1.6–2.4)
MCH RBC QN AUTO: 29.8 PG (ref 26.6–33)
MCH RBC QN AUTO: 30.6 PG (ref 26.6–33)
MCH RBC QN AUTO: 31.2 PG (ref 26.6–33)
MCHC RBC AUTO-ENTMCNC: 32.5 G/DL (ref 31.5–35.7)
MCHC RBC AUTO-ENTMCNC: 32.7 G/DL (ref 31.5–35.7)
MCHC RBC AUTO-ENTMCNC: 33.6 G/DL (ref 31.5–35.7)
MCV RBC AUTO: 91.8 FL (ref 79–97)
MCV RBC AUTO: 92.8 FL (ref 79–97)
MCV RBC AUTO: 93.7 FL (ref 79–97)
MONOCYTES # BLD AUTO: 0.34 10*3/MM3 (ref 0.1–0.9)
MONOCYTES # BLD AUTO: 0.9 10*3/MM3 (ref 0.1–0.9)
NEUTROPHILS # BLD AUTO: 14.82 10*3/MM3 (ref 1.7–7)
NEUTROPHILS # BLD AUTO: 28.74 10*3/MM3 (ref 1.7–7)
NEUTROPHILS NFR BLD MANUAL: 88 % (ref 42.7–76)
NEUTROPHILS NFR BLD MANUAL: 96 % (ref 42.7–76)
NITRITE UR QL STRIP: POSITIVE
NRBC BLD AUTO-RTO: 0 /100 WBC (ref 0–0.2)
PH UR STRIP.AUTO: <=5 [PH] (ref 5–8)
PHOSPHATE SERPL-MCNC: 6.1 MG/DL (ref 2.5–4.5)
PHOSPHATE SERPL-MCNC: 6.3 MG/DL (ref 2.5–4.5)
PLAT MORPH BLD: NORMAL
PLAT MORPH BLD: NORMAL
PLATELET # BLD AUTO: 152 10*3/MM3 (ref 140–450)
PLATELET # BLD AUTO: 189 10*3/MM3 (ref 140–450)
PLATELET # BLD AUTO: 212 10*3/MM3 (ref 140–450)
PMV BLD AUTO: 11.6 FL (ref 6–12)
PMV BLD AUTO: 11.6 FL (ref 6–12)
PMV BLD AUTO: 11.7 FL (ref 6–12)
POTASSIUM SERPL-SCNC: 5.1 MMOL/L (ref 3.5–5.2)
POTASSIUM SERPL-SCNC: 5.4 MMOL/L (ref 3.5–5.2)
POTASSIUM SERPL-SCNC: 5.7 MMOL/L (ref 3.5–5.2)
POTASSIUM SERPL-SCNC: 5.8 MMOL/L (ref 3.5–5.2)
POTASSIUM SERPL-SCNC: 5.9 MMOL/L (ref 3.5–5.2)
POTASSIUM SERPL-SCNC: 5.9 MMOL/L (ref 3.5–5.2)
PROT SERPL-MCNC: 6.2 G/DL (ref 6–8.5)
PROT SERPL-MCNC: 6.3 G/DL (ref 6–8.5)
PROT SERPL-MCNC: 7.2 G/DL (ref 6–8.5)
PROT UR QL STRIP: ABNORMAL
QT INTERVAL: 298 MS
RBC # BLD AUTO: 4.31 10*6/MM3 (ref 3.77–5.28)
RBC # BLD AUTO: 4.49 10*6/MM3 (ref 3.77–5.28)
RBC # BLD AUTO: 4.84 10*6/MM3 (ref 3.77–5.28)
RBC # UR: ABNORMAL /HPF
RBC MORPH BLD: NORMAL
RBC MORPH BLD: NORMAL
REF LAB TEST METHOD: ABNORMAL
RH BLD: NEGATIVE
SARS-COV-2 RNA RESP QL NAA+PROBE: NOT DETECTED
SODIUM SERPL-SCNC: 130 MMOL/L (ref 136–145)
SODIUM SERPL-SCNC: 131 MMOL/L (ref 136–145)
SODIUM SERPL-SCNC: 133 MMOL/L (ref 136–145)
SODIUM SERPL-SCNC: 136 MMOL/L (ref 136–145)
SODIUM SERPL-SCNC: 138 MMOL/L (ref 136–145)
SP GR UR STRIP: 1.02 (ref 1–1.03)
SQUAMOUS #/AREA URNS HPF: ABNORMAL /HPF
T&S EXPIRATION DATE: NORMAL
TIBC SERPL-MCNC: 225 MCG/DL (ref 298–536)
TRANSFERRIN SERPL-MCNC: 151 MG/DL (ref 200–360)
UROBILINOGEN UR QL STRIP: ABNORMAL
WBC # BLD AUTO: 16.84 10*3/MM3 (ref 3.4–10.8)
WBC # BLD AUTO: 22.22 10*3/MM3 (ref 3.4–10.8)
WBC # BLD AUTO: 29.94 10*3/MM3 (ref 3.4–10.8)
WBC MORPH BLD: NORMAL
WBC MORPH BLD: NORMAL
WBC UR QL AUTO: ABNORMAL /HPF

## 2021-01-01 PROCEDURE — 25010000002 HYDROMORPHONE 1 MG/ML SOLUTION: Performed by: INTERNAL MEDICINE

## 2021-01-01 PROCEDURE — 63710000001 INSULIN REGULAR HUMAN PER 5 UNITS: Performed by: INTERNAL MEDICINE

## 2021-01-01 PROCEDURE — 85027 COMPLETE CBC AUTOMATED: CPT | Performed by: INTERNAL MEDICINE

## 2021-01-01 PROCEDURE — U0005 INFEC AGEN DETEC AMPLI PROBE: HCPCS | Performed by: EMERGENCY MEDICINE

## 2021-01-01 PROCEDURE — 25010000002 PIPERACILLIN SOD-TAZOBACTAM PER 1 G: Performed by: INTERNAL MEDICINE

## 2021-01-01 PROCEDURE — 25010000002 FUROSEMIDE PER 20 MG: Performed by: INTERNAL MEDICINE

## 2021-01-01 PROCEDURE — 85025 COMPLETE CBC W/AUTO DIFF WBC: CPT | Performed by: PHYSICIAN ASSISTANT

## 2021-01-01 PROCEDURE — 94799 UNLISTED PULMONARY SVC/PX: CPT

## 2021-01-01 PROCEDURE — 99231 SBSQ HOSP IP/OBS SF/LOW 25: CPT | Performed by: INTERNAL MEDICINE

## 2021-01-01 PROCEDURE — 63710000001 INSULIN LISPRO (HUMAN) PER 5 UNITS: Performed by: INTERNAL MEDICINE

## 2021-01-01 PROCEDURE — 85014 HEMATOCRIT: CPT | Performed by: INTERNAL MEDICINE

## 2021-01-01 PROCEDURE — 74176 CT ABD & PELVIS W/O CONTRAST: CPT

## 2021-01-01 PROCEDURE — P9612 CATHETERIZE FOR URINE SPEC: HCPCS

## 2021-01-01 PROCEDURE — 99223 1ST HOSP IP/OBS HIGH 75: CPT | Performed by: INTERNAL MEDICINE

## 2021-01-01 PROCEDURE — 74019 RADEX ABDOMEN 2 VIEWS: CPT

## 2021-01-01 PROCEDURE — 85018 HEMOGLOBIN: CPT | Performed by: INTERNAL MEDICINE

## 2021-01-01 PROCEDURE — 25010000002 DIGOXIN PER 500 MCG: Performed by: NURSE PRACTITIONER

## 2021-01-01 PROCEDURE — 93010 ELECTROCARDIOGRAM REPORT: CPT | Performed by: INTERNAL MEDICINE

## 2021-01-01 PROCEDURE — 86901 BLOOD TYPING SEROLOGIC RH(D): CPT | Performed by: INTERNAL MEDICINE

## 2021-01-01 PROCEDURE — 87086 URINE CULTURE/COLONY COUNT: CPT | Performed by: EMERGENCY MEDICINE

## 2021-01-01 PROCEDURE — 25010000002 METOCLOPRAMIDE PER 10 MG: Performed by: SURGERY

## 2021-01-01 PROCEDURE — 99222 1ST HOSP IP/OBS MODERATE 55: CPT | Performed by: SURGERY

## 2021-01-01 PROCEDURE — 82962 GLUCOSE BLOOD TEST: CPT

## 2021-01-01 PROCEDURE — 63710000001 INSULIN GLARGINE PER 5 UNITS: Performed by: INTERNAL MEDICINE

## 2021-01-01 PROCEDURE — 99232 SBSQ HOSP IP/OBS MODERATE 35: CPT | Performed by: INTERNAL MEDICINE

## 2021-01-01 PROCEDURE — 86850 RBC ANTIBODY SCREEN: CPT | Performed by: INTERNAL MEDICINE

## 2021-01-01 PROCEDURE — 25010000002 HYDROMORPHONE PER 4 MG: Performed by: INTERNAL MEDICINE

## 2021-01-01 PROCEDURE — 81001 URINALYSIS AUTO W/SCOPE: CPT | Performed by: PHYSICIAN ASSISTANT

## 2021-01-01 PROCEDURE — 87186 SC STD MICRODIL/AGAR DIL: CPT | Performed by: EMERGENCY MEDICINE

## 2021-01-01 PROCEDURE — 25010000002 LORAZEPAM PER 2 MG: Performed by: INTERNAL MEDICINE

## 2021-01-01 PROCEDURE — 83735 ASSAY OF MAGNESIUM: CPT | Performed by: INTERNAL MEDICINE

## 2021-01-01 PROCEDURE — 82728 ASSAY OF FERRITIN: CPT | Performed by: INTERNAL MEDICINE

## 2021-01-01 PROCEDURE — 80053 COMPREHEN METABOLIC PANEL: CPT | Performed by: PHYSICIAN ASSISTANT

## 2021-01-01 PROCEDURE — 25010000002 ONDANSETRON PER 1 MG: Performed by: INTERNAL MEDICINE

## 2021-01-01 PROCEDURE — 83605 ASSAY OF LACTIC ACID: CPT | Performed by: PHYSICIAN ASSISTANT

## 2021-01-01 PROCEDURE — 85007 BL SMEAR W/DIFF WBC COUNT: CPT | Performed by: INTERNAL MEDICINE

## 2021-01-01 PROCEDURE — 25010000002 PIPERACILLIN SOD-TAZOBACTAM PER 1 G: Performed by: EMERGENCY MEDICINE

## 2021-01-01 PROCEDURE — 74018 RADEX ABDOMEN 1 VIEW: CPT

## 2021-01-01 PROCEDURE — 87040 BLOOD CULTURE FOR BACTERIA: CPT | Performed by: PHYSICIAN ASSISTANT

## 2021-01-01 PROCEDURE — 85007 BL SMEAR W/DIFF WBC COUNT: CPT | Performed by: PHYSICIAN ASSISTANT

## 2021-01-01 PROCEDURE — 86900 BLOOD TYPING SEROLOGIC ABO: CPT | Performed by: INTERNAL MEDICINE

## 2021-01-01 PROCEDURE — 99232 SBSQ HOSP IP/OBS MODERATE 35: CPT | Performed by: SURGERY

## 2021-01-01 PROCEDURE — U0003 INFECTIOUS AGENT DETECTION BY NUCLEIC ACID (DNA OR RNA); SEVERE ACUTE RESPIRATORY SYNDROME CORONAVIRUS 2 (SARS-COV-2) (CORONAVIRUS DISEASE [COVID-19]), AMPLIFIED PROBE TECHNIQUE, MAKING USE OF HIGH THROUGHPUT TECHNOLOGIES AS DESCRIBED BY CMS-2020-01-R: HCPCS | Performed by: EMERGENCY MEDICINE

## 2021-01-01 PROCEDURE — 83540 ASSAY OF IRON: CPT | Performed by: INTERNAL MEDICINE

## 2021-01-01 PROCEDURE — 87077 CULTURE AEROBIC IDENTIFY: CPT | Performed by: EMERGENCY MEDICINE

## 2021-01-01 PROCEDURE — 25010000002 CALCIUM GLUCONATE-NACL 1-0.675 GM/50ML-% SOLUTION: Performed by: INTERNAL MEDICINE

## 2021-01-01 PROCEDURE — 99285 EMERGENCY DEPT VISIT HI MDM: CPT

## 2021-01-01 PROCEDURE — 80053 COMPREHEN METABOLIC PANEL: CPT | Performed by: SURGERY

## 2021-01-01 PROCEDURE — 85025 COMPLETE CBC W/AUTO DIFF WBC: CPT | Performed by: INTERNAL MEDICINE

## 2021-01-01 PROCEDURE — 83036 HEMOGLOBIN GLYCOSYLATED A1C: CPT | Performed by: INTERNAL MEDICINE

## 2021-01-01 PROCEDURE — 84100 ASSAY OF PHOSPHORUS: CPT | Performed by: INTERNAL MEDICINE

## 2021-01-01 PROCEDURE — 93005 ELECTROCARDIOGRAM TRACING: CPT | Performed by: PHYSICIAN ASSISTANT

## 2021-01-01 PROCEDURE — 80053 COMPREHEN METABOLIC PANEL: CPT | Performed by: INTERNAL MEDICINE

## 2021-01-01 PROCEDURE — 83690 ASSAY OF LIPASE: CPT | Performed by: PHYSICIAN ASSISTANT

## 2021-01-01 PROCEDURE — 84132 ASSAY OF SERUM POTASSIUM: CPT | Performed by: INTERNAL MEDICINE

## 2021-01-01 PROCEDURE — 99222 1ST HOSP IP/OBS MODERATE 55: CPT | Performed by: NURSE PRACTITIONER

## 2021-01-01 PROCEDURE — 87186 SC STD MICRODIL/AGAR DIL: CPT | Performed by: PHYSICIAN ASSISTANT

## 2021-01-01 PROCEDURE — 84466 ASSAY OF TRANSFERRIN: CPT | Performed by: INTERNAL MEDICINE

## 2021-01-01 RX ORDER — FAMOTIDINE 20 MG/1
20 TABLET, FILM COATED ORAL DAILY
Status: DISCONTINUED | OUTPATIENT
Start: 2021-04-03 | End: 2021-04-03 | Stop reason: HOSPADM

## 2021-01-01 RX ORDER — MORPHINE SULFATE 20 MG/ML
5 SOLUTION ORAL
Status: CANCELLED | OUTPATIENT
Start: 2021-01-01 | End: 2021-04-06

## 2021-01-01 RX ORDER — ONDANSETRON 2 MG/ML
4 INJECTION INTRAMUSCULAR; INTRAVENOUS EVERY 6 HOURS PRN
Status: CANCELLED | OUTPATIENT
Start: 2021-01-01

## 2021-01-01 RX ORDER — LORAZEPAM 2 MG/ML
1 INJECTION INTRAMUSCULAR
Status: CANCELLED | OUTPATIENT
Start: 2021-01-01 | End: 2021-04-08

## 2021-01-01 RX ORDER — ONDANSETRON 4 MG/1
4 TABLET, FILM COATED ORAL EVERY 6 HOURS PRN
Status: DISCONTINUED | OUTPATIENT
Start: 2021-01-01 | End: 2021-01-01 | Stop reason: HOSPADM

## 2021-01-01 RX ORDER — ACETAMINOPHEN 650 MG/1
650 SUPPOSITORY RECTAL EVERY 4 HOURS PRN
Status: DISCONTINUED | OUTPATIENT
Start: 2021-01-01 | End: 2021-01-01 | Stop reason: HOSPADM

## 2021-01-01 RX ORDER — LORAZEPAM 2 MG/ML
1 CONCENTRATE ORAL
Status: DISCONTINUED | OUTPATIENT
Start: 2021-01-01 | End: 2021-01-01 | Stop reason: HOSPADM

## 2021-01-01 RX ORDER — ACETAMINOPHEN 650 MG/1
650 SUPPOSITORY RECTAL EVERY 4 HOURS PRN
Status: CANCELLED | OUTPATIENT
Start: 2021-01-01

## 2021-01-01 RX ORDER — FAMOTIDINE 10 MG/ML
20 INJECTION, SOLUTION INTRAVENOUS DAILY
Status: CANCELLED | OUTPATIENT
Start: 2021-04-03

## 2021-01-01 RX ORDER — MORPHINE SULFATE 20 MG/ML
20 SOLUTION ORAL
Status: DISCONTINUED | OUTPATIENT
Start: 2021-01-01 | End: 2021-04-03 | Stop reason: HOSPADM

## 2021-01-01 RX ORDER — ECHINACEA PURPUREA EXTRACT 125 MG
2 TABLET ORAL AS NEEDED
Status: DISCONTINUED | OUTPATIENT
Start: 2021-01-01 | End: 2021-01-01 | Stop reason: HOSPADM

## 2021-01-01 RX ORDER — MORPHINE SULFATE 20 MG/ML
5 SOLUTION ORAL
Status: DISCONTINUED | OUTPATIENT
Start: 2021-01-01 | End: 2021-01-01 | Stop reason: HOSPADM

## 2021-01-01 RX ORDER — GLYCOPYRROLATE 0.2 MG/ML
0.2 INJECTION INTRAMUSCULAR; INTRAVENOUS
Status: CANCELLED | OUTPATIENT
Start: 2021-01-01

## 2021-01-01 RX ORDER — NICOTINE POLACRILEX 4 MG
15 LOZENGE BUCCAL
Status: DISCONTINUED | OUTPATIENT
Start: 2021-01-01 | End: 2021-01-01

## 2021-01-01 RX ORDER — LORAZEPAM 2 MG/ML
2 INJECTION INTRAMUSCULAR
Status: DISCONTINUED | OUTPATIENT
Start: 2021-01-01 | End: 2021-04-03 | Stop reason: HOSPADM

## 2021-01-01 RX ORDER — PROMETHAZINE HYDROCHLORIDE 12.5 MG/1
12.5 SUPPOSITORY RECTAL EVERY 4 HOURS PRN
Status: DISCONTINUED | OUTPATIENT
Start: 2021-01-01 | End: 2021-01-01 | Stop reason: HOSPADM

## 2021-01-01 RX ORDER — PROMETHAZINE HYDROCHLORIDE 6.25 MG/5ML
6.25 SYRUP ORAL EVERY 4 HOURS PRN
Status: DISCONTINUED | OUTPATIENT
Start: 2021-01-01 | End: 2021-04-03 | Stop reason: HOSPADM

## 2021-01-01 RX ORDER — GLYCOPYRROLATE 0.2 MG/ML
0.2 INJECTION INTRAMUSCULAR; INTRAVENOUS
Status: DISCONTINUED | OUTPATIENT
Start: 2021-01-01 | End: 2021-01-01 | Stop reason: HOSPADM

## 2021-01-01 RX ORDER — GLYCOPYRROLATE 0.2 MG/ML
0.4 INJECTION INTRAMUSCULAR; INTRAVENOUS
Status: CANCELLED | OUTPATIENT
Start: 2021-01-01

## 2021-01-01 RX ORDER — LORAZEPAM 2 MG/ML
0.5 CONCENTRATE ORAL
Status: CANCELLED | OUTPATIENT
Start: 2021-01-01 | End: 2021-04-08

## 2021-01-01 RX ORDER — FAMOTIDINE 20 MG/1
20 TABLET, FILM COATED ORAL DAILY
Status: DISCONTINUED | OUTPATIENT
Start: 2021-01-01 | End: 2021-01-01 | Stop reason: HOSPADM

## 2021-01-01 RX ORDER — GLYCOPYRROLATE 0.2 MG/ML
0.4 INJECTION INTRAMUSCULAR; INTRAVENOUS
Status: DISCONTINUED | OUTPATIENT
Start: 2021-01-01 | End: 2021-04-03 | Stop reason: HOSPADM

## 2021-01-01 RX ORDER — MORPHINE SULFATE 10 MG/ML
6 INJECTION INTRAMUSCULAR; INTRAVENOUS; SUBCUTANEOUS
Status: DISCONTINUED | OUTPATIENT
Start: 2021-01-01 | End: 2021-01-01 | Stop reason: HOSPADM

## 2021-01-01 RX ORDER — FAMOTIDINE 10 MG/ML
20 INJECTION, SOLUTION INTRAVENOUS DAILY
Status: DISCONTINUED | OUTPATIENT
Start: 2021-04-03 | End: 2021-04-03 | Stop reason: HOSPADM

## 2021-01-01 RX ORDER — SODIUM CHLORIDE, SODIUM LACTATE, POTASSIUM CHLORIDE, CALCIUM CHLORIDE 600; 310; 30; 20 MG/100ML; MG/100ML; MG/100ML; MG/100ML
100 INJECTION, SOLUTION INTRAVENOUS CONTINUOUS
Status: DISCONTINUED | OUTPATIENT
Start: 2021-01-01 | End: 2021-01-01

## 2021-01-01 RX ORDER — LORAZEPAM 2 MG/ML
2 INJECTION INTRAMUSCULAR
Status: CANCELLED | OUTPATIENT
Start: 2021-01-01 | End: 2021-04-08

## 2021-01-01 RX ORDER — IPRATROPIUM BROMIDE AND ALBUTEROL SULFATE 2.5; .5 MG/3ML; MG/3ML
3 SOLUTION RESPIRATORY (INHALATION) EVERY 4 HOURS PRN
Status: DISCONTINUED | OUTPATIENT
Start: 2021-01-01 | End: 2021-04-03 | Stop reason: HOSPADM

## 2021-01-01 RX ORDER — LORAZEPAM 2 MG/ML
1 INJECTION INTRAMUSCULAR
Status: DISCONTINUED | OUTPATIENT
Start: 2021-01-01 | End: 2021-01-01 | Stop reason: HOSPADM

## 2021-01-01 RX ORDER — MORPHINE SULFATE 20 MG/ML
20 SOLUTION ORAL
Status: DISCONTINUED | OUTPATIENT
Start: 2021-01-01 | End: 2021-01-01 | Stop reason: HOSPADM

## 2021-01-01 RX ORDER — PROMETHAZINE HYDROCHLORIDE 25 MG/1
6.25 TABLET ORAL EVERY 4 HOURS PRN
Status: DISCONTINUED | OUTPATIENT
Start: 2021-01-01 | End: 2021-04-03 | Stop reason: HOSPADM

## 2021-01-01 RX ORDER — FAMOTIDINE 20 MG/1
20 TABLET, FILM COATED ORAL DAILY
Status: CANCELLED | OUTPATIENT
Start: 2021-04-03

## 2021-01-01 RX ORDER — LORAZEPAM 2 MG/ML
2 INJECTION INTRAMUSCULAR
Status: DISCONTINUED | OUTPATIENT
Start: 2021-01-01 | End: 2021-01-01 | Stop reason: HOSPADM

## 2021-01-01 RX ORDER — ONDANSETRON 4 MG/1
4 TABLET, FILM COATED ORAL EVERY 6 HOURS PRN
Status: CANCELLED | OUTPATIENT
Start: 2021-01-01

## 2021-01-01 RX ORDER — GLYCOPYRROLATE 0.2 MG/ML
0.2 INJECTION INTRAMUSCULAR; INTRAVENOUS
Status: DISCONTINUED | OUTPATIENT
Start: 2021-01-01 | End: 2021-04-03 | Stop reason: HOSPADM

## 2021-01-01 RX ORDER — SODIUM CHLORIDE 9 MG/ML
75 INJECTION, SOLUTION INTRAVENOUS CONTINUOUS
Status: DISCONTINUED | OUTPATIENT
Start: 2021-01-01 | End: 2021-01-01

## 2021-01-01 RX ORDER — FUROSEMIDE 10 MG/ML
80 INJECTION INTRAMUSCULAR; INTRAVENOUS ONCE
Status: COMPLETED | OUTPATIENT
Start: 2021-01-01 | End: 2021-01-01

## 2021-01-01 RX ORDER — HYDROMORPHONE HCL 110MG/55ML
1.5 PATIENT CONTROLLED ANALGESIA SYRINGE INTRAVENOUS
Status: CANCELLED | OUTPATIENT
Start: 2021-01-01 | End: 2021-04-08

## 2021-01-01 RX ORDER — MORPHINE SULFATE 20 MG/ML
10 SOLUTION ORAL
Status: DISCONTINUED | OUTPATIENT
Start: 2021-01-01 | End: 2021-01-01 | Stop reason: HOSPADM

## 2021-01-01 RX ORDER — PROMETHAZINE HYDROCHLORIDE 6.25 MG/5ML
6.25 SYRUP ORAL EVERY 4 HOURS PRN
Status: CANCELLED | OUTPATIENT
Start: 2021-01-01

## 2021-01-01 RX ORDER — METOCLOPRAMIDE HYDROCHLORIDE 5 MG/ML
10 INJECTION INTRAMUSCULAR; INTRAVENOUS EVERY 6 HOURS
Status: DISCONTINUED | OUTPATIENT
Start: 2021-01-01 | End: 2021-01-01

## 2021-01-01 RX ORDER — ACETAMINOPHEN 325 MG/1
650 TABLET ORAL EVERY 4 HOURS PRN
Status: DISCONTINUED | OUTPATIENT
Start: 2021-01-01 | End: 2021-04-03 | Stop reason: HOSPADM

## 2021-01-01 RX ORDER — PROMETHAZINE HYDROCHLORIDE 6.25 MG/5ML
12.5 SYRUP ORAL EVERY 4 HOURS PRN
Status: CANCELLED | OUTPATIENT
Start: 2021-01-01

## 2021-01-01 RX ORDER — LORAZEPAM 2 MG/ML
0.5 INJECTION INTRAMUSCULAR
Status: CANCELLED | OUTPATIENT
Start: 2021-01-01 | End: 2021-04-08

## 2021-01-01 RX ORDER — INSULIN GLARGINE 100 [IU]/ML
30 INJECTION, SOLUTION SUBCUTANEOUS NIGHTLY
Status: DISCONTINUED | OUTPATIENT
Start: 2021-01-01 | End: 2021-01-01

## 2021-01-01 RX ORDER — LORAZEPAM 2 MG/ML
0.5 INJECTION INTRAMUSCULAR
Status: DISCONTINUED | OUTPATIENT
Start: 2021-01-01 | End: 2021-01-01 | Stop reason: HOSPADM

## 2021-01-01 RX ORDER — ONDANSETRON 4 MG/1
4 TABLET, FILM COATED ORAL EVERY 6 HOURS PRN
Status: DISCONTINUED | OUTPATIENT
Start: 2021-01-01 | End: 2021-04-03 | Stop reason: HOSPADM

## 2021-01-01 RX ORDER — PROMETHAZINE HYDROCHLORIDE 12.5 MG/1
6.25 SUPPOSITORY RECTAL EVERY 4 HOURS PRN
Status: DISCONTINUED | OUTPATIENT
Start: 2021-01-01 | End: 2021-01-01 | Stop reason: HOSPADM

## 2021-01-01 RX ORDER — ACETAMINOPHEN 650 MG/1
650 SUPPOSITORY RECTAL EVERY 4 HOURS PRN
Status: DISCONTINUED | OUTPATIENT
Start: 2021-01-01 | End: 2021-04-03 | Stop reason: HOSPADM

## 2021-01-01 RX ORDER — LORAZEPAM 2 MG/ML
0.5 INJECTION INTRAMUSCULAR
Status: DISCONTINUED | OUTPATIENT
Start: 2021-01-01 | End: 2021-04-03 | Stop reason: HOSPADM

## 2021-01-01 RX ORDER — DILTIAZEM HCL IN NACL,ISO-OSM 125 MG/125
5-15 PLASTIC BAG, INJECTION (ML) INTRAVENOUS
Status: DISCONTINUED | OUTPATIENT
Start: 2021-01-01 | End: 2021-01-01

## 2021-01-01 RX ORDER — INSULIN GLARGINE 100 [IU]/ML
10 INJECTION, SOLUTION SUBCUTANEOUS ONCE
Status: COMPLETED | OUTPATIENT
Start: 2021-01-01 | End: 2021-01-01

## 2021-01-01 RX ORDER — PROMETHAZINE HYDROCHLORIDE 12.5 MG/1
6.25 SUPPOSITORY RECTAL EVERY 4 HOURS PRN
Status: CANCELLED | OUTPATIENT
Start: 2021-01-01

## 2021-01-01 RX ORDER — KETOROLAC TROMETHAMINE 30 MG/ML
15 INJECTION, SOLUTION INTRAMUSCULAR; INTRAVENOUS EVERY 6 HOURS PRN
Status: DISCONTINUED | OUTPATIENT
Start: 2021-01-01 | End: 2021-01-01 | Stop reason: HOSPADM

## 2021-01-01 RX ORDER — DEXTROSE MONOHYDRATE 25 G/50ML
25 INJECTION, SOLUTION INTRAVENOUS
Status: DISCONTINUED | OUTPATIENT
Start: 2021-01-01 | End: 2021-01-01

## 2021-01-01 RX ORDER — ACETAMINOPHEN 325 MG/1
650 TABLET ORAL EVERY 4 HOURS PRN
Status: DISCONTINUED | OUTPATIENT
Start: 2021-01-01 | End: 2021-01-01

## 2021-01-01 RX ORDER — MORPHINE SULFATE 2 MG/ML
2 INJECTION, SOLUTION INTRAMUSCULAR; INTRAVENOUS
Status: CANCELLED | OUTPATIENT
Start: 2021-01-01 | End: 2021-04-06

## 2021-01-01 RX ORDER — LORAZEPAM 2 MG/ML
1 INJECTION INTRAMUSCULAR
Status: DISCONTINUED | OUTPATIENT
Start: 2021-01-01 | End: 2021-04-03 | Stop reason: HOSPADM

## 2021-01-01 RX ORDER — SODIUM CHLORIDE 9 MG/ML
150 INJECTION, SOLUTION INTRAVENOUS CONTINUOUS
Status: DISCONTINUED | OUTPATIENT
Start: 2021-01-01 | End: 2021-01-01

## 2021-01-01 RX ORDER — PROMETHAZINE HYDROCHLORIDE 25 MG/1
12.5 TABLET ORAL EVERY 4 HOURS PRN
Status: DISCONTINUED | OUTPATIENT
Start: 2021-01-01 | End: 2021-01-01 | Stop reason: HOSPADM

## 2021-01-01 RX ORDER — MORPHINE SULFATE 20 MG/ML
5 SOLUTION ORAL
Status: DISCONTINUED | OUTPATIENT
Start: 2021-01-01 | End: 2021-04-03 | Stop reason: HOSPADM

## 2021-01-01 RX ORDER — MORPHINE SULFATE 10 MG/ML
6 INJECTION INTRAMUSCULAR; INTRAVENOUS; SUBCUTANEOUS
Status: DISCONTINUED | OUTPATIENT
Start: 2021-01-01 | End: 2021-04-03 | Stop reason: HOSPADM

## 2021-01-01 RX ORDER — LORAZEPAM 2 MG/ML
2 CONCENTRATE ORAL
Status: DISCONTINUED | OUTPATIENT
Start: 2021-01-01 | End: 2021-04-03 | Stop reason: HOSPADM

## 2021-01-01 RX ORDER — MORPHINE SULFATE 20 MG/ML
10 SOLUTION ORAL
Status: DISCONTINUED | OUTPATIENT
Start: 2021-01-01 | End: 2021-04-03 | Stop reason: HOSPADM

## 2021-01-01 RX ORDER — ACETAMINOPHEN 325 MG/1
650 TABLET ORAL EVERY 4 HOURS PRN
Status: DISCONTINUED | OUTPATIENT
Start: 2021-01-01 | End: 2021-01-01 | Stop reason: HOSPADM

## 2021-01-01 RX ORDER — ACETAMINOPHEN 160 MG/5ML
650 SOLUTION ORAL EVERY 4 HOURS PRN
Status: CANCELLED | OUTPATIENT
Start: 2021-01-01

## 2021-01-01 RX ORDER — IPRATROPIUM BROMIDE AND ALBUTEROL SULFATE 2.5; .5 MG/3ML; MG/3ML
3 SOLUTION RESPIRATORY (INHALATION) EVERY 4 HOURS PRN
Status: DISCONTINUED | OUTPATIENT
Start: 2021-01-01 | End: 2021-01-01 | Stop reason: HOSPADM

## 2021-01-01 RX ORDER — LORAZEPAM 2 MG/ML
1 CONCENTRATE ORAL
Status: CANCELLED | OUTPATIENT
Start: 2021-01-01 | End: 2021-04-08

## 2021-01-01 RX ORDER — MORPHINE SULFATE 20 MG/ML
20 SOLUTION ORAL
Status: CANCELLED | OUTPATIENT
Start: 2021-01-01 | End: 2021-04-08

## 2021-01-01 RX ORDER — MORPHINE SULFATE 2 MG/ML
4 INJECTION, SOLUTION INTRAMUSCULAR; INTRAVENOUS
Status: DISCONTINUED | OUTPATIENT
Start: 2021-01-01 | End: 2021-01-01 | Stop reason: HOSPADM

## 2021-01-01 RX ORDER — INSULIN LISPRO 100 [IU]/ML
0-9 INJECTION, SOLUTION INTRAVENOUS; SUBCUTANEOUS
Status: DISCONTINUED | OUTPATIENT
Start: 2021-01-01 | End: 2021-01-01

## 2021-01-01 RX ORDER — IPRATROPIUM BROMIDE AND ALBUTEROL SULFATE 2.5; .5 MG/3ML; MG/3ML
3 SOLUTION RESPIRATORY (INHALATION) EVERY 4 HOURS PRN
Status: CANCELLED | OUTPATIENT
Start: 2021-01-01

## 2021-01-01 RX ORDER — UREA 10 %
3 LOTION (ML) TOPICAL NIGHTLY PRN
Status: DISCONTINUED | OUTPATIENT
Start: 2021-01-01 | End: 2021-01-01

## 2021-01-01 RX ORDER — DILTIAZEM HYDROCHLORIDE 5 MG/ML
10 INJECTION INTRAVENOUS ONCE
Status: COMPLETED | OUTPATIENT
Start: 2021-01-01 | End: 2021-01-01

## 2021-01-01 RX ORDER — PROMETHAZINE HYDROCHLORIDE 6.25 MG/5ML
12.5 SYRUP ORAL EVERY 4 HOURS PRN
Status: DISCONTINUED | OUTPATIENT
Start: 2021-01-01 | End: 2021-04-03 | Stop reason: HOSPADM

## 2021-01-01 RX ORDER — PROMETHAZINE HYDROCHLORIDE 25 MG/1
12.5 TABLET ORAL EVERY 4 HOURS PRN
Status: DISCONTINUED | OUTPATIENT
Start: 2021-01-01 | End: 2021-04-03 | Stop reason: HOSPADM

## 2021-01-01 RX ORDER — NITROGLYCERIN 0.4 MG/1
0.4 TABLET SUBLINGUAL
Status: DISCONTINUED | OUTPATIENT
Start: 2021-01-01 | End: 2021-01-01

## 2021-01-01 RX ORDER — PROMETHAZINE HYDROCHLORIDE 25 MG/1
6.25 TABLET ORAL EVERY 4 HOURS PRN
Status: DISCONTINUED | OUTPATIENT
Start: 2021-01-01 | End: 2021-01-01 | Stop reason: HOSPADM

## 2021-01-01 RX ORDER — GLYCOPYRROLATE 0.2 MG/ML
0.4 INJECTION INTRAMUSCULAR; INTRAVENOUS
Status: DISCONTINUED | OUTPATIENT
Start: 2021-01-01 | End: 2021-01-01 | Stop reason: HOSPADM

## 2021-01-01 RX ORDER — ONDANSETRON 2 MG/ML
4 INJECTION INTRAMUSCULAR; INTRAVENOUS EVERY 6 HOURS PRN
Status: DISCONTINUED | OUTPATIENT
Start: 2021-01-01 | End: 2021-01-01

## 2021-01-01 RX ORDER — ONDANSETRON 2 MG/ML
4 INJECTION INTRAMUSCULAR; INTRAVENOUS EVERY 6 HOURS PRN
Status: DISCONTINUED | OUTPATIENT
Start: 2021-01-01 | End: 2021-01-01 | Stop reason: HOSPADM

## 2021-01-01 RX ORDER — KETOROLAC TROMETHAMINE 30 MG/ML
15 INJECTION, SOLUTION INTRAMUSCULAR; INTRAVENOUS EVERY 6 HOURS PRN
Status: CANCELLED | OUTPATIENT
Start: 2021-01-01 | End: 2021-04-06

## 2021-01-01 RX ORDER — METOCLOPRAMIDE HYDROCHLORIDE 5 MG/ML
5 INJECTION INTRAMUSCULAR; INTRAVENOUS EVERY 6 HOURS
Status: DISCONTINUED | OUTPATIENT
Start: 2021-01-01 | End: 2021-01-01

## 2021-01-01 RX ORDER — ECHINACEA PURPUREA EXTRACT 125 MG
2 TABLET ORAL AS NEEDED
Status: CANCELLED | OUTPATIENT
Start: 2021-01-01

## 2021-01-01 RX ORDER — HYDROMORPHONE HCL 110MG/55ML
1.5 PATIENT CONTROLLED ANALGESIA SYRINGE INTRAVENOUS
Status: DISCONTINUED | OUTPATIENT
Start: 2021-01-01 | End: 2021-04-03 | Stop reason: HOSPADM

## 2021-01-01 RX ORDER — MORPHINE SULFATE 2 MG/ML
2 INJECTION, SOLUTION INTRAMUSCULAR; INTRAVENOUS
Status: DISCONTINUED | OUTPATIENT
Start: 2021-01-01 | End: 2021-04-03 | Stop reason: HOSPADM

## 2021-01-01 RX ORDER — LORAZEPAM 2 MG/ML
1 CONCENTRATE ORAL
Status: DISCONTINUED | OUTPATIENT
Start: 2021-01-01 | End: 2021-04-03 | Stop reason: HOSPADM

## 2021-01-01 RX ORDER — LORAZEPAM 2 MG/ML
2 CONCENTRATE ORAL
Status: CANCELLED | OUTPATIENT
Start: 2021-01-01 | End: 2021-04-08

## 2021-01-01 RX ORDER — PROMETHAZINE HYDROCHLORIDE 6.25 MG/5ML
12.5 SYRUP ORAL EVERY 4 HOURS PRN
Status: DISCONTINUED | OUTPATIENT
Start: 2021-01-01 | End: 2021-01-01 | Stop reason: HOSPADM

## 2021-01-01 RX ORDER — PROMETHAZINE HYDROCHLORIDE 12.5 MG/1
6.25 SUPPOSITORY RECTAL EVERY 4 HOURS PRN
Status: DISCONTINUED | OUTPATIENT
Start: 2021-01-01 | End: 2021-04-03 | Stop reason: HOSPADM

## 2021-01-01 RX ORDER — DIGOXIN 0.25 MG/ML
250 INJECTION INTRAMUSCULAR; INTRAVENOUS ONCE
Status: COMPLETED | OUTPATIENT
Start: 2021-01-01 | End: 2021-01-01

## 2021-01-01 RX ORDER — PROMETHAZINE HYDROCHLORIDE 25 MG/1
6.25 TABLET ORAL EVERY 4 HOURS PRN
Status: CANCELLED | OUTPATIENT
Start: 2021-01-01

## 2021-01-01 RX ORDER — HYDROMORPHONE HYDROCHLORIDE 1 MG/ML
0.5 INJECTION, SOLUTION INTRAMUSCULAR; INTRAVENOUS; SUBCUTANEOUS
Status: DISCONTINUED | OUTPATIENT
Start: 2021-01-01 | End: 2021-01-01

## 2021-01-01 RX ORDER — FAMOTIDINE 10 MG/ML
20 INJECTION, SOLUTION INTRAVENOUS DAILY
Status: DISCONTINUED | OUTPATIENT
Start: 2021-01-01 | End: 2021-01-01 | Stop reason: HOSPADM

## 2021-01-01 RX ORDER — ECHINACEA PURPUREA EXTRACT 125 MG
2 TABLET ORAL AS NEEDED
Status: DISCONTINUED | OUTPATIENT
Start: 2021-01-01 | End: 2021-04-03 | Stop reason: HOSPADM

## 2021-01-01 RX ORDER — ACETAMINOPHEN 160 MG/5ML
650 SOLUTION ORAL EVERY 4 HOURS PRN
Status: DISCONTINUED | OUTPATIENT
Start: 2021-01-01 | End: 2021-01-01 | Stop reason: HOSPADM

## 2021-01-01 RX ORDER — INSULIN GLARGINE 100 [IU]/ML
20 INJECTION, SOLUTION SUBCUTANEOUS NIGHTLY
Status: DISCONTINUED | OUTPATIENT
Start: 2021-01-01 | End: 2021-01-01

## 2021-01-01 RX ORDER — MORPHINE SULFATE 4 MG/ML
4 INJECTION, SOLUTION INTRAMUSCULAR; INTRAVENOUS
Status: DISCONTINUED | OUTPATIENT
Start: 2021-01-01 | End: 2021-04-03 | Stop reason: HOSPADM

## 2021-01-01 RX ORDER — MORPHINE SULFATE 2 MG/ML
2 INJECTION, SOLUTION INTRAMUSCULAR; INTRAVENOUS
Status: DISCONTINUED | OUTPATIENT
Start: 2021-01-01 | End: 2021-01-01 | Stop reason: HOSPADM

## 2021-01-01 RX ORDER — HYDROMORPHONE HYDROCHLORIDE 1 MG/ML
0.5 INJECTION, SOLUTION INTRAMUSCULAR; INTRAVENOUS; SUBCUTANEOUS
Status: DISCONTINUED | OUTPATIENT
Start: 2021-01-01 | End: 2021-01-01 | Stop reason: HOSPADM

## 2021-01-01 RX ORDER — IPRATROPIUM BROMIDE AND ALBUTEROL SULFATE 2.5; .5 MG/3ML; MG/3ML
3 SOLUTION RESPIRATORY (INHALATION)
Status: DISCONTINUED | OUTPATIENT
Start: 2021-01-01 | End: 2021-01-01

## 2021-01-01 RX ORDER — PROMETHAZINE HYDROCHLORIDE 12.5 MG/1
12.5 SUPPOSITORY RECTAL EVERY 4 HOURS PRN
Status: CANCELLED | OUTPATIENT
Start: 2021-01-01

## 2021-01-01 RX ORDER — KETOROLAC TROMETHAMINE 30 MG/ML
15 INJECTION, SOLUTION INTRAMUSCULAR; INTRAVENOUS EVERY 6 HOURS PRN
Status: DISCONTINUED | OUTPATIENT
Start: 2021-01-01 | End: 2021-04-03 | Stop reason: HOSPADM

## 2021-01-01 RX ORDER — LORAZEPAM 2 MG/ML
0.5 CONCENTRATE ORAL
Status: DISCONTINUED | OUTPATIENT
Start: 2021-01-01 | End: 2021-04-03 | Stop reason: HOSPADM

## 2021-01-01 RX ORDER — LORAZEPAM 2 MG/ML
2 CONCENTRATE ORAL
Status: DISCONTINUED | OUTPATIENT
Start: 2021-01-01 | End: 2021-01-01 | Stop reason: HOSPADM

## 2021-01-01 RX ORDER — MORPHINE SULFATE 10 MG/ML
6 INJECTION INTRAMUSCULAR; INTRAVENOUS; SUBCUTANEOUS
Status: CANCELLED | OUTPATIENT
Start: 2021-01-01 | End: 2021-04-08

## 2021-01-01 RX ORDER — ONDANSETRON 2 MG/ML
4 INJECTION INTRAMUSCULAR; INTRAVENOUS EVERY 6 HOURS PRN
Status: DISCONTINUED | OUTPATIENT
Start: 2021-01-01 | End: 2021-04-03 | Stop reason: HOSPADM

## 2021-01-01 RX ORDER — ONDANSETRON 4 MG/1
4 TABLET, FILM COATED ORAL EVERY 6 HOURS PRN
Status: DISCONTINUED | OUTPATIENT
Start: 2021-01-01 | End: 2021-01-01

## 2021-01-01 RX ORDER — MORPHINE SULFATE 4 MG/ML
4 INJECTION, SOLUTION INTRAMUSCULAR; INTRAVENOUS
Status: CANCELLED | OUTPATIENT
Start: 2021-01-01 | End: 2021-04-08

## 2021-01-01 RX ORDER — ACETAMINOPHEN 160 MG/5ML
650 SOLUTION ORAL EVERY 4 HOURS PRN
Status: DISCONTINUED | OUTPATIENT
Start: 2021-01-01 | End: 2021-04-03 | Stop reason: HOSPADM

## 2021-01-01 RX ORDER — PROMETHAZINE HYDROCHLORIDE 6.25 MG/5ML
6.25 SYRUP ORAL EVERY 4 HOURS PRN
Status: DISCONTINUED | OUTPATIENT
Start: 2021-01-01 | End: 2021-01-01 | Stop reason: HOSPADM

## 2021-01-01 RX ORDER — PROMETHAZINE HYDROCHLORIDE 12.5 MG/1
12.5 SUPPOSITORY RECTAL EVERY 4 HOURS PRN
Status: DISCONTINUED | OUTPATIENT
Start: 2021-01-01 | End: 2021-04-03 | Stop reason: HOSPADM

## 2021-01-01 RX ORDER — MORPHINE SULFATE 20 MG/ML
10 SOLUTION ORAL
Status: CANCELLED | OUTPATIENT
Start: 2021-01-01 | End: 2021-04-08

## 2021-01-01 RX ORDER — ACETAMINOPHEN 325 MG/1
650 TABLET ORAL EVERY 4 HOURS PRN
Status: CANCELLED | OUTPATIENT
Start: 2021-01-01

## 2021-01-01 RX ORDER — PROMETHAZINE HYDROCHLORIDE 25 MG/1
12.5 TABLET ORAL EVERY 4 HOURS PRN
Status: CANCELLED | OUTPATIENT
Start: 2021-01-01

## 2021-01-01 RX ORDER — CALCIUM GLUCONATE 20 MG/ML
1 INJECTION, SOLUTION INTRAVENOUS ONCE
Status: COMPLETED | OUTPATIENT
Start: 2021-01-01 | End: 2021-01-01

## 2021-01-01 RX ORDER — LORAZEPAM 2 MG/ML
0.5 CONCENTRATE ORAL
Status: DISCONTINUED | OUTPATIENT
Start: 2021-01-01 | End: 2021-01-01 | Stop reason: HOSPADM

## 2021-01-01 RX ADMIN — SODIUM CHLORIDE 1000 ML: 9 INJECTION, SOLUTION INTRAVENOUS at 12:27

## 2021-01-01 RX ADMIN — INSULIN HUMAN 8 UNITS: 100 INJECTION, SOLUTION PARENTERAL at 08:03

## 2021-01-01 RX ADMIN — PANTOPRAZOLE SODIUM 8 MG/HR: 40 INJECTION, POWDER, FOR SOLUTION INTRAVENOUS at 14:06

## 2021-01-01 RX ADMIN — LORAZEPAM 1 MG: 2 INJECTION INTRAMUSCULAR; INTRAVENOUS at 10:10

## 2021-01-01 RX ADMIN — FUROSEMIDE 80 MG: 10 INJECTION, SOLUTION INTRAMUSCULAR; INTRAVENOUS at 22:27

## 2021-01-01 RX ADMIN — IPRATROPIUM BROMIDE AND ALBUTEROL SULFATE 3 ML: 2.5; .5 SOLUTION RESPIRATORY (INHALATION) at 21:01

## 2021-01-01 RX ADMIN — TAZOBACTAM SODIUM AND PIPERACILLIN SODIUM 3.38 G: 375; 3 INJECTION, SOLUTION INTRAVENOUS at 04:22

## 2021-01-01 RX ADMIN — PANTOPRAZOLE SODIUM 8 MG/HR: 40 INJECTION, POWDER, FOR SOLUTION INTRAVENOUS at 19:18

## 2021-01-01 RX ADMIN — HYDROMORPHONE HYDROCHLORIDE 0.5 MG: 1 INJECTION, SOLUTION INTRAMUSCULAR; INTRAVENOUS; SUBCUTANEOUS at 16:05

## 2021-01-01 RX ADMIN — FUROSEMIDE 80 MG: 10 INJECTION, SOLUTION INTRAMUSCULAR; INTRAVENOUS at 12:23

## 2021-01-01 RX ADMIN — METOROPROLOL TARTRATE 5 MG: 5 INJECTION, SOLUTION INTRAVENOUS at 15:30

## 2021-01-01 RX ADMIN — HYDROMORPHONE HYDROCHLORIDE 0.5 MG: 1 INJECTION, SOLUTION INTRAMUSCULAR; INTRAVENOUS; SUBCUTANEOUS at 23:58

## 2021-01-01 RX ADMIN — SODIUM CHLORIDE 75 ML/HR: 9 INJECTION, SOLUTION INTRAVENOUS at 13:17

## 2021-01-01 RX ADMIN — LORAZEPAM 1 MG: 2 INJECTION INTRAMUSCULAR; INTRAVENOUS at 16:58

## 2021-01-01 RX ADMIN — TAZOBACTAM SODIUM AND PIPERACILLIN SODIUM 3.38 G: 375; 3 INJECTION, SOLUTION INTRAVENOUS at 16:06

## 2021-01-01 RX ADMIN — PANTOPRAZOLE SODIUM 8 MG/HR: 40 INJECTION, POWDER, FOR SOLUTION INTRAVENOUS at 04:20

## 2021-01-01 RX ADMIN — INSULIN GLARGINE 20 UNITS: 100 INJECTION, SOLUTION SUBCUTANEOUS at 20:19

## 2021-01-01 RX ADMIN — TAZOBACTAM SODIUM AND PIPERACILLIN SODIUM 3.38 G: 375; 3 INJECTION, SOLUTION INTRAVENOUS at 13:21

## 2021-01-01 RX ADMIN — PANTOPRAZOLE SODIUM 8 MG/HR: 40 INJECTION, POWDER, FOR SOLUTION INTRAVENOUS at 09:16

## 2021-01-01 RX ADMIN — SODIUM CHLORIDE 75 ML/HR: 9 INJECTION, SOLUTION INTRAVENOUS at 19:18

## 2021-01-01 RX ADMIN — HYDROMORPHONE HYDROCHLORIDE 1 MG: 1 INJECTION, SOLUTION INTRAMUSCULAR; INTRAVENOUS; SUBCUTANEOUS at 15:16

## 2021-01-01 RX ADMIN — HYDROMORPHONE HYDROCHLORIDE 0.5 MG: 1 INJECTION, SOLUTION INTRAMUSCULAR; INTRAVENOUS; SUBCUTANEOUS at 12:14

## 2021-01-01 RX ADMIN — METOPROLOL TARTRATE 5 MG: 5 INJECTION INTRAVENOUS at 08:23

## 2021-01-01 RX ADMIN — DILTIAZEM HYDROCHLORIDE 10 MG: 5 INJECTION INTRAVENOUS at 19:02

## 2021-01-01 RX ADMIN — CALCIUM GLUCONATE 1 G: 20 INJECTION, SOLUTION INTRAVENOUS at 23:38

## 2021-01-01 RX ADMIN — DIGOXIN 250 MCG: 0.25 INJECTION INTRAMUSCULAR; INTRAVENOUS at 08:15

## 2021-01-01 RX ADMIN — HYDROMORPHONE HYDROCHLORIDE 1 MG: 1 INJECTION, SOLUTION INTRAMUSCULAR; INTRAVENOUS; SUBCUTANEOUS at 12:40

## 2021-01-01 RX ADMIN — SODIUM CHLORIDE 500 ML: 9 INJECTION, SOLUTION INTRAVENOUS at 22:26

## 2021-01-01 RX ADMIN — METOPROLOL TARTRATE 5 MG: 5 INJECTION INTRAVENOUS at 19:25

## 2021-01-01 RX ADMIN — SODIUM CHLORIDE 75 ML/HR: 9 INJECTION, SOLUTION INTRAVENOUS at 08:39

## 2021-01-01 RX ADMIN — SODIUM CHLORIDE 75 ML/HR: 9 INJECTION, SOLUTION INTRAVENOUS at 00:54

## 2021-01-01 RX ADMIN — HYDROMORPHONE HYDROCHLORIDE 1 MG: 1 INJECTION, SOLUTION INTRAMUSCULAR; INTRAVENOUS; SUBCUTANEOUS at 04:30

## 2021-01-01 RX ADMIN — HYDROMORPHONE HYDROCHLORIDE 1 MG: 1 INJECTION, SOLUTION INTRAMUSCULAR; INTRAVENOUS; SUBCUTANEOUS at 09:24

## 2021-01-01 RX ADMIN — METOCLOPRAMIDE HYDROCHLORIDE 5 MG: 5 INJECTION INTRAMUSCULAR; INTRAVENOUS at 10:02

## 2021-01-01 RX ADMIN — METOPROLOL TARTRATE 5 MG: 5 INJECTION INTRAVENOUS at 15:33

## 2021-01-01 RX ADMIN — ONDANSETRON 4 MG: 2 INJECTION INTRAMUSCULAR; INTRAVENOUS at 08:19

## 2021-01-01 RX ADMIN — TAZOBACTAM SODIUM AND PIPERACILLIN SODIUM 3.38 G: 375; 3 INJECTION, SOLUTION INTRAVENOUS at 20:19

## 2021-01-01 RX ADMIN — PANTOPRAZOLE SODIUM 8 MG/HR: 40 INJECTION, POWDER, FOR SOLUTION INTRAVENOUS at 18:51

## 2021-01-01 RX ADMIN — FAMOTIDINE 20 MG: 10 INJECTION INTRAVENOUS at 08:04

## 2021-01-01 RX ADMIN — METOPROLOL TARTRATE 5 MG: 5 INJECTION INTRAVENOUS at 00:50

## 2021-01-01 RX ADMIN — LORAZEPAM 1 MG: 2 INJECTION INTRAMUSCULAR; INTRAVENOUS at 12:40

## 2021-01-01 RX ADMIN — PANTOPRAZOLE SODIUM 8 MG/HR: 40 INJECTION, POWDER, FOR SOLUTION INTRAVENOUS at 00:53

## 2021-01-01 RX ADMIN — Medication 5 MG/HR: at 04:22

## 2021-01-01 RX ADMIN — HYDROMORPHONE HYDROCHLORIDE 0.5 MG: 1 INJECTION, SOLUTION INTRAMUSCULAR; INTRAVENOUS; SUBCUTANEOUS at 21:31

## 2021-01-01 RX ADMIN — SODIUM CHLORIDE 500 ML: 9 INJECTION, SOLUTION INTRAVENOUS at 11:16

## 2021-01-01 RX ADMIN — LORAZEPAM 1 MG: 2 INJECTION INTRAMUSCULAR; INTRAVENOUS at 06:53

## 2021-01-01 RX ADMIN — METOPROLOL TARTRATE 5 MG: 5 INJECTION INTRAVENOUS at 09:17

## 2021-01-01 RX ADMIN — HYDROMORPHONE HYDROCHLORIDE 0.5 MG: 1 INJECTION, SOLUTION INTRAMUSCULAR; INTRAVENOUS; SUBCUTANEOUS at 02:16

## 2021-01-01 RX ADMIN — SODIUM CHLORIDE 1000 ML: 9 INJECTION, SOLUTION INTRAVENOUS at 14:03

## 2021-01-01 RX ADMIN — HYDROMORPHONE HYDROCHLORIDE 1 MG: 1 INJECTION, SOLUTION INTRAMUSCULAR; INTRAVENOUS; SUBCUTANEOUS at 16:58

## 2021-01-01 RX ADMIN — HYDROMORPHONE HYDROCHLORIDE 0.5 MG: 1 INJECTION, SOLUTION INTRAMUSCULAR; INTRAVENOUS; SUBCUTANEOUS at 20:28

## 2021-01-01 RX ADMIN — IPRATROPIUM BROMIDE AND ALBUTEROL SULFATE 3 ML: 2.5; .5 SOLUTION RESPIRATORY (INHALATION) at 07:37

## 2021-01-01 RX ADMIN — INSULIN LISPRO 7 UNITS: 100 INJECTION, SOLUTION INTRAVENOUS; SUBCUTANEOUS at 09:11

## 2021-01-01 RX ADMIN — IPRATROPIUM BROMIDE AND ALBUTEROL SULFATE 3 ML: 2.5; .5 SOLUTION RESPIRATORY (INHALATION) at 16:20

## 2021-01-01 RX ADMIN — HYDROMORPHONE HYDROCHLORIDE 1 MG: 1 INJECTION, SOLUTION INTRAMUSCULAR; INTRAVENOUS; SUBCUTANEOUS at 21:04

## 2021-01-01 RX ADMIN — INSULIN GLARGINE 10 UNITS: 100 INJECTION, SOLUTION SUBCUTANEOUS at 12:22

## 2021-01-01 RX ADMIN — TAZOBACTAM SODIUM AND PIPERACILLIN SODIUM 3.38 G: 375; 3 INJECTION, SOLUTION INTRAVENOUS at 03:35

## 2021-01-01 RX ADMIN — INSULIN LISPRO 8 UNITS: 100 INJECTION, SOLUTION INTRAVENOUS; SUBCUTANEOUS at 20:19

## 2021-01-01 RX ADMIN — INSULIN HUMAN 5 UNITS: 100 INJECTION, SOLUTION PARENTERAL at 00:49

## 2021-01-01 RX ADMIN — HYDROMORPHONE HYDROCHLORIDE 0.5 MG: 1 INJECTION, SOLUTION INTRAMUSCULAR; INTRAVENOUS; SUBCUTANEOUS at 08:48

## 2021-01-01 RX ADMIN — INSULIN HUMAN 8 UNITS: 100 INJECTION, SOLUTION PARENTERAL at 13:39

## 2021-01-01 RX ADMIN — PANTOPRAZOLE SODIUM 8 MG/HR: 40 INJECTION, POWDER, FOR SOLUTION INTRAVENOUS at 12:23

## 2021-01-01 RX ADMIN — INSULIN HUMAN 10 UNITS: 100 INJECTION, SOLUTION PARENTERAL at 12:34

## 2021-01-01 RX ADMIN — INSULIN GLARGINE 30 UNITS: 100 INJECTION, SOLUTION SUBCUTANEOUS at 00:49

## 2021-01-01 RX ADMIN — HYDROMORPHONE HYDROCHLORIDE 1 MG: 1 INJECTION, SOLUTION INTRAMUSCULAR; INTRAVENOUS; SUBCUTANEOUS at 06:49

## 2021-03-30 PROBLEM — N17.9 AKI (ACUTE KIDNEY INJURY) (HCC): Status: ACTIVE | Noted: 2021-01-01

## 2021-03-31 PROBLEM — N17.9 SEPSIS WITH ACUTE RENAL FAILURE WITHOUT SEPTIC SHOCK (HCC): Status: ACTIVE | Noted: 2020-01-01

## 2021-03-31 PROBLEM — E87.5 HYPERKALEMIA: Status: ACTIVE | Noted: 2021-01-01

## 2021-03-31 PROBLEM — R65.20 SEPSIS WITH ACUTE RENAL FAILURE WITHOUT SEPTIC SHOCK (HCC): Status: ACTIVE | Noted: 2020-01-01

## 2021-04-01 PROBLEM — R19.8 PERFORATED ABDOMINAL VISCUS: Status: ACTIVE | Noted: 2021-01-01

## 2021-04-01 PROBLEM — J96.01 ACUTE RESPIRATORY FAILURE WITH HYPOXIA (HCC): Status: ACTIVE | Noted: 2021-01-01

## 2021-04-01 PROBLEM — K66.8 PNEUMOPERITONEUM OF UNKNOWN ETIOLOGY: Status: ACTIVE | Noted: 2021-01-01

## 2021-04-01 NOTE — PROGRESS NOTES
Chief complaint: Follow-up cholelithiasis, gastric distention, sepsis    Subjective:  Remains quite ill, heart rate is slightly improved today.  NG output 850 yesterday.  Patient continues to report diffuse abdominal pain.    Review of systems:  Constitutional: Positive for weakness, chills, negative for fever  Respiratory: Positive for cough and shortness of breath    Physical exam:  Afebrile yesterday, heart rate 100s 110s, respiratory rate 20 breaths/min, blood pressure 151/88  General: Awake, mildly confused, chronic ill appearance, appears uncomfortable  Eyes: No icterus, extraocular wounds intact  Neck: Supple, trachea midline  Respiratory: Some use of accessory muscles, good bilateral chest expansion  Gastrointestinal: Abdomen is obese, there is a reducible periumbilical hernia, minimally tender, there is some abdominal tenderness, left greater than right, no guarding  Extremities: 3+ peripheral edema, symmetrical muscle loss noted    Labs are reviewed.  White blood cell count is improved to 16.8, hemoglobin stable at 13.2.  Chemistries largely unchanged.  Liver function studies are slightly improved.  Albumin 2.4.    Urine culture with greater than 10 to the fifth Proteus species, blood cultures negative to this point.    Assessment and plan:  -Cholelithiasis, does not appear to have evidence of acute cholecystitis.  At any rate, not an operative candidate, continue medical management, antibiotics per infectious disease.  -Gastric distention, may be related to her long-term poorly controlled diabetes.  I am going to repeat x-rays today.  Start Reglan, would like to try to get her tube out and allow clear liquids if she is able to tolerate that.  -Atrial fibrillation with rapid ventricular response, cardiology following, rate is improved today  -Type 2 diabetes, poorly controlled  -Acute on chronic renal insufficiency, renal following  -Atrial fibrillation on Eliquis, currently held  -Probable urinary tract  infection, now with greater than 10 to the fifth Proteus species, antibiotics per infectious disease  -Morbid obesity, complicating all of above  -Discussed current situation with the patient's family, explained that she has no indication for surgery currently, but would not be a candidate for surgical intervention except under the most extreme circumstances.  I think that considering palliation is probably reasonable for her.    Kj Hancock MD  General and Endoscopic Surgery  [facility]    [address], [address]  [place], [place], [address]  P: [phone]  F: [phone]    Addendum:  Plain films reviewed.  Appears to be free air under the diaphragm.  I had a anatoliy discussion with the patient's power of  and another close friend.  I discussed the pluses and minuses of further imaging with CT.  I told him that I did not think she would survive an operation and that in my view palliative care is probably more appropriate.  She would clearly require postoperative ventilation and I do not think she would be likely to survive a major laparotomy.  The family is understanding, but wants to get CT results prior to deciding where to go next.    Kj Hancock MD  General and Endoscopic Surgery  [facility]    [address], [address]  [place], [address]  P: [phone]  F: [phone]      Addendum:  CT reviewed, discussed with radiology and with the patient's power of .  The CT does not definitively demonstrate the source of this free air.  There is an area of air which extends towards the stomach and there perhaps is a gap in the wall, although this may be a trick of the imaging.  There is no inflammation and there is no significant fluid, although there is still a small amount of fluid around the spleen.  After discussion with the patient's power of , she wishes to talk about palliative care with the patient, which I think is most appropriate.  I do  not believe that her current situation is survivable.  From my standpoint, if the patient and family wish to proceed with palliation, okay to remove nasogastric tube.    Kj Hancock MD  General and Endoscopic Surgery  [facility]    [address], [address]  [place], [place], [address]  P: [phone]  F: [phone]

## 2021-04-01 NOTE — PROGRESS NOTES
[facility], [facility]  Inpatient Progress Note    Reason for Follow Up: GI bleed    Subjective     Interval History:   Reviewed chart notes, evidence of free air under the diaphragm.  H&H stable.  Palliative measures noted.  Discussed with RN, anticipate transfer to palliative unit later today.    Current Facility-Administered Medications:   •  acetaminophen (TYLENOL) tablet 650 mg, 650 mg, Oral, Q4H PRN, Angie Walls MD  •  dextrose (D50W) 25 g/ 50mL Intravenous Solution 25 g, 25 g, Intravenous, Q15 Min PRN, Elroy Green MD  •  dextrose (GLUTOSE) oral gel 15 g, 15 g, Oral, Q15 Min PRN, Elroy Green MD  •  [COMPLETED] dilTIAZem (CARDIZEM) injection 10 mg, 10 mg, Intravenous, Once, 10 mg at 03/31/21 1902 **FOLLOWED BY** dilTIAZem (CARDIZEM) 125 mg in 125 mL 0.7% sodium chloride  infusion, 5-15 mg/hr, Intravenous, Titrated, Aniyah Davis, APRN, Last Rate: 5 mL/hr at 04/01/21 0422, 5 mg/hr at 04/01/21 0422  •  glucagon (human recombinant) (GLUCAGEN DIAGNOSTIC) injection 1 mg, 1 mg, Subcutaneous, Q15 Min PRN, Elroy Green MD  •  HYDROmorphone (DILAUDID) injection 0.5 mg, 0.5 mg, Intravenous, Q2H PRN, Angie Walls MD, 0.5 mg at 04/01/21 1214  •  insulin glargine (LANTUS, SEMGLEE) injection 30 Units, 30 Units, Subcutaneous, Nightly, Elroy Green MD, 30 Units at 04/01/21 0049  •  insulin regular (humuLIN R,novoLIN R) injection 0-14 Units, 0-14 Units, Subcutaneous, Q6H, Elroy Green MD, 8 Units at 04/01/21 1339  •  ipratropium-albuterol (DUO-NEB) nebulizer solution 3 mL, 3 mL, Nebulization, 4x Daily - RT, Elroy Green MD, 3 mL at 04/01/21 0737  •  melatonin tablet 3 mg, 3 mg, Oral, Nightly PRN, Angie Walls MD  •  metoclopramide (REGLAN) injection 5 mg, 5 mg, Intravenous, Q6H, Kj Hancock MD, 5 mg at 04/01/21 1002  •  metoprolol tartrate (LOPRESSOR) injection 5 mg, 5 mg, Intravenous, Q8H, Aniyah Davis APRN, 5 mg at 04/01/21  0917  •  nitroglycerin (NITROSTAT) SL tablet 0.4 mg, 0.4 mg, Sublingual, Q5 Min PRN, Angie Walls MD  •  ondansetron (ZOFRAN) tablet 4 mg, 4 mg, Oral, Q6H PRN **OR** ondansetron (ZOFRAN) injection 4 mg, 4 mg, Intravenous, Q6H PRN, Angie Walls MD, 4 mg at 03/31/21 0819  •  pantoprazole (PROTONIX) 40 mg in 100mL NS IVPB, 8 mg/hr, Intravenous, Continuous, Angie Walls MD, Last Rate: 20 mL/hr at 04/01/21 0916, 8 mg/hr at 04/01/21 0916  •  piperacillin-tazobactam (ZOSYN) 3.375 g in iso-osmotic dextrose 50 ml (premix), 3.375 g, Intravenous, Q12H, Angie Walls MD, 3.375 g at 04/01/21 0422  •  sodium chloride 0.9 % infusion, 75 mL/hr, Intravenous, Continuous, Angie Walls MD, Last Rate: 75 mL/hr at 04/01/21 1317, 75 mL/hr at 04/01/21 1317  Review of Systems:    Review of systems could not be obtained due to  patient nonverbal.    Objective     Vital Signs  Temp:  [98.2 °F (36.8 °C)-98.4 °F (36.9 °C)] 98.2 °F (36.8 °C)  Heart Rate:  [] 110  Resp:  [16-28] 20  BP: (109-151)/(67-88) 151/88  Body mass index is 47.9 kg/m².    Intake/Output Summary (Last 24 hours) at 4/1/2021 1358  Last data filed at 4/1/2021 0647  Gross per 24 hour   Intake 3030.25 ml   Output 1725 ml   Net 1305.25 ml     No intake/output data recorded.     Physical Exam:   General: patient awake, alert and cooperative   Eyes: Normal lids and lashes, no scleral icterus   Neck: supple, normal ROM   Skin: warm and dry, not jaundiced   Cardiovascular: regular rhythm and rate, no murmurs auscultated   Pulm: clear to auscultation bilaterally, regular and unlabored   Abdomen: soft, nontender, nondistended; normal bowel sounds   Extremities: no rash or edema   Psychiatric: Normal mood and behavior; memory intact     Results Review:     I reviewed the patient's new clinical results.    Results from last 7 days   Lab Units 04/01/21  0427 03/31/21  2349 03/31/21  1454 03/31/21  0010 03/30/21  1111   WBC 10*3/mm3  16.84*  --   --  22.22* 29.94*   HEMOGLOBIN g/dL 13.2 12.8 13.3 13.4  13.4 15.1   HEMATOCRIT % 40.4 40.0 39.6 41.2  41.2 44.9   PLATELETS 10*3/mm3 152  --   --  189 212     Results from last 7 days   Lab Units 04/01/21  0427 03/31/21  1455 03/31/21  0812 03/30/21  1156   SODIUM mmol/L 138 136 133* 130*   POTASSIUM mmol/L 5.1 5.4* 5.7* 5.9*   CHLORIDE mmol/L 102 99 97* 91*   CO2 mmol/L 21.4* 22.0 21.1* 23.2   BUN mg/dL 72* 65* 60* 49*   CREATININE mg/dL 3.59* 3.56* 3.41* 3.37*   CALCIUM mg/dL 8.4* 8.6 8.5* 9.5   BILIRUBIN mg/dL 0.6  --  1.0 1.8*   ALK PHOS U/L 141*  --  160* 245*   ALT (SGPT) U/L 93*  --  135* 231*   AST (SGOT) U/L 20  --  44* 151*   GLUCOSE mg/dL 282* 332* 374* 447*         Lab Results   Lab Value Date/Time    LIPASE 17 03/30/2021 1111    LIPASE 10 (L) 12/12/2020 1316       Radiology:  CT Abdomen Pelvis Without Contrast   Preliminary Result   1. There is evidence of pneumoperitoneum. No portal venous gas is seen.   Small linear focus of extraluminal air adjacent to the body of the   stomach that may lie within a vein. There is air-filled distention of   the right and transverse colon as well as the long segment of mid small   bowel. The exact site of perforation is not demonstrated. There is   perisplenic fluid/hemorrhage present. Please correlate for any history   of trauma/fall.   2. Retroperitoneal lymphadenopathy without interim change.       These findings were discussed with Dr. Hancock by telephone.       Radiation dose reduction techniques were utilized, including automated   exposure control and exposure modulation based on body size.              XR Abdomen Flat & Upright         XR Abdomen 1 View   Final Result      CT Abdomen Pelvis Without Contrast   Final Result   1.  Markedly dilated stomach which is more severely dilated than on the   12/12/2020 study and contains fluid and air. No obstructing gastric   lesions are seen. There is slight narrowing of the distal duodenum which    could be related to peristalsis. No masses are seen in this region.   2.  Large gallstone.   3.  Bilateral pleural thickening and some minimal atelectasis, scar or   inflammatory change in the lung bases.   4.  Small amount of free fluid surrounding the spleen.   5.  Right renal stones.   6.  Umbilical hernia containing short segment of small bowel which   appears mildly dilated. This hernia was also seen on the 12/12/2020   study and contained a short segment of nondilated bowel at that time.   The right colon appears mildly distended with the transverse and   descending colon appearing decompressed.   7.  Retroperitoneal lymphadenopathy is again seen and appears similar to   the 12/12/2020 study.   8.  Report was discussed with the referring practitioner.       Radiation dose reduction techniques were utilized, including automated   exposure control and exposure modulation based on body size.       This report was finalized on 3/30/2021 4:18 PM by Dr. Rafael Alonso M.D.              Assessment/Plan     Patient Active Problem List   Diagnosis   • History of total knee arthroplasty   • Pain in left knee   • Failed total knee arthroplasty (CMS/HCC)   • H/O mammogram   • H/O colonoscopy   • Lymph edema   • Sarcoidosis   • Chronic atrial fibrillation (CMS/[facility])   • Hyperlipidemia   • Hypertriglyceridemia   • Type 2 diabetes mellitus with hyperglycemia (CMS/[facility])   • Stasis dermatitis of both legs   • Essential hypertension   • Cellulitis of left lower extremity   • Cellulitis of extremity, lower bilateral   • Chronic anticoagulation, for a fib   • Morbid obesity with BMI of 50.0-59.9, adult (CMS/[facility])   • OA (osteoarthritis) of knee   • 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 Coffee-ground emesis: H&H stable  2. Abdominal pain: Free air under the diaphragm  3. Elevated LFTs  4. A. fib with RVR  5. Hypotension      Plan:  · Palliative measures  · Will sign off but available as needed  I discussed the patients findings and my recommendations with nursing staff.    Pk De Jesus MD

## 2021-04-01 NOTE — PROGRESS NOTES
INFECTIOUS DISEASES PROGRESS NOTE    CC: Follow-up sepsis?    S:   She is maybe a little bit better.  Heart rate is down.  She still quite tender in her abdomen and that is her main complaint.  No fevers or chills or night sweats    O:  Physical Exam:  Temp:  [98.2 °F (36.8 °C)-98.4 °F (36.9 °C)] 98.2 °F (36.8 °C)  Heart Rate:  [] 110  Resp:  [16-28] 20  BP: (109-151)/(67-88) 151/88  Physical Exam  Constitutional:       Appearance: She is obese. She is ill-appearing.   Pulmonary:      Effort: Pulmonary effort is normal.   Abdominal:      General: There is no distension.      Tenderness: There is abdominal tenderness. There is guarding.   Skin:     General: Skin is warm and dry.          Diagnostics:    Creatinine 3.59  Alk phos 141  White count 16.8  Hemoglobin 13.2  Platelets 152  Glucose 253 through 282  Blood cultures no growth to date  Urine culture Proteus      Assessment/Plan   1.  Sepsis  2.  Acute kidney injury on chronic kidney disease stage III with baseline creatinine of about 1.2  3.  Proteus bacteriuria, no active urinary symptoms at this time  4.  Coffee-ground aspirate and NG tube  5.  A. fib with RVR     KUB this morning was concerning for free abdominal air and repeat CT is pending.  Possible palliative care if indeed he does have a perforated viscus.  For now, we will continue with the Shana.    Harvinder Arita MD  13:31 EDT  04/01/21

## 2021-04-01 NOTE — PLAN OF CARE
Goal Outcome Evaluation:     Progress: no change  Outcome Summary: Pt transferred to [facility] for comfort care. Goal is to go back to NH. Pt appears comfortable at transfer. Skin assessed. Called Sister Geraldo to let know about transfer. Will cont to monitor

## 2021-04-01 NOTE — CONSULTS
Purpose of the visit was to evaluate for: goals of care/advanced care planning, support for patient/family, hospice referral/discussion, pain/symptom management, withdrawal of interventions, transfer to comfort care bed/unit and comfort care. Spoke with MD and RN as well as patient and HCS and discussed palliative care, goals of care, care options, resuscitation status, [facility] scattered bed status and discharge options.      Assessment:  Patient is palliative care appropriate given sepsis, acute renal failure, gastric distention due to impaired gastric emptying, afib/RVR. Patient complained of back pain several times throughout converstation, has NG tube and also repeatedly asking for ice chips, appears dyspneic. PPS 20%    Recommendations/Plan: Complete CT scan as ordered, plan to transfer to  to focus on treatment options geared toward symptom management and comfort. Ultimate goal is to get back to nursing home with [facility] services.     Other Comments: Spoke with patient briefly this morning, had trouble having in depth conversation due to complaints of pain and dry mouth, as well as tiredness that caused her to fall.     Also spoke with Sister Geraldo Landeros, [facility], by telephone. She ultimately wants patient to be comfortable and be with her sisters so they can pray with her. She understands that at this time discharge may be premature and transferring her to palliative care before hand would be best in order better manage symptoms and remove NG tube. Also discussed visitation policy on 4.

## 2021-04-01 NOTE — NURSING NOTE
Notified Nereida HOLDER with [facility] that Cardizem gtt was not started until this AM d/t HR/BP being on the low side throughout the night until early this AM. HR began to stay above 100 and BP higher so RN followed orders to hang Cardizem gtt d/t AF not being controlled and BP getting higher. V/U.

## 2021-04-01 NOTE — PROGRESS NOTES
LOS: 2 days     Chief Complaint/ Reason for encounter: Acute renal failure  Chief Complaint   Patient presents with   • Abdominal Pain         Subjective   She feels very poorly, complaining of abdominal pain and distention  No fevers or chills  NG tube remains in place and she is n.p.o.  Denies shortness of breath or edema      Medical history reviewed:  History of Present Illness    Subjective    History taken from: Patient and chart    Vital Signs  Temp:  [98.2 °F (36.8 °C)-98.4 °F (36.9 °C)] 98.2 °F (36.8 °C)  Heart Rate:  [] 110  Resp:  [16-28] 20  BP: (109-151)/(67-88) 151/88       Wt Readings from Last 1 Encounters:   03/30/21 1651 119 kg (261 lb 14.5 oz)       Objective    Objective:  General Appearance:  Comfortable, obese, ill-appearing, in no acute distress and not in pain.  Awake, alert, oriented  HEENT: Mucous membranes moist, no injury, oropharynx clear  Lungs:  Normal effort and normal respiratory rate.  Breath sounds clear to auscultation.  No  respiratory distress.  No rales, decreased breath sounds or rhonchi.    Heart: Normal rate.  Regular rhythm.  S1 normal.  No murmur.   Abdomen: Abdomen is distended, tender to palpation, bowel sounds hypoactive, rebound and guarding present  Extremities: Normal range of motion.  Trace edema of bilateral lower extremities, distal pulses intact  Neurological: No focal motor or sensory deficits, pupils reactive  Skin:  Warm and dry.  No rash or cyanosis.       Results Review:    Intake/Output:     Intake/Output Summary (Last 24 hours) at 4/1/2021 1346  Last data filed at 4/1/2021 0647  Gross per 24 hour   Intake 3030.25 ml   Output 1725 ml   Net 1305.25 ml         DATA:  Radiology and Labs:  The following labs independently reviewed by me. Additional labs ordered for tomorrow a.m.  Interval notes, chart personally reviewed by me.   Old records independently reviewed showing baseline creatinine around 1.5, stage III CKD  The following radiologic studies  independently viewed by me, findings CT abdomen pelvis showing pneumoperitoneum distention of the colon, no clear site of bowel perforation noted  New problems include acute abdomen  Discussed with patient herself at bedside    Risk/ complexity of medical care/ medical decision making high complexity with new, pneumoperitoneum    Labs:   Recent Results (from the past 24 hour(s))   Hemoglobin & Hematocrit, Blood    Collection Time: 03/31/21  2:54 PM    Specimen: Hand, Right; Blood   Result Value Ref Range    Hemoglobin 13.3 12.0 - 15.9 g/dL    Hematocrit 39.6 34.0 - 46.6 %   Basic Metabolic Panel    Collection Time: 03/31/21  2:55 PM    Specimen: Hand, Right; Blood   Result Value Ref Range    Glucose 332 (H) 65 - 99 mg/dL    BUN 65 (H) 8 - 23 mg/dL    Creatinine 3.56 (H) 0.57 - 1.00 mg/dL    Sodium 136 136 - 145 mmol/L    Potassium 5.4 (H) 3.5 - 5.2 mmol/L    Chloride 99 98 - 107 mmol/L    CO2 22.0 22.0 - 29.0 mmol/L    Calcium 8.6 8.6 - 10.5 mg/dL    eGFR  African Amer      eGFR Non African Amer 12 (L) >60 mL/min/1.73    BUN/Creatinine Ratio 18.3 7.0 - 25.0    Anion Gap 15.0 5.0 - 15.0 mmol/L   POC Glucose Once    Collection Time: 03/31/21  4:20 PM    Specimen: Blood   Result Value Ref Range    Glucose 288 (H) 70 - 130 mg/dL   POC Glucose Once    Collection Time: 03/31/21  8:32 PM    Specimen: Blood   Result Value Ref Range    Glucose 330 (H) 70 - 130 mg/dL   Hemoglobin & Hematocrit, Blood    Collection Time: 03/31/21 11:49 PM    Specimen: Blood   Result Value Ref Range    Hemoglobin 12.8 12.0 - 15.9 g/dL    Hematocrit 40.0 34.0 - 46.6 %   POC Glucose Once    Collection Time: 04/01/21 12:07 AM    Specimen: Blood   Result Value Ref Range    Glucose 280 (H) 70 - 130 mg/dL   Magnesium    Collection Time: 04/01/21  4:27 AM    Specimen: Blood   Result Value Ref Range    Magnesium 2.2 1.6 - 2.4 mg/dL   Phosphorus    Collection Time: 04/01/21  4:27 AM    Specimen: Blood   Result Value Ref Range    Phosphorus 6.3 (H) 2.5 -  4.5 mg/dL   Comprehensive Metabolic Panel    Collection Time: 04/01/21  4:27 AM    Specimen: Blood   Result Value Ref Range    Glucose 282 (H) 65 - 99 mg/dL    BUN 72 (H) 8 - 23 mg/dL    Creatinine 3.59 (H) 0.57 - 1.00 mg/dL    Sodium 138 136 - 145 mmol/L    Potassium 5.1 3.5 - 5.2 mmol/L    Chloride 102 98 - 107 mmol/L    CO2 21.4 (L) 22.0 - 29.0 mmol/L    Calcium 8.4 (L) 8.6 - 10.5 mg/dL    Total Protein 6.2 6.0 - 8.5 g/dL    Albumin 2.40 (L) 3.50 - 5.20 g/dL    ALT (SGPT) 93 (H) 1 - 33 U/L    AST (SGOT) 20 1 - 32 U/L    Alkaline Phosphatase 141 (H) 39 - 117 U/L    Total Bilirubin 0.6 0.0 - 1.2 mg/dL    eGFR Non African Amer 12 (L) >60 mL/min/1.73    eGFR  African Amer      Globulin 3.8 gm/dL    A/G Ratio 0.6 g/dL    BUN/Creatinine Ratio 20.1 7.0 - 25.0    Anion Gap 14.6 5.0 - 15.0 mmol/L   CBC Auto Differential    Collection Time: 04/01/21  4:27 AM    Specimen: Blood   Result Value Ref Range    WBC 16.84 (H) 3.40 - 10.80 10*3/mm3    RBC 4.31 3.77 - 5.28 10*6/mm3    Hemoglobin 13.2 12.0 - 15.9 g/dL    Hematocrit 40.4 34.0 - 46.6 %    MCV 93.7 79.0 - 97.0 fL    MCH 30.6 26.6 - 33.0 pg    MCHC 32.7 31.5 - 35.7 g/dL    RDW 13.7 12.3 - 15.4 %    RDW-SD 47.5 37.0 - 54.0 fl    MPV 11.7 6.0 - 12.0 fL    Platelets 152 140 - 450 10*3/mm3    nRBC 0.0 0.0 - 0.2 /100 WBC   Manual Differential    Collection Time: 04/01/21  4:27 AM    Specimen: Blood   Result Value Ref Range    Neutrophil % 88.0 (H) 42.7 - 76.0 %    Lymphocyte % 10.0 (L) 19.6 - 45.3 %    Monocyte % 2.0 (L) 5.0 - 12.0 %    Neutrophils Absolute 14.82 (H) 1.70 - 7.00 10*3/mm3    Lymphocytes Absolute 1.68 0.70 - 3.10 10*3/mm3    Monocytes Absolute 0.34 0.10 - 0.90 10*3/mm3    RBC Morphology Normal Normal    WBC Morphology Normal Normal    Platelet Morphology Normal Normal   POC Glucose Once    Collection Time: 04/01/21  6:12 AM    Specimen: Blood   Result Value Ref Range    Glucose 264 (H) 70 - 130 mg/dL   POC Glucose Once    Collection Time: 04/01/21  1:25 PM     Specimen: Blood   Result Value Ref Range    Glucose 253 (H) 70 - 130 mg/dL       Radiology:  Imaging Results (Last 24 Hours)     Procedure Component Value Units Date/Time    CT Abdomen Pelvis Without Contrast [[id number]] Collected: 04/01/21 1335     Updated: 04/01/21 1335    Narrative:      CT ABDOMEN AND PELVIS WITHOUT CONTRAST     TECHNIQUE: Axial CT images of the abdomen and pelvis were obtained  without administration of intravenous contrast. The patient was none  given oral contrast. Coronal and sagittal reformats were obtained.     COMPARISON: CT dated 3/30/2021     FINDINGS: Pneumoperitoneum is identified. No portal venous gas is  identified. There is no evidence of bowel obstruction. The gastric  distention has decreased in the interim from prior imaging. There is  mild gaseous distention of few of the mid small bowel loops that  transition gradually to normal caliber. Additionally there is mild  distention of the right colon and the proximal transverse colon that is  also air distended. The distal colon is collapsed. There is a small  linear focus of air seen immediately adjacent to the body of the stomach  on image 63. I am not sure as to the location however this may represent  small focus of gas within a vein. The air also extends into the medical  hernia that contains a portion of the small bowel loop without evidence  of obstruction.     Perisplenic density that may represent fluid and/or hemorrhage. The  pancreas, liver is unremarkable. Cholelithiasis present. Bilateral  adrenal glands and kidneys are unchanged with numerous calculi. The  urinary bladder is decompressed with a Kelly catheter. The uterus is not  identified. Again demonstrated is pathological retroperitoneal  lymphadenopathy that is without interim change. Small left pleural  effusion with basilar collapse consolidation. Dilated pulmonary artery  suggestive of pulmonary arterial hypertension. No pericardial effusion.       Impression:       1. There is evidence of pneumoperitoneum. No portal venous gas is seen.  Small linear focus of extraluminal air adjacent to the body of the  stomach that may lie within a vein. There is air-filled distention of  the right and transverse colon as well as the long segment of mid small  bowel. The exact site of perforation is not demonstrated. There is  perisplenic fluid/hemorrhage present. Please correlate for any history  of trauma/fall.  2. Retroperitoneal lymphadenopathy without interim change.     These findings were discussed with Dr. Hancock by telephone.     Radiation dose reduction techniques were utilized, including automated  exposure control and exposure modulation based on body size.          XR Abdomen Flat & Upright [[id number]] Collected: 04/01/21 1028     Updated: 04/01/21 1028    Narrative:      ABDOMEN     HISTORY: Abdominal pain, bloating.     FINDINGS: Supine and upright views of the abdomen demonstrates a  nasogastric tube in place with the tip in the stomach. The study is  hampered by the patient's body habitus, but a double wall sign is  appreciated, consistent with free air. Further evaluation with a CT  examination of the abdomen and pelvis is recommended. Gaseous distention  of the stomach and small bowel is noted.     The above information was called to and discussed with Dr. Hancock on  04/01/2021 at 1025 hours.                Medications have been reviewed:  Current Facility-Administered Medications   Medication Dose Route Frequency Provider Last Rate Last Admin   • acetaminophen (TYLENOL) tablet 650 mg  650 mg Oral Q4H PRN Angie Walls MD       • dextrose (D50W) 25 g/ 50mL Intravenous Solution 25 g  25 g Intravenous Q15 Min PRN Elroy Green MD       • dextrose (GLUTOSE) oral gel 15 g  15 g Oral Q15 Min PRN Elroy Green MD       • dilTIAZem (CARDIZEM) 125 mg in 125 mL 0.7% sodium chloride  infusion  5-15 mg/hr Intravenous Titrated Aniyah Davis APRN 5 mL/hr at  04/01/21 0422 5 mg/hr at 04/01/21 0422   • glucagon (human recombinant) (GLUCAGEN DIAGNOSTIC) injection 1 mg  1 mg Subcutaneous Q15 Min PRN Elroy Green MD       • HYDROmorphone (DILAUDID) injection 0.5 mg  0.5 mg Intravenous Q2H PRN Angie Walls MD   0.5 mg at 04/01/21 1214   • insulin glargine (LANTUS, SEMGLEE) injection 30 Units  30 Units Subcutaneous Nightly Elroy Green MD   30 Units at 04/01/21 0049   • insulin regular (humuLIN R,novoLIN R) injection 0-14 Units  0-14 Units Subcutaneous Q6H Elroy Green MD   8 Units at 04/01/21 1339   • ipratropium-albuterol (DUO-NEB) nebulizer solution 3 mL  3 mL Nebulization 4x Daily - RT Elroy Green MD   3 mL at 04/01/21 0737   • melatonin tablet 3 mg  3 mg Oral Nightly PRN Angie Walls MD       • metoclopramide (REGLAN) injection 5 mg  5 mg Intravenous Q6H Kj Hancock MD   5 mg at 04/01/21 1002   • metoprolol tartrate (LOPRESSOR) injection 5 mg  5 mg Intravenous Q8H Aniyah Davis APRN   5 mg at 04/01/21 0917   • nitroglycerin (NITROSTAT) SL tablet 0.4 mg  0.4 mg Sublingual Q5 Min PRN Angie Walls MD       • ondansetron (ZOFRAN) tablet 4 mg  4 mg Oral Q6H PRN Angie Walls MD        Or   • ondansetron (ZOFRAN) injection 4 mg  4 mg Intravenous Q6H PRN Angie Walls MD   4 mg at 03/31/21 0819   • pantoprazole (PROTONIX) 40 mg in 100mL NS IVPB  8 mg/hr Intravenous Continuous Angie Walls MD 20 mL/hr at 04/01/21 0916 8 mg/hr at 04/01/21 0916   • piperacillin-tazobactam (ZOSYN) 3.375 g in iso-osmotic dextrose 50 ml (premix)  3.375 g Intravenous Q12H Angie Walls MD   3.375 g at 04/01/21 0422   • sodium chloride 0.9 % infusion  75 mL/hr Intravenous Continuous Angie Walls MD 75 mL/hr at 04/01/21 1317 75 mL/hr at 04/01/21 1317       ASSESSMENT:  1) ROGELIO -probable ATN secondary to sepsis  2) CKD3b  3) Hyperkalemia, improved  4) Abd pain -CT showing new,  pneumoperitoneum  5) Elevated LFT's  6) Coffee ground emesis  7) Cholelithiasis  8) Rapid A-fib       PLAN:   Renal function a bit worse again today which is not unexpected given the new findings on CT showing pneumoperitoneum  Surgery notes reviewed, patient not an operative candidate  Potassium levels have improved  NG tube decompression per surgery  Family is considering palliative care which seems appropriate  Continue IV fluids and renally dosed IV antibiotics for now    Avoid nephrotoxins and adjust all meds for reduced GFR as appropriate.  Will follow.       Zeferino Aj MD   [facility]   Office phone number: [phone]  Answering service phone number: [phone]    04/01/21  13:46 EDT    Dictation performed using Dragon dictation software

## 2021-04-01 NOTE — PROGRESS NOTES
Name: Shoshana Bae ADMIT: 3/30/2021   : 1940  PCP: Fany Morales MD    MRN: [id number] LOS: 2 days   AGE/SEX: 80 y.o. female  ROOM: [facility]     Subjective   Subjective   CC: abdominal pain  Patient has had ongoing tachycardia and continues to have abdominal pain. NG tube was placed for gastric distension. She is confused and unable to give a coherent history but does state she has diffuse abdominal pain.    Objective   Objective   Vital Signs  Temp:  [98.2 °F (36.8 °C)-98.4 °F (36.9 °C)] 98.2 °F (36.8 °C)  Heart Rate:  [] 110  Resp:  [16-28] 20  BP: (109-151)/(67-88) 151/88  SpO2:  [91 %-94 %] 92 %  on  Flow (L/min):  [5] 5;   Device (Oxygen Therapy): nasal cannula  Body mass index is 47.9 kg/m².  Physical Exam  Vitals and nursing note reviewed.   Constitutional:       General: She is in acute distress.      Appearance: She is ill-appearing and toxic-appearing.   HENT:      Head: Normocephalic and atraumatic.      Nose: Nose normal.      Comments: NG tube in place     Mouth/Throat:      Mouth: Mucous membranes are dry.      Pharynx: Oropharynx is clear.   Eyes:      Conjunctiva/sclera: Conjunctivae normal.      Pupils: Pupils are equal, round, and reactive to light.   Cardiovascular:      Rate and Rhythm: Tachycardia present. Rhythm irregular.      Pulses: Normal pulses.   Pulmonary:      Effort: Pulmonary effort is normal.      Breath sounds: Examination of the right-lower field reveals decreased breath sounds. Examination of the left-lower field reveals decreased breath sounds. Decreased breath sounds present.   Abdominal:      General: Bowel sounds are decreased.      Palpations: Abdomen is soft.      Tenderness: There is abdominal tenderness (mild, diffuse). There is no guarding or rebound.      Hernia: A hernia (umbilical) is present.   Musculoskeletal:         General: Swelling (2-3+ BLE edema) present. No tenderness.      Cervical back: Normal range of motion and neck supple.    Skin:     General: Skin is warm and dry.      Capillary Refill: Capillary refill takes 2 to 3 seconds.      Findings: Erythema (BLE, L>R) present.   Neurological:      General: No focal deficit present.      Mental Status: She is lethargic and disoriented.   Psychiatric:         Attention and Perception: She is inattentive.         Behavior: Behavior is slowed.         Cognition and Memory: Cognition is impaired.       Results Review     I reviewed the patient's new clinical results.  I reviewed the patient's telemetry.  I reviewed the patient abdomen/pelvis CT scan  I reviewed the patient's flat/upright KUB  Results from last 7 days   Lab Units 04/01/21 0427 03/31/21  2349 03/31/21  1454 03/31/21  0812 03/31/21  0010 03/30/21  1111   WBC 10*3/mm3 16.84*  --   --   --  22.22* 29.94*   HEMOGLOBIN g/dL 13.2 12.8 13.3 13.5 13.4  13.4 15.1   PLATELETS 10*3/mm3 152  --   --   --  189 212     Results from last 7 days   Lab Units 04/01/21 0427 03/31/21  1455 03/31/21  0812 03/30/21  2308 03/30/21  1831   SODIUM mmol/L 138 136 133*  --  131*   POTASSIUM mmol/L 5.1 5.4* 5.7* 5.9* 5.8*   CHLORIDE mmol/L 102 99 97*  --  96*   CO2 mmol/L 21.4* 22.0 21.1*  --  21.3*   BUN mg/dL 72* 65* 60*  --  52*   CREATININE mg/dL 3.59* 3.56* 3.41*  --  3.32*   GLUCOSE mg/dL 282* 332* 374*  --  409*   Estimated Creatinine Clearance: 14.8 mL/min (A) (by C-G formula based on SCr of 3.59 mg/dL (H)).  Results from last 7 days   Lab Units 04/01/21 0427 03/31/21  0812 03/30/21  1156   ALBUMIN g/dL 2.40* 2.40* 3.10*   BILIRUBIN mg/dL 0.6 1.0 1.8*   ALK PHOS U/L 141* 160* 245*   AST (SGOT) U/L 20 44* 151*   ALT (SGPT) U/L 93* 135* 231*     Results from last 7 days   Lab Units 04/01/21  0427 03/31/21  1455 03/31/21  0812 03/30/21  1831 03/30/21  1156   CALCIUM mg/dL 8.4* 8.6 8.5* 8.8 9.5   ALBUMIN g/dL 2.40*  --  2.40*  --  3.10*   MAGNESIUM mg/dL 2.2  --  2.1  --   --    PHOSPHORUS mg/dL 6.3*  --  6.1*  --   --      Results from last 7 days      Lab Units 03/30/21  1532 03/30/21  1132   LACTATE mmol/L 2.8* 3.9*     COVID19   Date Value Ref Range Status   03/30/2021 Not Detected Not Detected - Ref. Range Final   12/12/2020 Not Detected Not Detected - Ref. Range Final     Hemoglobin A1C   Date/Time Value Ref Range Status   03/31/2021 0812 10.50 (H) 4.80 - 5.60 % Final     Glucose   Date/Time Value Ref Range Status   04/01/2021 0612 264 (H) 70 - 130 mg/dL Final   04/01/2021 0007 280 (H) 70 - 130 mg/dL Final   03/31/2021 2032 330 (H) 70 - 130 mg/dL Final   03/31/2021 1620 288 (H) 70 - 130 mg/dL Final   03/31/2021 1123 322 (H) 70 - 130 mg/dL Final   03/31/2021 0901 330 (H) 70 - 130 mg/dL Final   03/31/2021 0530 337 (H) 70 - 130 mg/dL Final       XR Abdomen Flat & Upright  ABDOMEN     HISTORY: Abdominal pain, bloating.     FINDINGS: Supine and upright views of the abdomen demonstrates a  nasogastric tube in place with the tip in the stomach. The study is  hampered by the patient's body habitus, but a double wall sign is  appreciated, consistent with free air. Further evaluation with a CT  examination of the abdomen and pelvis is recommended. Gaseous distention  of the stomach and small bowel is noted.     The above information was called to and discussed with Dr. Hancock on  04/01/2021 at 1025 hours.       Scheduled Medications  insulin glargine, 30 Units, Subcutaneous, Nightly  insulin regular, 0-14 Units, Subcutaneous, Q6H  ipratropium-albuterol, 3 mL, Nebulization, 4x Daily - RT  metoclopramide, 5 mg, Intravenous, Q6H  metoprolol tartrate, 5 mg, Intravenous, Q8H  piperacillin-tazobactam, 3.375 g, Intravenous, Q12H    Infusions  dilTIAZem, 5-15 mg/hr, Last Rate: 5 mg/hr (04/01/21 0422)  pantoprazole, 8 mg/hr, Last Rate: 8 mg/hr (04/01/21 0916)  sodium chloride, 75 mL/hr, Last Rate: 75 mL/hr (04/01/21 0054)    Diet  NPO Diet       Assessment/Plan     Active Hospital Problems    Diagnosis  POA   • **Acute renal failure (ARF) (CMS/HCC) [N17.9]  Yes   • Acute  respiratory failure with hypoxia (CMS/[facility]) [J96.01]  Yes   • Hyperkalemia [E87.5]  Yes   • UTI (urinary tract infection), bacterial [N39.0, A49.9]  Yes   • Sepsis with acute renal failure without septic shock (CMS/[facility]) [A41.9, R65.20, N17.9]  Yes   • Obstructive sleep apnea syndrome [G47.33]  Yes   • Morbid obesity with BMI of 50.0-59.9, adult (CMS/[facility]) [E66.01, Z68.43]  Not Applicable   • Type 2 diabetes mellitus with hyperglycemia (CMS/[facility]) [E11.65]  Yes   • Stasis dermatitis of both legs [I87.2]  Yes   • Sarcoidosis [D86.9]  Yes   • Lymph edema [I89.0]  Yes   • Chronic atrial fibrillation (CMS/[facility]) [I48.20]  Yes      Resolved Hospital Problems   No resolved problems to display.   Sepsis with Acute Renal Failure and Acute Hypoxic Respiratory Failure  - tachypnea on admission and required 5L oxygen via nasal cannula  - source seems more likely intra-abdominal given free-air finding on KUB  - continue coverage with broad-spectrum antibiotics  - follow up on cultures  - receiving IVF-will monitor volume status closely  - appreciate ID recs    ROGELIO  - due to sepsis  - complicated by hyperkalemia-improved a little with normal saline and insulin  - nephrology following, appreciate recs    Type 2 DM  - uncontrolled (A1C 10.50%)   - complications include recurrent cellulitis   - continue current insulin regimen    Afib/RVR  - provoked by sepsis  - on low dose eliquis at home which is held due to above issues  - continue PRN IV metoprolol  - had dose of digoxin  - cardiology consulted    Impaired Gastric Emptying  - likely due to acute illness-no obvious mechanical obstruction on CT  - has NG tube, continue low wall suction  - continue protonix gtt  - appreciate general surgery recommendations    I d/w Dr. Hancock and later with Sister Geraldo, her healthcare surrogate. If indeed the patient has a perforated visus organ as the KUB indicates then she would be a very poor candidate for surgery.  Sister Geraldo knows this and is  ready to transition to palliative care if this is the case but she would like to proceed with an abdominal CT scan to confirm for validation of her decision and closure. Will proceed with the scan and I will reach out to her following this. I also discussed with the palliative care team who have contacted Sister Geraldo as well.         SCDs for DVT prophylaxis.  DNR.  Discussed with patient, nursing staff, consulting provider, care team on multidisciplinary rounds and Healthcare Surrogate.  Anticipate discharge TBD timing yet to be determined.      Elroy Green MD  [facility]  04/01/21  11:36 EDT    Addendum:  I reviewed her CT abdomen and pelvis and she does look to have a perforated viscus, probably ascending or transverse colon. I discussed with Dr. Hancock and Dr. Choi and I think all are in agreement that she is a very poor surgical candidate.  I again discussed with Sister Gearldo, her POA, and she wishes to transition the patient to palliative care. Will move her to the palliative floor and initiate comfort measures. Will consult [facility] for evaluation for their services-Sister Geraldo would ideally like to take the patient back to her nursing home and I told her we would do our best but I am not sure we would be able to get her stable enough to transfer.  Will observe on the palliative care floor to see what her care requirements are.    Elroy Green MD  4/1/2021  14:25 EDT

## 2021-04-01 NOTE — PLAN OF CARE
Goal Outcome Evaluation:  Plan of Care Reviewed With: patient  Progress: declining  Outcome Summary: Pt HR 80s-130s and SBP 90s-130s. Pt tolerating metoprolol IVP. Pt started yelling out around 0200. Dilaudid for pain. Pt continues to ask for something to drink although RN explains she is getting IV fluids and oral care performed. Turn Q2H. NG to suction getting thick brown contents. BLE very edematous and red. Pt from [facility]. Reassess on 4/1 to determine POC.

## 2021-04-02 PROBLEM — Z51.5 ENCOUNTER FOR PALLIATIVE CARE: Status: ACTIVE | Noted: 2021-01-01

## 2021-04-02 PROBLEM — N17.9 ACUTE KIDNEY FAILURE (HCC): Status: ACTIVE | Noted: 2021-01-01

## 2021-04-02 NOTE — PLAN OF CARE
Goal Outcome Evaluation:     Progress: declining  Outcome Summary: Pt actively dying; flipped to HSB. After discussion about pt's symptoms with Sister Geraldo, we are premedicating prior to turns with 1mg dilaudid and 1mg ativan. Pt is now unresponsive. Family from out of town arrived at bedside and her sisters have visited as well. Will cont to monitor

## 2021-04-02 NOTE — PROGRESS NOTES
Discharge Planning Assessment  [facility]     Patient Name: Shoshana Bae  MRN: [id number]  Today's Date: 4/2/2021    Admit Date: 3/30/2021    Discharge Needs Assessment    No documentation.       Discharge Plan     Row Name 04/02/21 1424       Plan    Final Discharge Disposition Code  51 - hospice medical facility    Final Note  Patient admitted to [facility] scattered bed 4/2/2021        Continued Care and Services - Admitted Since 3/30/2021     Destination     Service Provider Request Status Selected Services Address Phone Fax Patient Preferred    [facility]  Accepted N/A [address] [facility] [id number] [phone] --              Expected Discharge Date and Time     Expected Discharge Date Expected Discharge Time    Apr 2, 2021         Demographic Summary    No documentation.       Functional Status    No documentation.       Psychosocial    No documentation.       Abuse/Neglect    No documentation.       Legal    No documentation.       Substance Abuse    No documentation.       Patient Forms    No documentation.           Abby Castillo, RN

## 2021-04-02 NOTE — PLAN OF CARE
Goal Outcome Evaluation:   Patient has rested well this shift. Medication given for complaints of back pain. Turn and reposition ever two hours and as needed. 1 mg of dilaudid given for pain. Will continue to monitor.

## 2021-04-02 NOTE — PROGRESS NOTES
LOS: 3 days     Chief Complaint/ Reason for encounter: Acute renal failure  Chief Complaint   Patient presents with   • Abdominal Pain         Subjective    Events noted, patient was transferred to [facility] for palliative care earlier today  More comfortable, less anxious  No complaints of shortness of breath or chest pain  Still with abdominal pain      Medical history reviewed:  History of Present Illness    Subjective    History taken from: Patient and chart    Vital Signs  Temp:  [97.6 °F (36.4 °C)-99.1 °F (37.3 °C)] 99.1 °F (37.3 °C)  Heart Rate:  [105-113] 105  Resp:  [20] 20  BP: (132-136)/(78-83) 132/83       Wt Readings from Last 1 Encounters:   03/30/21 1651 119 kg (261 lb 14.5 oz)       Objective    Objective:  General Appearance:  Comfortable, obese, ill-appearing, in no acute distress and not in pain.  Awake, alert, oriented  HEENT: Mucous membranes moist, no injury, oropharynx clear  Lungs:  Normal effort and normal respiratory rate.  Breath sounds clear to auscultation.  No  respiratory distress.  No rales, decreased breath sounds or rhonchi.    Heart: Normal rate.  Regular rhythm.  S1 normal.  No murmur.   Abdomen: Abdomen is distended, tender to palpation, bowel sounds hypoactive, rebound and guarding present  Extremities: Normal range of motion.  Trace edema of bilateral lower extremities, distal pulses intact  Neurological: No focal motor or sensory deficits, pupils reactive  Skin:  Warm and dry.  No rash or cyanosis.       Results Review:    Intake/Output:     Intake/Output Summary (Last 24 hours) at 4/2/2021 1301  Last data filed at 4/2/2021 0330  Gross per 24 hour   Intake 120 ml   Output 750 ml   Net -630 ml         DATA:  Radiology and Labs:  The following labs independently reviewed by me. Additional labs ordered for tomorrow a.m.  Interval notes, chart personally reviewed by me.   Old records independently reviewed showing baseline creatinine around 1.5, stage III CKD  Discussed  with her fellow sisters at bedside        Labs:   Recent Results (from the past 24 hour(s))   POC Glucose Once    Collection Time: 04/01/21  1:25 PM    Specimen: Blood   Result Value Ref Range    Glucose 253 (H) 70 - 130 mg/dL   POC Glucose Once    Collection Time: 04/01/21  4:12 PM    Specimen: Blood   Result Value Ref Range    Glucose 245 (H) 70 - 130 mg/dL       Radiology:  Imaging Results (Last 24 Hours)     Procedure Component Value Units Date/Time    CT Abdomen Pelvis Without Contrast [[id number]] Collected: 04/01/21 1335     Updated: 04/02/21 0849    Narrative:      CT ABDOMEN AND PELVIS WITHOUT CONTRAST     TECHNIQUE: Axial CT images of the abdomen and pelvis were obtained  without administration of intravenous contrast. The patient was none  given oral contrast. Coronal and sagittal reformats were obtained.     COMPARISON: CT dated 3/30/2021     FINDINGS: Pneumoperitoneum is identified. No portal venous gas is  identified. There is no evidence of bowel obstruction. The gastric  distention has decreased in the interim from prior imaging. There is  mild gaseous distention of few of the mid small bowel loops that  transition gradually to normal caliber. Additionally there is mild  distention of the right colon and the proximal transverse colon that is  also air distended. The distal colon is collapsed. There is a small  linear focus of air seen immediately adjacent to the body of the stomach  on image 63. I am not sure as to the location however this may represent  small focus of gas within a vein. The air also extends into the medical  hernia that contains a portion of the small bowel loop without evidence  of obstruction.     Perisplenic density that may represent fluid and/or hemorrhage. The  pancreas, liver is unremarkable. Cholelithiasis present. Bilateral  adrenal glands and kidneys are unchanged with numerous calculi. The  urinary bladder is decompressed with a Kelly catheter. The uterus is  not  identified. Again demonstrated is pathological retroperitoneal  lymphadenopathy that is without interim change. Small left pleural  effusion with basilar collapse consolidation. Dilated pulmonary artery  suggestive of pulmonary arterial hypertension. No pericardial effusion.       Impression:      1. There is evidence of pneumoperitoneum. No portal venous gas is seen.  Small linear focus of extraluminal air adjacent to the body of the  stomach that may lie within a vein. There is air-filled distention of  the right and transverse colon as well as the long segment of mid small  bowel. The exact site of perforation is not demonstrated. There is  perisplenic fluid/hemorrhage present. Please correlate for any history  of trauma/fall.  2. Retroperitoneal lymphadenopathy without interim change.     These findings were discussed with Dr. Hancock by telephone.     Radiation dose reduction techniques were utilized, including automated  exposure control and exposure modulation based on body size.     This report was finalized on 4/2/2021 8:46 AM by Dr. Mari Mccartney M.D.       CT Abdomen Pelvis Without Contrast [[id number]] Collected: 03/30/21 1332     Updated: 04/02/21 0821    Addenda:        ADDENDUM: The images from a CT scan of 03/30/2021 were again reviewed.  There is a very tiny amount of extraluminal air on the CT scan of  03/30/2021 including a very tiny air bubble anterior to the left hepatic  lobe on image 22 as well as an additional tiny extraluminal air bubble  in the anterior abdomen at the level of the upper pelvis on image 86.  Patient had a follow-up CT scan of the abdomen and pelvis on 04/01/2021.     This report was finalized on 4/2/2021 8:18 AM by Dr. Rafael Alonso M.D.     Signed: 04/02/21 0818 by Rafael Alonso MD    Narrative:      CT OF THE ABDOMEN AND PELVIS WITHOUT CONTRAST 03/30/2021     HISTORY: Abdominal distention. Abdominal pain.     Axial images were obtained from the lung  bases to the symphysis pubis.  No intravenous or oral contrast was given.     There is some mild pleural thickening posteriorly in both lower  hemithoraces. There is some minimal inflammatory change, atelectasis or  scarring in the lung bases.     There is rather marked dilatation of the stomach which is partially  fluid-filled and air-filled. The entire stomach is dilated with air seen  in the duodenum. The distal duodenum just past the superior mesenteric  artery shows some narrowing but no masses are seen in this region.     There is an umbilical region hernia containing a segment of small bowel  and small bowel segments in the anterior abdomen appear mildly dilated.  The right colon is mildly distended with the transverse and descending  colon decompressed.     Tiny amount of free fluid is seen surrounding the spleen. Again seen is  an area of low attenuation along the posterior aspect of the liver with  some posterior calcification. This appears stable since the previous  study of 12/12/2020.     There is a 4.7 cm gallstone. No gallbladder wall thickening or  surrounding inflammatory changes seen.     The spleen, pancreas and adrenals appear unremarkable. Moderate size  collection of right renal stones are seen measuring up to 3 cm in  greatest dimension. These are nonobstructing. A tiny calcification in  the left renal hilum is probably vascular in nature.     Uterus has been removed. There are postoperative changes of the  lumbosacral spine.     There is moderate amount of retroperitoneal lymphadenopathy adjacent to  the aortic bifurcation and left iliac artery. This appears stable since  the previous study of 12/12/2020 and is somewhat obscured by streak  artifact from the spinal hardware.       Impression:      1.  Markedly dilated stomach which is more severely dilated than on the  12/12/2020 study and contains fluid and air. No obstructing gastric  lesions are seen. There is slight narrowing of the distal  duodenum which  could be related to peristalsis. No masses are seen in this region.  2.  Large gallstone.  3.  Bilateral pleural thickening and some minimal atelectasis, scar or  inflammatory change in the lung bases.  4.  Small amount of free fluid surrounding the spleen.  5.  Right renal stones.  6.  Umbilical hernia containing short segment of small bowel which  appears mildly dilated. This hernia was also seen on the 12/12/2020  study and contained a short segment of nondilated bowel at that time.  The right colon appears mildly distended with the transverse and  descending colon appearing decompressed.  7.  Retroperitoneal lymphadenopathy is again seen and appears similar to  the 12/12/2020 study.  8.  Report was discussed with the referring practitioner.     Radiation dose reduction techniques were utilized, including automated  exposure control and exposure modulation based on body size.     This report was finalized on 3/30/2021 4:18 PM by Dr. Rafael Alonso M.D.       XR Abdomen Flat & Upright [[id number]] Collected: 04/01/21 1028     Updated: 04/02/21 0812    Narrative:      ABDOMEN     HISTORY: Abdominal pain, bloating.     FINDINGS: Supine and upright views of the abdomen demonstrates a  nasogastric tube in place with the tip in the stomach. The study is  hampered by the patient's body habitus, but a double wall sign is  appreciated, consistent with free air. Further evaluation with a CT  examination of the abdomen and pelvis is recommended. Gaseous distention  of the stomach and small bowel is noted.     The above information was called to and discussed with Dr. Hancock on  04/01/2021 at 1025 hours.     This report was finalized on 4/2/2021 8:09 AM by Dr. Lebron Garcia M.D.                Medications have been reviewed:  Current Facility-Administered Medications   Medication Dose Route Frequency Provider Last Rate Last Admin   • acetaminophen (TYLENOL) tablet 650 mg  650 mg Oral Q4H PRN Peter  Elroy BRYANT MD        Or   • acetaminophen (TYLENOL) 160 MG/5ML solution 650 mg  650 mg Oral Q4H PRN Elroy Green MD        Or   • acetaminophen (TYLENOL) suppository 650 mg  650 mg Rectal Q4H PRN Elroy Green MD       • famotidine (PEPCID) tablet 20 mg  20 mg Oral Daily Elroy Green MD        Or   • famotidine (PEPCID) injection 20 mg  20 mg Intravenous Daily Elroy Green MD   20 mg at 04/02/21 0804   • Glycerin-Hypromellose- (ARTIFICIAL TEARS) 0.2-0.2-1 % ophthalmic solution solution 1 drop  1 drop Both Eyes Q30 Min PRN Elroy Green MD       • glycopyrrolate (ROBINUL) injection 0.2 mg  0.2 mg Intravenous Q2H PRN Elroy Green MD        Or   • glycopyrrolate (ROBINUL) injection 0.2 mg  0.2 mg Subcutaneous Q2H PRN Elroy Green MD        Or   • glycopyrrolate (ROBINUL) injection 0.4 mg  0.4 mg Intravenous Q2H PRN Elroy Green MD        Or   • glycopyrrolate (ROBINUL) injection 0.4 mg  0.4 mg Subcutaneous Q2H PRN Elroy Green MD       • HYDROmorphone (DILAUDID) injection 0.5 mg  0.5 mg Intravenous Q1H PRN Elroy Green MD        Or   • morphine injection 2 mg  2 mg Intravenous Q1H PRN Elroy Green MD        Or   • morphine concentrated solution solution 5 mg  5 mg Sublingual Q1H PRN Elroy Green MD        Or   • ketorolac (TORADOL) injection 15 mg  15 mg Intravenous Q6H PRN Elroy Green MD       • HYDROmorphone (DILAUDID) injection 1 mg  1 mg Intravenous Q1H PRN Elroy Green MD   1 mg at 04/02/21 1240    Or   • morphine injection 4 mg  4 mg Intravenous Q1H PRElroy Hercules MD        Or   • morphine concentrated solution solution 10 mg  10 mg Sublingual Q1H PRN Elroy Green MD       • HYDROmorphone (DILAUDID) injection 1.5 mg  1.5 mg Intravenous Q1H PRN Elroy Green MD        Or   • Morphine injection 6 mg  6 mg Intravenous Q1H PRN Elroy Green MD        Or   • morphine concentrated solution solution 20 mg   20 mg Sublingual Q1H PRN Elroy Green MD       • ipratropium-albuterol (DUO-NEB) nebulizer solution 3 mL  3 mL Nebulization Q4H PRN Elroy Green MD       • LORazepam (ATIVAN) injection 0.5 mg  0.5 mg Intravenous Q1H PRN Elroy Green MD        Or   • LORazepam (ATIVAN) injection 0.5 mg  0.5 mg Subcutaneous Q1H PRN Elroy Green MD        Or   • LORazepam (ATIVAN) 2 MG/ML concentrated solution 0.5 mg  0.5 mg Sublingual Q1H PRN Elroy Green MD       • LORazepam (ATIVAN) injection 1 mg  1 mg Intravenous Q1H PRElroy Hercules MD   1 mg at 04/02/21 1240    Or   • LORazepam (ATIVAN) injection 1 mg  1 mg Subcutaneous Q1H PRN Elroy Geren MD        Or   • LORazepam (ATIVAN) 2 MG/ML concentrated solution 1 mg  1 mg Sublingual Q1H PRN Elroy Green MD       • LORazepam (ATIVAN) injection 2 mg  2 mg Intravenous Q1H PRN Elroy Green MD        Or   • LORazepam (ATIVAN) injection 2 mg  2 mg Subcutaneous Q1H PRElroy Hercules MD        Or   • LORazepam (ATIVAN) 2 MG/ML concentrated solution 2 mg  2 mg Sublingual Q1H PRN Elroy Green MD       • magic mouthwash oral supsension 5 mL  5 mL Swish & Spit Q4H PRN Elroy Green MD       • miconazole (MICOTIN) 2 % powder 1 application  1 application Topical BID PRN Elroy Green MD       • ondansetron (ZOFRAN) tablet 4 mg  4 mg Oral Q6H PRN Elroy Green MD        Or   • ondansetron (ZOFRAN) injection 4 mg  4 mg Intravenous Q6H PRElroy Hercules MD       • promethazine (PHENERGAN) tablet 12.5 mg  12.5 mg Oral Q4H PRN Elroy Green MD        Or   • promethazine (PHENERGAN) 6.25 MG/5ML syrup 12.5 mg  12.5 mg Oral Q4H PRN Elroy Green MD        Or   • promethazine (PHENERGAN) suppository 12.5 mg  12.5 mg Rectal Q4H PRN Elroy Green MD       • promethazine (PHENERGAN) tablet 6.25 mg  6.25 mg Oral Q4H PRN Elroy Green MD        Or   • promethazine (PHENERGAN) 6.25 MG/5ML syrup 6.25 mg  6.25  mg Oral Q4H PRN Elroy Green MD        Or   • promethazine (PHENERGAN) suppository 6.25 mg  6.25 mg Rectal Q4H PRN Elroy Green MD       • sodium chloride nasal spray 2 spray  2 spray Each Nare PRN Elroy Green MD           ASSESSMENT:  1) ROGELIO -probable ATN secondary to sepsis, worsening  2) CKD3b  3) Hyperkalemia  4) Abd pain - pneumoperitoneum, unclear source of perforation  5) Elevated LFT's  6) Coffee ground emesis  7) Cholelithiasis  8) Rapid A-fib       PLAN:   Discussed with her fellow sisters at bedside  Agree with plans to transition to palliative/comfort care  Scheduled medications have been discontinued as well as future labs  Prognosis very poor, palliative care seems the best option    Will sign off, please call if any questions      Zeferino Aj MD   [facility]   Office phone number: [phone]  Answering service phone number: [phone]    04/02/21  13:01 EDT    Dictation performed using Dragon dictation software

## 2021-04-02 NOTE — CONSULTS
HSB admit 21  [facility] ID:[id number]    Primary Diagnosis: N17.9 Acute renal failure    Patient is needing IV symptom management for pain and restlessness.    Met with Sister Geraldo/ [facility] and provided explanation of services and consents signed for [facility]SB. Written material provided. [facility] is who they will use for  home arrangements.     Thank you for the referral.    Mari Johnson RN  [facility]  [phone]

## 2021-04-02 NOTE — DISCHARGE SUMMARY
Date of Admission: 3/30/2021  Date of Discharge:  4/2/2021  Primary Care Physician: Fany Morales MD     Discharge Diagnosis:  Active Hospital Problems    Diagnosis  POA   • **Acute renal failure (ARF) (CMS/Formerly [facility]) [N17.9]  Yes   • Acute respiratory failure with hypoxia (CMS/Formerly [facility]) [J96.01]  Yes   • Pneumoperitoneum of unknown etiology [K66.8]  Yes   • Perforated abdominal viscus [R19.8]  Yes   • Hyperkalemia [E87.5]  Yes   • UTI (urinary tract infection), bacterial [N39.0, A49.9]  Yes   • Sepsis with acute renal failure without septic shock (CMS/Formerly [facility]) [A41.9, R65.20, N17.9]  Yes   • Obstructive sleep apnea syndrome [G47.33]  Yes   • Morbid obesity with BMI of 50.0-59.9, adult (CMS/Formerly [facility]) [E66.01, Z68.43]  Not Applicable   • Type 2 diabetes mellitus with hyperglycemia (CMS/Formerly [facility]) [E11.65]  Yes   • Stasis dermatitis of both legs [I87.2]  Yes   • Sarcoidosis [D86.9]  Yes   • Lymph edema [I89.0]  Yes   • Chronic atrial fibrillation (CMS/Formerly [facility]) [I48.20]  Yes      Resolved Hospital Problems   No resolved problems to display.       Presenting Problem/History of Present Illness:  Ventral hernia without obstruction or gangrene [K43.9]  Abdominal distention [R14.0]  Acute UTI [N39.0]  ROGELIO (acute kidney injury) (CMS/Formerly [facility]) [N17.9]  Leukocytosis, unspecified type [D72.829]  Atrial fibrillation, unspecified type (CMS/Formerly [facility]) [I48.91]     Hospital Course:  The patient is a 80 y.o. female with HTN, uncontrolled Type 2 DM, RODRIGO, chronic atrial fibrillation, recurrent UTI, sarcoidosis, and lymphedema with recurrent cellulitis who presented with abdominal pain, nausea, and vomiting. She was found to be septic and was critically ill with renal and respiratory failure. She was also in atrial fibrillation with RVR. She was started on broad spectrum antibiotics and IV fluids along with an insulin regimen for BG control. She had gastric distension with fluid and no mechanical obstruction-an NG tube was placed for this. The source of sepsis  "was not apparent initially but she was discovered to have free intraperitoneal air indicative of a perforated viscus organ that was not visible on initial imaging. She was evaluated by multiple consultants and it was determined that due to her very poor overall health she was not a candidate for surgery. This was discussed with her healthcare surrogate and she decided to pursue palliative care. She was transferred to the palliative care floor and comfort measures will be continued. She will be discharged to a hospice scattered bed.    Exam Today:  Blood pressure 132/83, pulse 105, temperature 99.1 °F (37.3 °C), temperature source Oral, resp. rate 20, height 157.5 cm (62\"), weight 119 kg (261 lb 14.5 oz), SpO2 95 %.  Vitals and nursing note reviewed.   Constitutional:       Lethargic, appears chronically ill and toxic.  HENT:      Head: Normocephalic and atraumatic.      Nose: Nose normal.      Mouth: Mucous membranes are moist     Eyes:      Conjunctiva/sclera: Conjunctivae normal.      Pupils: Pupils are equal, round, and reactive to light.   Cardiovascular:      Rate and Rhythm: Tachycardia, irregular rhythm     Pulses: Normal pulses.   Pulmonary:      Effort: Pulmonary effort is normal.      Breath sounds: Decreased bibasilar breath sounds  Abdominal:      General: Bowel sounds are decreased      Umbilical hernia present  Musculoskeletal:         General: 3+ BLE edema present. No tenderness.      Cervical back: Normal range of motion and neck supple.   Skin:     General: Skin is warm and dry.   Neurological:      General: No focal deficit present.      Mental Status: She is lethargic   Psychiatric:         She is inattentive. Behavior slowed    Consults:   Consults     Date and Time Order Name Status Description    3/30/2021  4:42 PM Inpatient Gastroenterology Consult Completed     3/30/2021  4:41 PM Inpatient General Surgery Consult Completed     3/30/2021  4:40 PM Inpatient Nephrology Consult Completed     " 3/30/2021  4:39 PM Inpatient Infectious Diseases Consult Completed     3/30/2021  4:39 PM Inpatient Cardiology Consult Completed     3/30/2021  1:34 PM [facility] (on-call MD unless specified) Details Completed            Discharge Disposition: Hospice Scattered Bed      Discharge Medications:  No current facility-administered medications for this encounter.  No current outpatient medications on file.    Facility-Administered Medications Ordered in Other Encounters:   •  acetaminophen (TYLENOL) tablet 650 mg, 650 mg, Oral, Q4H PRN **OR** acetaminophen (TYLENOL) 160 MG/5ML solution 650 mg, 650 mg, Oral, Q4H PRN **OR** acetaminophen (TYLENOL) suppository 650 mg, 650 mg, Rectal, Q4H PRN, Elroy Green MD  •  [START ON 4/3/2021] famotidine (PEPCID) tablet 20 mg, 20 mg, Oral, Daily **OR** [START ON 4/3/2021] famotidine (PEPCID) injection 20 mg, 20 mg, Intravenous, Daily, Elroy Green MD  •  Glycerin-Hypromellose- (ARTIFICIAL TEARS) 0.2-0.2-1 % ophthalmic solution solution 1 drop, 1 drop, Both Eyes, Q30 Min PRN, Elroy Green MD  •  glycopyrrolate (ROBINUL) injection 0.2 mg, 0.2 mg, Intravenous, Q2H PRN **OR** glycopyrrolate (ROBINUL) injection 0.2 mg, 0.2 mg, Subcutaneous, Q2H PRN **OR** glycopyrrolate (ROBINUL) injection 0.4 mg, 0.4 mg, Intravenous, Q2H PRN **OR** glycopyrrolate (ROBINUL) injection 0.4 mg, 0.4 mg, Subcutaneous, Q2H PRN, Elroy Green MD  •  HYDROmorphone (DILAUDID) injection 0.5 mg, 0.5 mg, Intravenous, Q1H PRN **OR** morphine injection 2 mg, 2 mg, Intravenous, Q1H PRN **OR** morphine concentrated solution solution 5 mg, 5 mg, Sublingual, Q1H PRN **OR** ketorolac (TORADOL) injection 15 mg, 15 mg, Intravenous, Q6H PRN, Elroy Green MD  •  HYDROmorphone (DILAUDID) injection 1 mg, 1 mg, Intravenous, Q1H PRN **OR** Morphine sulfate (PF) injection 4 mg, 4 mg, Intravenous, Q1H PRN **OR** morphine concentrated solution solution 10 mg, 10 mg, Sublingual, Q1H PRN, Elroy Green  MD  •  HYDROmorphone (DILAUDID) injection 1.5 mg, 1.5 mg, Intravenous, Q1H PRN **OR** Morphine injection 6 mg, 6 mg, Intravenous, Q1H PRN **OR** morphine concentrated solution solution 20 mg, 20 mg, Sublingual, Q1H PRN, Elroy Green MD  •  ipratropium-albuterol (DUO-NEB) nebulizer solution 3 mL, 3 mL, Nebulization, Q4H PRN, Elroy Green MD  •  LORazepam (ATIVAN) injection 0.5 mg, 0.5 mg, Intravenous, Q1H PRN **OR** LORazepam (ATIVAN) injection 0.5 mg, 0.5 mg, Subcutaneous, Q1H PRN **OR** LORazepam (ATIVAN) 2 MG/ML concentrated solution 0.5 mg, 0.5 mg, Sublingual, Q1H PRN, Elroy Green MD  •  LORazepam (ATIVAN) injection 1 mg, 1 mg, Intravenous, Q1H PRN **OR** LORazepam (ATIVAN) injection 1 mg, 1 mg, Subcutaneous, Q1H PRN **OR** LORazepam (ATIVAN) 2 MG/ML concentrated solution 1 mg, 1 mg, Sublingual, Q1H PRN, Elroy Green MD  •  LORazepam (ATIVAN) injection 2 mg, 2 mg, Intravenous, Q1H PRN **OR** LORazepam (ATIVAN) injection 2 mg, 2 mg, Subcutaneous, Q1H PRN **OR** LORazepam (ATIVAN) 2 MG/ML concentrated solution 2 mg, 2 mg, Sublingual, Q1H PRN, Elroy Green MD  •  magic mouthwash oral supsension 5 mL, 5 mL, Swish & Spit, Q4H PRN, Elroy Green MD  •  miconazole (MICOTIN) 2 % powder 1 application, 1 application, Topical, BID PRN, Elroy Green MD  •  ondansetron (ZOFRAN) tablet 4 mg, 4 mg, Oral, Q6H PRN **OR** ondansetron (ZOFRAN) injection 4 mg, 4 mg, Intravenous, Q6H PRN, Elroy Green MD  •  promethazine (PHENERGAN) tablet 12.5 mg, 12.5 mg, Oral, Q4H PRN **OR** promethazine (PHENERGAN) 6.25 MG/5ML syrup 12.5 mg, 12.5 mg, Oral, Q4H PRN **OR** promethazine (PHENERGAN) suppository 12.5 mg, 12.5 mg, Rectal, Q4H PRN, Elroy Green MD  •  promethazine (PHENERGAN) tablet 6.25 mg, 6.25 mg, Oral, Q4H PRN **OR** promethazine (PHENERGAN) 6.25 MG/5ML syrup 6.25 mg, 6.25 mg, Oral, Q4H PRN **OR** promethazine (PHENERGAN) suppository 6.25 mg, 6.25 mg, Rectal, Q4H PRN, Peter,  Elroy BRYANT MD  •  sodium chloride nasal spray 2 spray, 2 spray, Each Nare, PRN, Elroy Green MD Matthew D Lykins, MD  04/02/21  14:19 EDT    Time Spent on Discharge Activities: Greater than 30 minutes.

## 2021-04-02 NOTE — CONSULTS
I met with the patients co-workers outside her door. They explained to me that the patient will be having visitors as she begins this new transition of life. They brought her music and other people from her sisterhood will be by to see her.

## 2021-04-04 LAB — BACTERIA SPEC AEROBE CULT: NORMAL

## 2021-04-05 LAB
BACTERIA SPEC AEROBE CULT: ABNORMAL
GRAM STN SPEC: ABNORMAL
ISOLATED FROM: ABNORMAL

## 2021-04-05 NOTE — PROGRESS NOTES
Discharge Planning Assessment  [facility]     Patient Name: Shoshana Bae  MRN: [id number]  Today's Date: 2021    Admit Date: 2021    Discharge Needs Assessment    No documentation.       Discharge Plan     Row Name 21 0813       Plan    Plan      Final Discharge Disposition Code  41 -  in medical facility    Final Note  Patient  2021        Continued Care and Services - Discharged on 2021 Admission date: 2021 - Discharge disposition:    Coordination has not been started for this encounter.       Expected Discharge Date and Time     Expected Discharge Date Expected Discharge Time    2021         Demographic Summary    No documentation.       Functional Status    No documentation.       Psychosocial    No documentation.       Abuse/Neglect    No documentation.       Legal    No documentation.       Substance Abuse    No documentation.       Patient Forms    No documentation.           Abby Castillo RN

## 2021-04-21 NOTE — DISCHARGE SUMMARY
Primary Care Physician: Fany Morales MD    Date of Admission:  2021  Date of Death:  2021  Time of Death:  6:48 PM    Cause of Death: Perforated Abdominal Viscus    Final Diagnosis:  Active Hospital Problems    Diagnosis  POA   • Acute kidney failure (CMS/HCC) [N17.9]  Yes      Resolved Hospital Problems   No resolved problems to display.       Presenting Problem/History of Present Illness:  Acute kidney failure (CMS/HCC) [N17.9]      Hospital Course  The patient is a 80 y.o. female with HTN, uncontrolled Type 2 DM, RODRIGO, chronic atrial fibrillation, recurrent UTI, sarcoidosis, and lymphedema with recurrent cellulitis who presented with abdominal pain, nausea, and vomiting. She was found to be septic and was critically ill with renal and respiratory failure. She was also in atrial fibrillation with RVR. She was started on broad spectrum antibiotics and IV fluids along with an insulin regimen for BG control. She had gastric distension with fluid and no mechanical obstruction-an NG tube was placed for this. The source of sepsis was not apparent initially but she was discovered to have free intraperitoneal air indicative of a perforated viscus organ that was not visible on initial imaging. She was evaluated by multiple consultants and it was determined that due to her very poor overall health she was not a candidate for surgery. This was discussed with her healthcare surrogate and she decided to pursue palliative care. She was transferred to the palliative care floor and discharged to a hospice scattered bed. She continued her rapid clinical decline and  peacefully in the early evening of 2021.      Elroy Green MD  21  10:40 EDT

## 2023-08-17 NOTE — PROGRESS NOTES
LOS: 2 days   Patient Care Team:  Fany Morales MD as PCP - General (Geriatric Medicine)  Hiram De La Cruz MD as Consulting Physician (Cardiology)    Chief Complaint:     Follow-up atrial fibrillation.    Interval History:     She really is unable to say anything to me except for that she is thirsty and just wants some water.  She will is unable to answer questions other symptoms.    I spoke to the hospitalist and her CT abdomen pelvis shows perforated bowel.    Objective   Vital Signs  Temp:  [97.4 °F (36.3 °C)-98.4 °F (36.9 °C)] 98.2 °F (36.8 °C)  Heart Rate:  [] 108  Resp:  [16-28] 22  BP: (104-134)/(64-86) 134/86    Intake/Output Summary (Last 24 hours) at 4/1/2021 0807  Last data filed at 4/1/2021 0647  Gross per 24 hour   Intake 4022.25 ml   Output 1725 ml   Net 2297.25 ml       Comfortable NAD  Neck supple, no JVD or thyromegaly appreciated  S1/S2 irregular, tachycardia, no m/r/g  Lungs CTA B, normal effort  Abdomen S/NT/ND (+) BS, no HSM appreciated  Extremities warm, no clubbing, cyanosis, or edema  No visible or palpable skin lesions  A/Ox4, mood and affect appropriate    Results Review:      Results from last 7 days   Lab Units 04/01/21 0427 03/31/21  1455 03/31/21  0812   SODIUM mmol/L 138 136 133*   POTASSIUM mmol/L 5.1 5.4* 5.7*   CHLORIDE mmol/L 102 99 97*   CO2 mmol/L 21.4* 22.0 21.1*   BUN mg/dL 72* 65* 60*   CREATININE mg/dL 3.59* 3.56* 3.41*   GLUCOSE mg/dL 282* 332* 374*   CALCIUM mg/dL 8.4* 8.6 8.5*         Results from last 7 days   Lab Units 04/01/21  0427 03/31/21  2349 03/31/21  1454 03/31/21  0010 03/30/21  1111   WBC 10*3/mm3 16.84*  --   --  22.22* 29.94*   HEMOGLOBIN g/dL 13.2 12.8 13.3 13.4  13.4 15.1   HEMATOCRIT % 40.4 40.0 39.6 41.2  41.2 44.9   PLATELETS 10*3/mm3 152  --   --  189 212             Results from last 7 days   Lab Units 04/01/21  0427   MAGNESIUM mg/dL 2.2           I reviewed the patient's new clinical results.  I personally viewed and  interpreted the patient's EKG/Telemetry data        Medication Review:   insulin glargine, 30 Units, Subcutaneous, Nightly  insulin regular, 0-14 Units, Subcutaneous, Q6H  ipratropium-albuterol, 3 mL, Nebulization, 4x Daily - RT  metoprolol tartrate, 5 mg, Intravenous, Q8H  piperacillin-tazobactam, 3.375 g, Intravenous, Q12H        dilTIAZem, 5-15 mg/hr, Last Rate: 5 mg/hr (04/01/21 0422)  pantoprazole, 8 mg/hr, Last Rate: 8 mg/hr (04/01/21 0420)  sodium chloride, 75 mL/hr, Last Rate: 75 mL/hr (04/01/21 0054)        Assessment/Plan       Acute renal failure (ARF) (CMS/[facility])    Lymph edema    Sarcoidosis    Chronic atrial fibrillation (CMS/[facility])    Type 2 diabetes mellitus with hyperglycemia (CMS/[facility])    Stasis dermatitis of both legs    Morbid obesity with BMI of 50.0-59.9, adult (CMS/[facility])    Obstructive sleep apnea syndrome    Sepsis with acute renal failure without septic shock (CMS/[facility])    UTI (urinary tract infection), bacterial    Hyperkalemia    1. Atrial fibrillation with RVR - exacerbated by acute illness.  Did not respond much with IV metoprolol, no digoxin due to renal impairment. She is NPO.       2. Sepsis with acute renal failure - source likely urinary tract vs intra abdominal. She is on antibiotics. She is on IVF.      3. ROGELIO , likely ATN from volume depletion - creatinine 3.5. and her potassium is elevated (5.7). nephrology is following.      4. Poorly controlled type II diabetes A1c 10.5     5. Impaired gastric emptying with perforated colon. No obstruction noted on CT. She remains NPO with NG tube in place.     She is now going to be moved to palliative, we have nothing further to add, will sign off the case.    Bev Choi MD  04/01/21  08:07 EDT       no

## (undated) DEVICE — GLV SURG TRIUMPH CLASSIC PF LTX 7.5 STRL

## (undated) DEVICE — KT DRN EVAC WND PVC PCH WTROC RND 10F400

## (undated) DEVICE — APPL DURAPREP IODOPHOR APL 26ML

## (undated) DEVICE — GENESIS TROCHLEAR PIN 1/8 IN. X 5IN: Brand: GENESIS

## (undated) DEVICE — PREMIUM WET SKIN PREP TRAY: Brand: MEDLINE INDUSTRIES, INC.

## (undated) DEVICE — PREP SOL POVIDONE/IODINE BT 4OZ

## (undated) DEVICE — GENESIS TROCHLEAR PIN 1/8 X 3: Brand: GENESIS

## (undated) DEVICE — SUT VIC 1 CT1 36IN J947H

## (undated) DEVICE — 3M™ IOBAN™ 2 ANTIMICROBIAL INCISE DRAPE 6640EZ: Brand: IOBAN™ 2

## (undated) DEVICE — MEDI-VAC YANKAUER SUCTION HANDLE W/BULBOUS TIP: Brand: CARDINAL HEALTH

## (undated) DEVICE — PICO SINGLE USE NEGATIVE PRESSURE WOUND THERAPY SYSTEM 10CM X 30CM 4IN. X 12IN.: Brand: PICO

## (undated) DEVICE — BNDG ELAS ELITE V/CLOSE 6IN 5YD LF STRL

## (undated) DEVICE — PK KN TOTL 40

## (undated) DEVICE — UNDERCAST PADDING: Brand: DEROYAL

## (undated) DEVICE — MAT FLR ABSORBENT LG 4FT 10 2.5FT

## (undated) DEVICE — GLV SURG SENSICARE GREEN W/ALOE PF LF 7 STRL

## (undated) DEVICE — STPLR SKIN VISISTAT WD 35CT

## (undated) DEVICE — CONTAINER,SPECIMEN,OR STERILE,4OZ: Brand: MEDLINE

## (undated) DEVICE — GLV SURG SENSICARE MICRO PF LF 7 STRL

## (undated) DEVICE — DRSNG WND GZ CURAD OIL EMULSION 3X8IN LF STRL 1PK

## (undated) DEVICE — SUT VIC 0 CT1 36IN J946H

## (undated) DEVICE — ENCORE® LATEX ORTHO SIZE 7.5, STERILE LATEX POWDER-FREE SURGICAL GLOVE: Brand: ENCORE

## (undated) DEVICE — NDL SPINE 22G 31/2IN BLK

## (undated) DEVICE — 3M™ IOBAN™ 2 ANTIMICROBIAL INCISE DRAPE 6650EZ: Brand: IOBAN™ 2